# Patient Record
Sex: MALE | Race: WHITE | Employment: UNEMPLOYED | URBAN - METROPOLITAN AREA
[De-identification: names, ages, dates, MRNs, and addresses within clinical notes are randomized per-mention and may not be internally consistent; named-entity substitution may affect disease eponyms.]

---

## 2020-08-25 ENCOUNTER — APPOINTMENT (OUTPATIENT)
Dept: CT IMAGING | Age: 65
DRG: 166 | End: 2020-08-25
Attending: INTERNAL MEDICINE
Payer: COMMERCIAL

## 2020-08-25 ENCOUNTER — APPOINTMENT (OUTPATIENT)
Dept: GENERAL RADIOLOGY | Age: 65
DRG: 166 | End: 2020-08-25
Attending: STUDENT IN AN ORGANIZED HEALTH CARE EDUCATION/TRAINING PROGRAM
Payer: COMMERCIAL

## 2020-08-25 ENCOUNTER — HOSPITAL ENCOUNTER (INPATIENT)
Age: 65
LOS: 22 days | Discharge: NURSING, REHAB OR LONG-TERM CARE | DRG: 166 | End: 2020-09-16
Attending: EMERGENCY MEDICINE | Admitting: INTERNAL MEDICINE
Payer: COMMERCIAL

## 2020-08-25 ENCOUNTER — APPOINTMENT (OUTPATIENT)
Dept: INTERVENTIONAL RADIOLOGY/VASCULAR | Age: 65
DRG: 166 | End: 2020-08-25
Attending: INTERNAL MEDICINE
Payer: COMMERCIAL

## 2020-08-25 DIAGNOSIS — R06.02 SOB (SHORTNESS OF BREATH): ICD-10-CM

## 2020-08-25 DIAGNOSIS — J18.9 PNEUMONIA OF RIGHT LOWER LOBE DUE TO INFECTIOUS ORGANISM: ICD-10-CM

## 2020-08-25 DIAGNOSIS — R53.83 FATIGUE, UNSPECIFIED TYPE: ICD-10-CM

## 2020-08-25 DIAGNOSIS — A41.9 SEVERE SEPSIS (HCC): Primary | ICD-10-CM

## 2020-08-25 DIAGNOSIS — R65.20 SEVERE SEPSIS (HCC): Primary | ICD-10-CM

## 2020-08-25 DIAGNOSIS — K92.1 GASTROINTESTINAL HEMORRHAGE WITH MELENA: ICD-10-CM

## 2020-08-25 DIAGNOSIS — R63.4 WEIGHT LOSS, UNINTENTIONAL: ICD-10-CM

## 2020-08-25 DIAGNOSIS — R07.9 CHEST PAIN, UNSPECIFIED TYPE: ICD-10-CM

## 2020-08-25 PROBLEM — J90 PLEURAL EFFUSION, RIGHT: Status: ACTIVE | Noted: 2020-08-25

## 2020-08-25 PROBLEM — R19.7 DIARRHEA: Status: ACTIVE | Noted: 2020-08-25

## 2020-08-25 PROBLEM — I82.403 ACUTE DEEP VEIN THROMBOSIS (DVT) OF BOTH LOWER EXTREMITIES (HCC): Status: ACTIVE | Noted: 2020-08-25

## 2020-08-25 PROBLEM — I82.402 ACUTE DEEP VEIN THROMBOSIS (DVT) OF LEFT LOWER EXTREMITY (HCC): Status: ACTIVE | Noted: 2020-08-25

## 2020-08-25 PROBLEM — I26.99 BILATERAL PULMONARY EMBOLISM (HCC): Status: ACTIVE | Noted: 2020-08-25

## 2020-08-25 PROBLEM — R04.2 HEMOPTYSIS: Status: ACTIVE | Noted: 2020-08-25

## 2020-08-25 PROBLEM — Z78.9 ALCOHOL USE: Status: ACTIVE | Noted: 2020-08-25

## 2020-08-25 PROBLEM — R19.5 FECAL OCCULT BLOOD TEST POSITIVE: Status: ACTIVE | Noted: 2020-08-25

## 2020-08-25 PROBLEM — E87.1 HYPONATREMIA WITH EXTRACELLULAR FLUID DEPLETION: Status: ACTIVE | Noted: 2020-08-25

## 2020-08-25 PROBLEM — E80.6 HYPERBILIRUBINEMIA: Status: ACTIVE | Noted: 2020-08-25

## 2020-08-25 LAB
ALBUMIN SERPL-MCNC: 1 G/DL (ref 3.5–5)
ALBUMIN/GLOB SERPL: 0.2 {RATIO} (ref 1.1–2.2)
ALP SERPL-CCNC: 221 U/L (ref 45–117)
ALT SERPL-CCNC: 67 U/L (ref 12–78)
ANION GAP SERPL CALC-SCNC: 5 MMOL/L (ref 5–15)
AST SERPL-CCNC: 95 U/L (ref 15–37)
BASOPHILS # BLD: 0 K/UL (ref 0–0.1)
BASOPHILS NFR BLD: 0 % (ref 0–1)
BILIRUB SERPL-MCNC: 2.5 MG/DL (ref 0.2–1)
BUN SERPL-MCNC: 13 MG/DL (ref 6–20)
BUN/CREAT SERPL: 17 (ref 12–20)
CALCIUM SERPL-MCNC: 7.5 MG/DL (ref 8.5–10.1)
CHLORIDE SERPL-SCNC: 96 MMOL/L (ref 97–108)
CO2 SERPL-SCNC: 31 MMOL/L (ref 21–32)
COMMENT, HOLDF: NORMAL
COMMENT, HOLDF: NORMAL
CREAT SERPL-MCNC: 0.78 MG/DL (ref 0.7–1.3)
DIFFERENTIAL METHOD BLD: ABNORMAL
EOSINOPHIL # BLD: 0 K/UL (ref 0–0.4)
EOSINOPHIL NFR BLD: 0 % (ref 0–7)
ERYTHROCYTE [DISTWIDTH] IN BLOOD BY AUTOMATED COUNT: 19.8 % (ref 11.5–14.5)
GLOBULIN SER CALC-MCNC: 5.8 G/DL (ref 2–4)
GLUCOSE SERPL-MCNC: 66 MG/DL (ref 65–100)
HCT VFR BLD AUTO: 35.1 % (ref 36.6–50.3)
HEMOCCULT STL QL: POSITIVE
HGB BLD-MCNC: 12.2 G/DL (ref 12.1–17)
IMM GRANULOCYTES # BLD AUTO: 0.9 K/UL (ref 0–0.04)
IMM GRANULOCYTES NFR BLD AUTO: 3 % (ref 0–0.5)
LACTATE SERPL-SCNC: 1.1 MMOL/L (ref 0.4–2)
LACTATE SERPL-SCNC: 2.2 MMOL/L (ref 0.4–2)
LYMPHOCYTES # BLD: 2.5 K/UL (ref 0.8–3.5)
LYMPHOCYTES NFR BLD: 8 % (ref 12–49)
M PNEUMO IGM SER IA-ACNC: NONREACTIVE
MCH RBC QN AUTO: 34.1 PG (ref 26–34)
MCHC RBC AUTO-ENTMCNC: 34.8 G/DL (ref 30–36.5)
MCV RBC AUTO: 98 FL (ref 80–99)
MONOCYTES # BLD: 1.2 K/UL (ref 0–1)
MONOCYTES NFR BLD: 4 % (ref 5–13)
NEUTS SEG # BLD: 26.5 K/UL (ref 1.8–8)
NEUTS SEG NFR BLD: 85 % (ref 32–75)
NRBC # BLD: 0 K/UL (ref 0–0.01)
NRBC BLD-RTO: 0 PER 100 WBC
PLATELET # BLD AUTO: 367 K/UL (ref 150–400)
PMV BLD AUTO: 10.6 FL (ref 8.9–12.9)
POTASSIUM SERPL-SCNC: 4.4 MMOL/L (ref 3.5–5.1)
PROT SERPL-MCNC: 6.8 G/DL (ref 6.4–8.2)
RBC # BLD AUTO: 3.58 M/UL (ref 4.1–5.7)
RBC MORPH BLD: ABNORMAL
RBC MORPH BLD: ABNORMAL
SAMPLES BEING HELD,HOLD: NORMAL
SAMPLES BEING HELD,HOLD: NORMAL
SODIUM SERPL-SCNC: 132 MMOL/L (ref 136–145)
TROPONIN I SERPL-MCNC: <0.05 NG/ML
WBC # BLD AUTO: 31.1 K/UL (ref 4.1–11.1)

## 2020-08-25 PROCEDURE — 74011000250 HC RX REV CODE- 250: Performed by: STUDENT IN AN ORGANIZED HEALTH CARE EDUCATION/TRAINING PROGRAM

## 2020-08-25 PROCEDURE — 87635 SARS-COV-2 COVID-19 AMP PRB: CPT

## 2020-08-25 PROCEDURE — 74011000636 HC RX REV CODE- 636: Performed by: RADIOLOGY

## 2020-08-25 PROCEDURE — 37191 INS ENDOVAS VENA CAVA FILTR: CPT

## 2020-08-25 PROCEDURE — 74011000250 HC RX REV CODE- 250: Performed by: INTERNAL MEDICINE

## 2020-08-25 PROCEDURE — 77030029065 HC DRSG HEMO QCLOT ZMED -B

## 2020-08-25 PROCEDURE — 77030015395 HC CATH ANGI DX SVU ANGI -C

## 2020-08-25 PROCEDURE — 87040 BLOOD CULTURE FOR BACTERIA: CPT

## 2020-08-25 PROCEDURE — 80053 COMPREHEN METABOLIC PANEL: CPT

## 2020-08-25 PROCEDURE — C9113 INJ PANTOPRAZOLE SODIUM, VIA: HCPCS | Performed by: INTERNAL MEDICINE

## 2020-08-25 PROCEDURE — 76937 US GUIDE VASCULAR ACCESS: CPT

## 2020-08-25 PROCEDURE — 74011250636 HC RX REV CODE- 250/636: Performed by: STUDENT IN AN ORGANIZED HEALTH CARE EDUCATION/TRAINING PROGRAM

## 2020-08-25 PROCEDURE — 74011000258 HC RX REV CODE- 258: Performed by: INTERNAL MEDICINE

## 2020-08-25 PROCEDURE — 71260 CT THORAX DX C+: CPT

## 2020-08-25 PROCEDURE — 06H03DZ INSERTION OF INTRALUMINAL DEVICE INTO INFERIOR VENA CAVA, PERCUTANEOUS APPROACH: ICD-10-PCS | Performed by: STUDENT IN AN ORGANIZED HEALTH CARE EDUCATION/TRAINING PROGRAM

## 2020-08-25 PROCEDURE — 85025 COMPLETE CBC W/AUTO DIFF WBC: CPT

## 2020-08-25 PROCEDURE — C1769 GUIDE WIRE: HCPCS

## 2020-08-25 PROCEDURE — 74011250636 HC RX REV CODE- 250/636: Performed by: EMERGENCY MEDICINE

## 2020-08-25 PROCEDURE — 86738 MYCOPLASMA ANTIBODY: CPT

## 2020-08-25 PROCEDURE — 82272 OCCULT BLD FECES 1-3 TESTS: CPT

## 2020-08-25 PROCEDURE — C1894 INTRO/SHEATH, NON-LASER: HCPCS

## 2020-08-25 PROCEDURE — C1880 VENA CAVA FILTER: HCPCS

## 2020-08-25 PROCEDURE — B5191ZZ FLUOROSCOPY OF INFERIOR VENA CAVA USING LOW OSMOLAR CONTRAST: ICD-10-PCS | Performed by: STUDENT IN AN ORGANIZED HEALTH CARE EDUCATION/TRAINING PROGRAM

## 2020-08-25 PROCEDURE — 99285 EMERGENCY DEPT VISIT HI MDM: CPT

## 2020-08-25 PROCEDURE — 74011000636 HC RX REV CODE- 636: Performed by: STUDENT IN AN ORGANIZED HEALTH CARE EDUCATION/TRAINING PROGRAM

## 2020-08-25 PROCEDURE — 96360 HYDRATION IV INFUSION INIT: CPT

## 2020-08-25 PROCEDURE — 83605 ASSAY OF LACTIC ACID: CPT

## 2020-08-25 PROCEDURE — 84484 ASSAY OF TROPONIN QUANT: CPT

## 2020-08-25 PROCEDURE — 74011250636 HC RX REV CODE- 250/636: Performed by: INTERNAL MEDICINE

## 2020-08-25 PROCEDURE — 36415 COLL VENOUS BLD VENIPUNCTURE: CPT

## 2020-08-25 PROCEDURE — 65660000000 HC RM CCU STEPDOWN

## 2020-08-25 PROCEDURE — 93005 ELECTROCARDIOGRAM TRACING: CPT

## 2020-08-25 PROCEDURE — 71045 X-RAY EXAM CHEST 1 VIEW: CPT

## 2020-08-25 RX ORDER — HEPARIN SODIUM 10000 [USP'U]/100ML
18-36 INJECTION, SOLUTION INTRAVENOUS
Status: DISCONTINUED | OUTPATIENT
Start: 2020-08-25 | End: 2020-08-25

## 2020-08-25 RX ORDER — FENTANYL CITRATE 50 UG/ML
25-50 INJECTION, SOLUTION INTRAMUSCULAR; INTRAVENOUS
Status: DISCONTINUED | OUTPATIENT
Start: 2020-08-25 | End: 2020-08-26 | Stop reason: HOSPADM

## 2020-08-25 RX ORDER — POLYETHYLENE GLYCOL 3350 17 G/17G
17 POWDER, FOR SOLUTION ORAL DAILY PRN
Status: DISCONTINUED | OUTPATIENT
Start: 2020-08-25 | End: 2020-09-16 | Stop reason: HOSPADM

## 2020-08-25 RX ORDER — HEPARIN SODIUM 200 [USP'U]/100ML
500 INJECTION, SOLUTION INTRAVENOUS CONTINUOUS
Status: DISCONTINUED | OUTPATIENT
Start: 2020-08-25 | End: 2020-08-25 | Stop reason: HOSPADM

## 2020-08-25 RX ORDER — ONDANSETRON 2 MG/ML
4 INJECTION INTRAMUSCULAR; INTRAVENOUS
Status: DISCONTINUED | OUTPATIENT
Start: 2020-08-25 | End: 2020-09-16 | Stop reason: HOSPADM

## 2020-08-25 RX ORDER — FENTANYL CITRATE 50 UG/ML
25-50 INJECTION, SOLUTION INTRAMUSCULAR; INTRAVENOUS
Status: DISCONTINUED | OUTPATIENT
Start: 2020-08-25 | End: 2020-08-25 | Stop reason: HOSPADM

## 2020-08-25 RX ORDER — SODIUM CHLORIDE 9 MG/ML
50 INJECTION, SOLUTION INTRAVENOUS CONTINUOUS
Status: DISCONTINUED | OUTPATIENT
Start: 2020-08-25 | End: 2020-09-05

## 2020-08-25 RX ORDER — SODIUM CHLORIDE 0.9 % (FLUSH) 0.9 %
5-10 SYRINGE (ML) INJECTION AS NEEDED
Status: DISCONTINUED | OUTPATIENT
Start: 2020-08-25 | End: 2020-09-16 | Stop reason: HOSPADM

## 2020-08-25 RX ORDER — LIDOCAINE HYDROCHLORIDE 10 MG/ML
10-30 INJECTION INFILTRATION; PERINEURAL
Status: DISCONTINUED | OUTPATIENT
Start: 2020-08-25 | End: 2020-08-25 | Stop reason: HOSPADM

## 2020-08-25 RX ORDER — PROMETHAZINE HYDROCHLORIDE 25 MG/1
12.5 TABLET ORAL
Status: DISCONTINUED | OUTPATIENT
Start: 2020-08-25 | End: 2020-09-16 | Stop reason: HOSPADM

## 2020-08-25 RX ORDER — ACETAMINOPHEN 650 MG/1
650 SUPPOSITORY RECTAL
Status: DISCONTINUED | OUTPATIENT
Start: 2020-08-25 | End: 2020-09-16 | Stop reason: HOSPADM

## 2020-08-25 RX ORDER — SODIUM CHLORIDE 0.9 % (FLUSH) 0.9 %
5-40 SYRINGE (ML) INJECTION AS NEEDED
Status: DISCONTINUED | OUTPATIENT
Start: 2020-08-25 | End: 2020-09-16 | Stop reason: HOSPADM

## 2020-08-25 RX ORDER — SODIUM CHLORIDE 0.9 % (FLUSH) 0.9 %
5-40 SYRINGE (ML) INJECTION EVERY 8 HOURS
Status: DISCONTINUED | OUTPATIENT
Start: 2020-08-25 | End: 2020-09-16 | Stop reason: HOSPADM

## 2020-08-25 RX ORDER — HEPARIN SODIUM 5000 [USP'U]/ML
80 INJECTION, SOLUTION INTRAVENOUS; SUBCUTANEOUS ONCE
Status: DISCONTINUED | OUTPATIENT
Start: 2020-08-25 | End: 2020-08-25

## 2020-08-25 RX ORDER — ACETAMINOPHEN 325 MG/1
650 TABLET ORAL
Status: DISCONTINUED | OUTPATIENT
Start: 2020-08-25 | End: 2020-09-16 | Stop reason: HOSPADM

## 2020-08-25 RX ORDER — ENOXAPARIN SODIUM 100 MG/ML
40 INJECTION SUBCUTANEOUS DAILY
Status: CANCELLED | OUTPATIENT
Start: 2020-08-26

## 2020-08-25 RX ORDER — HEPARIN SODIUM 200 [USP'U]/100ML
500 INJECTION, SOLUTION INTRAVENOUS CONTINUOUS
Status: DISCONTINUED | OUTPATIENT
Start: 2020-08-26 | End: 2020-08-26 | Stop reason: HOSPADM

## 2020-08-25 RX ADMIN — SODIUM CHLORIDE 863 ML: 900 INJECTION, SOLUTION INTRAVENOUS at 17:38

## 2020-08-25 RX ADMIN — SODIUM CHLORIDE 40 MG: 9 INJECTION INTRAMUSCULAR; INTRAVENOUS; SUBCUTANEOUS at 22:27

## 2020-08-25 RX ADMIN — IOPAMIDOL 15 ML: 755 INJECTION, SOLUTION INTRAVENOUS at 23:43

## 2020-08-25 RX ADMIN — PIPERACILLIN AND TAZOBACTAM 3.38 G: 3; .375 INJECTION, POWDER, LYOPHILIZED, FOR SOLUTION INTRAVENOUS at 22:27

## 2020-08-25 RX ADMIN — AZITHROMYCIN 500 MG: 500 INJECTION, POWDER, LYOPHILIZED, FOR SOLUTION INTRAVENOUS at 17:42

## 2020-08-25 RX ADMIN — HEPARIN SODIUM 500 UNITS/HR: 200 INJECTION, SOLUTION INTRAVENOUS at 23:39

## 2020-08-25 RX ADMIN — IOPAMIDOL 100 ML: 755 INJECTION, SOLUTION INTRAVENOUS at 18:38

## 2020-08-25 RX ADMIN — LIDOCAINE HYDROCHLORIDE 5 ML: 10 INJECTION, SOLUTION INFILTRATION; PERINEURAL at 23:34

## 2020-08-25 RX ADMIN — Medication 10 ML: at 22:47

## 2020-08-25 RX ADMIN — SODIUM CHLORIDE 75 ML/HR: 900 INJECTION, SOLUTION INTRAVENOUS at 22:27

## 2020-08-25 RX ADMIN — FENTANYL CITRATE 50 MCG: 50 INJECTION, SOLUTION INTRAMUSCULAR; INTRAVENOUS at 23:37

## 2020-08-25 RX ADMIN — SODIUM CHLORIDE 1000 ML: 900 INJECTION, SOLUTION INTRAVENOUS at 16:05

## 2020-08-25 RX ADMIN — WATER 1 G: 1 INJECTION INTRAMUSCULAR; INTRAVENOUS; SUBCUTANEOUS at 17:43

## 2020-08-25 NOTE — ED NOTES
Verbal report given to Zoe (name) on Hannah Rolon being transferred to PCC(unit) for routine progression of care    Report consisted of patient's Situation, Background, Assessment and Recommendations (SBAR)    Information from the following report(s)  SBAR, ED Summary, Procedure Summary and Intake/Output was reviewed with the receiving nurse. Opportunity for questions and clarification was provided.     Patient transported with:  Monitor  Registered Nurse    Last Filed Values:  Temp: 98.2 °F (36.8 °C) (08/25/20 1927)  Pulse (Heart Rate): 96 (08/25/20 1927)  Resp Rate: 25 (08/25/20 1926)  O2 Sat (%): 93 % (08/25/20 1926)  BP: 90/71 (08/25/20 1927)  MAP (Monitor): 79 (08/25/20 1926)  MAP (Calculated): 77 (08/25/20 1927)  Level of Consciousness: Alert (08/25/20 1927)      Lab Results   Component Value Date/Time    WBC 31.1 (H) 08/25/2020 04:07 PM    Lactic acid 1.1 08/25/2020 06:25 PM    Lactic acid 2.2 (HH) 08/25/2020 04:07 PM       Repeat LA:  Time Due completed at 1825    Blood Cultures Drawn:  yes    Fluid Resuscitation:  Total needed 1863 mL, Status completed, amount 1863    All Antibiotics Started:  yes, Dose Due completed see MAR     VS x 2 post-fluid resuscitation:   yes    Vasopressor Infusion:  no n/a    Provider Reassessment needed and notified:  no , Due completed     Additional Interventions/Comments:  N/a

## 2020-08-25 NOTE — Clinical Note
IVC consent obtained. Plan for Filter insertion. IVC site prepped. Right internal jugular accessed. IVC wire inserted. Pre-deployment venacavogram done. Renal veins visualized. IVC filter inserted. Post-deployment venacavogram done. IVC placement verified. A Other (enter in comment page) filter placed in Inferior Vena Cava.  [employer]

## 2020-08-25 NOTE — ED TRIAGE NOTES
Pt reports chest pain, diarrhea, and lethargy for a few months. Pt reportedly fell on floor 2 days ago and was unable to get up for unknown amount of time.

## 2020-08-25 NOTE — ED PROVIDER NOTES
61 y/o M who denies any PMH (Hasn't seen a doctor in over 40 years besides seeing PCP 1 month ago) presents due to fatigue, chest pain, SOB, and diarrhea. Pt reports chronic worsening fatigue, weight loss of about 50 lbs over past year in a half, and worsening SOB over the past few weeks. Does not take any medications, went to establish with PCP last month due to symptoms and says has ABD CT scheduled for 8/27/20. Says has also been having \"black\" diarrhea a few times a day for the past week. Pt with history of smoking about 2 PPD for past 40 years and drinking 12 pack of beer daily for past 40 years, has been smoking and drinking less the past few weeks due to symptoms. Pt denies taking any medications or blood thinners. Pt seen and examined with Dr. Paco Torres. Chart reviewed. No past medical history on file. No past surgical history on file. No family history on file.     Social History     Socioeconomic History    Marital status: SINGLE     Spouse name: Not on file    Number of children: Not on file    Years of education: Not on file    Highest education level: Not on file   Occupational History    Not on file   Social Needs    Financial resource strain: Not on file    Food insecurity     Worry: Not on file     Inability: Not on file    Transportation needs     Medical: Not on file     Non-medical: Not on file   Tobacco Use    Smoking status: Not on file   Substance and Sexual Activity    Alcohol use: Not on file    Drug use: Not on file    Sexual activity: Not on file   Lifestyle    Physical activity     Days per week: Not on file     Minutes per session: Not on file    Stress: Not on file   Relationships    Social connections     Talks on phone: Not on file     Gets together: Not on file     Attends Mu-ism service: Not on file     Active member of club or organization: Not on file     Attends meetings of clubs or organizations: Not on file     Relationship status: Not on file    Intimate partner violence     Fear of current or ex partner: Not on file     Emotionally abused: Not on file     Physically abused: Not on file     Forced sexual activity: Not on file   Other Topics Concern    Not on file   Social History Narrative    Not on file         ALLERGIES: Patient has no known allergies. Review of Systems   Constitutional: Positive for appetite change, fatigue and unexpected weight change. Negative for chills and fever. HENT: Negative for congestion and trouble swallowing. Eyes: Negative for pain and redness. Respiratory: Positive for cough and shortness of breath. Negative for wheezing. Cardiovascular: Positive for chest pain and leg swelling. Negative for palpitations. Gastrointestinal: Positive for blood in stool and diarrhea. Negative for abdominal pain, constipation, nausea and vomiting. Genitourinary: Negative for difficulty urinating and dysuria. Musculoskeletal: Negative for neck pain and neck stiffness. Skin: Negative for rash. Neurological: Negative for seizures. Psychiatric/Behavioral: Negative for confusion. Vitals:    08/25/20 1918 08/25/20 1926 08/25/20 1927 08/25/20 2010   BP:  90/71 90/71    Pulse:  96 96 97   Resp:  25     Temp:   98.2 °F (36.8 °C)    SpO2: 94% 93%     Weight:       Height:                Physical Exam  Vitals signs reviewed. Constitutional:       Appearance: He is ill-appearing. Comments: Cachectic    HENT:      Head: Normocephalic and atraumatic. Eyes:      Pupils: Pupils are equal, round, and reactive to light. Neck:      Musculoskeletal: Normal range of motion and neck supple. Cardiovascular:      Rate and Rhythm: Regular rhythm. Tachycardia present. Heart sounds: Heart sounds are distant. No murmur. Pulmonary:      Effort: Tachypnea present. No respiratory distress. Breath sounds: Examination of the right-lower field reveals decreased breath sounds. Decreased breath sounds present.  No wheezing. Comments: Decreased effort  Abdominal:      General: Bowel sounds are normal.      Palpations: Abdomen is soft. Tenderness: There is no abdominal tenderness. There is no guarding. Genitourinary:     Comments: FOBT +, brown stool on exam  Musculoskeletal:      Comments: 2+ pitting edema in bilateral lower extremities to the knees   Skin:     Capillary Refill: Capillary refill takes less than 2 seconds. Coloration: Skin is pale. Comments: Spider angiomas no chest   Neurological:      General: No focal deficit present. Mental Status: He is alert and oriented to person, place, and time. Psychiatric:         Behavior: Behavior normal.         MDM  Number of Diagnoses or Management Options  Chest pain, unspecified type: new and requires workup  Fatigue, unspecified type: new and requires workup  Gastrointestinal hemorrhage with melena: new and requires workup  Pneumonia of right lower lobe due to infectious organism: new and requires workup  Severe sepsis [facility]): new and requires workup  SOB (shortness of breath): new and requires workup  Weight loss, unintentional: new and requires workup  Diagnosis management comments: 61 y/o M who appears very sick on exam, cachectic, denies any PMH because has not been seeing a doctor for past 40 years until last month. EKG sinus tachy 118, no ST elevations. Troponin neg. WBC 31.1, LA 2.2, tachy, and increased respiratory rate. R PNA on CXR. Pt with severe sepsis, ordered 30cc/kg fluid bolus, cultures, and Rocephin and Azithro. FOBT +, Hgb 12.2. Elevated LFTs. Patient is very sick. Consulted hospitalist for admission.         Amount and/or Complexity of Data Reviewed  Clinical lab tests: ordered and reviewed  Tests in the radiology section of CPT®: ordered and reviewed    Risk of Complications, Morbidity, and/or Mortality  Presenting problems: high  Diagnostic procedures: moderate  Management options: moderate    Critical Care  Total time providing critical care: < 30 minutes    Patient Progress  Patient progress: stable    ED Course as of Aug 25 2013   Tue Aug 25, 2020   1626 Pt evaluated at bedside. Cachectic, thin, and frail with significant bilateral LE pitting edema, telangiectasias on chest. Tachycardic.     [SM]      ED Course User Index  [SM] Tammie Dean MD       Procedures: none.      Maida Mandujano MD  08/25/20

## 2020-08-25 NOTE — PROGRESS NOTES
8/25/2020  5:57 PM    Case management note    eval face to face /both masked   Before isolation    Reason for Admission:   Lethargy, GLF  Patient came to ED for fall which he layed on floor for quite some time. Patient is very thin and ill looking. He has a friend at [facility] [phone]  Patient does drive, uses a cane. He currently does not take any medication, but if needed RX [facility]                   RUR Score:        moderate             Plan for utilizing home health: To be evaluated by PT/OT    PCP: First and Last name:  Gaye Ortega   Name of Practice:    Are you a current patient: Yes/No: yes   Approximate date of last visit: 1 month   Can you participate in a virtual visit with your PCP: no                    Current Advanced Directive/Advance Care Plan: no, order placed for silvia to do AD in am. Patient may be interested in DNR                         Transition of Care Plan:                    1. Home   2. PCP follow up  3. Long term planning  4. CM to follow for discharge needs.     Care Management Interventions  PCP Verified by CM: Yes(dr. gaye ortega)  Mode of Transport at Discharge: Self  Current Support Network: Lives Alone  The Plan for Transition of Care is Related to the Following Treatment Goals : failure to thrive  Discharge Location  Discharge Placement: [place]

## 2020-08-25 NOTE — PROGRESS NOTES
TRANSFER - IN REPORT:    Verbal report received from Greg(name) on William Gilliam  being received from ED (unit) for routine progression of care      Report consisted of patients Situation, Background, Assessment and   Recommendations(SBAR). Information from the following report(s) SBAR, Kardex, [facility] and Recent Results was reviewed with the receiving nurse. Opportunity for questions and clarification was provided. Assessment completed upon patients arrival to unit and care assumed. 0000  Pt returned from IVC placement. Reassessed, vs stable.

## 2020-08-25 NOTE — PROGRESS NOTES
[facility]: MED RECONCILIATION    Comments/Recommendations:     Per patient's friend Mehreen Velasquez, he does not currently take any prescription or OTC medications at home  Updated preferred pharmacy to [facility] on [address] for any outpatient prescription needs    Information obtained from: Mehreen Velasquez (patient's friend), no Rx query on file    Significant PMH/Disease States: No past medical history on file. Chief Complaint for this Admission:   Chief Complaint   Patient presents with    Chest Pain    Diarrhea    Fatigue     Allergies: Patient has no known allergies. Thank you for the consult,  Ray DALE [facility]

## 2020-08-26 ENCOUNTER — APPOINTMENT (OUTPATIENT)
Dept: INTERVENTIONAL RADIOLOGY/VASCULAR | Age: 65
DRG: 166 | End: 2020-08-26
Attending: EMERGENCY MEDICINE
Payer: COMMERCIAL

## 2020-08-26 LAB
ALBUMIN SERPL-MCNC: 0.7 G/DL (ref 3.5–5)
ALBUMIN SERPL-MCNC: 0.7 G/DL (ref 3.5–5)
ALBUMIN/GLOB SERPL: 0.2 {RATIO} (ref 1.1–2.2)
ALBUMIN/GLOB SERPL: 0.2 {RATIO} (ref 1.1–2.2)
ALP SERPL-CCNC: 137 U/L (ref 45–117)
ALP SERPL-CCNC: 141 U/L (ref 45–117)
ALT SERPL-CCNC: 44 U/L (ref 12–78)
ALT SERPL-CCNC: 47 U/L (ref 12–78)
ANION GAP SERPL CALC-SCNC: 4 MMOL/L (ref 5–15)
ANION GAP SERPL CALC-SCNC: 7 MMOL/L (ref 5–15)
APPEARANCE FLD: ABNORMAL
APPEARANCE UR: CLEAR
AST SERPL-CCNC: 59 U/L (ref 15–37)
AST SERPL-CCNC: 75 U/L (ref 15–37)
ATRIAL RATE: 119 BPM
BACTERIA URNS QL MICRO: NEGATIVE /HPF
BASOPHILS # BLD: 0 K/UL (ref 0–0.1)
BASOPHILS NFR BLD: 0 % (ref 0–1)
BILIRUB SERPL-MCNC: 1.1 MG/DL (ref 0.2–1)
BILIRUB SERPL-MCNC: 1.4 MG/DL (ref 0.2–1)
BILIRUB UR QL CFM: POSITIVE
BODY FLD TYPE: NORMAL
BUN SERPL-MCNC: 11 MG/DL (ref 6–20)
BUN SERPL-MCNC: 12 MG/DL (ref 6–20)
BUN/CREAT SERPL: 16 (ref 12–20)
BUN/CREAT SERPL: 26 (ref 12–20)
CALCIUM SERPL-MCNC: 6.6 MG/DL (ref 8.5–10.1)
CALCIUM SERPL-MCNC: 6.6 MG/DL (ref 8.5–10.1)
CALCULATED P AXIS, ECG09: 29 DEGREES
CALCULATED R AXIS, ECG10: 54 DEGREES
CALCULATED T AXIS, ECG11: 111 DEGREES
CHLORIDE SERPL-SCNC: 102 MMOL/L (ref 97–108)
CHLORIDE SERPL-SCNC: 104 MMOL/L (ref 97–108)
CO2 SERPL-SCNC: 25 MMOL/L (ref 21–32)
CO2 SERPL-SCNC: 29 MMOL/L (ref 21–32)
COLOR FLD: ABNORMAL
COLOR UR: ABNORMAL
CREAT FLD-MCNC: 1.11 MG/DL
CREAT SERPL-MCNC: 0.47 MG/DL (ref 0.7–1.3)
CREAT SERPL-MCNC: 0.68 MG/DL (ref 0.7–1.3)
DIAGNOSIS, 93000: NORMAL
DIFFERENTIAL METHOD BLD: ABNORMAL
EOSINOPHIL # BLD: 0 K/UL (ref 0–0.4)
EOSINOPHIL NFR BLD: 0 % (ref 0–7)
EPITH CASTS URNS QL MICRO: ABNORMAL /LPF
ERYTHROCYTE [DISTWIDTH] IN BLOOD BY AUTOMATED COUNT: 20 % (ref 11.5–14.5)
GLOBULIN SER CALC-MCNC: 4.4 G/DL (ref 2–4)
GLOBULIN SER CALC-MCNC: 4.5 G/DL (ref 2–4)
GLUCOSE FLD-MCNC: <1 MG/DL
GLUCOSE SERPL-MCNC: 131 MG/DL (ref 65–100)
GLUCOSE SERPL-MCNC: 51 MG/DL (ref 65–100)
GLUCOSE UR STRIP.AUTO-MCNC: NEGATIVE MG/DL
HCT VFR BLD AUTO: 28 % (ref 36.6–50.3)
HEALTH STATUS, XMCV2T: NORMAL
HGB BLD-MCNC: 9.7 G/DL (ref 12.1–17)
HGB UR QL STRIP: NEGATIVE
IMM GRANULOCYTES # BLD AUTO: 0 K/UL (ref 0–0.04)
IMM GRANULOCYTES NFR BLD AUTO: 0 % (ref 0–0.5)
KETONES UR QL STRIP.AUTO: ABNORMAL MG/DL
LDH FLD L TO P-CCNC: 1620 U/L
LEUKOCYTE ESTERASE UR QL STRIP.AUTO: ABNORMAL
LYMPHOCYTES # BLD: 1.9 K/UL (ref 0.8–3.5)
LYMPHOCYTES NFR BLD: 6 % (ref 12–49)
LYMPHOCYTES NFR FLD: 3 %
MCH RBC QN AUTO: 34.3 PG (ref 26–34)
MCHC RBC AUTO-ENTMCNC: 34.6 G/DL (ref 30–36.5)
MCV RBC AUTO: 98.9 FL (ref 80–99)
MONOCYTES # BLD: 0 K/UL (ref 0–1)
MONOCYTES NFR BLD: 0 % (ref 5–13)
MONOS+MACROS NFR FLD: 6 %
MYELOCYTES NFR BLD MANUAL: 1 %
NEUTROPHILS NFR FLD: 91 %
NEUTS BAND NFR BLD MANUAL: 8 %
NEUTS SEG # BLD: 29.7 K/UL (ref 1.8–8)
NEUTS SEG NFR BLD: 85 % (ref 32–75)
NITRITE UR QL STRIP.AUTO: NEGATIVE
NRBC # BLD: 0 K/UL (ref 0–0.01)
NRBC BLD-RTO: 0 PER 100 WBC
NUC CELL # FLD: ABNORMAL /CU MM
P-R INTERVAL, ECG05: 98 MS
PH FLD: 6.8 [PH]
PH UR STRIP: 6 [PH] (ref 5–8)
PLATELET # BLD AUTO: 279 K/UL (ref 150–400)
PMV BLD AUTO: 10.6 FL (ref 8.9–12.9)
POTASSIUM SERPL-SCNC: 3.9 MMOL/L (ref 3.5–5.1)
POTASSIUM SERPL-SCNC: 4.4 MMOL/L (ref 3.5–5.1)
PROT FLD-MCNC: 2.9 G/DL
PROT SERPL-MCNC: 5.1 G/DL (ref 6.4–8.2)
PROT SERPL-MCNC: 5.2 G/DL (ref 6.4–8.2)
PROT UR STRIP-MCNC: ABNORMAL MG/DL
Q-T INTERVAL, ECG07: 310 MS
QRS DURATION, ECG06: 74 MS
QTC CALCULATION (BEZET), ECG08: 436 MS
RBC # BLD AUTO: 2.83 M/UL (ref 4.1–5.7)
RBC # FLD: >100 /CU MM
RBC #/AREA URNS HPF: ABNORMAL /HPF (ref 0–5)
RBC MORPH BLD: ABNORMAL
RBC MORPH BLD: ABNORMAL
SARS-COV-2, COV2: NOT DETECTED
SODIUM SERPL-SCNC: 135 MMOL/L (ref 136–145)
SODIUM SERPL-SCNC: 136 MMOL/L (ref 136–145)
SOURCE, COVRS: NORMAL
SP GR UR REFRACTOMETRY: 1 (ref 1–1.03)
SPECIMEN SOURCE FLD: ABNORMAL
SPECIMEN SOURCE FLD: NORMAL
SPECIMEN SOURCE, FCOV2M: NORMAL
SPECIMEN TYPE, XMCV1T: NORMAL
UR CULT HOLD, URHOLD: NORMAL
UROBILINOGEN UR QL STRIP.AUTO: 1 EU/DL (ref 0.2–1)
VENTRICULAR RATE, ECG03: 119 BPM
WBC # BLD AUTO: 31.9 K/UL (ref 4.1–11.1)
WBC URNS QL MICRO: ABNORMAL /HPF (ref 0–4)

## 2020-08-26 PROCEDURE — 80053 COMPREHEN METABOLIC PANEL: CPT

## 2020-08-26 PROCEDURE — 74011000258 HC RX REV CODE- 258: Performed by: INTERNAL MEDICINE

## 2020-08-26 PROCEDURE — 74011250637 HC RX REV CODE- 250/637: Performed by: FAMILY MEDICINE

## 2020-08-26 PROCEDURE — 77030002996 HC SUT SLK J&J -A

## 2020-08-26 PROCEDURE — 87070 CULTURE OTHR SPECIMN AEROBIC: CPT

## 2020-08-26 PROCEDURE — 84157 ASSAY OF PROTEIN OTHER: CPT

## 2020-08-26 PROCEDURE — 74011250636 HC RX REV CODE- 250/636: Performed by: NURSE PRACTITIONER

## 2020-08-26 PROCEDURE — 87116 MYCOBACTERIA CULTURE: CPT

## 2020-08-26 PROCEDURE — 0W9930Z DRAINAGE OF RIGHT PLEURAL CAVITY WITH DRAINAGE DEVICE, PERCUTANEOUS APPROACH: ICD-10-PCS | Performed by: RADIOLOGY

## 2020-08-26 PROCEDURE — 81001 URINALYSIS AUTO W/SCOPE: CPT

## 2020-08-26 PROCEDURE — 87075 CULTR BACTERIA EXCEPT BLOOD: CPT

## 2020-08-26 PROCEDURE — 87015 SPECIMEN INFECT AGNT CONCNTJ: CPT

## 2020-08-26 PROCEDURE — 89050 BODY FLUID CELL COUNT: CPT

## 2020-08-26 PROCEDURE — 83986 ASSAY PH BODY FLUID NOS: CPT

## 2020-08-26 PROCEDURE — 88112 CYTOPATH CELL ENHANCE TECH: CPT

## 2020-08-26 PROCEDURE — C9113 INJ PANTOPRAZOLE SODIUM, VIA: HCPCS | Performed by: INTERNAL MEDICINE

## 2020-08-26 PROCEDURE — 87205 SMEAR GRAM STAIN: CPT

## 2020-08-26 PROCEDURE — 83615 LACTATE (LD) (LDH) ENZYME: CPT

## 2020-08-26 PROCEDURE — 77030002986 HC SUT PROL J&J -A

## 2020-08-26 PROCEDURE — 65660000000 HC RM CCU STEPDOWN

## 2020-08-26 PROCEDURE — 87186 SC STD MICRODIL/AGAR DIL: CPT

## 2020-08-26 PROCEDURE — 86480 TB TEST CELL IMMUN MEASURE: CPT

## 2020-08-26 PROCEDURE — 77030012390 HC DRN CHST BTL GTNG -B

## 2020-08-26 PROCEDURE — 88305 TISSUE EXAM BY PATHOLOGIST: CPT

## 2020-08-26 PROCEDURE — 76942 ECHO GUIDE FOR BIOPSY: CPT

## 2020-08-26 PROCEDURE — 87102 FUNGUS ISOLATION CULTURE: CPT

## 2020-08-26 PROCEDURE — C1894 INTRO/SHEATH, NON-LASER: HCPCS

## 2020-08-26 PROCEDURE — 82945 GLUCOSE OTHER FLUID: CPT

## 2020-08-26 PROCEDURE — 85025 COMPLETE CBC W/AUTO DIFF WBC: CPT

## 2020-08-26 PROCEDURE — C1729 CATH, DRAINAGE: HCPCS

## 2020-08-26 PROCEDURE — 87899 AGENT NOS ASSAY W/OPTIC: CPT

## 2020-08-26 PROCEDURE — 74011250636 HC RX REV CODE- 250/636: Performed by: RADIOLOGY

## 2020-08-26 PROCEDURE — 77030011229 HC DIL VESL COON COOK -A

## 2020-08-26 PROCEDURE — 82570 ASSAY OF URINE CREATININE: CPT

## 2020-08-26 PROCEDURE — C1769 GUIDE WIRE: HCPCS

## 2020-08-26 PROCEDURE — 74011250636 HC RX REV CODE- 250/636: Performed by: INTERNAL MEDICINE

## 2020-08-26 PROCEDURE — 74011250637 HC RX REV CODE- 250/637: Performed by: INTERNAL MEDICINE

## 2020-08-26 PROCEDURE — 36415 COLL VENOUS BLD VENIPUNCTURE: CPT

## 2020-08-26 PROCEDURE — 84311 SPECTROPHOTOMETRY: CPT

## 2020-08-26 PROCEDURE — 87077 CULTURE AEROBIC IDENTIFY: CPT

## 2020-08-26 PROCEDURE — 87449 NOS EACH ORGANISM AG IA: CPT

## 2020-08-26 PROCEDURE — 32557 INSERT CATH PLEURA W/ IMAGE: CPT

## 2020-08-26 RX ORDER — MORPHINE SULFATE 2 MG/ML
1 INJECTION, SOLUTION INTRAMUSCULAR; INTRAVENOUS
Status: DISCONTINUED | OUTPATIENT
Start: 2020-08-26 | End: 2020-09-16 | Stop reason: HOSPADM

## 2020-08-26 RX ORDER — LANOLIN ALCOHOL/MO/W.PET/CERES
1000 CREAM (GRAM) TOPICAL DAILY
Status: DISCONTINUED | OUTPATIENT
Start: 2020-08-27 | End: 2020-09-16 | Stop reason: HOSPADM

## 2020-08-26 RX ORDER — FOLIC ACID 1 MG/1
1 TABLET ORAL DAILY
Status: DISCONTINUED | OUTPATIENT
Start: 2020-08-27 | End: 2020-09-16 | Stop reason: HOSPADM

## 2020-08-26 RX ORDER — MIDAZOLAM HYDROCHLORIDE 1 MG/ML
1-10 INJECTION, SOLUTION INTRAMUSCULAR; INTRAVENOUS
Status: CANCELLED | OUTPATIENT
Start: 2020-08-26

## 2020-08-26 RX ORDER — HYDROCODONE BITARTRATE AND ACETAMINOPHEN 5; 325 MG/1; MG/1
1 TABLET ORAL
Status: DISCONTINUED | OUTPATIENT
Start: 2020-08-26 | End: 2020-09-16 | Stop reason: HOSPADM

## 2020-08-26 RX ORDER — FENTANYL CITRATE 50 UG/ML
25-100 INJECTION, SOLUTION INTRAMUSCULAR; INTRAVENOUS
Status: DISCONTINUED | OUTPATIENT
Start: 2020-08-26 | End: 2020-08-29

## 2020-08-26 RX ORDER — LIDOCAINE HYDROCHLORIDE 10 MG/ML
10 INJECTION, SOLUTION EPIDURAL; INFILTRATION; INTRACAUDAL; PERINEURAL
Status: DISCONTINUED | OUTPATIENT
Start: 2020-08-26 | End: 2020-08-29

## 2020-08-26 RX ORDER — VANCOMYCIN/0.9 % SOD CHLORIDE 1.5G/250ML
1500 PLASTIC BAG, INJECTION (ML) INTRAVENOUS
Status: COMPLETED | OUTPATIENT
Start: 2020-08-26 | End: 2020-08-26

## 2020-08-26 RX ADMIN — PIPERACILLIN AND TAZOBACTAM 3.38 G: 3; .375 INJECTION, POWDER, LYOPHILIZED, FOR SOLUTION INTRAVENOUS at 17:39

## 2020-08-26 RX ADMIN — SODIUM CHLORIDE 75 ML/HR: 900 INJECTION, SOLUTION INTRAVENOUS at 12:47

## 2020-08-26 RX ADMIN — Medication 10 ML: at 04:54

## 2020-08-26 RX ADMIN — PIPERACILLIN AND TAZOBACTAM 3.38 G: 3; .375 INJECTION, POWDER, LYOPHILIZED, FOR SOLUTION INTRAVENOUS at 04:54

## 2020-08-26 RX ADMIN — Medication 10 ML: at 23:55

## 2020-08-26 RX ADMIN — VANCOMYCIN HYDROCHLORIDE 750 MG: 750 INJECTION, POWDER, LYOPHILIZED, FOR SOLUTION INTRAVENOUS at 23:52

## 2020-08-26 RX ADMIN — FENTANYL CITRATE 50 MCG: 50 INJECTION, SOLUTION INTRAMUSCULAR; INTRAVENOUS at 16:35

## 2020-08-26 RX ADMIN — DOXYCYCLINE 100 MG: 100 INJECTION, POWDER, LYOPHILIZED, FOR SOLUTION INTRAVENOUS at 17:40

## 2020-08-26 RX ADMIN — HYDROCODONE BITARTRATE AND ACETAMINOPHEN 1 TABLET: 5; 325 TABLET ORAL at 19:26

## 2020-08-26 RX ADMIN — SODIUM CHLORIDE 40 MG: 9 INJECTION INTRAMUSCULAR; INTRAVENOUS; SUBCUTANEOUS at 20:22

## 2020-08-26 RX ADMIN — VANCOMYCIN HYDROCHLORIDE 1500 MG: 10 INJECTION, POWDER, LYOPHILIZED, FOR SOLUTION INTRAVENOUS at 12:47

## 2020-08-26 RX ADMIN — PIPERACILLIN AND TAZOBACTAM 3.38 G: 3; .375 INJECTION, POWDER, LYOPHILIZED, FOR SOLUTION INTRAVENOUS at 09:22

## 2020-08-26 RX ADMIN — SODIUM CHLORIDE 40 MG: 9 INJECTION INTRAMUSCULAR; INTRAVENOUS; SUBCUTANEOUS at 09:19

## 2020-08-26 RX ADMIN — PANCRELIPASE 1 CAPSULE: 60000; 12000; 38000 CAPSULE, DELAYED RELEASE PELLETS ORAL at 19:26

## 2020-08-26 NOTE — PROCEDURES
Interventional Radiology  Procedure Note        8/25/2020 11:50 PM    Patient: Lela Martinez     Informed consent obtained    Diagnosis: IVC thrombus, PE, hemoptysis    Procedure(s): IVC filter    Specimens removed:  none    Complications: None    Primary Physician: Eric Song MD    Recomendations: N/A    Discharge Disposition: Stable; return to floor    Full dictated report to follow    Eric Song MD  Interventional Radiology  [facility], [facility].  11:50 PM, 8/25/2020

## 2020-08-26 NOTE — PROGRESS NOTES
Physical Therapy  Orders received and chart reviewed. Patient with new extensive LE DVT, bilateral PE's, COVID and TB rule outs, s/p IVC filter placement yesterday, new start of Heparin and awaiting a thoracic surgery consult. He remains hypotensive. Due to medical complexity, we will continue to follow and re-attempt tomorrow. Thank you.   Kiya Ortiz PT,DPT,NCS

## 2020-08-26 NOTE — PROGRESS NOTES
0700- Bedside and Verbal shift change report given to OWEN Tena RN (oncoming nurse) by Luis M Steven RN (offgoing nurse). Report included the following information SBAR, Kardex and ED Summary. 1554- Covid not detected, updated Dr Rossana Keating. Per MD, not retesting COVID. Placing patient on airbourne precautions d/t pending TB test, DC droplet +.     1625- Patient asked to get out of bed stating \"I can walk\" but refused to stand and pivot to stretcher. PT to assess patient. 1630- Pt off floor for chest tube placement. 1740- Pt returned to floor    1900- Pt c/o pain at chest tube site. Spoke with Dr Jermain Sage, orders received for norco 5-325 q6prn and IV morphine 1mg q4prn. Also received orders to check CMP in 1 hour d/t chest tube drainage. 1900- Bedside and Verbal shift change report given to Neo Multani (oncoming nurse) by Ti Canas RN (offgoing nurse). Report included the following information SBAR, Kardex, ED Summary, Procedure Summary, Intake/Output and MAR.

## 2020-08-26 NOTE — PROGRESS NOTES
Occupational Therapy Note:  Chart reviewed and spoke with nursing. Patient with new extensive LE DVT, bilateral PE's, COVID and TB rule outs, s/p IVC filter placement yesterday, new start of Heparin and awaiting a thoracic surgery consult. He remains hypotensive. Due to medical complexity, we will continue to follow and re-attempt tomorrow as appropriate. Thank you.   Janell Garcia

## 2020-08-26 NOTE — PROGRESS NOTES
[facility] Dosing Services: Antimicrobial Stewardship Progress Note    Consult for antibiotic dosing of vancomycin by Rosalio Yates NP  Pharmacist reviewed antibiotic appropriateness for 60year old , male  for indication of CAP  Day of Therapy 1/7    Plan:  Vancomycin therapy:  Start Vancomycin therapy, with loading dose of 1500mg. Follow with maintenance dose of 750mg every 12 hours. Dose calculated to approximate a therapeutic trough of 15-20 mcg/mL. Last trough level / Plan for level: Trough prior to 4th dose (not yet ordered). Pharmacy to follow daily and will make changes to dose and/or frequency based on clinical status. Date Dose & Interval Measured (mcg/mL) Extrapolated (mcg/mL)   ? ? ? ?   ? ? ? ?   ? ? ? ? Other Antimicrobial  (not dosed by pharmacist)   Zosyn 3.375g IV every 8 hours  Doxycyline 100mg IV every 12 hours   Cultures     8/25 Blood x2: NGTD x 16h (prelim)  8/25 Sputum: in process  Urine on hold   Serum Creatinine     Lab Results   Component Value Date/Time    Creatinine 0.47 (L) 08/26/2020 03:12 AM       Creatinine Clearance Estimated Creatinine Clearance: 87.8 mL/min (A) (by C-G formula based on SCr of 0.47 mg/dL (L)).      Procalcitonin  No results found for: PCT     Temp   98.6 °F (37 °C)    WBC   Lab Results   Component Value Date/Time    WBC 31.9 (H) 08/26/2020 03:12 AM       H/H   Lab Results   Component Value Date/Time    HGB 9.7 (L) 08/26/2020 03:12 AM      Platelets Lab Results   Component Value Date/Time    PLATELET 104 85/85/2348 03:12 AM            Pharmacist: Signed Berta Watts Contact information: [phone]

## 2020-08-26 NOTE — PROGRESS NOTES
Spiritual Care Assessment/Progress Note  [address]      NAME: Kenneth Cerrato      MRN: [id number]  AGE: 60 y.o. SEX: male  Nondenominational Affiliation: No preference   Language: English     8/26/2020     Total Time (in minutes): 20     Spiritual Assessment begun in [facility] 3 PRO CARE TELE 2 through conversation with:         [x]Patient        [] Family    [] Friend(s)        Reason for Consult: Advance medical directive consult     Spiritual beliefs: (Please include comment if needed)     [] Identifies with a jaret tradition:         [] Supported by a jaret community:            [x] Claims no spiritual orientation:           [] Seeking spiritual identity:                [] Adheres to an individual form of spirituality:           [] Not able to assess:                           Identified resources for coping:      [] Prayer                               [] Music                  [] Guided Imagery     [x] Family/friends                 [] Pet visits     [] Devotional reading                         [] Unknown     [] Other:                                       Interventions offered during this visit: (See comments for more details)    Patient Interventions: Advance medical directive consult, Affirmation of emotions/emotional suffering           Plan of Care:     [] Support spiritual and/or cultural needs    [] Support AMD and/or advance care planning process      [] Support grieving process   [] Coordinate Rites and/or Rituals    [] Coordination with community clergy   [] No spiritual needs identified at this time   [] Detailed Plan of Care below (See Comments)  [] Make referral to Music Therapy  [] Make referral to Pet Therapy     [] Make referral to Addiction services  [] Make referral to [facility]  [] Make referral to Spiritual Care Partner  [] No future visits requested        [x] Follow up visits as needed     Comments: Responded to In Basket request for and Advance Medical Directive (AMD).    Arley Francisco is under contact restrictions. After consulting with his nurse called him on the phone. He indicated he was not feeling well. He said he knows what an AMD is and needs to get a friend here to be able to go over the information with them. Gave the AMD form and  Your Right to Decide booklet to his nurse to take into the room the next time she goes in. Advised of  Availability should he have further question or assistance be needed. At this time Mr. Arley Francisco is feeling bad, he indicated that he has nothing that brings him strength spiritually right now. Provided supportive presence.     Visited by: River Osborne., MS., [address] [facility] ([id number])

## 2020-08-26 NOTE — PROGRESS NOTES
Problem: Falls - Risk of  Goal: *Absence of Falls  Description: Document Lubna Eubanks Fall Risk and appropriate interventions in the flowsheet.   Outcome: Progressing Towards Goal  Note: Fall Risk Interventions:  Mobility Interventions: Bed/chair exit alarm, OT consult for ADLs, Patient to call before getting OOB, PT Consult for mobility concerns         Medication Interventions: Bed/chair exit alarm, Patient to call before getting OOB, Teach patient to arise slowly    Elimination Interventions: Bed/chair exit alarm, Call light in reach, Urinal in reach    History of Falls Interventions: Bed/chair exit alarm, Investigate reason for fall, Vital signs minimum Q4HRs X 24 hrs (comment for end date), Room close to nurse's station

## 2020-08-26 NOTE — PROGRESS NOTES
Daily Progress Note: 8/26/2020  Sabi Perkins MD    Assessment/Plan:   Pneumonia involving right lung (8/25/2020) /  Hemoptysis (8/25/2020) POA: extensive with internal cavitation and air fluid levels. Malignancy remains a DDx. Blood cultures, sputum cultures and serologies. empiric IV Zosyn and Doxycycline. Consulted pulmonology and Thoracic Surg     Bilateral pulmonary embolism, acute ([address] (8/25/2020) / Acute deep vein thrombosis (DVT) of both lower extremities ([facility] [address] (8/25/2020): due to associated hemoptysis and positive FOBT, I have discussed with IR and will have an IVC filter placed tonight pending further testing prior to full anticoagulation; recheck Hb.     Weight loss, unintentional (8/25/2020) POA: still concerning for malignancy but severe protein calorie malnutrition remains a DDx. Work up as above. Diet as tolerated     Hyperbilirubinemia (8/25/2020) POA: unclear etiology. CT abdomen unremarkable. Follow LFTs in AM     Pleural effusion, right (8/25/2020) POA: empyema, pneumonia vs malignant effusion. Consult pulmonology     Alcohol use (8/25/2020) POA: he says he has cut down recently. Monitor for withdrawal     Diarrhea (8/25/2020) / Fecal occult blood test positive (8/25/2020) POA: check stool studies. Monitor Hgb. May need a Gi evaluation once more stable.  Consulted GI     Hyponatremia with extracellular fluid depletion (8/25/2020) POA: gently hydration         Problem List:  Problem List as of 8/26/2020 Date Reviewed: 8/25/2020          Codes Class Noted - Resolved    * (Principal) Pneumonia involving right lung ICD-10-CM: J18.9  ICD-9-CM: 486  8/25/2020 - Present        Hemoptysis ICD-10-CM: R04.2  ICD-9-CM: 786.30  8/25/2020 - Present        Hyperbilirubinemia ICD-10-CM: E80.6  ICD-9-CM: 782.4  8/25/2020 - Present        Weight loss, unintentional ICD-10-CM: R63.4  ICD-9-CM: 783.21  8/25/2020 - Present        Pleural effusion, right ICD-10-CM: J90  ICD-9-CM: 511.9 8/25/2020 - Present        Bilateral pulmonary embolism ([facility] [address] ICD-10-CM: I26.99  ICD-9-CM: 415.19  8/25/2020 - Present        Acute deep vein thrombosis (DVT) of both lower extremities (HCC) ICD-10-CM: I82.403  ICD-9-CM: 453.40  8/25/2020 - Present        Alcohol use ICD-10-CM: Z72.89  ICD-9-CM: V49.89  8/25/2020 - Present        Diarrhea ICD-10-CM: R19.7  ICD-9-CM: 787.91  8/25/2020 - Present        Fecal occult blood test positive ICD-10-CM: R19.5  ICD-9-CM: 792.1  8/25/2020 - Present        Hyponatremia with extracellular fluid depletion ICD-10-CM: E87.1  ICD-9-CM: 276.1  8/25/2020 - Present              Subjective:     60 y.o. male who is being admitted for Pneumonia involving right lung. Mr. Frankie Lopez presented to our Emergency Department today complaining of generalized weakness, diarrhea and lethargy for several months. Patient was accompanied by his friend who gave most of the history. The patient does not like seeing physicians and apparently uses vitamin supplements. He is a smoker and drinks alcohol and has been progressively weaker over several months now. He has had pleuritic right sided chest pain associated with a productive cough and hemoptysis. He feel on the floor several days ago and was unable to get up. His friends helped him up and brought him to the ED for further testing. Initial CXR done showed airspace disease in right mid lower lung zone consistent with pneumonia. This is surrounded by a moderate right-sided pleural effusion. Upon review, he was noted to be cachectic with swollen lower extremities. CT scans chest abdomen and pelvis done showed a diffuse irregular opacification throughout the right lower lobe with multiple areas of internal cavitation and air-fluid levels. Findings are more likely related to infection than malignancy. Large right pleural effusion. Trace left pleural effusion. Bilateral pulmonary emboli. Clot burden is moderate. No evidence of right heart strain.  Thrombus in the veins of the left lower extremity extending to the IVC. A small amount of thrombus is also seen in the right common iliac vein. He will be admitted for further management. (Dr Genaro Mario)    :  Feeling a little better this AM but still c/o painful, pleuritic cough and occas thick sputum. No leg/calf pains. No known exposure to ill individuals/no travel - \"just staying in the last few months. \"  No temps. WBC 31.9K. Cultures neg so far.       Review of Systems:   A comprehensive review of systems was negative except for that written in the HPI. Objective:   Physical Exam:   Visit Vitals  BP 95/57 (BP 1 Location: Right arm, BP Patient Position: At rest)   Pulse 91   Temp 97.7 °F (36.5 °C)   Resp 22   Ht 5' 10\" (1.778 m)   Wt 56.7 kg (125 lb 1.6 oz)   SpO2 95%   BMI 17.95 kg/m²      O2 Device: Room air  Temp (24hrs), Av.9 °F (36.6 °C), Min:97.5 °F (36.4 °C), Max:98.2 °F (36.8 °C)    No intake/output data recorded.  1901 -  0700  In: 2263 [P.O.:400; I.V.:1863]  Out: 400 [Urine:400]  General:  Alert, cooperative, no distress, cachetic, ill appearing. Head:  Normocephalic, without obvious abnormality, atraumatic. Eyes:  Kinder conjuntiva. PERRL, EOMs intact. Nose: Nares normal. Septum midline. Mucosa normal. No drainage or sinus tenderness. Throat: Lips, mucosa, and tongue moist..   Neck: Supple, symmetrical, trachea midline, no adenopathy, thyroid: no enlargement/tenderness/nodules, no carotid bruit and no JVD. Lungs:   Poor air movement, R less than Left, a few scattered rhonchi. Chest wall:  No tenderness or deformity. Heart:  Regular rate and rhythm, S1, S2 normal, no murmur, click, rub or gallop. Abdomen:   Soft, non-tender. Bowel sounds normal. No masses,  No organomegaly. Extremities: no cyanosis or edema. No calf tenderness; swelling of left calf >right. No cords palpated. Pulses: 2+ and symmetric all extremities.    Skin: Skin color, texture, turgor normal. No rashes   Neurologic:  Alert and oriented X 3. Fine motor of hands and fingers normal.   equal.  No cogwheeling or rigidity. Gait not tested at this time. Sensation grossly normal to touch. Gross motor of extremities normal.       Data Review:       Recent Days:  Recent Labs     08/26/20  0312 08/25/20  1607   WBC 31.9* 31.1*   HGB 9.7* 12.2   HCT 28.0* 35.1*    367     Recent Labs     08/26/20 0312 08/25/20  1607   * 132*   K 4.4 4.4    96*   CO2 29 31   GLU 51* 66   BUN 12 13   CREA 0.47* 0.78   CA 6.6* 7.5*   ALB 0.7* 1.0*   TBILI 1.4* 2.5*   ALT 47 67     No results for input(s): PH, PCO2, PO2, HCO3, FIO2 in the last 72 hours. 24 Hour Results:  Recent Results (from the past 24 hour(s))   CBC WITH AUTOMATED DIFF    Collection Time: 08/25/20  4:07 PM   Result Value Ref Range    WBC 31.1 (H) 4.1 - 11.1 K/uL    RBC 3.58 (L) 4.10 - 5.70 M/uL    HGB 12.2 12.1 - 17.0 g/dL    HCT 35.1 (L) 36.6 - 50.3 %    MCV 98.0 80.0 - 99.0 FL    MCH 34.1 (H) 26.0 - 34.0 PG    MCHC 34.8 30.0 - 36.5 g/dL    RDW 19.8 (H) 11.5 - 14.5 %    PLATELET 858 308 - 387 K/uL    MPV 10.6 8.9 - 12.9 FL    NRBC 0.0 0  WBC    ABSOLUTE NRBC 0.00 0.00 - 0.01 K/uL    NEUTROPHILS 85 (H) 32 - 75 %    LYMPHOCYTES 8 (L) 12 - 49 %    MONOCYTES 4 (L) 5 - 13 %    EOSINOPHILS 0 0 - 7 %    BASOPHILS 0 0 - 1 %    IMMATURE GRANULOCYTES 3 (H) 0.0 - 0.5 %    ABS. NEUTROPHILS 26.5 (H) 1.8 - 8.0 K/UL    ABS. LYMPHOCYTES 2.5 0.8 - 3.5 K/UL    ABS. MONOCYTES 1.2 (H) 0.0 - 1.0 K/UL    ABS. EOSINOPHILS 0.0 0.0 - 0.4 K/UL    ABS. BASOPHILS 0.0 0.0 - 0.1 K/UL    ABS. IMM.  GRANS. 0.9 (H) 0.00 - 0.04 K/UL    DF SMEAR SCANNED      RBC COMMENTS TEARDROP CELLS  1+        RBC COMMENTS ANISOCYTOSIS  1+       METABOLIC PANEL, COMPREHENSIVE    Collection Time: 08/25/20  4:07 PM   Result Value Ref Range    Sodium 132 (L) 136 - 145 mmol/L    Potassium 4.4 3.5 - 5.1 mmol/L    Chloride 96 (L) 97 - 108 mmol/L    CO2 31 21 - 32 mmol/L    Anion gap 5 5 - 15 mmol/L    Glucose 66 65 - 100 mg/dL    BUN 13 6 - 20 MG/DL    Creatinine 0.78 0.70 - 1.30 MG/DL    BUN/Creatinine ratio 17 12 - 20      GFR est AA >60 >60 ml/min/1.73m2    GFR est non-AA >60 >60 ml/min/1.73m2    Calcium 7.5 (L) 8.5 - 10.1 MG/DL    Bilirubin, total 2.5 (H) 0.2 - 1.0 MG/DL    ALT (SGPT) 67 12 - 78 U/L    AST (SGOT) 95 (H) 15 - 37 U/L    Alk. phosphatase 221 (H) 45 - 117 U/L    Protein, total 6.8 6.4 - 8.2 g/dL    Albumin 1.0 (L) 3.5 - 5.0 g/dL    Globulin 5.8 (H) 2.0 - 4.0 g/dL    A-G Ratio 0.2 (L) 1.1 - 2.2     TROPONIN I    Collection Time: 08/25/20  4:07 PM   Result Value Ref Range    Troponin-I, Qt. <0.05 <0.05 ng/mL   LACTIC ACID    Collection Time: 08/25/20  4:07 PM   Result Value Ref Range    Lactic acid 2.2 (HH) 0.4 - 2.0 MMOL/L   SAMPLES BEING HELD    Collection Time: 08/25/20  4:07 PM   Result Value Ref Range    SAMPLES BEING HELD [email or web address]     COMMENT        Add-on orders for these samples will be processed based on acceptable specimen integrity and analyte stability, which may vary by analyte.    CULTURE, BLOOD    Collection Time: 08/25/20  4:07 PM    Specimen: Blood   Result Value Ref Range    Special Requests: NO SPECIAL REQUESTS      Culture result: NO GROWTH AFTER 11 HOURS     MYCOPLASMA AB, IGM    Collection Time: 08/25/20  4:07 PM   Result Value Ref Range    Mycoplasma Ab, IgM NONREACTIVE NR     OCCULT BLOOD, STOOL    Collection Time: 08/25/20  4:34 PM   Result Value Ref Range    Occult blood, stool Positive (A) NEG     CULTURE, BLOOD    Collection Time: 08/25/20  5:09 PM    Specimen: Blood   Result Value Ref Range    Special Requests: NO SPECIAL REQUESTS      Culture result: NO GROWTH AFTER 11 HOURS     SARS-COV-2    Collection Time: 08/25/20  6:25 PM   Result Value Ref Range    Specimen source Nasopharyngeal      SARS-CoV-2 PENDING     SARS-CoV-2 PENDING     Specimen source Nasopharyngeal      COVID-19 rapid test PENDING     Specimen type NP Swab      Health status PENDING     COVID-19 PENDING    SAMPLES BEING HELD    Collection Time: 08/25/20  6:25 PM   Result Value Ref Range    SAMPLES BEING HELD NO TUBES ON HOLD     COMMENT        Add-on orders for these samples will be processed based on acceptable specimen integrity and analyte stability, which may vary by analyte. LACTIC ACID    Collection Time: 08/25/20  6:25 PM   Result Value Ref Range    Lactic acid 1.1 0.4 - 2.0 MMOL/L   URINALYSIS W/MICROSCOPIC    Collection Time: 08/26/20  3:12 AM   Result Value Ref Range    Color JENNIFFER      Appearance CLEAR CLEAR      Specific gravity 1.005 1.003 - 1.030      pH (UA) 6.0 5.0 - 8.0      Protein TRACE (A) NEG mg/dL    Glucose Negative NEG mg/dL    Ketone TRACE (A) NEG mg/dL    Blood Negative NEG      Urobilinogen 1.0 0.2 - 1.0 EU/dL    Nitrites Negative NEG      Leukocyte Esterase SMALL (A) NEG      WBC 5-10 0 - 4 /hpf    RBC 5-10 0 - 5 /hpf    Epithelial cells FEW FEW /lpf    Bacteria Negative NEG /hpf   URINE CULTURE HOLD SAMPLE    Collection Time: 08/26/20  3:12 AM    Specimen: Serum; Urine   Result Value Ref Range    Urine culture hold        Urine on hold in Microbiology dept for 2 days. If unpreserved urine is submitted, it cannot be used for addtional testing after 24 hours, recollection will be required. METABOLIC PANEL, COMPREHENSIVE    Collection Time: 08/26/20  3:12 AM   Result Value Ref Range    Sodium 135 (L) 136 - 145 mmol/L    Potassium 4.4 3.5 - 5.1 mmol/L    Chloride 102 97 - 108 mmol/L    CO2 29 21 - 32 mmol/L    Anion gap 4 (L) 5 - 15 mmol/L    Glucose 51 (L) 65 - 100 mg/dL    BUN 12 6 - 20 MG/DL    Creatinine 0.47 (L) 0.70 - 1.30 MG/DL    BUN/Creatinine ratio 26 (H) 12 - 20      GFR est AA >60 >60 ml/min/1.73m2    GFR est non-AA >60 >60 ml/min/1.73m2    Calcium 6.6 (L) 8.5 - 10.1 MG/DL    Bilirubin, total 1.4 (H) 0.2 - 1.0 MG/DL    ALT (SGPT) 47 12 - 78 U/L    AST (SGOT) 75 (H) 15 - 37 U/L    Alk.  phosphatase 137 (H) 45 - 117 U/L    Protein, total 5.1 (L) 6.4 - 8.2 g/dL    Albumin 0.7 (L) 3.5 - 5.0 g/dL    Globulin 4.4 (H) 2.0 - 4.0 g/dL    A-G Ratio 0.2 (L) 1.1 - 2.2     CBC WITH AUTOMATED DIFF    Collection Time: 08/26/20  3:12 AM   Result Value Ref Range    WBC 31.9 (H) 4.1 - 11.1 K/uL    RBC 2.83 (L) 4.10 - 5.70 M/uL    HGB 9.7 (L) 12.1 - 17.0 g/dL    HCT 28.0 (L) 36.6 - 50.3 %    MCV 98.9 80.0 - 99.0 FL    MCH 34.3 (H) 26.0 - 34.0 PG    MCHC 34.6 30.0 - 36.5 g/dL    RDW 20.0 (H) 11.5 - 14.5 %    PLATELET 607 813 - 285 K/uL    MPV 10.6 8.9 - 12.9 FL    NRBC 0.0 0  WBC    ABSOLUTE NRBC 0.00 0.00 - 0.01 K/uL    NEUTROPHILS 85 (H) 32 - 75 %    BAND NEUTROPHILS 8 %    LYMPHOCYTES 6 (L) 12 - 49 %    MONOCYTES 0 (L) 5 - 13 %    EOSINOPHILS 0 0 - 7 %    BASOPHILS 0 0 - 1 %    MYELOCYTES 1 %    IMMATURE GRANULOCYTES 0 0.0 - 0.5 %    ABS. NEUTROPHILS 29.7 (H) 1.8 - 8.0 K/UL    ABS. LYMPHOCYTES 1.9 0.8 - 3.5 K/UL    ABS. MONOCYTES 0.0 0.0 - 1.0 K/UL    ABS. EOSINOPHILS 0.0 0.0 - 0.4 K/UL    ABS. BASOPHILS 0.0 0.0 - 0.1 K/UL    ABS. IMM.  GRANS. 0.0 0.00 - 0.04 K/UL    DF MANUAL      RBC COMMENTS ANISOCYTOSIS  1+        RBC COMMENTS TARGET CELLS  1+       BILIRUBIN, CONFIRM    Collection Time: 08/26/20  3:12 AM   Result Value Ref Range    Bilirubin UA, confirm Positive (A) NEG         Medications reviewed  Current Facility-Administered Medications   Medication Dose Route Frequency    sodium chloride (NS) flush 5-10 mL  5-10 mL IntraVENous PRN    sodium chloride (NS) flush 5-40 mL  5-40 mL IntraVENous Q8H    sodium chloride (NS) flush 5-40 mL  5-40 mL IntraVENous PRN    acetaminophen (TYLENOL) tablet 650 mg  650 mg Oral Q6H PRN    Or    acetaminophen (TYLENOL) suppository 650 mg  650 mg Rectal Q6H PRN    polyethylene glycol (MIRALAX) packet 17 g  17 g Oral DAILY PRN    promethazine (PHENERGAN) tablet 12.5 mg  12.5 mg Oral Q6H PRN    Or    ondansetron (ZOFRAN) injection 4 mg  4 mg IntraVENous Q6H PRN    0.9% sodium chloride infusion  75 mL/hr IntraVENous CONTINUOUS    piperacillin-tazobactam (ZOSYN) 3.375 g in 0.9% sodium chloride (MBP/ADV) 100 mL  3.375 g IntraVENous Q8H    doxycycline (VIBRAMYCIN) 100 mg in 0.9% sodium chloride (MBP/ADV) 100 mL  100 mg IntraVENous Q12H    pantoprazole (PROTONIX) 40 mg in 0.9% sodium chloride 10 mL injection  40 mg IntraVENous Q12H       Care Plan discussed with: Patient/Nurse  Total time spent with patient: 30 minutes.   Shea Shipley MD

## 2020-08-26 NOTE — PROGRESS NOTES
Care Management follow up    Due to [address] assessment completed via EMR review and collaboration with nursing staff. No contact with patient for this assessment. Patient admitted for pneumonia, hemoptysis, right pleural effusion, bilateral DVT, r/o TB.    RUR score 10%/ low risk    Current status  Patient currently requiring medical management including IV antibiotics and further evaluation. IVC filter placed 8/25/2020. Transition of Care Plan  1. Monitor patient status and response to treatment. 2. Medical management continues. 3. PT/OT evaluations pending. 4. Unsure of discharge needs at this time. 5. CM to follow.     Dana Tubbs RN, MSN/Care manager

## 2020-08-26 NOTE — PROGRESS NOTES
TRANSFER - OUT REPORT:    Verbal report given to [facility] (unit) for routine progression of care       Report consisted of patient's Situation, Background, Assessment and   Recommendations(SBAR). Information from the following report(s) Procedure Summary was reviewed with the receiving nurse. Opportunity for questions and clarification was provided.       Patient transported with:   Registered Nurse

## 2020-08-26 NOTE — H&P
[address]  [phone]    Admission History and Physical      NAME:       Tonia Gee   :          MRN:      [id number]     PCP:      Baylee Rojo MD     Date of service:   2020     Chief  Complaint: Generalized weakness    History Of Presenting Illness:       Mr. Sherif Nava is a 60 y.o. male who is being admitted for Pneumonia involving right lung. Mr. Sherif Nava presented to our Emergency Department today complaining of generalized weakness, diarrhea and lethargy for several months. Patient was accompanied by his friend who gave most of the history. The patient does not like seeing physicians and apparently uses vitamin supplements. He is a smoker and drinks alcohol and has been progressively weaker over several months now. He has had pleuritic right sided chest pain associated with a productive cough and hemoptysis. He feel on the floor several days ago and was unable to get up. His friends helped him up and brought him to the ED for further testing. Initial CXR done showed airspace disease in right mid lower lung zone consistent with pneumonia. This is surrounded by a moderate right-sided pleural effusion. Upon review, he was noted to be cachectic with swollen lower extremities. CT scans chest abdomen and pelvis done showed a diffuse irregular opacification throughout the right lower lobe with multiple areas of internal cavitation and air-fluid levels. Findings are more likely related to infection than malignancy. Large right pleural effusion. Trace left pleural effusion. Bilateral pulmonary emboli. Clot burden is moderate. No evidence of right heart strain. Thrombus in the veins of the left lower extremity extending to the IVC. A small amount of thrombus is also seen in the right common iliac vein.  He will be admitted for further management. No Known Allergies    Prior to Admission medications    None     Past medical history: neg for DM, HTN      No past surgical history on file. Social History     Tobacco Use    Smoking status: Current Every Day Smoker   Substance Use Topics    Alcohol use: Yes        No family history on file. Review of Systems:    Constitutional ROS: no fever, chills, rigors or night sweats  Respiratory ROS: + cough, + sputum,  +hemoptysis, + dyspnea and + pleuritic pain. Cardiovascular ROS: no palpitations, orthopnea, PND or syncope  Endocrine ROS: no polydispsia, polyuria, heat or cold intolerance or major weight change. Gastrointestinal ROS: no dysphagia, abdominal pain, nausea, vomiting or diarrhea    Genito-Urinary ROS: no dysuria, frequency, hematuria, retention or flank pain  Musculoskeletal ROS: no joint pain but lower extremities swelling   Neurological ROS: no headache, confusion, focal weakness   Psychiatric ROS: no depression, anxiety, mood swings  Dermatological ROS: no rash, pruritis, or urticaria  Heme-Lymph ROS: no swollen glands, bleeding    Examination:    Constitutional:    Visit Vitals  BP 90/71 (BP 1 Location: Right arm, BP Patient Position: At rest)   Pulse 97   Temp 98.2 °F (36.8 °C)   Resp 25   Ht 5' 10\" (1.778 m)   Wt 56.7 kg (125 lb 1.6 oz)   SpO2 93%   BMI 17.95 kg/m²         General:  Weak, very cachectic and ill looking patient although not in any acute distress   Eyes: icteric conjunctivae, PERRLA with no discharge. Normal eye movements  Ear, Nose, Mouth & Throat: No ottorrhea, rhinorrhea, non tender sinuses, dry mucous membranes  Respiratory: + accessory muscle use, decreased breath sounds right > left. No wheezes  Cardiovascular:  No JVD or murmurs, regular and normal S1, S2 without thrills, bruits. ++ peripheral edema.  Capillary refil+, good distal pulses  GI & :  Soft abdomen, non-tender, non-distended, normoactive bowel sounds with no palpable organomegaly  Heme:  No cervical or axillary adenopathy. Musculoskeletal:  No cyanosis, clubbing, atrophy or deformities  Skin:  No rashes, bruising or ulcers   Neurological: Awake and alert, speech is clear, CNs 2-12 are grossly intact and otherwise non focal  Psychiatric:  Has a fair insight to his illness   ________________________________________________________________________    Data Review:    Labs:    Recent Labs     08/25/20  1607   WBC 31.1*   HGB 12.2   HCT 35.1*        Recent Labs     08/25/20  1607   *   K 4.4   CL 96*   CO2 31   GLU 66   BUN 13   CREA 0.78   CA 7.5*   ALB 1.0*   ALT 67     No components found for: GLPOC  No results for input(s): PH, PCO2, PO2, HCO3, FIO2 in the last 72 hours. No results for input(s): INR, INREXT, INREXT in the last 72 hours. Imaging Studies:      Chest X-ray, CT scans - all reviewed     I have also reviewed available old medical records. Assessment & Impression:     Mr. Chandan Campa is a 60 y.o. male being evaluated for:     Principal Problem:    Pneumonia involving right lung (8/25/2020)    Active Problems:    Hemoptysis (8/25/2020)      Hyperbilirubinemia (8/25/2020)      Weight loss, unintentional (8/25/2020)      Pleural effusion, right (8/25/2020)      Bilateral pulmonary embolism (HCC) (8/25/2020)      Acute deep vein thrombosis (DVT) of both lower extremities ([address] (8/25/2020)      Alcohol use (8/25/2020)      Diarrhea (8/25/2020)      Fecal occult blood test positive (8/25/2020)      Hyponatremia with extracellular fluid depletion (8/25/2020)         Plan of management:    Pneumonia involving right lung (8/25/2020) /  Hemoptysis (8/25/2020) POA: extensive with internal cavitation and air fluid levels. Malignancy remains a DDx. Admit to hospital. Blood cultures, sputum cultures and serologies. Start empiric IV Zosyn and Doxycycline.  Consult pulmonology     Bilateral pulmonary embolism, acute ([facility] [address] (8/25/2020) / Acute deep vein thrombosis (DVT) of both lower extremities ([address] (8/25/2020): due to associated hemoptysis and positive FOBT, I have discussed with IR and will have an IVC filter placed tonight pending further testing prior to full anticoagulation    Weight loss, unintentional (8/25/2020) POA: still concerning for malignancy but severe protein calorie malnutrition remains a DDx. Work up as above. Diet as tolerated    Hyperbilirubinemia (8/25/2020) POA: unclear etiology. CT abdomen unremarkable. Follow LFTs in AM    Pleural effusion, right (8/25/2020) POA: likely para - pneumonia vs malignant effusion. Consult pulmonology    Alcohol use (8/25/2020) POA: he says he has cut down recently. Monitor for withdrawal    Diarrhea (8/25/2020) / Fecal occult blood test positive (8/25/2020) POA: check stool studies. Monitor Hgb. May need a Gi evaluation once more stable.  Consult on call MD    Hyponatremia with extracellular fluid depletion (8/25/2020) POA: gently hydration     Code Status:  Full    Surrogate decision maker: Family    Risk of deterioration: high      Total time spent for the care of the patient: [address] [place] [address],[address] discussed with: Patient, Family, Nursing Staff, ED physician, IR and Dr Valentino Dibble whose team will assume further care    Discussed:  Code Status, Care Plan and D/C Planning    Prophylaxis:  H2B/PPI    Probable Disposition:  Home w/Family           ___________________________________________________    Attending Physician: Robbie Francis MD

## 2020-08-26 NOTE — CONSULTS
Gastroenterology Consult     Referring Physician: Hyun Reynolds    Consult Date: 8/26/2020       Cough, weakness     Chief Complaint: cough, weakness    History of Present Illness: Vernon Morgan is a 60 y.o. male who is seen in consultation for malnutrition, abnormal CT, weight loss. Personal history of tobacco, alcohol use. Admit with pneumonia, weight loss, malnutrition. Denies impaired ability oral intake, vomiting, dysphagia, regurgitation. Labs consistent with severe malnutrition; evidence for intestinal blood loss    No past medical history on file. Past Surgical History:   Procedure Laterality Date    IR [facility] IVC FILTER  8/25/2020    IR THORACENTESIS/INSERT CHEST TUBE  8/26/2020      No family history on file.   Social History     Tobacco Use    Smoking status: Current Every Day Smoker   Substance Use Topics    Alcohol use: Yes      No Known Allergies  Current Facility-Administered Medications   Medication Dose Route Frequency    vancomycin (VANCOCIN) 750 mg in 0.9% sodium chloride (MBP/ADV) 250 mL  750 mg IntraVENous Q12H    lidocaine (PF) (XYLOCAINE) 10 mg/mL (1 %) injection 10 mL  10 mL SubCUTAneous Rad Multiple    fentaNYL citrate (PF) injection  mcg   mcg IntraVENous Rad Multiple    sodium chloride (NS) flush 5-10 mL  5-10 mL IntraVENous PRN    sodium chloride (NS) flush 5-40 mL  5-40 mL IntraVENous Q8H    sodium chloride (NS) flush 5-40 mL  5-40 mL IntraVENous PRN    acetaminophen (TYLENOL) tablet 650 mg  650 mg Oral Q6H PRN    Or    acetaminophen (TYLENOL) suppository 650 mg  650 mg Rectal Q6H PRN    polyethylene glycol (MIRALAX) packet 17 g  17 g Oral DAILY PRN    promethazine (PHENERGAN) tablet 12.5 mg  12.5 mg Oral Q6H PRN    Or    ondansetron (ZOFRAN) injection 4 mg  4 mg IntraVENous Q6H PRN    0.9% sodium chloride infusion  75 mL/hr IntraVENous CONTINUOUS    piperacillin-tazobactam (ZOSYN) 3.375 g in 0.9% sodium chloride (MBP/ADV) 100 mL  3.375 g IntraVENous Q8H  doxycycline (VIBRAMYCIN) 100 mg in 0.9% sodium chloride (MBP/ADV) 100 mL  100 mg IntraVENous Q12H    pantoprazole (PROTONIX) 40 mg in 0.9% sodium chloride 10 mL injection  40 mg IntraVENous Q12H        Review of Systems:  A detailed 10 organ review of systems is obtained with pertinent positives as listed in the History of Present Illness and Past Medical History. All others are negative. Objective:     Physical Exam:  Visit Vitals  BP 96/69   Pulse 97   Temp 97.6 °F (36.4 °C)   Resp 24   Ht 5' 10\" (1.778 m)   Wt 56.7 kg (125 lb 1.6 oz)   SpO2 93%   BMI 17.95 kg/m²      Emaciated  Skin:  Extremities and face reveal no rashes. No jara erythema. No telangiectasias on the chest wall. HEENT: Sclerae anicteric. Extra-occular muscles are intact. No oral ulcers. No abnormal pigmentation of the lips. The neck is supple. Cardiovascular: Regular rate and rhythm. Respiratory:  Scattered rhonchi. GI:  Abdomen nondistended, soft, and nontender. Normal active bowel sounds. No enlargement of the liver or spleen. No masses palpable. Rectal:  Deferred  Musculoskeletal:  No pitting edema of the lower legs. Extremities have good range of motion. No costovertebral tenderness. Neurological:  Gross memory appears intact. Patient is alert and oriented. Psychiatric:  Mood appears appropriate with judgement intact. Lymphatic:  No cervical or supraclavicular adenopathy.     Lab/Data Review:  CMP:   Lab Results   Component Value Date/Time     (L) 08/26/2020 03:12 AM    K 4.4 08/26/2020 03:12 AM     08/26/2020 03:12 AM    CO2 29 08/26/2020 03:12 AM    AGAP 4 (L) 08/26/2020 03:12 AM    GLU 51 (L) 08/26/2020 03:12 AM    BUN 12 08/26/2020 03:12 AM    CREA 0.47 (L) 08/26/2020 03:12 AM    GFRAA >60 08/26/2020 03:12 AM    GFRNA >60 08/26/2020 03:12 AM    CA 6.6 (L) 08/26/2020 03:12 AM    ALB 0.7 (L) 08/26/2020 03:12 AM    TP 5.1 (L) 08/26/2020 03:12 AM    GLOB 4.4 (H) 08/26/2020 03:12 AM    AGRAT 0.2 (L) 08/26/2020 03:12 AM    ALT 47 08/26/2020 03:12 AM     CBC:   Lab Results   Component Value Date/Time    WBC 31.9 (H) 08/26/2020 03:12 AM    HGB 9.7 (L) 08/26/2020 03:12 AM    HCT 28.0 (L) 08/26/2020 03:12 AM     08/26/2020 03:12 AM         Assessment/Plan:     Principal Problem:    Pneumonia involving right lung (8/25/2020)    Active Problems:    Hemoptysis (8/25/2020)      Hyperbilirubinemia (8/25/2020)      Weight loss, unintentional (8/25/2020)      Pleural effusion, right (8/25/2020)      Bilateral pulmonary embolism (HCC) (8/25/2020)      Acute deep vein thrombosis (DVT) of both lower extremities ([address] (8/25/2020)      Alcohol use (8/25/2020)      Diarrhea (8/25/2020)      Fecal occult blood test positive (8/25/2020)      Hyponatremia with extracellular fluid depletion (8/25/2020)         Recommend:    Creon with meals  Oral B12, folate  GI blood loss evaluation once pulmonary status improved  Thanks very much

## 2020-08-26 NOTE — PROGRESS NOTES
TRANSFER - IN REPORT:    Verbal report received on Horace Shove  being received from 3rd Floor(unit) for ordered procedure      Report consisted of patients Situation, Background, Assessment and   Recommendations(SBAR). Information from the following report(s) SBAR was reviewed with the receiving nurse. Opportunity for questions and clarification was provided. Assessment completed upon patients arrival to unit and care assumed.

## 2020-08-26 NOTE — CONSULTS
Name: [facility]: [facility]   : 1955 Admit Date: 2020   Phone: [phone]  Room: 324/01   PCP: Mariano Pimentel MD  MRN: [id number]   Date: 2020  Code: Full Code        HPI:      Chart and notes reviewed. Data reviewed. I review the patient's current medications in the medical record at each encounter.  I have evaluated and examined the patient. 9:18 AM       History was obtained from patient. I was asked by Gregoria Bragg MD to see Tom Good in consultation for a chief complaint of  pneumonia, hemoptysis concerning for malignancy. History of Present Illness:   is a pleasant, 59 yo gentleman that presented to [facility] with worsening shortness of breath and weakness. He tells me that he started feeling poorly in 2019 with a cough, however that resolved. His symptoms returned in 2019 and tells me that he has been struggling with cough, weight loss of 40+ lbs, and intermittent hemoptysis (phlegm mixed with blood), and diarrhea. He is a smoker of 2ppd for 20+ years. Also prior to the last two weeks, was \"drinking like a fish,\" at least 1 pint per day. He does not routinely prefer to seek traditional medical care and thus has delayed coming to the ED due to his symptoms. Reports worsening right sided chest pain and poor appetite. Images:  Personally reviewed. Chest/Abdominal CT:  Diffuse irregular opacification throughout the right lower lobe with multiple  areas of internal cavitation and air-fluid levels. Findings are more likely  related to infection than malignancy. Large right pleural effusion. Trace left pleural effusion. Bilateral pulmonary emboli. Clot burden is moderate. No evidence of right heart strain. Thrombus in the veins of the left lower extremity extending to the IVC. A  small amount of thrombus is also seen in the right common iliac vein.     WBC 31.69  Hgb 9.7  ALT 47  AST 75  Na 135  Creat .47  Albumin . 7  Lactic acid on admit 2.2; now 1.1  Hemoccult +   Blood cultures negative x 11 hours    No past medical history on file. Past Surgical History:   Procedure Laterality Date    IR [facility] IVC FILTER  8/25/2020       No family history on file. Social History     Tobacco Use    Smoking status: Current Every Day Smoker   Substance Use Topics    Alcohol use: Yes       No Known Allergies    Current Facility-Administered Medications   Medication Dose Route Frequency    sodium chloride (NS) flush 5-10 mL  5-10 mL IntraVENous PRN    sodium chloride (NS) flush 5-40 mL  5-40 mL IntraVENous Q8H    sodium chloride (NS) flush 5-40 mL  5-40 mL IntraVENous PRN    acetaminophen (TYLENOL) tablet 650 mg  650 mg Oral Q6H PRN    Or    acetaminophen (TYLENOL) suppository 650 mg  650 mg Rectal Q6H PRN    polyethylene glycol (MIRALAX) packet 17 g  17 g Oral DAILY PRN    promethazine (PHENERGAN) tablet 12.5 mg  12.5 mg Oral Q6H PRN    Or    ondansetron (ZOFRAN) injection 4 mg  4 mg IntraVENous Q6H PRN    0.9% sodium chloride infusion  75 mL/hr IntraVENous CONTINUOUS    piperacillin-tazobactam (ZOSYN) 3.375 g in 0.9% sodium chloride (MBP/ADV) 100 mL  3.375 g IntraVENous Q8H    doxycycline (VIBRAMYCIN) 100 mg in 0.9% sodium chloride (MBP/ADV) 100 mL  100 mg IntraVENous Q12H    pantoprazole (PROTONIX) 40 mg in 0.9% sodium chloride 10 mL injection  40 mg IntraVENous Q12H         REVIEW OF SYSTEMS   Negative except as stated in the HPI. Physical Exam:   Visit Vitals  BP 95/57 (BP 1 Location: Right arm, BP Patient Position: At rest)   Pulse 97   Temp 97.7 °F (36.5 °C)   Resp 22   Ht 5' 10\" (1.778 m)   Wt 56.7 kg (125 lb 1.6 oz)   SpO2 95%   BMI 17.95 kg/m²       General:  Alert, cooperative, no distress, appears older stated age. Head:  Normocephalic, without obvious abnormality, atraumatic. Eyes:  Conjunctivae/corneas clear. .   Nose: Nares normal. Septum midline.  Mucosa normal.    Throat: Lips, mucosa, and tongue normal. Poor dentition. Lungs:   Diminished throughout. Chest wall:  No tenderness or deformity. Heart:  Regular rate and rhythm, S1, S2 normal, no murmur, click, rub or gallop. Abdomen:   Soft, non-tender. Bowel sounds normal.    Extremities: Muscle wasting. Anasarca. Pulses: 2+ and symmetric all extremities. Skin: Skin color, texture, turgor normal.   Lymph nodes: Cervical nodes normal.   Neurologic: Grossly nonfocal       Lab Results   Component Value Date/Time    Sodium 135 (L) 08/26/2020 03:12 AM    Potassium 4.4 08/26/2020 03:12 AM    Chloride 102 08/26/2020 03:12 AM    CO2 29 08/26/2020 03:12 AM    BUN 12 08/26/2020 03:12 AM    Creatinine 0.47 (L) 08/26/2020 03:12 AM    Glucose 51 (L) 08/26/2020 03:12 AM    Calcium 6.6 (L) 08/26/2020 03:12 AM    Lactic acid 1.1 08/25/2020 06:25 PM       Lab Results   Component Value Date/Time    WBC 31.9 (H) 08/26/2020 03:12 AM    HGB 9.7 (L) 08/26/2020 03:12 AM    PLATELET 414 25/92/1139 03:12 AM    MCV 98.9 08/26/2020 03:12 AM       Lab Results   Component Value Date/Time    Alk.  phosphatase 137 (H) 08/26/2020 03:12 AM    Protein, total 5.1 (L) 08/26/2020 03:12 AM    Albumin 0.7 (L) 08/26/2020 03:12 AM    Globulin 4.4 (H) 08/26/2020 03:12 AM       No results found for: IRON, FE, TIBC, IBCT, PSAT, FERR    No results found for: SR, CRP, ANALIA, ANAIGG, RA, RPR, RPRT, VDRLT, VDRLS, TSH, TSHEXT     No results found for: PH, PHI, PCO2, PCO2I, PO2, PO2I, HCO3, HCO3I, FIO2, FIO2I    Lab Results   Component Value Date/Time    Troponin-I, Qt. <0.05 08/25/2020 04:07 PM        Lab Results   Component Value Date/Time    Culture result: NO GROWTH AFTER 11 HOURS 08/25/2020 05:09 PM    Culture result: NO GROWTH AFTER 11 HOURS 08/25/2020 04:07 PM       No results found for: TOXA1, RPR, HBCM, HBSAG, HAAB, HCAB1, HAAT, G6PD, CRYAC, HIVGT, HIVR, HIV1, HIV12, HIVPC, HIVRPI    No results found for: VANCT, CPK    Lab Results   Component Value Date/Time    Color JENNIFFER 08/26/2020 03:12 AM    Appearance CLEAR 08/26/2020 03:12 AM    Specific gravity 1.005 08/26/2020 03:12 AM    pH (UA) 6.0 08/26/2020 03:12 AM    Protein TRACE (A) 08/26/2020 03:12 AM    Glucose Negative 08/26/2020 03:12 AM    Ketone TRACE (A) 08/26/2020 03:12 AM    Blood Negative 08/26/2020 03:12 AM    Urobilinogen 1.0 08/26/2020 03:12 AM    Nitrites Negative 08/26/2020 03:12 AM    Leukocyte Esterase SMALL (A) 08/26/2020 03:12 AM    WBC 5-10 08/26/2020 03:12 AM    RBC 5-10 08/26/2020 03:12 AM    Bacteria Negative 08/26/2020 03:12 AM       IMPRESSION  · Abnormal Chest CT: extensive cavitation throughout   · Bilateral PEs s/p IVC filter  · Weight Loss  · Tobacco Use    PLAN  · Supplemental O2 to keep sats >88%  · Thoracic surgery consult  · Continue Zosyn, Doxy. Add Vancomycin. · Follow cultures and check sputum culture  · Check Quantiferon gold and PPD  · Check MRSA swab  · F/U Covid. · IVF    Thank you very much for the consult.         Maty Akers NP

## 2020-08-26 NOTE — ACP (ADVANCE CARE PLANNING)
Responded to In [employer] request for and Advance Medical Directive (AMD). Mr. Felipe Rubio is under contact restrictions. After consulting with his nurse called him on the phone. He indicated he was not feeling well. He said he knows what an AMD is and needs to get a friend here to be able to go over the information with them. Gave the AMD form and  Your Right to Decide booklet to his nurse to take into the room the next time she goes in. Advised of  Availability should he have further question or assistance be needed.   Visited by: Marcelina Wilson., MS., [address] [facility] [address] ([id number])

## 2020-08-26 NOTE — CONSULTS
Comprehensive Nutrition Assessment    Type and Reason for Visit: Consult, Initial    Nutrition Recommendations/Plan:   1. Continue with Regular diet order - will change consistency to Chopped with gravy to help with difficulty chewing. 2. Will order Ensure Pudding, Ensure Clear, and orange Magic Cup for increased kcal/protein intake. 3. Recommend ordering Probiotic to help with diarrhea. Nutrition Assessment:      8/26: 61 yo male admitted for pneumonia. PMhx: ETOH use daily. Underweight per BMI of 17.9. No weight hx in EMR. Pt being tested for COVID-19. Spoke with pt over phone, states he has been steadily losing weight and reports 50lb loss over the past 1.5 years. This is 29% loss which is significant for time frame. Pt states he has had no PO intakes for the last 1-2 weeks except for a few protein bars. Prior to that his intakes were still poor, only eating 1x/day which was either a frozen meal or sandwich. Regular diet ordered here. States he ate 50% of grits and pancakes this morning. Has no teeth and states he was not able to chew the sausage or fruit. Requesting softer foods, willing to try chopped meats for now but may need even softer foods than that. Discussed ONS options, states Ensure Enlive causes him to have diarrhea. Willing to try Ensure Clear, Ensure pudding, and Magic Cup. C/o chronic diarrhea, states he takes a probiotic most days at home which helps with the diarrhea, requesting that here. Labs and meds reviewed. NaCl ordered at 75ml/hr.       Malnutrition Assessment:  Malnutrition Status:     Severe Malnutrition  Context:      Chronic Illness  Findings of the 6 clinical characteristics of malnutrition:   Energy Intake:  7 - 75% or less est energy requirements for 1 month or longer  Weight Loss:  7.000 - Greater than 20% over 1 year     Body Fat Loss:  Unable to assess,     Muscle Mass Loss:  Unable to assess,    Fluid Accumulation:  No significant fluid accumulation,  Strength:  Not performed         Estimated Daily Nutrient Needs:  Energy (kcal):  2034 kcals(REE 1373 x AF 1.3 + 250)  Protein (g):  68-80gm(1.2-1.4gm/kg/d)       Fluid (ml/day):  2034 ml(1ml/kg)    Nutrition Related Findings:         Wounds:    None       Current Nutrition Therapies:   DIET REGULAR    Anthropometric Measures:  · Height:  5' 10\" (177.8 cm)  · Current Body Wt:  56.7 kg (125 lb)   · Admission Body Wt:       · Usual Body Wt:        · Ideal Body Wt:   :      · Adjusted Body Weight:   ; Weight Adjustment for: No adjustment   · Adjusted BMI:       · BMI Category:  Underweight (BMI less than 18.5)       Nutrition Diagnosis:   · Underweight related to inadequate protein-energy intake as evidenced by BMI(17.9)    · Biting/chewing (masticatory) difficulty related to biting/chewing (masticatory) difficulty as evidenced by other (specify)(edentulism)      Nutrition Interventions:   Food and/or Nutrient Delivery: Continue current diet, Start oral nutrition supplement  Nutrition Education and Counseling: No recommendations at this time  Coordination of Nutrition Care: Continued inpatient monitoring    Goals:  Consueme > 75% meals + ONS to promote weight gain of 0.5-1lb within next 2-3 days       Nutrition Monitoring and Evaluation:   Behavioral-Environmental Outcomes:    Food/Nutrient Intake Outcomes: Food and nutrient intake, Supplement intake  Physical Signs/Symptoms Outcomes: Chewing or swallowing, GI status, Weight    Discharge Planning:    Continue current diet, Continue oral nutrition supplement     Electronically signed by Geneva Nissen, RD on 8/26/2020 at 11:21 AM    Contact: [phone]

## 2020-08-27 ENCOUNTER — APPOINTMENT (OUTPATIENT)
Dept: GENERAL RADIOLOGY | Age: 65
DRG: 166 | End: 2020-08-27
Attending: NURSE PRACTITIONER
Payer: COMMERCIAL

## 2020-08-27 ENCOUNTER — APPOINTMENT (OUTPATIENT)
Dept: NON INVASIVE DIAGNOSTICS | Age: 65
DRG: 166 | End: 2020-08-27
Attending: INTERNAL MEDICINE
Payer: COMMERCIAL

## 2020-08-27 ENCOUNTER — APPOINTMENT (OUTPATIENT)
Dept: VASCULAR SURGERY | Age: 65
DRG: 166 | End: 2020-08-27
Attending: INTERNAL MEDICINE
Payer: COMMERCIAL

## 2020-08-27 LAB
ALBUMIN SERPL-MCNC: 0.6 G/DL (ref 3.5–5)
ALBUMIN/GLOB SERPL: 0.2 {RATIO} (ref 1.1–2.2)
ALP SERPL-CCNC: 115 U/L (ref 45–117)
ALT SERPL-CCNC: 38 U/L (ref 12–78)
ANION GAP SERPL CALC-SCNC: 7 MMOL/L (ref 5–15)
AST SERPL-CCNC: 45 U/L (ref 15–37)
BACTERIA SPEC CULT: NORMAL
BACTERIA SPEC CULT: NORMAL
BASOPHILS # BLD: 0 K/UL (ref 0–0.1)
BASOPHILS NFR BLD: 0 % (ref 0–1)
BILIRUB SERPL-MCNC: 1 MG/DL (ref 0.2–1)
BUN SERPL-MCNC: 10 MG/DL (ref 6–20)
BUN/CREAT SERPL: 18 (ref 12–20)
CALCIUM SERPL-MCNC: 6.5 MG/DL (ref 8.5–10.1)
CHLORIDE SERPL-SCNC: 106 MMOL/L (ref 97–108)
CO2 SERPL-SCNC: 25 MMOL/L (ref 21–32)
CREAT SERPL-MCNC: 0.55 MG/DL (ref 0.7–1.3)
DIFFERENTIAL METHOD BLD: ABNORMAL
ECHO AO ROOT DIAM: 3.4 CM
ECHO AV AREA PEAK VELOCITY: 2.05 CM2
ECHO AV AREA/BSA PEAK VELOCITY: 1.2 CM2/M2
ECHO AV PEAK GRADIENT: 4.61 MMHG
ECHO AV PEAK VELOCITY: 107.38 CM/S
ECHO LA AREA 4C: 14.04 CM2
ECHO LA MAJOR AXIS: 2.44 CM
ECHO LA MINOR AXIS: 1.39 CM
ECHO LA VOL 2C: 27.18 ML (ref 18–58)
ECHO LA VOL 4C: 34.28 ML (ref 18–58)
ECHO LA VOL BP: 36.57 ML (ref 18–58)
ECHO LA VOL/BSA BIPLANE: 20.77 ML/M2 (ref 16–28)
ECHO LA VOLUME INDEX A2C: 15.44 ML/M2 (ref 16–28)
ECHO LA VOLUME INDEX A4C: 19.47 ML/M2 (ref 16–28)
ECHO LV E' LATERAL VELOCITY: 10.83 CENTIMETER/SECOND
ECHO LV E' SEPTAL VELOCITY: 13.6 CENTIMETER/SECOND
ECHO LV INTERNAL DIMENSION DIASTOLIC: 4.25 CM (ref 4.2–5.9)
ECHO LV INTERNAL DIMENSION SYSTOLIC: 2.94 CM
ECHO LV IVSD: 1.03 CM (ref 0.6–1)
ECHO LV MASS 2D: 108.5 G (ref 88–224)
ECHO LV MASS INDEX 2D: 61.6 G/M2 (ref 49–115)
ECHO LV POSTERIOR WALL DIASTOLIC: 0.63 CM (ref 0.6–1)
ECHO LVOT DIAM: 1.8 CM
ECHO LVOT PEAK GRADIENT: 3.01 MMHG
ECHO LVOT PEAK VELOCITY: 86.68 CM/S
ECHO MV A VELOCITY: 63.76 CENTIMETER/SECOND
ECHO MV E DECELERATION TIME (DT): 0.21 S
ECHO MV E VELOCITY: 69.55 CENTIMETER/SECOND
ECHO PV PEAK INSTANTANEOUS GRADIENT SYSTOLIC: 1.08 MMHG
ECHO PV REGURGITANT MAX VELOCITY: 51.87 CM/S
ECHO RV INTERNAL DIMENSION: 3.14 CM
ECHO RV TAPSE: 2.1 CM (ref 1.5–2)
ECHO TV REGURGITANT MAX VELOCITY: 210.36 CM/S
ECHO TV REGURGITANT PEAK GRADIENT: 17.7 MMHG
EOSINOPHIL # BLD: 0.3 K/UL (ref 0–0.4)
EOSINOPHIL NFR BLD: 1 % (ref 0–7)
ERYTHROCYTE [DISTWIDTH] IN BLOOD BY AUTOMATED COUNT: 20.5 % (ref 11.5–14.5)
FLUID CULTURE, SPNG2: NORMAL
GLOBULIN SER CALC-MCNC: 3.9 G/DL (ref 2–4)
GLUCOSE SERPL-MCNC: 100 MG/DL (ref 65–100)
HCT VFR BLD AUTO: 25.9 % (ref 36.6–50.3)
HGB BLD-MCNC: 8.8 G/DL (ref 12.1–17)
HIV 1+2 AB+HIV1 P24 AG SERPL QL IA: NONREACTIVE
HIV12 RESULT COMMENT, HHIVC: NORMAL
IMM GRANULOCYTES # BLD AUTO: 0 K/UL
IMM GRANULOCYTES NFR BLD AUTO: 0 %
L PNEUMO1 AG UR QL IA: NEGATIVE
LA VOL DISK BP: 33.11 ML (ref 18–58)
LDH SERPL L TO P-CCNC: 353 U/L (ref 85–241)
LYMPHOCYTES # BLD: 2 K/UL (ref 0.8–3.5)
LYMPHOCYTES NFR BLD: 7 % (ref 12–49)
MCH RBC QN AUTO: 33.7 PG (ref 26–34)
MCHC RBC AUTO-ENTMCNC: 34 G/DL (ref 30–36.5)
MCV RBC AUTO: 99.2 FL (ref 80–99)
METAMYELOCYTES NFR BLD MANUAL: 1 %
MONOCYTES # BLD: 0.9 K/UL (ref 0–1)
MONOCYTES NFR BLD: 3 % (ref 5–13)
NEUTS BAND NFR BLD MANUAL: 3 % (ref 0–6)
NEUTS SEG # BLD: 25.3 K/UL (ref 1.8–8)
NEUTS SEG NFR BLD: 85 % (ref 32–75)
NRBC # BLD: 0 K/UL (ref 0–0.01)
NRBC BLD-RTO: 0 PER 100 WBC
ORGANISM ID, SPNG3: NORMAL
PLATELET # BLD AUTO: 227 K/UL (ref 150–400)
PLEASE NOTE, SPNG4: NORMAL
PMV BLD AUTO: 10.1 FL (ref 8.9–12.9)
POTASSIUM SERPL-SCNC: 3.6 MMOL/L (ref 3.5–5.1)
PROT SERPL-MCNC: 4.5 G/DL (ref 6.4–8.2)
RBC # BLD AUTO: 2.61 M/UL (ref 4.1–5.7)
RBC MORPH BLD: ABNORMAL
RBC MORPH BLD: ABNORMAL
S PNEUM AG SPEC QL LA: NEGATIVE
SERVICE CMNT-IMP: NORMAL
SODIUM SERPL-SCNC: 138 MMOL/L (ref 136–145)
SPECIMEN SOURCE: NORMAL
SPECIMEN SOURCE: NORMAL
SPECIMEN, SPNG1: NORMAL
WBC # BLD AUTO: 28.8 K/UL (ref 4.1–11.1)

## 2020-08-27 PROCEDURE — 74011250636 HC RX REV CODE- 250/636: Performed by: FAMILY MEDICINE

## 2020-08-27 PROCEDURE — 87116 MYCOBACTERIA CULTURE: CPT

## 2020-08-27 PROCEDURE — 97162 PT EVAL MOD COMPLEX 30 MIN: CPT

## 2020-08-27 PROCEDURE — 97165 OT EVAL LOW COMPLEX 30 MIN: CPT

## 2020-08-27 PROCEDURE — 71045 X-RAY EXAM CHEST 1 VIEW: CPT

## 2020-08-27 PROCEDURE — 74011000258 HC RX REV CODE- 258: Performed by: INTERNAL MEDICINE

## 2020-08-27 PROCEDURE — 77030027138 HC INCENT SPIROMETER -A

## 2020-08-27 PROCEDURE — 83615 LACTATE (LD) (LDH) ENZYME: CPT

## 2020-08-27 PROCEDURE — 74011250637 HC RX REV CODE- 250/637: Performed by: INTERNAL MEDICINE

## 2020-08-27 PROCEDURE — 51798 US URINE CAPACITY MEASURE: CPT

## 2020-08-27 PROCEDURE — 74011000250 HC RX REV CODE- 250: Performed by: INTERNAL MEDICINE

## 2020-08-27 PROCEDURE — 93970 EXTREMITY STUDY: CPT

## 2020-08-27 PROCEDURE — 74011250637 HC RX REV CODE- 250/637: Performed by: FAMILY MEDICINE

## 2020-08-27 PROCEDURE — 97530 THERAPEUTIC ACTIVITIES: CPT

## 2020-08-27 PROCEDURE — 93306 TTE W/DOPPLER COMPLETE: CPT

## 2020-08-27 PROCEDURE — 74011250636 HC RX REV CODE- 250/636: Performed by: NURSE PRACTITIONER

## 2020-08-27 PROCEDURE — P9047 ALBUMIN (HUMAN), 25%, 50ML: HCPCS | Performed by: NURSE PRACTITIONER

## 2020-08-27 PROCEDURE — 80053 COMPREHEN METABOLIC PANEL: CPT

## 2020-08-27 PROCEDURE — 94640 AIRWAY INHALATION TREATMENT: CPT

## 2020-08-27 PROCEDURE — 74011250636 HC RX REV CODE- 250/636: Performed by: INTERNAL MEDICINE

## 2020-08-27 PROCEDURE — 85025 COMPLETE CBC W/AUTO DIFF WBC: CPT

## 2020-08-27 PROCEDURE — 65660000000 HC RM CCU STEPDOWN

## 2020-08-27 PROCEDURE — 36415 COLL VENOUS BLD VENIPUNCTURE: CPT

## 2020-08-27 PROCEDURE — 97535 SELF CARE MNGMENT TRAINING: CPT

## 2020-08-27 PROCEDURE — 87389 HIV-1 AG W/HIV-1&-2 AB AG IA: CPT

## 2020-08-27 PROCEDURE — C9113 INJ PANTOPRAZOLE SODIUM, VIA: HCPCS | Performed by: INTERNAL MEDICINE

## 2020-08-27 RX ORDER — ALBUMIN HUMAN 250 G/1000ML
25 SOLUTION INTRAVENOUS ONCE
Status: COMPLETED | OUTPATIENT
Start: 2020-08-28 | End: 2020-08-28

## 2020-08-27 RX ORDER — ALBUMIN HUMAN 250 G/1000ML
25 SOLUTION INTRAVENOUS EVERY 6 HOURS
Status: COMPLETED | OUTPATIENT
Start: 2020-08-27 | End: 2020-08-28

## 2020-08-27 RX ORDER — SODIUM CHLORIDE FOR INHALATION 10 %
5 VIAL, NEBULIZER (ML) INHALATION ONCE
Status: DISCONTINUED | OUTPATIENT
Start: 2020-08-27 | End: 2020-08-27

## 2020-08-27 RX ORDER — SODIUM CHLORIDE 9 MG/ML
500 INJECTION, SOLUTION INTRAVENOUS ONCE
Status: COMPLETED | OUTPATIENT
Start: 2020-08-27 | End: 2020-08-27

## 2020-08-27 RX ORDER — ALBUMIN HUMAN 250 G/1000ML
25 SOLUTION INTRAVENOUS EVERY 6 HOURS
Status: DISCONTINUED | OUTPATIENT
Start: 2020-08-27 | End: 2020-08-27

## 2020-08-27 RX ORDER — SODIUM CHLORIDE FOR INHALATION 10 %
5 VIAL, NEBULIZER (ML) INHALATION
Status: DISPENSED | OUTPATIENT
Start: 2020-08-27 | End: 2020-08-30

## 2020-08-27 RX ADMIN — SODIUM CHLORIDE 100 ML/HR: 900 INJECTION, SOLUTION INTRAVENOUS at 19:53

## 2020-08-27 RX ADMIN — ALBUMIN (HUMAN) 25 G: 0.25 INJECTION, SOLUTION INTRAVENOUS at 11:23

## 2020-08-27 RX ADMIN — PANCRELIPASE 1 CAPSULE: 60000; 12000; 38000 CAPSULE, DELAYED RELEASE PELLETS ORAL at 17:36

## 2020-08-27 RX ADMIN — Medication 10 ML: at 22:32

## 2020-08-27 RX ADMIN — PIPERACILLIN AND TAZOBACTAM 3.38 G: 3; .375 INJECTION, POWDER, LYOPHILIZED, FOR SOLUTION INTRAVENOUS at 10:19

## 2020-08-27 RX ADMIN — VANCOMYCIN HYDROCHLORIDE 750 MG: 750 INJECTION, POWDER, LYOPHILIZED, FOR SOLUTION INTRAVENOUS at 22:31

## 2020-08-27 RX ADMIN — Medication 10 ML: at 06:45

## 2020-08-27 RX ADMIN — Medication 5 ML: at 09:43

## 2020-08-27 RX ADMIN — DOXYCYCLINE 100 MG: 100 INJECTION, POWDER, LYOPHILIZED, FOR SOLUTION INTRAVENOUS at 17:35

## 2020-08-27 RX ADMIN — SODIUM CHLORIDE 1000 ML: 900 INJECTION, SOLUTION INTRAVENOUS at 04:00

## 2020-08-27 RX ADMIN — Medication 10 ML: at 13:25

## 2020-08-27 RX ADMIN — PANCRELIPASE 1 CAPSULE: 60000; 12000; 38000 CAPSULE, DELAYED RELEASE PELLETS ORAL at 10:18

## 2020-08-27 RX ADMIN — SODIUM CHLORIDE 1000 ML: 900 INJECTION, SOLUTION INTRAVENOUS at 20:28

## 2020-08-27 RX ADMIN — HYDROCODONE BITARTRATE AND ACETAMINOPHEN 1 TABLET: 5; 325 TABLET ORAL at 10:24

## 2020-08-27 RX ADMIN — PANCRELIPASE 1 CAPSULE: 60000; 12000; 38000 CAPSULE, DELAYED RELEASE PELLETS ORAL at 11:27

## 2020-08-27 RX ADMIN — SODIUM CHLORIDE 500 ML: 900 INJECTION, SOLUTION INTRAVENOUS at 12:47

## 2020-08-27 RX ADMIN — SODIUM CHLORIDE 40 MG: 9 INJECTION INTRAMUSCULAR; INTRAVENOUS; SUBCUTANEOUS at 19:53

## 2020-08-27 RX ADMIN — ALBUMIN (HUMAN) 25 G: 0.25 INJECTION, SOLUTION INTRAVENOUS at 17:35

## 2020-08-27 RX ADMIN — SODIUM CHLORIDE 100 ML/HR: 900 INJECTION, SOLUTION INTRAVENOUS at 14:49

## 2020-08-27 RX ADMIN — SODIUM CHLORIDE 40 MG: 9 INJECTION INTRAMUSCULAR; INTRAVENOUS; SUBCUTANEOUS at 10:18

## 2020-08-27 RX ADMIN — SODIUM CHLORIDE 100 ML/HR: 900 INJECTION, SOLUTION INTRAVENOUS at 22:31

## 2020-08-27 RX ADMIN — VANCOMYCIN HYDROCHLORIDE 750 MG: 750 INJECTION, POWDER, LYOPHILIZED, FOR SOLUTION INTRAVENOUS at 10:19

## 2020-08-27 RX ADMIN — PIPERACILLIN AND TAZOBACTAM 3.38 G: 3; .375 INJECTION, POWDER, LYOPHILIZED, FOR SOLUTION INTRAVENOUS at 17:34

## 2020-08-27 RX ADMIN — CYANOCOBALAMIN TAB 500 MCG 1000 MCG: 500 TAB at 10:18

## 2020-08-27 RX ADMIN — HYDROCODONE BITARTRATE AND ACETAMINOPHEN 1 TABLET: 5; 325 TABLET ORAL at 17:42

## 2020-08-27 RX ADMIN — DOXYCYCLINE 100 MG: 100 INJECTION, POWDER, LYOPHILIZED, FOR SOLUTION INTRAVENOUS at 05:30

## 2020-08-27 RX ADMIN — FOLIC ACID 1 MG: 1 TABLET ORAL at 10:18

## 2020-08-27 RX ADMIN — PIPERACILLIN AND TAZOBACTAM 3.38 G: 3; .375 INJECTION, POWDER, LYOPHILIZED, FOR SOLUTION INTRAVENOUS at 03:00

## 2020-08-27 NOTE — PROGRESS NOTES
Received call from [facility] for Pleural fluid culture and it is positive for OCCASIONAL [address]. Notified primary nurse Antonino Ruiz.

## 2020-08-27 NOTE — PROGRESS NOTES
Problem: Self Care Deficits Care Plan (Adult)  Goal: *Acute Goals and Plan of Care (Insert Text)  Description:   FUNCTIONAL STATUS PRIOR TO ADMISSION: Patient was independent and active without use of DME. Patient was independent for basic and instrumental ADLs. HOME SUPPORT: The patient lived alone with no local support. Occupational Therapy Goals  Initiated 8/27/2020  1. Patient will perform grooming, standing at sink for at least 5 minutes, with supervision/set-up within 7 day(s). 2.  Patient will perform upper body dressing with modified independence within 7 day(s). 3.  Patient will perform lower body dressing with supervision/set-up within 7 day(s). 4.  Patient will perform toilet transfers with modified independence within 7 day(s). 5.  Patient will perform all aspects of toileting with modified independence within 7 day(s). 6.  Patient will participate in upper extremity therapeutic exercise/activities with independence for 5 minutes within 7 day(s). 7.  Patient will utilize energy conservation techniques during functional activities with verbal cues within 7 day(s). Outcome: Progressing Towards Goal     OCCUPATIONAL THERAPY EVALUATION  Patient: Yasemin Avery (89 y.o. male)  Date: 8/27/2020  Primary Diagnosis: Pneumonia [J18.9]        Precautions: Airborne; Fall    ASSESSMENT  Based on the objective data described below, the patient presents with decreased activity tolerance, hypotension, decreased strength, and decreased ADL performance and mobility following admission for generalized weakness, lethargy, and diarrhea x several months and also with GLF at home. Pt found to have bilateral PEs, large R pleural effusion, DVT in LEs, and PNA. He has ruled negative for COVID and being worked up for TB, seen on Airborne precautions. He is now s/p IVC filter and chest tube placement. Pt is received in bed and alert.  He is demanding and particular, not very friendly to therapists but overall agreeable to participate. He is able to stand at EOB with rolling walker to manage brief change and able to remove hands to assist with closures, but refuses to perform his own hygiene. Noted BRB during bowel hygiene; RN notified. He is extensively educated on importance of chest opening exercises, breathing exercises, and sitting OOB in chair, however pt refuses almost all of this, is not receptive, and returns to supine at end of session. At this time, pt is functioning below his baseline, but anticipate he will improve once able to mobilize without additional line/tube/isolation precautions. Will continue to follow to progress activity and maximize performance with ADLs. Pulse BP SpO2   08/27/20 1505 85 96/73 Sitting; Post activity   08/27/20 1500 87 90/66 Standing; during activity   08/27/20 1457  (!) 82/63 Standing    08/27/20 1454  93/65 Sitting EOB   08/27/20 1452 82 96/68 At rest       Current Level of Function Impacting Discharge (ADLs/self-care): set up to mod A ADLs    Functional Outcome Measure: The patient scored Total: 30/100 on the Barthel Index outcome measure which is indicative of 70% impaired ability to care for basic self needs/dependency on others. Other factors to consider for discharge: poor motivation to participate with therapy; lives alone; h/o Etoh; airborne precautions     Patient will benefit from skilled therapy intervention to address the above noted impairments. PLAN :  Recommendations and Planned Interventions: self care training, functional mobility training, therapeutic exercise, balance training, therapeutic activities, endurance activities, patient education, home safety training and family training/education    Frequency/Duration: Patient will be followed by occupational therapy 5 times a week to address goals.     Recommendation for discharge: (in order for the patient to meet his/her long term goals)  To be determined: home with [facility] and additional assistance vs. rehab pending progress in acute setting    This discharge recommendation:  Has not yet been discussed the attending provider and/or case management    IF patient discharges home will need the following DME: walker: rolling       SUBJECTIVE:   Patient stated Go ahead and believe what you want to believe.  -when educated regarding importance of sitting OOB for pulmonary hygiene    OBJECTIVE DATA SUMMARY:   HISTORY:   No past medical history on file. Past Surgical History:   Procedure Laterality Date    IR [facility] BROWN DEER IVC FILTER  8/25/2020    IR THORACENTESIS/INSERT CHEST TUBE  8/26/2020       Expanded or extensive additional review of patient history:     Home Situation  Home Environment: Private residence  # Steps to Enter: 4  Rails to Enter: Yes  One/Two Story Residence: One story  Living Alone: Yes  Support Systems: Friends \ neighbors  Patient Expects to be Discharged to[de-identified] Private residence  Current DME Used/Available at Home: None    Hand dominance: Right    EXAMINATION OF PERFORMANCE DEFICITS:  Cognitive/Behavioral Status:  Neurologic State: Alert  Orientation Level: Oriented X4  Cognition: Follows commands; Appropriate for age attention/concentration  Perception: Appears intact  Perseveration: No perseveration noted  Safety/Judgement: Awareness of environment;Decreased awareness of need for safety; Fall prevention(decreased insight into illness/condition)     Edema: noted swelling in bilateral LEs    Hearing: Auditory  Auditory Impairment: None    Range of Motion:  AROM: Generally decreased, functional    Strength:  Strength: Generally decreased, functional    Coordination:  Coordination: Within functional limits  Fine Motor Skills-Upper: Left Intact; Right Intact    Gross Motor Skills-Upper: Left Intact; Right Intact    Tone:  Tone: Normal    Balance:  Sitting: Intact  Standing: With support  Standing - Static: Good  Standing - Dynamic : Good    Functional Mobility and Transfers for ADLs:  Bed Mobility:  Supine to Sit: Additional time; Moderate assistance; Adaptive equipment;Bed Modified  Scooting: Additional time;Contact guard assistance    Transfers:  Sit to Stand: Contact guard assistance; Additional time  Stand to Sit: Contact guard assistance; Additional time  Toilet Transfer : Contact guard assistance(infer based on observations)    ADL Assessment:  Feeding: Setup; Independent    Oral Facial Hygiene/Grooming: Setup;Supervision(seated; infer up to CGA standing)    Bathing: Contact guard assistance; Moderate assistance(infer based on observations)    Upper Body Dressing: Minimum assistance(infer based on observations due to lines/leads)    Lower Body Dressing: Minimum assistance; Moderate assistance(infer based on observations)    Toileting: Contact guard assistance; Moderate assistance    ADL Intervention and task modifications:    Lower Body Dressing Assistance  Protective Undergarmet: Moderate assistance;Maximum assistance(to manage brief; pt able to assist with donning tabs standin)  Position Performed: Standing(with rolling walker at EOB)  Cues: Don;Doff;Physical assistance  Adaptive Equipment Used: Walker    Toileting  Bowel Hygiene: Maximum assistance(due to pt refusing to perform himself; likely able to w/. CGA)  Clothing Management: Moderate assistance  Cues: Physical assistance for pants down;Physical assistance for pants up; Tactile cues provided;Verbal cues provided;Visual cues provided(physical assist for hygiene; noted BRB, RN notified)  Adaptive Equipment: Walker    Cognitive Retraining  Safety/Judgement: Awareness of environment;Decreased awareness of need for safety; Fall prevention(decreased insight into illness/condition)    Instructed patient to increase time sitting OOB in chair for pulmonary hygiene, skin integrity, and to increase endurance in preparation for ADLs. Instructed patient to sit OOB for all meals.  Educated pt on UE exercises (scapular elevation, shoulder rows) for chest opening to increase pulmonary hygiene and improve posture in preparation for ADL transfers and mobility, however pt refusing to perform. Pt not receptive to any education and refuses to sit OOB at end of session. Functional Measure:  Barthel Index:    Bathin  Bladder: 5  Bowels: 5  Groomin  Dressin  Feedin  Mobility: 0  Stairs: 0  Toilet Use: 5  Transfer (Bed to Chair and Back): 10  Total: 30/100        The Barthel ADL Index: Guidelines  1. The index should be used as a record of what a patient does, not as a record of what a patient could do. 2. The main aim is to establish degree of independence from any help, physical or verbal, however minor and for whatever reason. 3. The need for supervision renders the patient not independent. 4. A patient's performance should be established using the best available evidence. Asking the patient, friends/relatives and nurses are the usual sources, but direct observation and common sense are also important. However direct testing is not needed. 5. Usually the patient's performance over the preceding 24-48 hours is important, but occasionally longer periods will be relevant. 6. Middle categories imply that the patient supplies over 50 per cent of the effort. 7. Use of aids to be independent is allowed. Aurea Blunt., Barthel, DFAREED. (9487). Functional evaluation: the Barthel Index. [address] [facility] (14)2. Silo [place] leonor [place], J.J.M.F, Artur Peres., Blanca Lee., [facility], [address] [place] [address] (). Measuring the change indisability after inpatient rehabilitation; comparison of the responsiveness of the Barthel Index and Functional [place] Measure. Journal of Neurology, Neurosurgery, and Psychiatry, 66(4), 984-039. Semaj Askew, N.J.A, NOEL Vasquez, & Jonathan Calderon MJoseA. (2004.) Assessment of post-stroke quality of life in cost-effectiveness studies: The usefulness of the Barthel Index and the EuroQoL-5D.  Quality of Life Research, 13, 171-16     Occupational Therapy Evaluation Charge Determination   History Examination Decision-Making   LOW Complexity : Brief history review  HIGH Complexity : 5 or more performance deficits relating to physical, cognitive , or psychosocial skils that result in activity limitations and / or participation restrictions HIGH Complexity : Patient presents with comorbidities that affect occupational performance. Signifigant modification of tasks or assistance (eg, physical or verbal) with assessment (s) is necessary to enable patient to complete evaluation       Based on the above components, the patient evaluation is determined to be of the following complexity level: LOW   Pain Rating:  Pt reporting no pain. Activity Tolerance:   Fair and hypotensive (see above)  Please refer to the flowsheet for vital signs taken during this treatment. After treatment patient left in no apparent distress:    Supine in bed, Call bell within reach, Bed / chair alarm activated, Side rails x 3 and RN notified    COMMUNICATION/EDUCATION:   The patients plan of care was discussed with: Physical therapist and Registered nurse. Patient understands intent and goals of therapy, but is neutral about his/her participation. This patients plan of care is appropriate for delegation to [facility].     Thank you for this referral.  Yoni Harman, OT  Time Calculation: 40 mins

## 2020-08-27 NOTE — PROGRESS NOTES
1237: Dr. Beth Garcia notified of patients recent blood pressure of 74/55. 500 cc Bolus ordered. 1452: Patients blood pressure is now 96/68.

## 2020-08-27 NOTE — PROGRESS NOTES
Gastrointestinal Progress Note    8/27/2020    Admit Date: 8/25/2020    Subjective:     No new complaints, problems. States difficulty breathing and all scheduled testing makes difficult complete meals. Denies pain, vomiting; no visible blood loss    Current Facility-Administered Medications   Medication Dose Route Frequency    sodium chloride 10% 15 ml hypertonic neb solution  5 mL Nebulization QAM RT    [START ON 8/28/2020] Vancomycin Trough: please obtain at 0930 on 8/28 (obtain 30 min prior to 1000 dose on 8/28/2020) Thank you!    Other ONCE    vancomycin (VANCOCIN) 750 mg in 0.9% sodium chloride (MBP/ADV) 250 mL  750 mg IntraVENous Q12H    lidocaine (PF) (XYLOCAINE) 10 mg/mL (1 %) injection 10 mL  10 mL SubCUTAneous Rad Multiple    fentaNYL citrate (PF) injection  mcg   mcg IntraVENous Rad Multiple    cyanocobalamin (VITAMIN B12) tablet 1,000 mcg  1,000 mcg Oral DAILY    folic acid (FOLVITE) tablet 1 mg  1 mg Oral DAILY    lipase-protease-amylase (CREON 12,000) capsule 1 Cap  1 Cap Oral TID WITH MEALS    HYDROcodone-acetaminophen (NORCO) 5-325 mg per tablet 1 Tab  1 Tab Oral Q6H PRN    morphine injection 1 mg  1 mg IntraVENous Q4H PRN    sodium chloride (NS) flush 5-10 mL  5-10 mL IntraVENous PRN    sodium chloride (NS) flush 5-40 mL  5-40 mL IntraVENous Q8H    sodium chloride (NS) flush 5-40 mL  5-40 mL IntraVENous PRN    acetaminophen (TYLENOL) tablet 650 mg  650 mg Oral Q6H PRN    Or    acetaminophen (TYLENOL) suppository 650 mg  650 mg Rectal Q6H PRN    polyethylene glycol (MIRALAX) packet 17 g  17 g Oral DAILY PRN    promethazine (PHENERGAN) tablet 12.5 mg  12.5 mg Oral Q6H PRN    Or    ondansetron (ZOFRAN) injection 4 mg  4 mg IntraVENous Q6H PRN    0.9% sodium chloride infusion  100 mL/hr IntraVENous CONTINUOUS    piperacillin-tazobactam (ZOSYN) 3.375 g in 0.9% sodium chloride (MBP/ADV) 100 mL  3.375 g IntraVENous Q8H    doxycycline (VIBRAMYCIN) 100 mg in 0.9% sodium chloride (MBP/ADV) 100 mL  100 mg IntraVENous Q12H    pantoprazole (PROTONIX) 40 mg in 0.9% sodium chloride 10 mL injection  40 mg IntraVENous Q12H        Objective:     Blood pressure 96/73, pulse 85, temperature 97.5 °F (36.4 °C), resp. rate 18, height 5' 10\" (1.778 m), weight 60.8 kg (134 lb), SpO2 98 %.    08/27 0701 - 08/27 1900  In: 320 [P.O.:320]  Out: 511 [Urine:490]    08/25 1901 - 08/27 0700  In: 4652.5 [P.O.:1160; I.V.:3492.5]  Out: 2970 [Urine:800]    EXAM:  GENERAL: alert, no distress, HEART: regular rate and rhythm, LUNGS: chest clear, no wheezing, rales, normal symmetric air entry, ABDOMEN:  Bowel sounds are normal, liver is not enlarged, spleen is not enlarged and EXTREMITY: no edema      Data Review    Recent Results (from the past 24 hour(s))   METABOLIC PANEL, COMPREHENSIVE    Collection Time: 08/26/20  8:22 PM   Result Value Ref Range    Sodium 136 136 - 145 mmol/L    Potassium 3.9 3.5 - 5.1 mmol/L    Chloride 104 97 - 108 mmol/L    CO2 25 21 - 32 mmol/L    Anion gap 7 5 - 15 mmol/L    Glucose 131 (H) 65 - 100 mg/dL    BUN 11 6 - 20 MG/DL    Creatinine 0.68 (L) 0.70 - 1.30 MG/DL    BUN/Creatinine ratio 16 12 - 20      GFR est AA >60 >60 ml/min/1.73m2    GFR est non-AA >60 >60 ml/min/1.73m2    Calcium 6.6 (L) 8.5 - 10.1 MG/DL    Bilirubin, total 1.1 (H) 0.2 - 1.0 MG/DL    ALT (SGPT) 44 12 - 78 U/L    AST (SGOT) 59 (H) 15 - 37 U/L    Alk.  phosphatase 141 (H) 45 - 117 U/L    Protein, total 5.2 (L) 6.4 - 8.2 g/dL    Albumin 0.7 (L) 3.5 - 5.0 g/dL    Globulin 4.5 (H) 2.0 - 4.0 g/dL    A-G Ratio 0.2 (L) 1.1 - 2.2     CBC WITH AUTOMATED DIFF    Collection Time: 08/27/20  3:54 AM   Result Value Ref Range    WBC 28.8 (H) 4.1 - 11.1 K/uL    RBC 2.61 (L) 4.10 - 5.70 M/uL    HGB 8.8 (L) 12.1 - 17.0 g/dL    HCT 25.9 (L) 36.6 - 50.3 %    MCV 99.2 (H) 80.0 - 99.0 FL    MCH 33.7 26.0 - 34.0 PG    MCHC 34.0 30.0 - 36.5 g/dL    RDW 20.5 (H) 11.5 - 14.5 %    PLATELET 463 876 - 001 K/uL    MPV 10.1 8.9 - 12.9 FL NRBC 0.0 0  WBC    ABSOLUTE NRBC 0.00 0.00 - 0.01 K/uL    NEUTROPHILS 85 (H) 32 - 75 %    BAND NEUTROPHILS 3 0 - 6 %    LYMPHOCYTES 7 (L) 12 - 49 %    MONOCYTES 3 (L) 5 - 13 %    EOSINOPHILS 1 0 - 7 %    BASOPHILS 0 0 - 1 %    METAMYELOCYTES 1 (H) 0 %    IMMATURE GRANULOCYTES 0 %    ABS. NEUTROPHILS 25.3 (H) 1.8 - 8.0 K/UL    ABS. LYMPHOCYTES 2.0 0.8 - 3.5 K/UL    ABS. MONOCYTES 0.9 0.0 - 1.0 K/UL    ABS. EOSINOPHILS 0.3 0.0 - 0.4 K/UL    ABS. BASOPHILS 0.0 0.0 - 0.1 K/UL    ABS. IMM. GRANS. 0.0 K/UL    DF MANUAL      RBC COMMENTS ANISOCYTOSIS  1+        RBC COMMENTS TARGET CELLS     METABOLIC PANEL, COMPREHENSIVE    Collection Time: 08/27/20  3:54 AM   Result Value Ref Range    Sodium 138 136 - 145 mmol/L    Potassium 3.6 3.5 - 5.1 mmol/L    Chloride 106 97 - 108 mmol/L    CO2 25 21 - 32 mmol/L    Anion gap 7 5 - 15 mmol/L    Glucose 100 65 - 100 mg/dL    BUN 10 6 - 20 MG/DL    Creatinine 0.55 (L) 0.70 - 1.30 MG/DL    BUN/Creatinine ratio 18 12 - 20      GFR est AA >60 >60 ml/min/1.73m2    GFR est non-AA >60 >60 ml/min/1.73m2    Calcium 6.5 (L) 8.5 - 10.1 MG/DL    Bilirubin, total 1.0 0.2 - 1.0 MG/DL    ALT (SGPT) 38 12 - 78 U/L    AST (SGOT) 45 (H) 15 - 37 U/L    Alk.  phosphatase 115 45 - 117 U/L    Protein, total 4.5 (L) 6.4 - 8.2 g/dL    Albumin 0.6 (L) 3.5 - 5.0 g/dL    Globulin 3.9 2.0 - 4.0 g/dL    A-G Ratio 0.2 (L) 1.1 - 2.2     LD    Collection Time: 08/27/20  3:54 AM   Result Value Ref Range     (H) 85 - 241 U/L   HIV 1/2 AG/AB, 4TH GENERATION,W RFLX CONFIRM    Collection Time: 08/27/20 11:42 AM   Result Value Ref Range    HIV 1/2 Interpretation NONREACTIVE NR      HIV 1/2 result comment SEE NOTE     ECHO ADULT COMPLETE    Collection Time: 08/27/20  3:53 PM   Result Value Ref Range    IVSd 1.03 (A) 0.6 - 1.0 cm    LVIDd 4.25 4.2 - 5.9 cm    LVIDs 2.94 cm    LVOT d 1.80 cm    LVPWd 0.63 0.6 - 1.0 cm    LVOT Peak Gradient 3.01 mmHg    LVOT Peak Velocity 86.68 cm/s    RVIDd 3.14 cm    Left Atrium Major Axis 2.44 cm    LA Volume 36.57 18 - 58 mL    LA Area 4C 14.04 cm2    LA Vol 2C 27.18 18 - 58 mL    LA Vol 4C 34.28 18 - 58 mL    LA Volume DISK BP 33.11 18 - 58 mL    Aortic Valve Area by Continuity of Peak Velocity 2.05 cm2    AoV PG 4.61 mmHg    Aortic Valve Systolic Peak Velocity 123.80 cm/s    MV A Thiago 63.76 centimeter/second    Mitral Valve E Wave Deceleration Time 0.21 s    MV E Thiago 69.55 centimeter/second    LV E' Lateral Velocity 10.83 centimeter/second    LV E' Septal Velocity 13.60 centimeter/second    Pulmonic Valve Systolic Peak Instantaneous Gradient 1.08 mmHg    Pulmonic Regurgitant End Max Velocity 51.87 cm/s    Tapse 2.10 (A) 1.5 - 2.0 cm    Triscuspid Valve Regurgitation Peak Gradient 17.70 mmHg    TR Max Velocity 210.36 cm/s    Ao Root D 3.40 cm    LV Mass .5 88 - 224 g    LV Mass AL Index 61.6 49 - 115 g/m2    Left Atrium Minor Axis 1.39 cm    LA Vol Index 20.77 16 - 28 ml/m2    LA Vol Index 15.44 16 - 28 ml/m2    LA Vol Index 19.47 16 - 28 ml/m2    ANAYA/BSA Pk Thiago 1.2 cm2/m2       Assessment:     Principal Problem:    Pneumonia involving right lung (8/25/2020)    Active Problems:    Hemoptysis (8/25/2020)      Hyperbilirubinemia (8/25/2020)      Weight loss, unintentional (8/25/2020)      Pleural effusion, right (8/25/2020)      Bilateral pulmonary embolism (HCC) (8/25/2020)      Acute deep vein thrombosis (DVT) of both lower extremities (HCC) (8/25/2020)      Alcohol use (8/25/2020)      Diarrhea (8/25/2020)      Fecal occult blood test positive (8/25/2020)      Hyponatremia with extracellular fluid depletion (8/25/2020)        Plan:     1. No immediate plans additional GI evaluation.   Will check in time to time

## 2020-08-27 NOTE — PROGRESS NOTES
Nutrition Assessment     Type and Reason for Visit: Reassess    Nutrition Recommendations/Plan:   · Continue diet as ordered pending SLP eval and recs. · Continue multiple ONS to assist with meeting nutritional needs, hx of weight loss, malnutrition. Nutrition Assessment:      8/27;  Chart reviewed; med noted for pneumonia, TB. Pt on airborne precautions. RD completed comprehensive assessment with pt yesterday 8/26. Multiple ONS initiated. Will continue for now. Per Pulmonology note today, SLP to be consulted. Currently receiving a regular consistency diet with fair/good documented po intake. 8/26: 61 yo male admitted for pneumonia. PMhx: ETOH use daily. Underweight per BMI of 17.9. No weight hx in EMR. Pt being tested for COVID-19. Spoke with pt over phone, states he has been steadily losing weight and reports 50lb loss over the past 1.5 years. This is 29% loss which is significant for time frame. Pt states he has had no PO intakes for the last 1-2 weeks except for a few protein bars. Prior to that his intakes were still poor, only eating 1x/day which was either a frozen meal or sandwich. Regular diet ordered here. States he ate 50% of grits and pancakes this morning. Has no teeth and states he was not able to chew the sausage or fruit. Requesting softer foods, willing to try chopped meats for now but may need even softer foods than that. Discussed ONS options, states Ensure Enlive causes him to have diarrhea. Willing to try Ensure Clear, Ensure pudding, and Magic Cup. C/o chronic diarrhea, states he takes a probiotic most days at home which helps with the diarrhea, requesting that here. Labs and meds reviewed. NaCl ordered at 75ml/hr.     Patient Vitals for the past 72 hrs:   % Diet Eaten   08/26/20 1926 80 %   08/26/20 0917 50 %     Last Weight Metric  Weight Loss Metrics 8/25/2020   Today's Wt 125 lb 1.6 oz   BMI 17.95 kg/m2       Malnutrition Assessment:  Malnutrition Status: Severe malnutrition     Estimated Daily Nutrient Needs:  Energy (kcal):  2034 kcals(REE 1373 x AF 1.3 + 250)  Protein (g):  68-80gm(1.2-1.4gm/kg/d)       Fluid (ml/day):  2034 ml(1ml/kg)    Nutrition Related Findings:  Meds: folvite, B12, H/H 8.8/25.9      Current Nutrition Therapies:  DIET REGULAR  DIET NUTRITIONAL SUPPLEMENTS Breakfast, Lunch;  Ensure Clear  DIET NUTRITIONAL SUPPLEMENTS Lunch; Magic Cups  DIET NUTRITIONAL SUPPLEMENTS Lunch, Dinner; Pudding, Fortified    Anthropometric Measures:  · Height:  5' 10\" (177.8 cm)  · Current Body Wt:  56.7 kg (125 lb)  · BMI: 17.9    Nutrition Diagnosis:   · Underweight related to inadequate protein-energy intake as evidenced by BMI(17.9)    Nutrition Intervention:  Food and/or Nutrient Delivery: Continue current diet, Start oral nutrition supplement  Nutrition Education and Counseling: No recommendations at this time  Coordination of Nutrition Care: Continued inpatient monitoring    Goals:  Consueme > 75% meals + ONS to promote weight gain of 0.5-1lb within next 2-3 days       Nutrition Monitoring and Evaluation:   Food/Nutrient Intake Outcomes: Food and nutrient intake, Supplement intake  Physical Signs/Symptoms Outcomes: Chewing or swallowing, GI status, Weight    Discharge Planning:    Continue current diet, Continue oral nutrition supplement     Electronically signed by Stephen Goddard RD on 8/27/2020 at 11:29 AM

## 2020-08-27 NOTE — PROGRESS NOTES
Shift Change:     Bedside and Verbal shift change report given to Zo Harrell (oncoming nurse) by [facility] (offgoing nurse). Report included the following information SBAR, Kardex and Recent Results. Shift Summary:    1920: Chest tube drainage system filled with output. Contacted ICU RN to assist in change of drainage system. 2000: ICU RNs Steven Scott and Fredi Pisano at bedside to assist in change of drainage system. 2120 mL recorded output. Drainage system sitting upright. Extra clamps at bedside for emergency. Pnt stated \"Give me my medications for tonight and don't come back until 9am.\" Explained that I need to come in to check on him at least every hour, but I will not wake him up unless I need to. Explained that I will cluster medications and Vitals checks in order to give him the most amount of sleep I can, but I cannot leave him alone. Pnt verbalized understanding. 2030: Educated pnt on Protonix. Explained that the increased stress his body has endured causes increased acid production and protonix will protect the lining of his stomach. Pnt verbalized understanding. 0345: Pnt's BP has remained low overnight. Current BP is 79/54. Dr. Saul Parent alerted of findings. 1L Bolus ordered and increased maintenance fluid to 125mL/hr. Order added later d/t system downtime. 0400: Attempted to take patient's weight. Bed was not zeroed prior to pnt being in bed and weight is grossly inaccurate. Pnt unable to stand or leave the bed at this time. Called supervisor to see if turn team is able to go into room with juan f lift to zero bed and team is not fitted with N95. Unable to take daily weight. Shift Change:     Bedside and Verbal shift change report given to [facility] (oncoming nurse) by Zo Harrell (offgoing nurse). Report included the following information SBAR, Kardex and Recent Results.

## 2020-08-27 NOTE — PROGRESS NOTES
Daily Progress Note: 8/27/2020  Jasmyne Grier MD    Assessment/Plan:   Pneumonia involving right lung (8/25/2020) /  Hemoptysis (8/25/2020) POA: extensive with internal cavitation and air fluid levels. --.Blood cultures, sputum cultures and serologies. -- Consulted pulmonology and Thoracic Surg     Bilateral pulmonary embolism, acute ([address] (8/25/2020) / Acute deep vein thrombosis (DVT) of both lower extremities ([address] (8/25/2020): due to associated hemoptysis and positive FOBT  - IVC filter placed   - holding anticoags due to dropping Hb     Weight loss, unintentional (8/25/2020) POA: still concerning for malignancy but severe protein calorie malnutrition remains a DDx. Work up as above. Diet as tolerated     Hyperbilirubinemia (8/25/2020) POA: unclear etiology. CT abdomen unremarkable  - resolved     Pleural effusion, right (8/25/2020) POA: empyema, pneumonia vs malignant effusion. Consulted pulmonology  - Chest tube placed 8/26/20     Alcohol use (8/25/2020) POA: he says he has cut down recently. Monitor for withdrawal     Diarrhea (8/25/2020) / Fecal occult blood test positive (8/25/2020) POA: check stool studies.  Monitor Hgb.   - Consulted GI     Hyponatremia with extracellular fluid depletion (8/25/2020) POA: gently hydration         Problem List:  Problem List as of 8/27/2020 Date Reviewed: 8/25/2020          Codes Class Noted - Resolved    * (Principal) Pneumonia involving right lung ICD-10-CM: J18.9  ICD-9-CM: 486  8/25/2020 - Present        Hemoptysis ICD-10-CM: R04.2  ICD-9-CM: 786.30  8/25/2020 - Present        Hyperbilirubinemia ICD-10-CM: E80.6  ICD-9-CM: 782.4  8/25/2020 - Present        Weight loss, unintentional ICD-10-CM: R63.4  ICD-9-CM: 783.21  8/25/2020 - Present        Pleural effusion, right ICD-10-CM: J90  ICD-9-CM: 511.9  8/25/2020 - Present        Bilateral pulmonary embolism ([facility] [address] ICD-10-CM: I26.99  ICD-9-CM: 415.19  8/25/2020 - Present        Acute deep vein thrombosis (DVT) of both lower extremities ([facility] [address] ICD-10-CM: I82.403  ICD-9-CM: 453.40  8/25/2020 - Present        Alcohol use ICD-10-CM: Z72.89  ICD-9-CM: V49.89  8/25/2020 - Present        Diarrhea ICD-10-CM: R19.7  ICD-9-CM: 787.91  8/25/2020 - Present        Fecal occult blood test positive ICD-10-CM: R19.5  ICD-9-CM: 792.1  8/25/2020 - Present        Hyponatremia with extracellular fluid depletion ICD-10-CM: E87.1  ICD-9-CM: 276.1  8/25/2020 - Present            Subjective:     60 y.o. male who is being admitted for Pneumonia involving right lung. Mr. Dexter Aaron presented to our Emergency Department today complaining of generalized weakness, diarrhea and lethargy for several months. Patient was accompanied by his friend who gave most of the history. The patient does not like seeing physicians and apparently uses vitamin supplements. He is a smoker and drinks alcohol and has been progressively weaker over several months now. He has had pleuritic right sided chest pain associated with a productive cough and hemoptysis. He feel on the floor several days ago and was unable to get up. His friends helped him up and brought him to the ED for further testing. Initial CXR done showed airspace disease in right mid lower lung zone consistent with pneumonia. This is surrounded by a moderate right-sided pleural effusion. Upon review, he was noted to be cachectic with swollen lower extremities. CT scans chest abdomen and pelvis done showed a diffuse irregular opacification throughout the right lower lobe with multiple areas of internal cavitation and air-fluid levels. Findings are more likely related to infection than malignancy. Large right pleural effusion. Trace left pleural effusion. Bilateral pulmonary emboli. Clot burden is moderate. No evidence of right heart strain. Thrombus in the veins of the left lower extremity extending to the IVC. A small amount of thrombus is also seen in the right common iliac vein.  He will be admitted for further management. (Dr Eli Agrawal)    :  Feeling a little better this AM but still c/o painful, pleuritic cough and occas thick sputum. No leg/calf pains. No known exposure to ill individuals/no travel - \"just staying in the last few months. \"  No temps. WBC 31.9K. Cultures neg so far.      :  Reports less cough, chest pain and feeling better overall. Appetite has returned. Chest tube placed yesterday with discharge obtained - pending cultures. Other cultures remain neg. Hb lower so cont to hold anticoag for now. GI has seen and work up when more stable. Remains on doxy, zosyn, vanc. WBC down to 28K. Bili back to normal.       Review of Systems:   A comprehensive review of systems was negative except for that written in the HPI. Objective:   Physical Exam:   Visit Vitals  BP (!) 84/59 (BP 1 Location: Left arm, BP Patient Position: At rest) Comment: post bolus infusion   Pulse 81   Temp 98.2 °F (36.8 °C)   Resp 18   Ht 5' 10\" (1.778 m)   Wt 56.7 kg (125 lb 1.6 oz)   SpO2 94%   BMI 17.95 kg/m²      O2 Device: Room air  Temp (24hrs), Av °F (36.7 °C), Min:97.3 °F (36.3 °C), Max:98.7 °F (37.1 °C)    1901 -  0700  In: 1796.3 [P.O.:520; I.V.:1276.3]  Out: 2170    701 - 1900  In: 4719.3 [P.O.:640; I.V.:4079.3]  Out: 800 [Urine:800]  General:  Alert, cooperative, no distress, cachetic, ill appearing. Head:  Normocephalic, without obvious abnormality, atraumatic. Eyes:  [place] conjuntiva. PERRL, EOMs intact. Throat: Lips, mucosa, and tongue moist..   Neck: Supple, symmetrical, trachea midline, no adenopathy, thyroid: no enlargement/tenderness/nodules, no carotid bruit and no JVD. Lungs:   Better air exchange right,  Fine crackles anteriorly right,  a few scattered rhonchi. Chest wall:  Chest tube site right looks ok   Heart:  Regular rate and rhythm, S1, S2 normal, no murmur, click, rub or gallop. Abdomen:   Soft, non-tender.  Bowel sounds normal. No masses,  No organomegaly. Extremities: no cyanosis or edema. No calf tenderness; swelling of left calf >right. No cords palpated. Pulses: 2+ and symmetric all extremities. Skin: Skin color, texture, turgor normal. No rashes   Neurologic:  Alert and oriented X 3. Fine motor of hands and fingers normal.   equal.  No cogwheeling or rigidity. Gait not tested at this time. Sensation grossly normal to touch. Gross motor of extremities normal.       Data Review:   EXAM: CT CHEST ABD PELV W CONT 8/25/20  INDICATION: weight loss  COMPARISON: Radiographs 8/25/2020  CONTRAST: 100 mL of Isovue-370. FINDINGS:   CHEST WALL: No mass or axillary lymphadenopathy. THYROID: No nodule. MEDIASTINUM: No mass or lymphadenopathy. BRYAN: No mass or lymphadenopathy. THORACIC AORTA: No dissection or aneurysm. MAIN PULMONARY ARTERY: Normal in caliber. Thrombus is seen throughout the right  lower lobe pulmonary arteries. Thrombus is seen in the left lower lobe pulmonary  artery extending into the anterior and lateral basilar segments. TRACHEA/BRONCHI: Patent. ESOPHAGUS: No wall thickening or dilatation. HEART: Normal in size. PLEURA: There is a large right pleural effusion with enhancement of the pleural  surfaces. Trace left pleural effusion. LUNGS: There is irregular opacification throughout the right lower lobe. There  are multiple areas of internal cavitation with air-fluid levels. Small areas of  atelectasis or scarring are seen in the lingula and inferior left lower lobe. LIVER: No mass. BILIARY TREE: Gallbladder is within normal limits. CBD is not dilated. SPLEEN: within normal limits. PANCREAS: No mass or ductal dilatation. ADRENALS: Unremarkable. KIDNEYS: Small nonobstructive stones are seen in the bilateral kidneys. No  evidence of hydronephrosis. No mass. STOMACH: Unremarkable. SMALL BOWEL: No dilatation or wall thickening. COLON: No dilatation or wall thickening.   APPENDIX: Unremarkable  PERITONEUM: No ascites or pneumoperitoneum. RETROPERITONEUM: No lymphadenopathy or aortic aneurysm. REPRODUCTIVE ORGANS: Unremarkable  URINARY BLADDER: No mass or calculus. BONES: No destructive bone lesion. ABDOMINAL WALL: No mass or hernia. ADDITIONAL COMMENTS: There is thrombus in the left femoral veins extending into  the left iliac veins into the IVC. A small amount of thrombus is also present in  the right common iliac vein. IMPRESSION:  1. Diffuse irregular opacification throughout the right lower lobe with multiple  areas of internal cavitation and air-fluid levels. Findings are more likely  related to infection than malignancy. 2. Large right pleural effusion. Trace left pleural effusion. 3. Bilateral pulmonary emboli. Clot burden is moderate. No evidence of right  heart strain. 4. Thrombus in the veins of the left lower extremity extending to the IVC. A  small amount of thrombus is also seen in the right common iliac vein. EXAM:  IR THORACENTESIS/INSERT CHEST TUBE 8/26/20  INDICATION:  Request placement of large size pigtail catheter. Possible empyema. PROCEDURE:  Available history and imaging was reviewed which demonstrates a large partially  loculated right pleural effusion. Possibility of empyema, tuberculosis and other  etiologies are being considered clinically. Specific request for larger pigtail  catheter placement. Following informed consent the patient was evaluated with ultrasound in the  angina/catheter lab department. This demonstrates a large right pleural  effusion. The location of the pleural effusion was marked and then the area was prepped  and draped. 1% lidocaine was used for local anesthesia. A small incision was made with an 11  blade. A micropuncture needle was used under direct fluoroscopic guidance to access the  pleural fluid. When this was achieved the microguidewire was placed followed by  a 5 [place] dilator.  Through this a short [address] Amplatz superstiff guidewire  was placed. The track was then dilated to 12 [place]. This was then followed by  placement of a 12 [place] Abscession pigtail catheter. The string for the pigtail  was removed prior to placement. The catheter was positioned under fluoroscopic  guidance and then secured in place at the skin with a 2-0 Prolene suture. The  site was then dressed with a Tegaderm Bioclusive dressing and will be connected  to a Pleur-evac for subsequent drainage. Prior to this approximately 100 mL of fluid was obtained and sent for cytology  as well as multiple cultures including aerobic, anaerobic, AFB and fungal.   The fluid was withdrawn was mildly cloudy serosanguineous in color. I did not  notice any foul smell. The procedure was adequately tolerated without significant blood loss or  evidence of complication. Final digital image of the chest was saved documenting catheter position. IMPRESSION:   Successful ultrasound and fluoroscopic guided placement of 12 [place] right-sided  chest tube. Patient will have ongoing drainage from Pleur-evac in his hospital  bed/room. Care and monitoring as per medical service and nursing service. Recent Days:  Recent Labs     08/27/20  0354 08/26/20 0312 08/25/20  1607   WBC 28.8* 31.9* 31.1*   HGB 8.8* 9.7* 12.2   HCT 25.9* 28.0* 35.1*    279 367     Recent Labs     08/27/20  0354 08/26/20 2022 08/26/20 0312    136 135*   K 3.6 3.9 4.4    104 102   CO2 25 25 29    131* 51*   BUN 10 11 12   CREA 0.55* 0.68* 0.47*   CA 6.5* 6.6* 6.6*   ALB 0.6* 0.7* 0.7*   TBILI 1.0 1.1* 1.4*   ALT 38 44 47     No results for input(s): PH, PCO2, PO2, HCO3, FIO2 in the last 72 hours.     24 Hour Results:  Recent Results (from the past 24 hour(s))   CULTURE, RESPIRATORY/SPUTUM/BRONCH W GRAM STAIN    Collection Time: 08/26/20  9:24 AM    Specimen: Sputum   Result Value Ref Range    Special Requests: NO SPECIAL REQUESTS      GRAM STAIN OCCASIONAL EPITHELIAL CELLS SEEN      GRAM STAIN 2+ WBCS SEEN      GRAM STAIN FEW BUDDING YEAST WITH PSEUDOHYPHAE      GRAM STAIN OCCASIONAL GRAM POSITIVE COCCI      GRAM STAIN OCCASIONAL GRAM POSITIVE RODS      Culture result: PENDING    CREATININE, FLUID    Collection Time: 08/26/20 11:48 AM   Result Value Ref Range    Fluid Type: PLEURAL FLUID      Creatinine, fluid 1.11 MG/DL   GLUCOSE, FLUID    Collection Time: 08/26/20 11:48 AM   Result Value Ref Range    Fluid Type: PLEURAL FLUID      Glucose, body fld. <1 MG/DL   CULTURE, BODY FLUID Thereasa Gravely STAIN    Collection Time: 08/26/20  4:48 PM    Specimen: Pleural Fluid; Body Fluid   Result Value Ref Range    Special Requests: NO SPECIAL REQUESTS      GRAM STAIN 4+ WBCS SEEN      GRAM STAIN OCCASIONAL GRAM POSITIVE COCCI IN CLUSTERS      GRAM STAIN        CALLED TO AND READ BACK BY MS TIGRE RN ON 8/26/20 AT 2145 ([facility] 324). MS    Culture result: PENDING    PH, FLUID    Collection Time: 08/26/20  4:48 PM   Result Value Ref Range    FLUID TYPE(15) PLEURAL FLUID      FLUID PH 6.8     PROTEIN TOTAL, FLUID    Collection Time: 08/26/20  4:48 PM   Result Value Ref Range    Fluid Type: PLEURAL FLUID      Protein total, body fld.  2.9 g/dL   LDH, BODY FLUID    Collection Time: 08/26/20  4:48 PM   Result Value Ref Range    Fluid Type: PLEURAL FLUID      LD, body fld. 1,620 U/L   CELL COUNT, BODY FLUID    Collection Time: 08/26/20  4:48 PM   Result Value Ref Range    BODY FLUID TYPE PLEURAL FLUID      FLUID COLOR JENNIFFER      FLUID APPEARANCE TURBID      FLUID RBC CT. >100 (H) 0 /cu mm    FLUID NUCLEATED CELLS 32,890 /cu mm    FLD NEUTROPHILS 91 (A) NRRE %    FLD LYMPHS 3 (A) NRRE %    FLD MONO/MACROPHAGES 6 (A) NRRE %   METABOLIC PANEL, COMPREHENSIVE    Collection Time: 08/26/20  8:22 PM   Result Value Ref Range    Sodium 136 136 - 145 mmol/L    Potassium 3.9 3.5 - 5.1 mmol/L    Chloride 104 97 - 108 mmol/L    CO2 25 21 - 32 mmol/L    Anion gap 7 5 - 15 mmol/L    Glucose 131 (H) 65 - 100 mg/dL    BUN 11 6 - 20 MG/DL    Creatinine 0.68 (L) 0.70 - 1.30 MG/DL    BUN/Creatinine ratio 16 12 - 20      GFR est AA >60 >60 ml/min/1.73m2    GFR est non-AA >60 >60 ml/min/1.73m2    Calcium 6.6 (L) 8.5 - 10.1 MG/DL    Bilirubin, total 1.1 (H) 0.2 - 1.0 MG/DL    ALT (SGPT) 44 12 - 78 U/L    AST (SGOT) 59 (H) 15 - 37 U/L    Alk. phosphatase 141 (H) 45 - 117 U/L    Protein, total 5.2 (L) 6.4 - 8.2 g/dL    Albumin 0.7 (L) 3.5 - 5.0 g/dL    Globulin 4.5 (H) 2.0 - 4.0 g/dL    A-G Ratio 0.2 (L) 1.1 - 2.2     CBC WITH AUTOMATED DIFF    Collection Time: 08/27/20  3:54 AM   Result Value Ref Range    WBC 28.8 (H) 4.1 - 11.1 K/uL    RBC 2.61 (L) 4.10 - 5.70 M/uL    HGB 8.8 (L) 12.1 - 17.0 g/dL    HCT 25.9 (L) 36.6 - 50.3 %    MCV 99.2 (H) 80.0 - 99.0 FL    MCH 33.7 26.0 - 34.0 PG    MCHC 34.0 30.0 - 36.5 g/dL    RDW 20.5 (H) 11.5 - 14.5 %    PLATELET 031 087 - 547 K/uL    MPV 10.1 8.9 - 12.9 FL    NRBC 0.0 0  WBC    ABSOLUTE NRBC 0.00 0.00 - 0.01 K/uL    NEUTROPHILS 85 (H) 32 - 75 %    BAND NEUTROPHILS 3 0 - 6 %    LYMPHOCYTES 7 (L) 12 - 49 %    MONOCYTES 3 (L) 5 - 13 %    EOSINOPHILS 1 0 - 7 %    BASOPHILS 0 0 - 1 %    METAMYELOCYTES 1 (H) 0 %    IMMATURE GRANULOCYTES 0 %    ABS. NEUTROPHILS 25.3 (H) 1.8 - 8.0 K/UL    ABS. LYMPHOCYTES 2.0 0.8 - 3.5 K/UL    ABS. MONOCYTES 0.9 0.0 - 1.0 K/UL    ABS. EOSINOPHILS 0.3 0.0 - 0.4 K/UL    ABS. BASOPHILS 0.0 0.0 - 0.1 K/UL    ABS. IMM.  GRANS. 0.0 K/UL    DF MANUAL      RBC COMMENTS ANISOCYTOSIS  1+        RBC COMMENTS TARGET CELLS     METABOLIC PANEL, COMPREHENSIVE    Collection Time: 08/27/20  3:54 AM   Result Value Ref Range    Sodium 138 136 - 145 mmol/L    Potassium 3.6 3.5 - 5.1 mmol/L    Chloride 106 97 - 108 mmol/L    CO2 25 21 - 32 mmol/L    Anion gap 7 5 - 15 mmol/L    Glucose 100 65 - 100 mg/dL    BUN 10 6 - 20 MG/DL    Creatinine 0.55 (L) 0.70 - 1.30 MG/DL    BUN/Creatinine ratio 18 12 - 20      GFR est AA >60 >60 ml/min/1.73m2    GFR est non-AA >60 >60 ml/min/1.73m2    Calcium 6.5 (L) 8.5 - 10.1 MG/DL    Bilirubin, total 1.0 0.2 - 1.0 MG/DL    ALT (SGPT) 38 12 - 78 U/L    AST (SGOT) 45 (H) 15 - 37 U/L    Alk.  phosphatase 115 45 - 117 U/L    Protein, total 4.5 (L) 6.4 - 8.2 g/dL    Albumin 0.6 (L) 3.5 - 5.0 g/dL    Globulin 3.9 2.0 - 4.0 g/dL    A-G Ratio 0.2 (L) 1.1 - 2.2         Medications reviewed  Current Facility-Administered Medications   Medication Dose Route Frequency    vancomycin (VANCOCIN) 750 mg in 0.9% sodium chloride (MBP/ADV) 250 mL  750 mg IntraVENous Q12H    lidocaine (PF) (XYLOCAINE) 10 mg/mL (1 %) injection 10 mL  10 mL SubCUTAneous Rad Multiple    fentaNYL citrate (PF) injection  mcg   mcg IntraVENous Rad Multiple    cyanocobalamin (VITAMIN B12) tablet 1,000 mcg  1,000 mcg Oral DAILY    folic acid (FOLVITE) tablet 1 mg  1 mg Oral DAILY    lipase-protease-amylase (CREON 12,000) capsule 1 Cap  1 Cap Oral TID WITH MEALS    HYDROcodone-acetaminophen (NORCO) 5-325 mg per tablet 1 Tab  1 Tab Oral Q6H PRN    morphine injection 1 mg  1 mg IntraVENous Q4H PRN    sodium chloride (NS) flush 5-10 mL  5-10 mL IntraVENous PRN    sodium chloride (NS) flush 5-40 mL  5-40 mL IntraVENous Q8H    sodium chloride (NS) flush 5-40 mL  5-40 mL IntraVENous PRN    acetaminophen (TYLENOL) tablet 650 mg  650 mg Oral Q6H PRN    Or    acetaminophen (TYLENOL) suppository 650 mg  650 mg Rectal Q6H PRN    polyethylene glycol (MIRALAX) packet 17 g  17 g Oral DAILY PRN    promethazine (PHENERGAN) tablet 12.5 mg  12.5 mg Oral Q6H PRN    Or    ondansetron (ZOFRAN) injection 4 mg  4 mg IntraVENous Q6H PRN    0.9% sodium chloride infusion  125 mL/hr IntraVENous CONTINUOUS    piperacillin-tazobactam (ZOSYN) 3.375 g in 0.9% sodium chloride (MBP/ADV) 100 mL  3.375 g IntraVENous Q8H    doxycycline (VIBRAMYCIN) 100 mg in 0.9% sodium chloride (MBP/ADV) 100 mL  100 mg IntraVENous Q12H    pantoprazole (PROTONIX) 40 mg in 0.9% sodium chloride 10 mL injection  40 mg IntraVENous Q12H Care Plan discussed with: Patient/Nurse  Total time spent with patient: 30 minutes.   Anne Frias MD

## 2020-08-27 NOTE — PROGRESS NOTES
0700-Bedside and Verbal shift change report given to [address] [place] [address] (oncoming nurse) by Nemo Young (offgoing nurse). Report included the following information SBAR, Kardex, Intake/Output, MAR, Accordion, Recent Results and Med Rec Status. 0800- Chest tube to gravity at 70mL output. 1345- Pts BP remains low after bolus. Pt asymptomatic. Notified Dr. Samantha Mcdaniels MD OK with current pressures, will notify of changes.

## 2020-08-27 NOTE — PROGRESS NOTES
Problem: Mobility Impaired (Adult and Pediatric)  Goal: *Acute Goals and Plan of Care (Insert Text)  Description: FUNCTIONAL STATUS PRIOR TO ADMISSION: Pt reports independent baseline mobility without device    HOME SUPPORT PRIOR TO ADMISSION: The patient lived alone with friends to provide assistance intermittently. Physical Therapy Goals  Initiated 8/27/2020  1. Patient will move from supine to sit and sit to supine in bed with supervision/set-up within 7 day(s). 2.  Patient will transfer from bed to chair and chair to bed with supervision/set-up using the least restrictive device within 7 day(s). 3.  Patient will perform sit to stand with supervision/set-up within 7 day(s). 4.  Patient will ambulate with supervision/set-up for 100 feet with the least restrictive device within 7 day(s). 5.  Patient will ascend/descend 4 stairs with one handrail(s) with minimal assistance/contact guard assist within 7 day(s). Outcome: Progressing Towards Goal   PHYSICAL THERAPY EVALUATION  Patient: Iliana Martínez (73 y.o. male)  Date: 8/27/2020  Primary Diagnosis: Pneumonia [J18.9]        Precautions: Airborne (rule out TB)  Fall    ASSESSMENT  Based on the objective data described below, the patient presents with generally decreased strength, decreased endurance, impaired dynamic standing balance, decreased receptivity to education and cues, and limited functional mobility on day 2 of admission with diarrhea, weakness, and lethargy for several months. Work-up has ruled out COVID-19 and found pt with B PEs, DVTs, and pleural effusion. He is s/p IVC filter and R chest tube placement. Pt is irritable, demanding, and particular, demonstrates dependent behaviors, and is willing to participate in limited activities today. He performs bed mobility, dangles edge of bed, performs static and dynamic standing activities, and eventually returns to bed having declined transfer out of bed to chair.      Current Level of Function Impacting Discharge (mobility/balance): up to moderate assistance for bed mobility    Functional Outcome Measure: The patient scored 30 on the Barthel outcome measure which is indicative of 70% functional impairment. Other factors to consider for discharge: none additional     Patient will benefit from skilled therapy intervention to address the above noted impairments. PLAN :  Recommendations and Planned Interventions: bed mobility training, transfer training, gait training, therapeutic exercises, neuromuscular re-education, edema management/control, patient and family training/education, and therapeutic activities      Frequency/Duration: Patient will be followed by physical therapy:  5 times a week to address goals. Recommendation for discharge: (in order for the patient to meet his/her long term goals)  Therapy up to 5 days/week in SNF setting if discharged at current functional level    This discharge recommendation:  Has not yet been discussed the attending provider and/or case management    IF patient discharges home will need the following DME: TBD         SUBJECTIVE:   Patient stated Alan Abide me that cord so I can look at the end of it. I want to see it.  Pt asking to examine hospital bed plugs; requires education and redirection to return to therapy goals. Pt received supine, agreeable to PT and cleared by RN. OBJECTIVE DATA SUMMARY:   HISTORY:    No past medical history on file.   Past Surgical History:   Procedure Laterality Date    IR [facility] IVC FILTER  8/25/2020    IR THORACENTESIS/INSERT CHEST TUBE  8/26/2020       Personal factors and/or comorbidities impacting plan of care: as above    Home Situation  Home Environment: Private residence  # Steps to Enter: 4  Rails to Enter: Yes  One/Two Story Residence: One story  Living Alone: Yes  Support Systems: Friends \ neighbors  Patient Expects to be Discharged to[de-identified] Private residence  Current DME Used/Available at Home: None    EXAMINATION/PRESENTATION/DECISION MAKING:   Critical Behavior:  Neurologic State: Alert  Orientation Level: Oriented X4  Cognition: Follows commands, Appropriate for age attention/concentration  Safety/Judgement: Awareness of environment, Decreased awareness of need for safety, Fall prevention(decreased insight into illness/condition)  Hearing: Auditory  Auditory Impairment: None  Skin:  LE exposed skin intact  Edema: BLE edema as documented elsewhere. Range Of Motion:  AROM: Generally decreased, functional                       Strength:    Strength: Generally decreased, functional                    Tone & Sensation:   Tone: Normal                              Coordination:  Coordination: Within functional limits  Vision:      Functional Mobility:  Bed Mobility:     Supine to Sit: Additional time; Moderate assistance; Adaptive equipment;Bed Modified     Scooting: Additional time;Contact guard assistance  Transfers:  Sit to Stand: Contact guard assistance; Additional time  Stand to Sit: Contact guard assistance; Additional time                       Balance:   Sitting: Intact  Standing: With support  Standing - Static: Good  Standing - Dynamic : Good  Ambulation/Gait Training:  Distance (ft): 3 Feet (ft)  Assistive Device: Gait belt;Walker, rolling  Ambulation - Level of Assistance: Contact guard assistance        Gait Abnormalities: Decreased step clearance        Base of Support: Widened     Speed/Taty: Pace decreased (<100 feet/min)                              Functional Measure:  Barthel Index:    Bathin  Bladder: 5  Bowels: 5  Groomin  Dressin  Feedin  Mobility: 0  Stairs: 0  Toilet Use: 5  Transfer (Bed to Chair and Back): 10  Total: 30/100       The Barthel ADL Index: Guidelines  1. The index should be used as a record of what a patient does, not as a record of what a patient could do.   2. The main aim is to establish degree of independence from any help, physical or verbal, however minor and for whatever reason. 3. The need for supervision renders the patient not independent. 4. A patient's performance should be established using the best available evidence. Asking the patient, friends/relatives and nurses are the usual sources, but direct observation and common sense are also important. However direct testing is not needed. 5. Usually the patient's performance over the preceding 24-48 hours is important, but occasionally longer periods will be relevant. 6. Middle categories imply that the patient supplies over 50 per cent of the effort. 7. Use of aids to be independent is allowed. Sandoval Holland., Barthel, DJoseW. (1230). Functional evaluation: the Barthel Index. [address] [facility] (14)2. AVIVA Choi, Mala Thomason., Sebastien Torres., Kelsi, [address] (). Measuring the change indisability after inpatient rehabilitation; comparison of the responsiveness of the Barthel Index and Functional [place] Measure. Journal of Neurology, Neurosurgery, and Psychiatry, 66(4), 029-506. Jose Waters, NJoseJHEBERT, NOEL Vasquez, & Jackie Gardner M.A. (2004.) Assessment of post-stroke quality of life in cost-effectiveness studies: The usefulness of the Barthel Index and the EuroQoL-5D.  Quality of Life Research, 15, 386-46            Physical Therapy Evaluation Charge Determination   History Examination Presentation Decision-Making   HIGH Complexity :3+ comorbidities / personal factors will impact the outcome/ POC  HIGH Complexity : 4+ Standardized tests and measures addressing body structure, function, activity limitation and / or participation in recreation  MEDIUM Complexity : Evolving with changing characteristics  Other outcome measures barthel  MEDIUM      Based on the above components, the patient evaluation is determined to be of the following complexity level: MEDIUM    Pain Ratin    Activity Tolerance:   Fair  Please refer to the flowsheet for vital signs taken during this treatment. After treatment patient left in no apparent distress:   Supine in bed, Call bell within reach, Bed / chair alarm activated, and pt explicitely demanding 4 side rails up in very particular positions, becoming agitated and increasingly particular with room set-up. RN notified. COMMUNICATION/EDUCATION:   The patients plan of care was discussed with: Occupational therapist and Registered nurse. Patient understands intent and goals of therapy, but is neutral about his/her participation.     Thank you for this referral.  Bong Williamson, PT, DPT   Time Calculation: 45 mins

## 2020-08-27 NOTE — PROGRESS NOTES
Name: [facility]: [facility]   : 1955 Admit Date: 2020   Phone: [phone]  Room: 324/01   PCP: Maria Guadalupe Rose MD  MRN: [id number]   Date: 2020  Code: Full Code        HPI:      Chart and notes reviewed. Data reviewed. I review the patient's current medications in the medical record at each encounter.  I have evaluated and examined the patient. 9:18 AM       History was obtained from patient. I was asked by Isaac Anderson MD to see Gillian Leblanc in consultation for a chief complaint of  pneumonia, hemoptysis concerning for malignancy. History of Present Illness:   is a pleasant, 59 yo gentleman that presented to [facility] with worsening shortness of breath and weakness. He tells me that he started feeling poorly in 2019 with a cough, however that resolved. His symptoms returned in 2019 and tells me that he has been struggling with cough, weight loss of 40+ lbs, and intermittent hemoptysis (phlegm mixed with blood), and diarrhea. He is a smoker of 2ppd for 20+ years. Also prior to the last two weeks, was \"drinking like a fish,\" at least 1 pint per day. He does not routinely prefer to seek traditional medical care and thus has delayed coming to the ED due to his symptoms. Reports worsening right sided chest pain and poor appetite. Images:  Personally reviewed. Chest/Abdominal CT:  Diffuse irregular opacification throughout the right lower lobe with multiple  areas of internal cavitation and air-fluid levels. Findings are more likely  related to infection than malignancy. Large right pleural effusion. Trace left pleural effusion. Bilateral pulmonary emboli. Clot burden is moderate. No evidence of right heart strain. Thrombus in the veins of the left lower extremity extending to the IVC. A  small amount of thrombus is also seen in the right common iliac vein.     WBC 31.69  Hgb 9.7  ALT 47  AST 75  Na 135  Creat .47  Albumin . 7  Lactic acid on admit 2.2; now 1.1  Hemoccult +   Blood cultures negative x 11 hours      Interval History:    Afebrile overnight  BP 14K systolic  Sats 67% on RA  WBC 28.8; better  Hgb 8.8; decreased  LFTs trending down  Pleural fluid culture + for occasional GPCs in clusters  Pleural fluid pH 6.8  Pleural fluid glucose <1  Pleural fluid consistent with exudate  2170mL output noted in chest tube site this morning    ROS    Feels much better today than yesterday. Breathing much improved. Denies cough or sputum production. Wants to eat. No past medical history on file. Past Surgical History:   Procedure Laterality Date    IR [facility] IVC FILTER  8/25/2020    IR THORACENTESIS/INSERT CHEST TUBE  8/26/2020       No family history on file.     Social History     Tobacco Use    Smoking status: Current Every Day Smoker   Substance Use Topics    Alcohol use: Yes       No Known Allergies    Current Facility-Administered Medications   Medication Dose Route Frequency    vancomycin (VANCOCIN) 750 mg in 0.9% sodium chloride (MBP/ADV) 250 mL  750 mg IntraVENous Q12H    lidocaine (PF) (XYLOCAINE) 10 mg/mL (1 %) injection 10 mL  10 mL SubCUTAneous Rad Multiple    fentaNYL citrate (PF) injection  mcg   mcg IntraVENous Rad Multiple    cyanocobalamin (VITAMIN B12) tablet 1,000 mcg  1,000 mcg Oral DAILY    folic acid (FOLVITE) tablet 1 mg  1 mg Oral DAILY    lipase-protease-amylase (CREON 12,000) capsule 1 Cap  1 Cap Oral TID WITH MEALS    HYDROcodone-acetaminophen (NORCO) 5-325 mg per tablet 1 Tab  1 Tab Oral Q6H PRN    morphine injection 1 mg  1 mg IntraVENous Q4H PRN    sodium chloride (NS) flush 5-10 mL  5-10 mL IntraVENous PRN    sodium chloride (NS) flush 5-40 mL  5-40 mL IntraVENous Q8H    sodium chloride (NS) flush 5-40 mL  5-40 mL IntraVENous PRN    acetaminophen (TYLENOL) tablet 650 mg  650 mg Oral Q6H PRN    Or    acetaminophen (TYLENOL) suppository 650 mg  650 mg Rectal Q6H PRN    polyethylene glycol (MIRALAX) packet 17 g  17 g Oral DAILY PRN    promethazine (PHENERGAN) tablet 12.5 mg  12.5 mg Oral Q6H PRN    Or    ondansetron (ZOFRAN) injection 4 mg  4 mg IntraVENous Q6H PRN    0.9% sodium chloride infusion  100 mL/hr IntraVENous CONTINUOUS    piperacillin-tazobactam (ZOSYN) 3.375 g in 0.9% sodium chloride (MBP/ADV) 100 mL  3.375 g IntraVENous Q8H    doxycycline (VIBRAMYCIN) 100 mg in 0.9% sodium chloride (MBP/ADV) 100 mL  100 mg IntraVENous Q12H    pantoprazole (PROTONIX) 40 mg in 0.9% sodium chloride 10 mL injection  40 mg IntraVENous Q12H         REVIEW OF SYSTEMS   Negative except as stated in the HPI. Physical Exam:   Visit Vitals  BP (!) 83/55 (BP 1 Location: Right arm, BP Patient Position: At rest)   Pulse 86   Temp 97.7 °F (36.5 °C)   Resp 24   Ht 5' 10\" (1.778 m)   Wt 56.7 kg (125 lb 1.6 oz)   SpO2 99%   BMI 17.95 kg/m²       General:  Alert, cooperative, no distress, appears older stated age. Head:  Normocephalic, without obvious abnormality, atraumatic. Eyes:  Conjunctivae/corneas clear. .   Nose: Nares normal. Septum midline. Mucosa normal.    Throat: Lips, mucosa, and tongue normal. Poor dentition. Lungs:   Chest tube with serosanguinous fluid, no air leak. Diminished right base. Chest wall:  No tenderness or deformity. Heart:  Regular rate and rhythm, S1, S2 normal, no murmur, click, rub or gallop. Abdomen:   Soft, non-tender. Bowel sounds normal.    Extremities: Muscle wasting. Anasarca. Pulses: 2+ and symmetric all extremities.    Skin: Skin color, texture, turgor normal.   Lymph nodes: Cervical nodes normal.   Neurologic: Grossly nonfocal       Lab Results   Component Value Date/Time    Sodium 138 08/27/2020 03:54 AM    Potassium 3.6 08/27/2020 03:54 AM    Chloride 106 08/27/2020 03:54 AM    CO2 25 08/27/2020 03:54 AM    BUN 10 08/27/2020 03:54 AM    Creatinine 0.55 (L) 08/27/2020 03:54 AM    Glucose 100 08/27/2020 03:54 AM    Calcium 6.5 (L) 08/27/2020 03:54 AM    Lactic acid 1.1 08/25/2020 06:25 PM       Lab Results   Component Value Date/Time    WBC 28.8 (H) 08/27/2020 03:54 AM    HGB 8.8 (L) 08/27/2020 03:54 AM    PLATELET 983 79/57/9908 03:54 AM    MCV 99.2 (H) 08/27/2020 03:54 AM       Lab Results   Component Value Date/Time    Alk.  phosphatase 115 08/27/2020 03:54 AM    Protein, total 4.5 (L) 08/27/2020 03:54 AM    Albumin 0.6 (L) 08/27/2020 03:54 AM    Globulin 3.9 08/27/2020 03:54 AM       No results found for: IRON, FE, TIBC, IBCT, PSAT, FERR    No results found for: SR, CRP, ANALIA, ANAIGG, RA, RPR, RPRT, VDRLT, VDRLS, TSH, TSHEXT, TSHEXT     No results found for: PH, PHI, PCO2, PCO2I, PO2, PO2I, HCO3, HCO3I, FIO2, FIO2I    Lab Results   Component Value Date/Time    Troponin-I, Qt. <0.05 08/25/2020 04:07 PM        Lab Results   Component Value Date/Time    Culture result: PENDING 08/26/2020 04:48 PM    Culture result: PENDING 08/26/2020 09:24 AM    Culture result: NO GROWTH 2 DAYS 08/25/2020 05:09 PM       No results found for: TOXA1, RPR, HBCM, HBSAG, HAAB, HCAB1, HAAT, G6PD, CRYAC, HIVGT, HIVR, HIV1, HIV12, HIVPC, HIVRPI    No results found for: VANCT, CPK    Lab Results   Component Value Date/Time    Color JENNIFFER 08/26/2020 03:12 AM    Appearance CLEAR 08/26/2020 03:12 AM    Specific gravity 1.005 08/26/2020 03:12 AM    pH (UA) 6.0 08/26/2020 03:12 AM    Protein TRACE (A) 08/26/2020 03:12 AM    Glucose Negative 08/26/2020 03:12 AM    Ketone TRACE (A) 08/26/2020 03:12 AM    Blood Negative 08/26/2020 03:12 AM    Urobilinogen 1.0 08/26/2020 03:12 AM    Nitrites Negative 08/26/2020 03:12 AM    Leukocyte Esterase SMALL (A) 08/26/2020 03:12 AM    WBC 5-10 08/26/2020 03:12 AM    RBC 5-10 08/26/2020 03:12 AM    Bacteria Negative 08/26/2020 03:12 AM       IMPRESSION  · Abnormal Chest CT: extensive cavitation throughout   · Bilateral PEs s/p IVC filter due to GIB  · GIB  · Anemia  · Weight Loss  · Tobacco Use    PLAN  · Supplemental O2 to keep sats >88%  · Continue chest tube  · Continue Zosyn,Doxy, Vanc  · Follow-up pleural fluid studies, cultures, cytology  · Follow cultures and check sputum culture  · F/U Quantiferon gold and AFB smears  · F/U MRSA swab  · F/U HIV  · Check ECHO  · BLE dopplers  · GI following, will eventually need work-up  · Speech consult  · IVF          Heriberto Collazo, RED

## 2020-08-27 NOTE — PROGRESS NOTES
Problem: Falls - Risk of  Goal: *Absence of Falls  Description: Document Sangeetha Long Fall Risk and appropriate interventions in the flowsheet. Outcome: Progressing Towards Goal  Note: Fall Risk Interventions:  Mobility Interventions: Assess mobility with egress test, Bed/chair exit alarm, Communicate number of staff needed for ambulation/transfer, OT consult for ADLs, Patient to call before getting OOB, PT Consult for mobility concerns, PT Consult for assist device competence, Strengthening exercises (ROM-active/passive), Utilize walker, cane, or other assistive device, Utilize gait belt for transfers/ambulation         Medication Interventions: Bed/chair exit alarm, Patient to call before getting OOB, Teach patient to arise slowly    Elimination Interventions: Bed/chair exit alarm, Call light in reach, Elevated toilet seat, Patient to call for help with toileting needs, Toilet paper/wipes in reach, Toileting schedule/hourly rounds    History of Falls Interventions: Bed/chair exit alarm, Door open when patient unattended, Investigate reason for fall, Room close to nurse's station, Utilize gait belt for transfer/ambulation, Vital signs minimum Q4HRs X 24 hrs (comment for end date), Assess for delayed presentation/identification of injury for 48 hrs (comment for end date)         Problem: Patient Education: Go to Patient Education Activity  Goal: Patient/Family Education  Outcome: Progressing Towards Goal     Problem: Pressure Injury - Risk of  Goal: *Prevention of pressure injury  Description: Document Aj Scale and appropriate interventions in the flowsheet.   Outcome: Progressing Towards Goal  Note: Pressure Injury Interventions:       Moisture Interventions: Absorbent underpads, Apply protective barrier, creams and emollients, Check for incontinence Q2 hours and as needed, Internal/External urinary devices, Limit adult briefs, Maintain skin hydration (lotion/cream), Minimize layers, Moisture barrier, Offer toileting Q_hr    Activity Interventions: Assess need for specialty bed, Chair cushion, Increase time out of bed, Pressure redistribution bed/mattress(bed type), PT/OT evaluation    Mobility Interventions: Assess need for specialty bed, Chair cushion, Float heels, Pressure redistribution bed/mattress (bed type), PT/OT evaluation, Turn and reposition approx.  every two hours(pillow and wedges)    Nutrition Interventions: Document food/fluid/supplement intake, Offer support with meals,snacks and hydration, Discuss nutritional consult with provider    Friction and Shear Interventions: Apply protective barrier, creams and emollients, Foam dressings/transparent film/skin sealants, Lift sheet, Lift team/patient mobility team, Transferring/repositioning devices, Minimize layers                Problem: Patient Education: Go to Patient Education Activity  Goal: Patient/Family Education  Outcome: Progressing Towards Goal     Problem: Patient Education: Go to Patient Education Activity  Goal: Patient/Family Education  Outcome: Progressing Towards Goal     Problem: Pneumonia: Day 1  Goal: Off Pathway (Use only if patient is Off Pathway)  Outcome: Resolved/Not Met  Goal: Activity/Safety  Outcome: Resolved/Met  Goal: Consults, if ordered  Outcome: Resolved/Met  Goal: Diagnostic Test/Procedures  Outcome: Resolved/Met  Goal: Nutrition/Diet  Outcome: Resolved/Met  Goal: Medications  Outcome: Resolved/Met  Goal: Respiratory  Outcome: Resolved/Met  Goal: Treatments/Interventions/Procedures  Outcome: Resolved/Met  Goal: Psychosocial  Outcome: Resolved/Met  Goal: *Oxygen saturation within defined limits  Outcome: Resolved/Met  Goal: *Influenza vaccine administered (October-March)  Outcome: Resolved/Met  Goal: *Pneumoccocal vaccine administered  Outcome: Resolved/Met  Goal: *Hemodynamically stable  Outcome: Resolved/Met  Goal: *Demonstrates progressive activity  Outcome: Resolved/Met  Goal: *Tolerating diet  Outcome: Resolved/Met Problem: Pneumonia: Day 2  Goal: Off Pathway (Use only if patient is Off Pathway)  Outcome: Resolved/Not Met  Goal: Activity/Safety  Outcome: Progressing Towards Goal  Goal: Consults, if ordered  Outcome: Progressing Towards Goal  Goal: Diagnostic Test/Procedures  Outcome: Progressing Towards Goal  Goal: Nutrition/Diet  Outcome: Progressing Towards Goal  Goal: Discharge Planning  Outcome: Progressing Towards Goal  Goal: Medications  Outcome: Progressing Towards Goal  Goal: Respiratory  Outcome: Progressing Towards Goal  Goal: Treatments/Interventions/Procedures  Outcome: Progressing Towards Goal  Goal: Psychosocial  Outcome: Progressing Towards Goal  Goal: *Oxygen saturation within defined limits  Outcome: Progressing Towards Goal  Goal: *Hemodynamically stable  Outcome: Progressing Towards Goal  Goal: *Demonstrates progressive activity  Outcome: Progressing Towards Goal  Goal: *Tolerating diet  Outcome: Progressing Towards Goal  Goal: *Optimal pain control at patient's stated goal  Outcome: Progressing Towards Goal     Problem: Patient Education: Go to Patient Education Activity  Goal: Patient/Family Education  Outcome: Progressing Towards Goal     Problem: Upper and Lower GI Bleed: Day 1  Goal: Off Pathway (Use only if patient is Off Pathway)  Outcome: Resolved/Not Met  Goal: Activity/Safety  Outcome: Resolved/Met  Goal: Consults, if ordered  Outcome: Resolved/Met  Goal: Diagnostic Test/Procedures  Outcome: Resolved/Met  Goal: Nutrition/Diet  Outcome: Resolved/Met  Goal: Discharge Planning  Outcome: Resolved/Met  Goal: Medications  Outcome: Resolved/Met  Goal: Respiratory  Outcome: Resolved/Met  Goal: Treatments/Interventions/Procedures  Outcome: Resolved/Met  Goal: Psychosocial  Outcome: Resolved/Met  Goal: *Optimal pain control at patient's stated goal  Outcome: Resolved/Met  Goal: *Hemodynamically stable  Outcome: Resolved/Met  Goal: *Demonstrates progressive activity  Outcome: Resolved/Met     Problem: Upper and Lower GI Bleed: Day 2  Goal: Off Pathway (Use only if patient is Off Pathway)  Outcome: Resolved/Not Met  Goal: Activity/Safety  Outcome: Progressing Towards Goal  Goal: Consults, if ordered  Outcome: Progressing Towards Goal  Goal: Diagnostic Test/Procedures  Outcome: Progressing Towards Goal  Goal: Nutrition/Diet  Outcome: Progressing Towards Goal  Goal: Discharge Planning  Outcome: Progressing Towards Goal  Goal: Medications  Outcome: Progressing Towards Goal  Goal: Respiratory  Outcome: Progressing Towards Goal  Goal: Treatments/Interventions/Procedures  Outcome: Progressing Towards Goal  Goal: Psychosocial  Outcome: Progressing Towards Goal  Goal: *Optimal pain control at patient's stated goal  Outcome: Progressing Towards Goal  Goal: *Hemodynamically stable  Outcome: Progressing Towards Goal  Goal: *Tolerating diet  Outcome: Progressing Towards Goal  Goal: *Demonstrates progressive activity  Outcome: Progressing Towards Goal     Problem: Risk for Spread of Infection  Goal: Prevent transmission of infectious organism to others  Description: Prevent the transmission of infectious organisms to other patients, staff members, and visitors.   Outcome: Progressing Towards Goal     Problem: Patient Education:  Go to Education Activity  Goal: Patient/Family Education  Outcome: Progressing Towards Goal

## 2020-08-28 ENCOUNTER — APPOINTMENT (OUTPATIENT)
Dept: GENERAL RADIOLOGY | Age: 65
DRG: 166 | End: 2020-08-28
Attending: INTERNAL MEDICINE
Payer: COMMERCIAL

## 2020-08-28 LAB
ALBUMIN SERPL-MCNC: 1.5 G/DL (ref 3.5–5)
ALBUMIN/GLOB SERPL: 0.5 {RATIO} (ref 1.1–2.2)
ALP SERPL-CCNC: 170 U/L (ref 45–117)
ALT SERPL-CCNC: 46 U/L (ref 12–78)
ANION GAP SERPL CALC-SCNC: 6 MMOL/L (ref 5–15)
AST SERPL-CCNC: 84 U/L (ref 15–37)
BACTERIA SPEC CULT: ABNORMAL
BACTERIA SPEC CULT: NORMAL
BASOPHILS # BLD: 0 K/UL (ref 0–0.1)
BASOPHILS NFR BLD: 0 % (ref 0–1)
BILIRUB SERPL-MCNC: 1.8 MG/DL (ref 0.2–1)
BUN SERPL-MCNC: 6 MG/DL (ref 6–20)
BUN/CREAT SERPL: 15 (ref 12–20)
CALCIUM SERPL-MCNC: 6.4 MG/DL (ref 8.5–10.1)
CHLORIDE SERPL-SCNC: 111 MMOL/L (ref 97–108)
CO2 SERPL-SCNC: 24 MMOL/L (ref 21–32)
CREAT SERPL-MCNC: 0.39 MG/DL (ref 0.7–1.3)
DATE LAST DOSE: ABNORMAL
DIFFERENTIAL METHOD BLD: ABNORMAL
EOSINOPHIL # BLD: 0 K/UL (ref 0–0.4)
EOSINOPHIL NFR BLD: 0 % (ref 0–7)
ERYTHROCYTE [DISTWIDTH] IN BLOOD BY AUTOMATED COUNT: 20.8 % (ref 11.5–14.5)
GLOBULIN SER CALC-MCNC: 3.2 G/DL (ref 2–4)
GLUCOSE SERPL-MCNC: 62 MG/DL (ref 65–100)
GRAM STN SPEC: ABNORMAL
HCT VFR BLD AUTO: 23.5 % (ref 36.6–50.3)
HGB BLD-MCNC: 8 G/DL (ref 12.1–17)
IMM GRANULOCYTES # BLD AUTO: 0.6 K/UL (ref 0–0.04)
IMM GRANULOCYTES NFR BLD AUTO: 3 % (ref 0–0.5)
LYMPHOCYTES # BLD: 2.4 K/UL (ref 0.8–3.5)
LYMPHOCYTES NFR BLD: 11 % (ref 12–49)
MCH RBC QN AUTO: 34.2 PG (ref 26–34)
MCHC RBC AUTO-ENTMCNC: 34 G/DL (ref 30–36.5)
MCV RBC AUTO: 100.4 FL (ref 80–99)
MONOCYTES # BLD: 0.6 K/UL (ref 0–1)
MONOCYTES NFR BLD: 3 % (ref 5–13)
NEUTS SEG # BLD: 17.8 K/UL (ref 1.8–8)
NEUTS SEG NFR BLD: 83 % (ref 32–75)
NRBC # BLD: 0 K/UL (ref 0–0.01)
NRBC BLD-RTO: 0 PER 100 WBC
PLATELET # BLD AUTO: 194 K/UL (ref 150–400)
PMV BLD AUTO: 10.7 FL (ref 8.9–12.9)
POTASSIUM SERPL-SCNC: 2.9 MMOL/L (ref 3.5–5.1)
PROT SERPL-MCNC: 4.7 G/DL (ref 6.4–8.2)
RBC # BLD AUTO: 2.34 M/UL (ref 4.1–5.7)
RBC MORPH BLD: ABNORMAL
REPORTED DOSE,DOSE: ABNORMAL UNITS
REPORTED DOSE/TIME,TMG: ABNORMAL
SERVICE CMNT-IMP: ABNORMAL
SERVICE CMNT-IMP: ABNORMAL
SERVICE CMNT-IMP: NORMAL
SODIUM SERPL-SCNC: 141 MMOL/L (ref 136–145)
VANCOMYCIN TROUGH SERPL-MCNC: 13 UG/ML (ref 5–10)
WBC # BLD AUTO: 21.4 K/UL (ref 4.1–11.1)

## 2020-08-28 PROCEDURE — 74011250636 HC RX REV CODE- 250/636: Performed by: NURSE PRACTITIONER

## 2020-08-28 PROCEDURE — P9047 ALBUMIN (HUMAN), 25%, 50ML: HCPCS | Performed by: FAMILY MEDICINE

## 2020-08-28 PROCEDURE — 74011250636 HC RX REV CODE- 250/636: Performed by: INTERNAL MEDICINE

## 2020-08-28 PROCEDURE — 74011250637 HC RX REV CODE- 250/637: Performed by: INTERNAL MEDICINE

## 2020-08-28 PROCEDURE — 85025 COMPLETE CBC W/AUTO DIFF WBC: CPT

## 2020-08-28 PROCEDURE — 74011250637 HC RX REV CODE- 250/637: Performed by: FAMILY MEDICINE

## 2020-08-28 PROCEDURE — 80053 COMPREHEN METABOLIC PANEL: CPT

## 2020-08-28 PROCEDURE — 71045 X-RAY EXAM CHEST 1 VIEW: CPT

## 2020-08-28 PROCEDURE — 74011000258 HC RX REV CODE- 258: Performed by: INTERNAL MEDICINE

## 2020-08-28 PROCEDURE — 74011250636 HC RX REV CODE- 250/636: Performed by: FAMILY MEDICINE

## 2020-08-28 PROCEDURE — 80202 ASSAY OF VANCOMYCIN: CPT

## 2020-08-28 PROCEDURE — 65660000000 HC RM CCU STEPDOWN

## 2020-08-28 PROCEDURE — 92610 EVALUATE SWALLOWING FUNCTION: CPT

## 2020-08-28 PROCEDURE — C9113 INJ PANTOPRAZOLE SODIUM, VIA: HCPCS | Performed by: INTERNAL MEDICINE

## 2020-08-28 PROCEDURE — 87116 MYCOBACTERIA CULTURE: CPT

## 2020-08-28 PROCEDURE — 36415 COLL VENOUS BLD VENIPUNCTURE: CPT

## 2020-08-28 RX ORDER — LOPERAMIDE HYDROCHLORIDE 2 MG/1
2 CAPSULE ORAL
Status: DISCONTINUED | OUTPATIENT
Start: 2020-08-28 | End: 2020-09-16 | Stop reason: HOSPADM

## 2020-08-28 RX ORDER — BISMUTH SUBSALICYLATE 262 MG/15ML
30 LIQUID ORAL
Status: DISCONTINUED | OUTPATIENT
Start: 2020-08-28 | End: 2020-09-16 | Stop reason: HOSPADM

## 2020-08-28 RX ADMIN — Medication 10 ML: at 15:36

## 2020-08-28 RX ADMIN — PANCRELIPASE 1 CAPSULE: 60000; 12000; 38000 CAPSULE, DELAYED RELEASE PELLETS ORAL at 09:39

## 2020-08-28 RX ADMIN — POTASSIUM BICARBONATE 20 MEQ: 782 TABLET, EFFERVESCENT ORAL at 09:39

## 2020-08-28 RX ADMIN — CYANOCOBALAMIN TAB 500 MCG 1000 MCG: 500 TAB at 09:39

## 2020-08-28 RX ADMIN — HYDROCODONE BITARTRATE AND ACETAMINOPHEN 1 TABLET: 5; 325 TABLET ORAL at 17:35

## 2020-08-28 RX ADMIN — FOLIC ACID 1 MG: 1 TABLET ORAL at 09:39

## 2020-08-28 RX ADMIN — SODIUM CHLORIDE 100 ML/HR: 900 INJECTION, SOLUTION INTRAVENOUS at 17:40

## 2020-08-28 RX ADMIN — SODIUM CHLORIDE 40 MG: 9 INJECTION INTRAMUSCULAR; INTRAVENOUS; SUBCUTANEOUS at 09:40

## 2020-08-28 RX ADMIN — POTASSIUM BICARBONATE 20 MEQ: 782 TABLET, EFFERVESCENT ORAL at 17:35

## 2020-08-28 RX ADMIN — DOXYCYCLINE 100 MG: 100 INJECTION, POWDER, LYOPHILIZED, FOR SOLUTION INTRAVENOUS at 05:03

## 2020-08-28 RX ADMIN — PIPERACILLIN AND TAZOBACTAM 3.38 G: 3; .375 INJECTION, POWDER, LYOPHILIZED, FOR SOLUTION INTRAVENOUS at 17:39

## 2020-08-28 RX ADMIN — PANCRELIPASE 1 CAPSULE: 60000; 12000; 38000 CAPSULE, DELAYED RELEASE PELLETS ORAL at 17:35

## 2020-08-28 RX ADMIN — VANCOMYCIN HYDROCHLORIDE 750 MG: 750 INJECTION, POWDER, LYOPHILIZED, FOR SOLUTION INTRAVENOUS at 09:48

## 2020-08-28 RX ADMIN — PANCRELIPASE 1 CAPSULE: 60000; 12000; 38000 CAPSULE, DELAYED RELEASE PELLETS ORAL at 12:00

## 2020-08-28 RX ADMIN — LOPERAMIDE HYDROCHLORIDE 2 MG: 2 CAPSULE ORAL at 19:01

## 2020-08-28 RX ADMIN — PIPERACILLIN AND TAZOBACTAM 3.38 G: 3; .375 INJECTION, POWDER, LYOPHILIZED, FOR SOLUTION INTRAVENOUS at 09:40

## 2020-08-28 RX ADMIN — SODIUM CHLORIDE 100 ML/HR: 900 INJECTION, SOLUTION INTRAVENOUS at 05:03

## 2020-08-28 RX ADMIN — SODIUM CHLORIDE 40 MG: 9 INJECTION INTRAMUSCULAR; INTRAVENOUS; SUBCUTANEOUS at 20:28

## 2020-08-28 RX ADMIN — VANCOMYCIN HYDROCHLORIDE 1000 MG: 1 INJECTION, POWDER, LYOPHILIZED, FOR SOLUTION INTRAVENOUS at 19:34

## 2020-08-28 RX ADMIN — PIPERACILLIN AND TAZOBACTAM 3.38 G: 3; .375 INJECTION, POWDER, LYOPHILIZED, FOR SOLUTION INTRAVENOUS at 02:10

## 2020-08-28 RX ADMIN — DOXYCYCLINE 100 MG: 100 INJECTION, POWDER, LYOPHILIZED, FOR SOLUTION INTRAVENOUS at 17:40

## 2020-08-28 RX ADMIN — Medication 10 ML: at 05:02

## 2020-08-28 RX ADMIN — ALBUMIN (HUMAN) 25 G: 0.25 INJECTION, SOLUTION INTRAVENOUS at 00:00

## 2020-08-28 RX ADMIN — HYDROCODONE BITARTRATE AND ACETAMINOPHEN 1 TABLET: 5; 325 TABLET ORAL at 00:16

## 2020-08-28 NOTE — PROGRESS NOTES
Daily Progress Note: 8/28/2020  Aniya Malloy MD    Assessment/Plan:   Pneumonia involving right lung (8/25/2020) /  Hemoptysis (8/25/2020) POA: extensive with internal cavitation and air fluid levels. --.Blood cultures, sputum cultures and serologies. -- Consulted pulmonology and Thoracic Surg     Bilateral pulmonary embolism, acute ([address] (8/25/2020) / Acute deep vein thrombosis (DVT) of both lower extremities ([address] (8/25/2020): due to associated hemoptysis and positive FOBT  - IVC filter placed   - holding anticoags due to dropping Hb     Weight loss, unintentional (8/25/2020) POA: still concerning for malignancy but severe protein calorie malnutrition remains a DDx. Work up as above. Diet as tolerated     Hyperbilirubinemia (8/25/2020) POA: unclear etiology. CT abdomen unremarkable  - resolved     Pleural effusion, right (8/25/2020) POA: empyema, pneumonia vs malignant effusion. Consulted pulmonology  - Chest tube placed 8/26/20     Alcohol use (8/25/2020) POA: he says he has cut down recently. Monitor for withdrawal     Diarrhea (8/25/2020) / Fecal occult blood test positive (8/25/2020) POA: check stool studies.  Monitor Hgb.   - Consulted GI     Hyponatremia with extracellular fluid depletion (8/25/2020) POA: gently hydration         Problem List:  Problem List as of 8/28/2020 Date Reviewed: 8/25/2020          Codes Class Noted - Resolved    * (Principal) Pneumonia involving right lung ICD-10-CM: J18.9  ICD-9-CM: 486  8/25/2020 - Present        Hemoptysis ICD-10-CM: R04.2  ICD-9-CM: 786.30  8/25/2020 - Present        Hyperbilirubinemia ICD-10-CM: E80.6  ICD-9-CM: 782.4  8/25/2020 - Present        Weight loss, unintentional ICD-10-CM: R63.4  ICD-9-CM: 783.21  8/25/2020 - Present        Pleural effusion, right ICD-10-CM: J90  ICD-9-CM: 511.9  8/25/2020 - Present        Bilateral pulmonary embolism ([address] ICD-10-CM: I26.99  ICD-9-CM: 415.19  8/25/2020 - Present        Acute deep vein thrombosis (DVT) of both lower extremities ([facility] ICD-10-CM: I82.403  ICD-9-CM: 453.40  8/25/2020 - Present        Alcohol use ICD-10-CM: Z72.89  ICD-9-CM: V49.89  8/25/2020 - Present        Diarrhea ICD-10-CM: R19.7  ICD-9-CM: 787.91  8/25/2020 - Present        Fecal occult blood test positive ICD-10-CM: R19.5  ICD-9-CM: 792.1  8/25/2020 - Present        Hyponatremia with extracellular fluid depletion ICD-10-CM: E87.1  ICD-9-CM: 276.1  8/25/2020 - Present            Subjective:     60 y.o. male who is being admitted for Pneumonia involving right lung. Mr. Sonia Nieves presented to our Emergency Department today complaining of generalized weakness, diarrhea and lethargy for several months. Patient was accompanied by his friend who gave most of the history. The patient does not like seeing physicians and apparently uses vitamin supplements. He is a smoker and drinks alcohol and has been progressively weaker over several months now. He has had pleuritic right sided chest pain associated with a productive cough and hemoptysis. He feel on the floor several days ago and was unable to get up. His friends helped him up and brought him to the ED for further testing. Initial CXR done showed airspace disease in right mid lower lung zone consistent with pneumonia. This is surrounded by a moderate right-sided pleural effusion. Upon review, he was noted to be cachectic with swollen lower extremities. CT scans chest abdomen and pelvis done showed a diffuse irregular opacification throughout the right lower lobe with multiple areas of internal cavitation and air-fluid levels. Findings are more likely related to infection than malignancy. Large right pleural effusion. Trace left pleural effusion. Bilateral pulmonary emboli. Clot burden is moderate. No evidence of right heart strain. Thrombus in the veins of the left lower extremity extending to the IVC. A small amount of thrombus is also seen in the right common iliac vein.  He will be admitted for further management. (Dr Vanesa Holm)    :  Feeling a little better this AM but still c/o painful, pleuritic cough and occas thick sputum. No leg/calf pains. No known exposure to ill individuals/no travel - \"just staying in the last few months. \"  No temps. WBC 31.9K. Cultures neg so far.      :  Reports less cough, chest pain and feeling better overall. Appetite has returned. Chest tube placed yesterday with discharge obtained - pending cultures. Other cultures remain neg. Hb lower so cont to hold anticoag for now. GI has seen and work up when more stable. Remains on doxy, zosyn, vanc. WBC down to 28K. Bili back to normal.     :  Continues to grad improve. Still with some cough. No tremor or hallucinations so far. K+ lower - replacing. WBC coming down. Afeb. CXR this AM pending. Cultures remain neg so far. Quantiferon gold test pending as of this AM.  He reports hx of chronic LE edema for years.      Review of Systems:   A comprehensive review of systems was negative except for that written in the HPI. Objective:   Physical Exam:   Visit Vitals  BP 90/57   Pulse 77   Temp 97.3 °F (36.3 °C)   Resp 16   Ht 5' 10\" (1.778 m)   Wt 65.4 kg (144 lb 1.6 oz)   SpO2 95%   BMI 20.68 kg/m²      O2 Device: Room air  Temp (24hrs), Av.8 °F (36.6 °C), Min:97.3 °F (36.3 °C), Max:98.4 °F (36.9 °C)    1901 -  0700  In: 836.7 [P.O.:220; I.V.:616.7]  Out: 545 [Urine:400]   701 - 1900  In: 4572.5 [P.O.:1080; I.V.:3492.5]  Out: 3212 [Urine:890]  General:  Alert, cooperative, no distress, cachetic, ill appearing. Head:  Normocephalic, without obvious abnormality, atraumatic. Eyes:  [place] conjuntiva. PERRL, EOMs intact. Throat: Lips, mucosa, and tongue moist..   Neck: Supple, symmetrical, trachea midline, no adenopathy, thyroid: no enlargement/tenderness/nodules, no carotid bruit and no JVD.    Lungs:   Better air exchange right,  Fine crackles anteriorly right,  a few scattered rhonchi. Chest wall:  Chest tube site right looks ok   Heart:  Regular rate and rhythm, S1, S2 normal, no murmur, click, rub or gallop. Abdomen:   Soft, non-tender. Bowel sounds normal. No masses,  No organomegaly. Extremities: no cyanosis. No calf tenderness; swelling of left LE >right - 2+ left and sl less on right. No cords palpated. Pulses: 2+ and symmetric all extremities. Skin: Skin color, texture, turgor normal. No rashes   Neurologic:  Alert and oriented X 3. Fine motor of hands and fingers normal.   equal.  No tremor. No cogwheeling or rigidity. Gait not tested at this time. Sensation grossly normal to touch. Gross motor of extremities normal.       Data Review:   EXAM: CT CHEST ABD PELV W CONT 8/25/20  INDICATION: weight loss  COMPARISON: Radiographs 8/25/2020  CONTRAST: 100 mL of Isovue-370. FINDINGS:   CHEST WALL: No mass or axillary lymphadenopathy. THYROID: No nodule. MEDIASTINUM: No mass or lymphadenopathy. BRYAN: No mass or lymphadenopathy. THORACIC AORTA: No dissection or aneurysm. MAIN PULMONARY ARTERY: Normal in caliber. Thrombus is seen throughout the right  lower lobe pulmonary arteries. Thrombus is seen in the left lower lobe pulmonary  artery extending into the anterior and lateral basilar segments. TRACHEA/BRONCHI: Patent. ESOPHAGUS: No wall thickening or dilatation. HEART: Normal in size. PLEURA: There is a large right pleural effusion with enhancement of the pleural  surfaces. Trace left pleural effusion. LUNGS: There is irregular opacification throughout the right lower lobe. There  are multiple areas of internal cavitation with air-fluid levels. Small areas of  atelectasis or scarring are seen in the lingula and inferior left lower lobe. LIVER: No mass. BILIARY TREE: Gallbladder is within normal limits. CBD is not dilated. SPLEEN: within normal limits. PANCREAS: No mass or ductal dilatation. ADRENALS: Unremarkable.   KIDNEYS: Small nonobstructive stones are seen in the bilateral kidneys. No  evidence of hydronephrosis. No mass. STOMACH: Unremarkable. SMALL BOWEL: No dilatation or wall thickening. COLON: No dilatation or wall thickening. APPENDIX: Unremarkable  PERITONEUM: No ascites or pneumoperitoneum. RETROPERITONEUM: No lymphadenopathy or aortic aneurysm. REPRODUCTIVE ORGANS: Unremarkable  URINARY BLADDER: No mass or calculus. BONES: No destructive bone lesion. ABDOMINAL WALL: No mass or hernia. ADDITIONAL COMMENTS: There is thrombus in the left femoral veins extending into  the left iliac veins into the IVC. A small amount of thrombus is also present in  the right common iliac vein. IMPRESSION:  1. Diffuse irregular opacification throughout the right lower lobe with multiple  areas of internal cavitation and air-fluid levels. Findings are more likely  related to infection than malignancy. 2. Large right pleural effusion. Trace left pleural effusion. 3. Bilateral pulmonary emboli. Clot burden is moderate. No evidence of right  heart strain. 4. Thrombus in the veins of the left lower extremity extending to the IVC. A  small amount of thrombus is also seen in the right common iliac vein. EXAM:  IR THORACENTESIS/INSERT CHEST TUBE 8/26/20  INDICATION:  Request placement of large size pigtail catheter. Possible empyema. PROCEDURE:  Available history and imaging was reviewed which demonstrates a large partially  loculated right pleural effusion. Possibility of empyema, tuberculosis and other  etiologies are being considered clinically. Specific request for larger pigtail  catheter placement. Following informed consent the patient was evaluated with ultrasound in the  angina/catheter lab department. This demonstrates a large right pleural  effusion. The location of the pleural effusion was marked and then the area was prepped  and draped. 1% lidocaine was used for local anesthesia.  A small incision was made with an 11  blade. A micropuncture needle was used under direct fluoroscopic guidance to access the  pleural fluid. When this was achieved the microguidewire was placed followed by  a 5 [place] dilator. Through this a short [address] guidewire  was placed. The track was then dilated to 12 [place]. This was then followed by  placement of a 12 Dutch Abscession pigtail catheter. The string for the pigtail  was removed prior to placement. The catheter was positioned under fluoroscopic  guidance and then secured in place at the skin with a 2-0 Prolene suture. The  site was then dressed with a Tegaderm Bioclusive dressing and will be connected  to a Pleur-evac for subsequent drainage. Prior to this approximately 100 mL of fluid was obtained and sent for cytology  as well as multiple cultures including aerobic, anaerobic, AFB and fungal.   The fluid was withdrawn was mildly cloudy serosanguineous in color. I did not  notice any foul smell. The procedure was adequately tolerated without significant blood loss or  evidence of complication. Final digital image of the chest was saved documenting catheter position. IMPRESSION:   Successful ultrasound and fluoroscopic guided placement of 12 Dutch right-sided  chest tube. Patient will have ongoing drainage from Pleur-evac in his hospital  bed/room. Care and monitoring as per medical service and nursing service. ECHO 8/27/20  Interpretation Summary   Result status: Final result    · Image quality for this study was technically difficult. · Echo study was technically difficulty and limited due to patient's tolerance. · LV: Estimated LVEF is 55 - 60%. Normal cavity size, wall thickness and systolic function (ejection fraction normal). Wall motion: normal. Mild (grade 1) left ventricular diastolic dysfunction. · Pericardium: Small pericardial effusion adjacent to right ventricle.          Recent Days:  Recent Labs     08/28/20  0505 08/27/20  8801 08/26/20  0312   WBC 21.4* 28.8* 31.9*   HGB 8.0* 8.8* 9.7*   HCT 23.5* 25.9* 28.0*    227 279     Recent Labs     08/28/20  0505 08/27/20  0354 08/26/20 2022    138 136   K 2.9* 3.6 3.9   * 106 104   CO2 24 25 25   GLU 62* 100 131*   BUN 6 10 11   CREA 0.39* 0.55* 0.68*   CA 6.4* 6.5* 6.6*   ALB 1.5* 0.6* 0.7*   TBILI 1.8* 1.0 1.1*   ALT 46 38 44     No results for input(s): PH, PCO2, PO2, HCO3, FIO2 in the last 72 hours.     24 Hour Results:  Recent Results (from the past 24 hour(s))   HIV 1/2 AG/AB, 4TH GENERATION,W RFLX CONFIRM    Collection Time: 08/27/20 11:42 AM   Result Value Ref Range    HIV 1/2 Interpretation NONREACTIVE NR      HIV 1/2 result comment SEE NOTE     ECHO ADULT COMPLETE    Collection Time: 08/27/20  3:53 PM   Result Value Ref Range    IVSd 1.03 (A) 0.6 - 1.0 cm    LVIDd 4.25 4.2 - 5.9 cm    LVIDs 2.94 cm    LVOT d 1.80 cm    LVPWd 0.63 0.6 - 1.0 cm    LVOT Peak Gradient 3.01 mmHg    LVOT Peak Velocity 86.68 cm/s    RVIDd 3.14 cm    Left Atrium Major Axis 2.44 cm    LA Volume 36.57 18 - 58 mL    LA Area 4C 14.04 cm2    LA Vol 2C 27.18 18 - 58 mL    LA Vol 4C 34.28 18 - 58 mL    LA Volume DISK BP 33.11 18 - 58 mL    Aortic Valve Area by Continuity of Peak Velocity 2.05 cm2    AoV PG 4.61 mmHg    Aortic Valve Systolic Peak Velocity 200.78 cm/s    MV A Thiago 63.76 centimeter/second    Mitral Valve E Wave Deceleration Time 0.21 s    MV E Thiago 69.55 centimeter/second    LV E' Lateral Velocity 10.83 centimeter/second    LV E' Septal Velocity 13.60 centimeter/second    Pulmonic Valve Systolic Peak Instantaneous Gradient 1.08 mmHg    Pulmonic Regurgitant End Max Velocity 51.87 cm/s    Tapse 2.10 (A) 1.5 - 2.0 cm    Triscuspid Valve Regurgitation Peak Gradient 17.70 mmHg    TR Max Velocity 210.36 cm/s    Ao Root D 3.40 cm    LV Mass .5 88 - 224 g    LV Mass AL Index 61.6 49 - 115 g/m2    Left Atrium Minor Axis 1.39 cm    LA Vol Index 20.77 16 - 28 ml/m2    LA Vol Index 15.44 16 - 28 ml/m2    LA Vol Index 19.47 16 - 28 ml/m2    ANAYA/BSA Pk Thiago 1.2 cm2/m2   CBC WITH AUTOMATED DIFF    Collection Time: 08/28/20  5:05 AM   Result Value Ref Range    WBC 21.4 (H) 4.1 - 11.1 K/uL    RBC 2.34 (L) 4.10 - 5.70 M/uL    HGB 8.0 (L) 12.1 - 17.0 g/dL    HCT 23.5 (L) 36.6 - 50.3 %    .4 (H) 80.0 - 99.0 FL    MCH 34.2 (H) 26.0 - 34.0 PG    MCHC 34.0 30.0 - 36.5 g/dL    RDW 20.8 (H) 11.5 - 14.5 %    PLATELET 788 618 - 379 K/uL    MPV 10.7 8.9 - 12.9 FL    NRBC 0.0 0  WBC    ABSOLUTE NRBC 0.00 0.00 - 0.01 K/uL    NEUTROPHILS 83 (H) 32 - 75 %    LYMPHOCYTES 11 (L) 12 - 49 %    MONOCYTES 3 (L) 5 - 13 %    EOSINOPHILS 0 0 - 7 %    BASOPHILS 0 0 - 1 %    IMMATURE GRANULOCYTES 3 (H) 0.0 - 0.5 %    ABS. NEUTROPHILS 17.8 (H) 1.8 - 8.0 K/UL    ABS. LYMPHOCYTES 2.4 0.8 - 3.5 K/UL    ABS. MONOCYTES 0.6 0.0 - 1.0 K/UL    ABS. EOSINOPHILS 0.0 0.0 - 0.4 K/UL    ABS. BASOPHILS 0.0 0.0 - 0.1 K/UL    ABS. IMM. GRANS. 0.6 (H) 0.00 - 0.04 K/UL    DF SMEAR SCANNED      RBC COMMENTS TARGET CELLS  PRESENT       METABOLIC PANEL, COMPREHENSIVE    Collection Time: 08/28/20  5:05 AM   Result Value Ref Range    Sodium 141 136 - 145 mmol/L    Potassium 2.9 (L) 3.5 - 5.1 mmol/L    Chloride 111 (H) 97 - 108 mmol/L    CO2 24 21 - 32 mmol/L    Anion gap 6 5 - 15 mmol/L    Glucose 62 (L) 65 - 100 mg/dL    BUN 6 6 - 20 MG/DL    Creatinine 0.39 (L) 0.70 - 1.30 MG/DL    BUN/Creatinine ratio 15 12 - 20      GFR est AA >60 >60 ml/min/1.73m2    GFR est non-AA >60 >60 ml/min/1.73m2    Calcium 6.4 (LL) 8.5 - 10.1 MG/DL    Bilirubin, total 1.8 (H) 0.2 - 1.0 MG/DL    ALT (SGPT) 46 12 - 78 U/L    AST (SGOT) 84 (H) 15 - 37 U/L    Alk.  phosphatase 170 (H) 45 - 117 U/L    Protein, total 4.7 (L) 6.4 - 8.2 g/dL    Albumin 1.5 (L) 3.5 - 5.0 g/dL    Globulin 3.2 2.0 - 4.0 g/dL    A-G Ratio 0.5 (L) 1.1 - 2.2         Medications reviewed  Current Facility-Administered Medications   Medication Dose Route Frequency    sodium chloride 10% 15 ml hypertonic neb solution  5 mL Nebulization QAM RT    Vancomycin Trough: please obtain at 0930 on 8/28 (obtain 30 min prior to 1000 dose on 8/28/2020) Thank you! Other ONCE    vancomycin (VANCOCIN) 750 mg in 0.9% sodium chloride (MBP/ADV) 250 mL  750 mg IntraVENous Q12H    lidocaine (PF) (XYLOCAINE) 10 mg/mL (1 %) injection 10 mL  10 mL SubCUTAneous Rad Multiple    fentaNYL citrate (PF) injection  mcg   mcg IntraVENous Rad Multiple    cyanocobalamin (VITAMIN B12) tablet 1,000 mcg  1,000 mcg Oral DAILY    folic acid (FOLVITE) tablet 1 mg  1 mg Oral DAILY    lipase-protease-amylase (CREON 12,000) capsule 1 Cap  1 Cap Oral TID WITH MEALS    HYDROcodone-acetaminophen (NORCO) 5-325 mg per tablet 1 Tab  1 Tab Oral Q6H PRN    morphine injection 1 mg  1 mg IntraVENous Q4H PRN    sodium chloride (NS) flush 5-10 mL  5-10 mL IntraVENous PRN    sodium chloride (NS) flush 5-40 mL  5-40 mL IntraVENous Q8H    sodium chloride (NS) flush 5-40 mL  5-40 mL IntraVENous PRN    acetaminophen (TYLENOL) tablet 650 mg  650 mg Oral Q6H PRN    Or    acetaminophen (TYLENOL) suppository 650 mg  650 mg Rectal Q6H PRN    polyethylene glycol (MIRALAX) packet 17 g  17 g Oral DAILY PRN    promethazine (PHENERGAN) tablet 12.5 mg  12.5 mg Oral Q6H PRN    Or    ondansetron (ZOFRAN) injection 4 mg  4 mg IntraVENous Q6H PRN    0.9% sodium chloride infusion  100 mL/hr IntraVENous CONTINUOUS    piperacillin-tazobactam (ZOSYN) 3.375 g in 0.9% sodium chloride (MBP/ADV) 100 mL  3.375 g IntraVENous Q8H    doxycycline (VIBRAMYCIN) 100 mg in 0.9% sodium chloride (MBP/ADV) 100 mL  100 mg IntraVENous Q12H    pantoprazole (PROTONIX) 40 mg in 0.9% sodium chloride 10 mL injection  40 mg IntraVENous Q12H       Care Plan discussed with: Patient/Nurse  Total time spent with patient: 30 minutes.   Benjamin Corado MD

## 2020-08-28 NOTE — PROGRESS NOTES
[address] [address] Pharmacy Dosing Services: Antimicrobial Stewardship Daily Doc    Consult for antibiotic dosing of Vancomycin by CHAPO Graf NP  Indication: CAP  Day of Therapy: 3 of 7    Vancomycin therapy:  Current maintenance dose: 750 mg IV every 12 hours   Dose calculated to approximate a therapeutic trough of 15-20 mcg/mL. Assessment/Plan: Afebrile; WBCs elevated; SCr decreased. Level 13 mcg/mL today - extrapolates to subtherapeutic trough of 12 mcg/mL. Vancomycin changed to 1,000 mg IV every 12 hours. Predicts therapeutic trough 17 mcg/mL. Dose administration notes:   Doses given appropriately as scheduled    Date Dose & Interval Measured (mcg/mL) Extrapolated (mcg/mL)   8/28/2020 750 mg IV every 12 hours 13 12                 Other Antimicrobial   (not dosed by pharmacist) Doxycycline 100 mg IV every 12 hours  Piperacillin/Tazobactam 3.375 grams IV every 8 hours   Cultures  8/28/2020 AFB: in process   8/27/2020 AFB: in process   8/26/2020 Pleural fluid: heavy staph aureus (pan sensitive) (final)   8/26/2020 Pleural fluid for anaerobes: no anaerobes isolated (final)   8/26/2020 Pleural fluid for fungus: in process   8/26/2020 MRSA nares: not present (final)   8/26/2020 AFB: in process   8/26/2020 Sputum: moderate staph aureus (pan sensitive); light normal respiratory guerline (final)   8/25/2020 Blood: no growth x 3 days (pending)   8/25/2020 Blood: no growth x 3 days (pending)   Serum Creatinine Lab Results   Component Value Date/Time    Creatinine 0.39 (L) 08/28/2020 05:05 AM       Creatinine Clearance Estimated Creatinine Clearance: 101.2 mL/min (A) (by C-G formula based on SCr of 0.39 mg/dL (L)).      Temp Temp: 97 °F (36.1 °C)       WBC Lab Results   Component Value Date/Time    WBC 21.4 (H) 08/28/2020 05:05 AM      H/H Lab Results   Component Value Date/Time    HGB 8.0 (L) 08/28/2020 05:05 AM      Platelets    Lab Results   Component Value Date/Time    PLATELET 360 93/87/1194 05:05 AM      Pharmacist Aleyda Lisa

## 2020-08-28 NOTE — PROGRESS NOTES
2315- Bedside and Verbal shift change report given to HARJIT Mcneill (oncoming nurse) by Liz Perez RN (offgoing nurse). Report included the following information SBAR, Kardex, Intake/Output and MAR.     0000- Initial assessment performed. MD aware of low B/P. Albumin administered at this time. Pt slightly agitated and only allowing RN to perform part of my assessment. Pt refusing to turn or allow full skin assessment at this time. Pain medication administered per patient request. Chest tube to suction, dressing intact. Pt in no distress at this time, denies further needs from RN. Will continue to monitor. 0330- Patient's b/p continues to be be low, SBP 80's. Pt asymptomatic. Dr. Marisa Farley notified, no further orders at this time. 0530- Pt refusing CHG bath at this time, agreeable to allow RN to perform pete care. Pt requested incontinence brief be placed. 6608- Attempted to call Dr. Marisa Farley x2. Phone went to voicemail using number that she answered last night. Attempting to report critical calcium of 6.4, Potassium 2.9. Will notify oncoming RN to attempt to call MD again. [facility]- Dr. Kashmir Miller notified of above. Orders placed to replete potassium. Bedside and Verbal shift change report given to Isis Mcneill RN (oncoming nurse) by Mary Kate Hope RN (offgoing nurse). Report included the following information SBAR, Kardex, Intake/Output and MAR.

## 2020-08-28 NOTE — PROGRESS NOTES
Shift Change:     Bedside and Verbal shift change report given to Lilia Manning (oncoming nurse) by Giana Sorenson (offgoing nurse). Report included the following information SBAR, Kardex and Recent Results. Shift Summary:    1930: Pnt stated he has had too many bowel movements today and does not want to eat anything. \"Take that food out of here and don't offer me anything. I don't want to keep pooping anymore. \" Attempts to educate about proper nutrition failed. 0220: Pnt called out stating he had a BM and needs to be cleaned up. Offered CHG and linen change, but pnt refused. Diaper changed. Pnt resting comfortably in bed. No further complaints at this time    Shift Change:     Bedside and Verbal shift change report given to [address] [place] [address] (oncoming nurse) by Lilia Manning (offgoing nurse). Report included the following information SBAR, Kardex and Recent Results.

## 2020-08-28 NOTE — PROGRESS NOTES
Occupational Therapy:  08/28/20    Chart reviewed for OT tx, spoke to RN who cleared pt for therapy. Pt received in bed, refusing to participate and reporting frequent bouts of diarrhea today. Educated pt on importance of safe gradual increase in activity and the effects of immobility while in acute setting. Pt continuing to refuse and becomes demanding, is not receptive to education. Offered modified activity at EOB or at bed level but pt continued to refuse. Pt is unmotivated to participate and demonstrates decreased insight into his deficits and condition. Will continue to follow pt for acute OT services but will decrease POC to 3x/week unless pt becomes more agreeable to participate.     Thank you,  Oscar Orr, OTR/L

## 2020-08-28 NOTE — PROGRESS NOTES
Bedside and Verbal shift change report given to Shahnaz Richmond, RN (oncoming nurse) by Frida Zuniga RN (offgoing nurse). Report included the following information SBAR, Kardex, ED Summary, Intake/Output, Recent Results, Med Rec Status and Cardiac Rhythm nsr     2000 full assessment complete. Pt has chest tube right side to suction. bp systolic 96/73 called md. Puneet Fleming. She stated to give a liter bolus. And to call her if two bps systolic less than 75.    2240 called dr. Puneet Fleming to notify her of the patient being positive for bilateral dvts. She is aware and stated they will be addressed at a later date. Also let her know patients bp is systolic 87 after bolus. She is ordering albumin. See mar.

## 2020-08-28 NOTE — PROGRESS NOTES
Name: [facility]: [facility]   : 1955 Admit Date: 2020   Phone: [phone]  Room: 324/01   PCP: Kush Mendoza MD  MRN: [id number]   Date: 2020  Code: Full Code          Chart and notes reviewed. Data reviewed. I review the patient's current medications in the medical record at each encounter.  I have evaluated and examined the patient. History of Present Illness:   is a pleasant, 61 yo gentleman that presented to [facility] with worsening shortness of breath and weakness. He tells me that he started feeling poorly in 2019 with a cough, however that resolved. His symptoms returned in 2019 and tells me that he has been struggling with cough, weight loss of 40+ lbs, and intermittent hemoptysis (phlegm mixed with blood), and diarrhea. He is a smoker of 2ppd for 20+ years. Also prior to the last two weeks, was \"drinking like a fish,\" at least 1 pint per day. He does not routinely prefer to seek traditional medical care and thus has delayed coming to the ED due to his symptoms. Reports worsening right sided chest pain and poor appetite. Images:  Personally reviewed. Chest/Abdominal CT:  Diffuse irregular opacification throughout the right lower lobe with multiple  areas of internal cavitation and air-fluid levels. Findings are more likely  related to infection than malignancy. Large right pleural effusion. Trace left pleural effusion. Bilateral pulmonary emboli. Clot burden is moderate. No evidence of right heart strain. Thrombus in the veins of the left lower extremity extending to the IVC. A  small amount of thrombus is also seen in the right common iliac vein. WBC 31.69  Hgb 9.7  ALT 47  AST 75  Na 135  Creat . 47  Albumin . 7  Lactic acid on admit 2.2; now 1.1  Hemoccult +   Blood cultures negative x 11 hours      Interval History:    Afebrile overnight  BP 80s/60s; MAPs upper 60s  Sats 96% on RA  WBC 21.4 - better  Hgb 8.0 - decreased  K 2.9  Ca 6.4; corrected calcium 8.4  LFTs mildly elevated  Pleural fluid culture + for occasional GPCs in clusters  Pleural fluid pH 6.8  Pleural fluid glucose <1  Pleural fluid consistent with exudate  Blood cx no growth x 3 days  Resp cx with moderate staph auereus  MRSA swab negative  HIV nonreactive  I/O: - 297ml    ECHO: EF 95-07%; grade 1 diastolic dysfunction; small pericardial effusion, no tamponade  BLE VD: positive for age indeterminate and acute deep venous thrombosis or thrombophlebitis in right and left CFV, FV, and popliteal veins. ROS: Feeling okay this morning. Denies SOB or CP. Denies fever, chills, or cough. States he had a BM and asking for breakfast.  Denies abd pain. Thinks LE edema is better. No past medical history on file. Past Surgical History:   Procedure Laterality Date    IR [facility]ER IVC FILTER  8/25/2020    IR THORACENTESIS/INSERT CHEST TUBE  8/26/2020       No family history on file. Social History     Tobacco Use    Smoking status: Current Every Day Smoker   Substance Use Topics    Alcohol use: Yes       No Known Allergies    Current Facility-Administered Medications   Medication Dose Route Frequency    potassium bicarb-citric acid (EFFER-K) tablet 20 mEq  20 mEq Oral BID    sodium chloride 10% 15 ml hypertonic neb solution  5 mL Nebulization QAM RT    Vancomycin Trough: please obtain at 0930 on 8/28 (obtain 30 min prior to 1000 dose on 8/28/2020) Thank you!    Other ONCE    vancomycin (VANCOCIN) 750 mg in 0.9% sodium chloride (MBP/ADV) 250 mL  750 mg IntraVENous Q12H    lidocaine (PF) (XYLOCAINE) 10 mg/mL (1 %) injection 10 mL  10 mL SubCUTAneous Rad Multiple    fentaNYL citrate (PF) injection  mcg   mcg IntraVENous Rad Multiple    cyanocobalamin (VITAMIN B12) tablet 1,000 mcg  1,000 mcg Oral DAILY    folic acid (FOLVITE) tablet 1 mg  1 mg Oral DAILY    lipase-protease-amylase (CREON 12,000) capsule 1 Cap  1 Cap Oral TID WITH MEALS    HYDROcodone-acetaminophen (NORCO) 5-325 mg per tablet 1 Tab  1 Tab Oral Q6H PRN    morphine injection 1 mg  1 mg IntraVENous Q4H PRN    sodium chloride (NS) flush 5-10 mL  5-10 mL IntraVENous PRN    sodium chloride (NS) flush 5-40 mL  5-40 mL IntraVENous Q8H    sodium chloride (NS) flush 5-40 mL  5-40 mL IntraVENous PRN    acetaminophen (TYLENOL) tablet 650 mg  650 mg Oral Q6H PRN    Or    acetaminophen (TYLENOL) suppository 650 mg  650 mg Rectal Q6H PRN    polyethylene glycol (MIRALAX) packet 17 g  17 g Oral DAILY PRN    promethazine (PHENERGAN) tablet 12.5 mg  12.5 mg Oral Q6H PRN    Or    ondansetron (ZOFRAN) injection 4 mg  4 mg IntraVENous Q6H PRN    0.9% sodium chloride infusion  100 mL/hr IntraVENous CONTINUOUS    piperacillin-tazobactam (ZOSYN) 3.375 g in 0.9% sodium chloride (MBP/ADV) 100 mL  3.375 g IntraVENous Q8H    doxycycline (VIBRAMYCIN) 100 mg in 0.9% sodium chloride (MBP/ADV) 100 mL  100 mg IntraVENous Q12H    pantoprazole (PROTONIX) 40 mg in 0.9% sodium chloride 10 mL injection  40 mg IntraVENous Q12H         REVIEW OF SYSTEMS   Negative except as stated in the HPI. Physical Exam:   Visit Vitals  BP 95/61   Pulse 84   Temp 97.3 °F (36.3 °C)   Resp 17   Ht 5' 10\" (1.778 m)   Wt 65.4 kg (144 lb 1.6 oz)   SpO2 96%   BMI 20.68 kg/m²       General:  Alert, cooperative, no distress, appears older stated age. Head:  Normocephalic, without obvious abnormality, atraumatic. Eyes:  Conjunctivae/corneas clear. .   Nose: Nares normal. Septum midline. Mucosa normal.    Throat: Lips, mucosa, and tongue normal. Poor dentition. Lungs:   Chest tube with serosanguinous fluid, no air leak. Diminished right base. Chest wall:  No tenderness or deformity. Heart:  Regular rate and rhythm, S1, S2 normal, no murmur, click, rub or gallop. Abdomen:   Soft, non-tender. Bowel sounds normal.    Extremities: Muscle wasting. Anasarca. Pulses: 2+ and symmetric all extremities. Skin: Skin color, texture, turgor normal.   Lymph nodes: Cervical nodes normal.   Neurologic: Grossly nonfocal       Lab Results   Component Value Date/Time    Sodium 141 08/28/2020 05:05 AM    Potassium 2.9 (L) 08/28/2020 05:05 AM    Chloride 111 (H) 08/28/2020 05:05 AM    CO2 24 08/28/2020 05:05 AM    BUN 6 08/28/2020 05:05 AM    Creatinine 0.39 (L) 08/28/2020 05:05 AM    Glucose 62 (L) 08/28/2020 05:05 AM    Calcium 6.4 (LL) 08/28/2020 05:05 AM    Lactic acid 1.1 08/25/2020 06:25 PM       Lab Results   Component Value Date/Time    WBC 21.4 (H) 08/28/2020 05:05 AM    HGB 8.0 (L) 08/28/2020 05:05 AM    PLATELET 911 06/53/3801 05:05 AM    .4 (H) 08/28/2020 05:05 AM       Lab Results   Component Value Date/Time    Alk. phosphatase 170 (H) 08/28/2020 05:05 AM    Protein, total 4.7 (L) 08/28/2020 05:05 AM    Albumin 1.5 (L) 08/28/2020 05:05 AM    Globulin 3.2 08/28/2020 05:05 AM       No results found for: IRON, FE, TIBC, IBCT, PSAT, FERR    No results found for: SR, CRP, ANALIA, ANAIGG, RA, RPR, RPRT, VDRLT, VDRLS, TSH, TSHEXT, TSHEXT     No results found for: PH, PHI, PCO2, PCO2I, PO2, PO2I, HCO3, HCO3I, FIO2, FIO2I    Lab Results   Component Value Date/Time    Troponin-I, Qt. <0.05 08/25/2020 04:07 PM        Lab Results   Component Value Date/Time    Culture result:  08/26/2020 04:48 PM     HEAVY  Preliminary report of probable S. aureus (Negative for antigen detection) confirmation and sensitivities to follow. Culture result: MRSA NOT PRESENT 08/26/2020 12:55 PM    Culture result:  08/26/2020 12:55 PM         Screening of patient nares for MRSA is for surveillance purposes and, if positive, to facilitate isolation considerations in high risk settings. It is not intended for automatic decolonization interventions per se as regimens are not sufficiently effective to warrant routine use.        No results found for: TOXA1, RPR, HBCM, HBSAG, HAAB, HCAB1, HAAT, G6PD, CRYAC, HIVGT, HIVR, HIV1, HIV12, HIVPC, HIVRPI    No results found for: VANCT, CPK    Lab Results   Component Value Date/Time    Color JENNIFFER 08/26/2020 03:12 AM    Appearance CLEAR 08/26/2020 03:12 AM    Specific gravity 1.005 08/26/2020 03:12 AM    pH (UA) 6.0 08/26/2020 03:12 AM    Protein TRACE (A) 08/26/2020 03:12 AM    Glucose Negative 08/26/2020 03:12 AM    Ketone TRACE (A) 08/26/2020 03:12 AM    Blood Negative 08/26/2020 03:12 AM    Urobilinogen 1.0 08/26/2020 03:12 AM    Nitrites Negative 08/26/2020 03:12 AM    Leukocyte Esterase SMALL (A) 08/26/2020 03:12 AM    WBC 5-10 08/26/2020 03:12 AM    RBC 5-10 08/26/2020 03:12 AM    Bacteria Negative 08/26/2020 03:12 AM       Images: personally visualized and report reviewed    CXR (8/28/2020): Right chest tube remains in place at the right lung base. There is a very small pleural effusion. Small apical pneumothorax is unchanged.   The lungs demonstrate increasing airspace opacities in the right mid lower lung zone. IMPRESSION  · Abnormal Chest CT: extensive cavitation throughout.   Sputum cx prelim with staph aureus    · Bilateral PEs s/p IVC filter due to GIB  · GIB  · Anemia  · BLE DVT  · Weight Loss  · Tobacco Use    PLAN  · Supplemental O2 if needed to keep sats >88%; remains on RA  · Continue chest tube to suction  · Repeat CXR tomorrow am  · Continue Zosyn, Doxy, Vanc  · Follow-up pleural fluid studies, cultures, cytology  · Follow blood and final sputum cultures   · F/U Quantiferon gold and AFB smears  · GI following, will eventually need work-up  · Speech consult pending  · IVF  · IV Protonix        [facility]

## 2020-08-28 NOTE — PROGRESS NOTES
SHIFT CHANGE:  7:42 AM Report received from Guillermo Meade RN. SBAR, Kardex, Procedure Summary, Intake/Output, MAR, Recent Results, Med Rec Status and Cardiac Rhythm NSR were discussed. SHIFT SUMMARY:  9:00 AM  Patient cleaned of large formed/soft brown stool. Patient is continent however refuses to use a bedpan. Patient requesting to wear a brief. 10:00 AM  Patient cleaned of large soft brown stool. Clean brief placed. 12:00 PM  Patient cleaned of medium soft dark brown stool. Patient states he normally does not have formed stools, states he's been having diarrhea for years. 3:42 PM  Patient cleaned of large soft dark brown stool. 5:41 PM  Patient cleaned of medium soft stool. Immediately needed to go again once cleaned up. Voiced his frustration over the frequent stools. States he takes a probiotic at home and [employer] which both help. Will notify MD.    6:00 PM  Dr. Maicol Jones called and notified of the above. Orders received for Q6H Immodium PRN, FloraQ daily and Pepto-bismal Q6H PRN. END OF SHIFT REPORT:  7:26 PM Bedside and Verbal shift change report given to Micah Prery RN (oncoming nurse) by Lisha Batres RN (offgoing nurse). Report included the following information SBAR, Kardex, Procedure Summary, Intake/Output, MAR, Recent Results, Med Rec Status and Cardiac Rhythm NSR.

## 2020-08-28 NOTE — PROGRESS NOTES
Problem: Dysphagia (Adult)  Goal: *Acute Goals and Plan of Care (Insert Text)  Description: Swallowing goals initiated 8-28-20:  1)  tolerate soft diet, thin liquids without s/s aspiration by 9-4-20  2) MBS after TB r/o  Outcome: Progressing Towards Goal   SPEECH LANGUAGE PATHOLOGY BEDSIDE SWALLOW EVALUATION  Patient: Gris Storey (8 y.o. male)  Date: 8/28/2020  Primary Diagnosis: Pneumonia [J18.9]        Precautions: aspiration  Fall    ASSESSMENT :  Based on the objective data described below, the patient presents with intermittent throat clearing and wet voice at meals. Evaluation limited by patient's reduced cooperation with PO and swallow evaluation. He is edentulous and states he needs soft foods. Admitted 8-25-20 with abnormal Chest CT:   . IMPRESSION:     1. Diffuse irregular opacification throughout the right lower lobe with multiple  areas of internal cavitation and air-fluid levels. Findings are more likely  related to infection than malignancy. 2. Large right pleural effusion. Trace left pleural effusion. 3. Bilateral pulmonary emboli. Clot burden is moderate. No evidence of right  heart strain. 4. Thrombus in the veins of the left lower extremity extending to the IVC. A  small amount of thrombus is also seen in the right common iliac vein. Also currently a TB r/o patient. He has had hemoptysis,  fecal occult blood, hyponatremia. PMH:  40 lb weight loss, R pleural effusoin, PE, DVT, ETOH, +tobacco.        Patient will benefit from skilled intervention to address the above impairments. Patients rehabilitation potential is considered to be Good     PLAN :  Recommendations and Planned Interventions:  Continue soft diet, thins. Will consider MBS once TB r/o. Frequency/Duration: Patient will be followed by speech-language pathology 1 time a week to address goals. Discharge Recommendations:  To Be Determined     SUBJECTIVE:   Patient stated You would make somebody a good housewife someday. OBJECTIVE:   No past medical history on file. Past Surgical History:   Procedure Laterality Date    IR [facility] IVC FILTER  8/25/2020    IR THORACENTESIS/INSERT CHEST TUBE  8/26/2020     Prior Level of Function/Home Situation:   Home Situation  Home Environment: Private residence  # Steps to Enter: 4  Rails to Enter: Yes  One/Two Story Residence: One story  Living Alone: Yes  Support Systems: Friends \ neighbors  Patient Expects to be Discharged to[de-identified] Private residence  Current DME Used/Available at Home: None  Diet prior to admission: regular, thins  Current Diet:  regular,thins   Cognitive and Communication Status:  Neurologic State: Alert, Appropriate for age  Orientation Level: Oriented to person, Oriented to place  Cognition: Follows commands, Impulsive, Impaired decision making  Perception: Appears intact  Perseveration: No perseveration noted  Safety/Judgement: Decreased insight into deficits  Oral Assessment:  Oral Assessment  Labial: No impairment  Dentition: Edentulous  Oral Hygiene: WFL  Lingual: No impairment  Velum: No impairment  Mandible: No impairment  P.O. Trials:  Patient Position: upright in bed  Vocal quality prior to P.O.: No impairment  Consistency Presented: Thin liquid; Solid(he agreed to take only a few bites and sips.)  How Presented: Self-fed/presented;Spoon;Straw;Successive swallows     Bolus Acceptance: No impairment  Bolus Formation/Control: (c/o need for soft foods \"I need soft foods. NO TEETH\")     Propulsion: (suspect he swallowed solids whole)  Oral Residue: None  Initiation of Swallow: (mild delay)  Laryngeal Elevation: Weak(appeared to have fair to good strength)  Aspiration Signs/Symptoms: Change vocal quality;Clear throat(intermittently with PO. ? )                        NOMS:   The NOMS functional outcome measure was used to quantify this patient's level of swallowing impairment.   Based on the NOMS, the patient was determined to be at level 5 for swallow function NOMS Swallowing Levels:  Level 1 (CN): NPO  Level 2 (CM): NPO but takes consistency in therapy  Level 3 (CL): Takes less than 50% of nutrition p.o. and continues with nonoral feedings; and/or safe with mod cues; and/or max diet restriction  Level 4 (CK): Safe swallow but needs mod cues; and/or mod diet restriction; and/or still requires some nonoral feeding/supplements  Level 5 (CJ): Safe swallow with min diet restriction; and/or needs min cues  Level 6 (CI): Independent with p.o.; rare cues; usually self cues; may need to avoid some foods or needs extra time  Level 7 ([address] [place] [address]): Independent for all p.o.  MARGARITA. (2003). National Outcomes Measurement System (NOMS): Adult Speech-Language Pathology User's Guide. Pain:  Pain Scale 1: Numeric (0 - 10)  Pain Intensity 1: 0       After treatment:   Patient left in no apparent distress in bed and Nursing notified    COMMUNICATION/EDUCATION:   Patient was educated regarding his deficit(s) of potential dysaphgia  as this relates to his diagnosis of PNA. He demonstrated Fair understanding as evidenced by lack of concern for deficits. .    The patient's plan of care including recommendations, planned interventions, and recommended diet changes were discussed with: Physical therapist, Physical therapy assistant, Occupational therapist, and Registered nurse. Patient/family have participated as able in goal setting and plan of care. Patient/family agree to work toward stated goals and plan of care.     Thank you for this referral.  Luis Enrique Cagle SLP  Time Calculation: 15 mins

## 2020-08-28 NOTE — PROGRESS NOTES
Care Management follow up     Due to [address]  assessment completed via EMR review and collaboration with nursing staff. No contact with patient for this assessment.     Patient admitted for pneumonia, hemoptysis, right pleural effusion, bilateral DVT, r/o TB.     RUR score 12%/ low risk     Current status  Patient currently requiring medical management including IV antibiotics and further evaluation. IVC filter placed 8/25/2020. R/O TB. Patient had Chest tube to suction.     Transition of Care Plan  1. Monitor patient status and response to treatment. 2. Medical management continues. 3. PT/OT evaluations pending, patient declining participation. 4. Unsure of discharge needs at this time.   5. CM to follow.     Dana Tubbs RN, MSN/Care manager

## 2020-08-28 NOTE — PROGRESS NOTES
Gastrointestinal Progress Note    8/28/2020    Admit Date: 8/25/2020    Subjective:     No complaints.   Oral intake is poor; states taste is issue  Current Facility-Administered Medications   Medication Dose Route Frequency    potassium bicarb-citric acid (EFFER-K) tablet 20 mEq  20 mEq Oral BID    vancomycin (VANCOCIN) 1,000 mg in 0.9% sodium chloride (MBP/ADV) 250 mL  1,000 mg IntraVENous Q12H    sodium chloride 10% 15 ml hypertonic neb solution  5 mL Nebulization QAM RT    lidocaine (PF) (XYLOCAINE) 10 mg/mL (1 %) injection 10 mL  10 mL SubCUTAneous Rad Multiple    fentaNYL citrate (PF) injection  mcg   mcg IntraVENous Rad Multiple    cyanocobalamin (VITAMIN B12) tablet 1,000 mcg  1,000 mcg Oral DAILY    folic acid (FOLVITE) tablet 1 mg  1 mg Oral DAILY    lipase-protease-amylase (CREON 12,000) capsule 1 Cap  1 Cap Oral TID WITH MEALS    HYDROcodone-acetaminophen (NORCO) 5-325 mg per tablet 1 Tab  1 Tab Oral Q6H PRN    morphine injection 1 mg  1 mg IntraVENous Q4H PRN    sodium chloride (NS) flush 5-10 mL  5-10 mL IntraVENous PRN    sodium chloride (NS) flush 5-40 mL  5-40 mL IntraVENous Q8H    sodium chloride (NS) flush 5-40 mL  5-40 mL IntraVENous PRN    acetaminophen (TYLENOL) tablet 650 mg  650 mg Oral Q6H PRN    Or    acetaminophen (TYLENOL) suppository 650 mg  650 mg Rectal Q6H PRN    polyethylene glycol (MIRALAX) packet 17 g  17 g Oral DAILY PRN    promethazine (PHENERGAN) tablet 12.5 mg  12.5 mg Oral Q6H PRN    Or    ondansetron (ZOFRAN) injection 4 mg  4 mg IntraVENous Q6H PRN    0.9% sodium chloride infusion  100 mL/hr IntraVENous CONTINUOUS    piperacillin-tazobactam (ZOSYN) 3.375 g in 0.9% sodium chloride (MBP/ADV) 100 mL  3.375 g IntraVENous Q8H    doxycycline (VIBRAMYCIN) 100 mg in 0.9% sodium chloride (MBP/ADV) 100 mL  100 mg IntraVENous Q12H    pantoprazole (PROTONIX) 40 mg in 0.9% sodium chloride 10 mL injection  40 mg IntraVENous Q12H        Objective:     Blood pressure 94/68, pulse 94, temperature 96.9 °F (36.1 °C), resp. rate 29, height 5' 10\" (1.778 m), weight 65.4 kg (144 lb 1.6 oz), SpO2 95 %.     08/28 0701 - 08/28 1900  In: 586.7 [P.O.:360; I.V.:226.7]  Out: -     08/26 1901 - 08/28 0700  In: 2952.9 [P.O.:1060; I.V.:1892.9]  Out: 6688 [Urine:890]    EXAM:  GENERAL: alert, cooperative, no distress, HEART: regular rate and rhythm, LUNGS: chest clear, no wheezing, rales, normal symmetric air entry, ABDOMEN:  Bowel sounds are normal, liver is not enlarged, spleen is not enlarged and EXTREMITY: mild edema      Data Review    Recent Results (from the past 24 hour(s))   ECHO ADULT COMPLETE    Collection Time: 08/27/20  3:53 PM   Result Value Ref Range    IVSd 1.03 (A) 0.6 - 1.0 cm    LVIDd 4.25 4.2 - 5.9 cm    LVIDs 2.94 cm    LVOT d 1.80 cm    LVPWd 0.63 0.6 - 1.0 cm    LVOT Peak Gradient 3.01 mmHg    LVOT Peak Velocity 86.68 cm/s    RVIDd 3.14 cm    Left Atrium Major Axis 2.44 cm    LA Volume 36.57 18 - 58 mL    LA Area 4C 14.04 cm2    LA Vol 2C 27.18 18 - 58 mL    LA Vol 4C 34.28 18 - 58 mL    LA Volume DISK BP 33.11 18 - 58 mL    Aortic Valve Area by Continuity of Peak Velocity 2.05 cm2    AoV PG 4.61 mmHg    Aortic Valve Systolic Peak Velocity 169.78 cm/s    MV A Thiago 63.76 centimeter/second    Mitral Valve E Wave Deceleration Time 0.21 s    MV E Thiago 69.55 centimeter/second    LV E' Lateral Velocity 10.83 centimeter/second    LV E' Septal Velocity 13.60 centimeter/second    Pulmonic Valve Systolic Peak Instantaneous Gradient 1.08 mmHg    Pulmonic Regurgitant End Max Velocity 51.87 cm/s    Tapse 2.10 (A) 1.5 - 2.0 cm    Triscuspid Valve Regurgitation Peak Gradient 17.70 mmHg    TR Max Velocity 210.36 cm/s    Ao Root D 3.40 cm    LV Mass .5 88 - 224 g    LV Mass AL Index 61.6 49 - 115 g/m2    Left Atrium Minor Axis 1.39 cm    LA Vol Index 20.77 16 - 28 ml/m2    LA Vol Index 15.44 16 - 28 ml/m2    LA Vol Index 19.47 16 - 28 ml/m2    ANAYA/BSA Pk Thiago 1.2 cm2/m2   CBC WITH AUTOMATED DIFF    Collection Time: 08/28/20  5:05 AM   Result Value Ref Range    WBC 21.4 (H) 4.1 - 11.1 K/uL    RBC 2.34 (L) 4.10 - 5.70 M/uL    HGB 8.0 (L) 12.1 - 17.0 g/dL    HCT 23.5 (L) 36.6 - 50.3 %    .4 (H) 80.0 - 99.0 FL    MCH 34.2 (H) 26.0 - 34.0 PG    MCHC 34.0 30.0 - 36.5 g/dL    RDW 20.8 (H) 11.5 - 14.5 %    PLATELET 565 304 - 639 K/uL    MPV 10.7 8.9 - 12.9 FL    NRBC 0.0 0  WBC    ABSOLUTE NRBC 0.00 0.00 - 0.01 K/uL    NEUTROPHILS 83 (H) 32 - 75 %    LYMPHOCYTES 11 (L) 12 - 49 %    MONOCYTES 3 (L) 5 - 13 %    EOSINOPHILS 0 0 - 7 %    BASOPHILS 0 0 - 1 %    IMMATURE GRANULOCYTES 3 (H) 0.0 - 0.5 %    ABS. NEUTROPHILS 17.8 (H) 1.8 - 8.0 K/UL    ABS. LYMPHOCYTES 2.4 0.8 - 3.5 K/UL    ABS. MONOCYTES 0.6 0.0 - 1.0 K/UL    ABS. EOSINOPHILS 0.0 0.0 - 0.4 K/UL    ABS. BASOPHILS 0.0 0.0 - 0.1 K/UL    ABS. IMM. GRANS. 0.6 (H) 0.00 - 0.04 K/UL    DF SMEAR SCANNED      RBC COMMENTS TARGET CELLS  PRESENT       METABOLIC PANEL, COMPREHENSIVE    Collection Time: 08/28/20  5:05 AM   Result Value Ref Range    Sodium 141 136 - 145 mmol/L    Potassium 2.9 (L) 3.5 - 5.1 mmol/L    Chloride 111 (H) 97 - 108 mmol/L    CO2 24 21 - 32 mmol/L    Anion gap 6 5 - 15 mmol/L    Glucose 62 (L) 65 - 100 mg/dL    BUN 6 6 - 20 MG/DL    Creatinine 0.39 (L) 0.70 - 1.30 MG/DL    BUN/Creatinine ratio 15 12 - 20      GFR est AA >60 >60 ml/min/1.73m2    GFR est non-AA >60 >60 ml/min/1.73m2    Calcium 6.4 (LL) 8.5 - 10.1 MG/DL    Bilirubin, total 1.8 (H) 0.2 - 1.0 MG/DL    ALT (SGPT) 46 12 - 78 U/L    AST (SGOT) 84 (H) 15 - 37 U/L    Alk.  phosphatase 170 (H) 45 - 117 U/L    Protein, total 4.7 (L) 6.4 - 8.2 g/dL    Albumin 1.5 (L) 3.5 - 5.0 g/dL    Globulin 3.2 2.0 - 4.0 g/dL    A-G Ratio 0.5 (L) 1.1 - 2.2     VANCOMYCIN, TROUGH    Collection Time: 08/28/20  9:36 AM   Result Value Ref Range    Vancomycin,trough 13.0 (H) 5.0 - 10.0 ug/mL    Reported dose date: NOT PROVIDED      Reported dose time: NOT PROVIDED      Reported dose: NOT PROVIDED UNITS       Assessment:     Principal Problem:    Pneumonia involving right lung (8/25/2020)    Active Problems:    Hemoptysis (8/25/2020)      Hyperbilirubinemia (8/25/2020)      Weight loss, unintentional (8/25/2020)      Pleural effusion, right (8/25/2020)      Bilateral pulmonary embolism (HCC) (8/25/2020)      Acute deep vein thrombosis (DVT) of both lower extremities ([address] (8/25/2020)      Alcohol use (8/25/2020)      Diarrhea (8/25/2020)      Fecal occult blood test positive (8/25/2020)      Hyponatremia with extracellular fluid depletion (8/25/2020)        Plan:     1.   Will try plan for endoscopic exam next week if he will permit

## 2020-08-29 ENCOUNTER — APPOINTMENT (OUTPATIENT)
Dept: GENERAL RADIOLOGY | Age: 65
DRG: 166 | End: 2020-08-29
Attending: PHYSICIAN ASSISTANT
Payer: COMMERCIAL

## 2020-08-29 ENCOUNTER — APPOINTMENT (OUTPATIENT)
Dept: GENERAL RADIOLOGY | Age: 65
DRG: 166 | End: 2020-08-29
Attending: INTERNAL MEDICINE
Payer: COMMERCIAL

## 2020-08-29 LAB
ADENOSINE DEAMINASE PLR-CCNC: 2.3 U/L (ref 0–9.4)
ALBUMIN SERPL-MCNC: 1.2 G/DL (ref 3.5–5)
ALBUMIN/GLOB SERPL: 0.3 {RATIO} (ref 1.1–2.2)
ALP SERPL-CCNC: 232 U/L (ref 45–117)
ALT SERPL-CCNC: 53 U/L (ref 12–78)
ANION GAP SERPL CALC-SCNC: 6 MMOL/L (ref 5–15)
AST SERPL-CCNC: 79 U/L (ref 15–37)
BASOPHILS # BLD: 0 K/UL (ref 0–0.1)
BASOPHILS NFR BLD: 0 % (ref 0–1)
BILIRUB SERPL-MCNC: 1.5 MG/DL (ref 0.2–1)
BUN SERPL-MCNC: 4 MG/DL (ref 6–20)
BUN/CREAT SERPL: 11 (ref 12–20)
CALCIUM SERPL-MCNC: 6.6 MG/DL (ref 8.5–10.1)
CHLORIDE SERPL-SCNC: 113 MMOL/L (ref 97–108)
CO2 SERPL-SCNC: 24 MMOL/L (ref 21–32)
CREAT SERPL-MCNC: 0.37 MG/DL (ref 0.7–1.3)
DIFFERENTIAL METHOD BLD: ABNORMAL
EOSINOPHIL # BLD: 0.2 K/UL (ref 0–0.4)
EOSINOPHIL NFR BLD: 1 % (ref 0–7)
ERYTHROCYTE [DISTWIDTH] IN BLOOD BY AUTOMATED COUNT: 20.4 % (ref 11.5–14.5)
GLOBULIN SER CALC-MCNC: 3.6 G/DL (ref 2–4)
GLUCOSE SERPL-MCNC: 66 MG/DL (ref 65–100)
HCT VFR BLD AUTO: 25.3 % (ref 36.6–50.3)
HGB BLD-MCNC: 8.5 G/DL (ref 12.1–17)
IMM GRANULOCYTES # BLD AUTO: 0 K/UL (ref 0–0.04)
IMM GRANULOCYTES NFR BLD AUTO: 0 % (ref 0–0.5)
LYMPHOCYTES # BLD: 3.5 K/UL (ref 0.8–3.5)
LYMPHOCYTES NFR BLD: 18 % (ref 12–49)
M TB IFN-G BLD-IMP: ABNORMAL
MAGNESIUM SERPL-MCNC: 1.7 MG/DL (ref 1.6–2.4)
MCH RBC QN AUTO: 34 PG (ref 26–34)
MCHC RBC AUTO-ENTMCNC: 33.6 G/DL (ref 30–36.5)
MCV RBC AUTO: 101.2 FL (ref 80–99)
MONOCYTES # BLD: 0.4 K/UL (ref 0–1)
MONOCYTES NFR BLD: 2 % (ref 5–13)
NEUTS BAND NFR BLD MANUAL: 1 %
NEUTS SEG # BLD: 15.5 K/UL (ref 1.8–8)
NEUTS SEG NFR BLD: 78 % (ref 32–75)
NRBC # BLD: 0 K/UL (ref 0–0.01)
NRBC BLD-RTO: 0 PER 100 WBC
PLATELET # BLD AUTO: 188 K/UL (ref 150–400)
PMV BLD AUTO: 11.2 FL (ref 8.9–12.9)
POTASSIUM SERPL-SCNC: 3.7 MMOL/L (ref 3.5–5.1)
PROT SERPL-MCNC: 4.8 G/DL (ref 6.4–8.2)
QUANTIFERON CRITERIA, QFI1T: ABNORMAL
QUANTIFERON MITOGEN VALUE: 0.13 IU/ML
QUANTIFERON NIL VALUE: 0 IU/ML
QUANTIFERON TB1 AG: 0.01 IU/ML
QUANTIFERON TB2 AG: 0.01 IU/ML
RBC # BLD AUTO: 2.5 M/UL (ref 4.1–5.7)
RBC MORPH BLD: ABNORMAL
RBC MORPH BLD: ABNORMAL
SODIUM SERPL-SCNC: 143 MMOL/L (ref 136–145)
SPECIMEN SOURCE: NORMAL
WBC # BLD AUTO: 19.6 K/UL (ref 4.1–11.1)
WBC MORPH BLD: ABNORMAL

## 2020-08-29 PROCEDURE — 65660000000 HC RM CCU STEPDOWN

## 2020-08-29 PROCEDURE — 36415 COLL VENOUS BLD VENIPUNCTURE: CPT

## 2020-08-29 PROCEDURE — 86480 TB TEST CELL IMMUN MEASURE: CPT

## 2020-08-29 PROCEDURE — 74011250636 HC RX REV CODE- 250/636: Performed by: NURSE PRACTITIONER

## 2020-08-29 PROCEDURE — 74011250637 HC RX REV CODE- 250/637: Performed by: FAMILY MEDICINE

## 2020-08-29 PROCEDURE — 74011250637 HC RX REV CODE- 250/637: Performed by: INTERNAL MEDICINE

## 2020-08-29 PROCEDURE — 85025 COMPLETE CBC W/AUTO DIFF WBC: CPT

## 2020-08-29 PROCEDURE — 80053 COMPREHEN METABOLIC PANEL: CPT

## 2020-08-29 PROCEDURE — 74011000258 HC RX REV CODE- 258: Performed by: INTERNAL MEDICINE

## 2020-08-29 PROCEDURE — 74011250636 HC RX REV CODE- 250/636: Performed by: INTERNAL MEDICINE

## 2020-08-29 PROCEDURE — 71045 X-RAY EXAM CHEST 1 VIEW: CPT

## 2020-08-29 PROCEDURE — 74011250636 HC RX REV CODE- 250/636: Performed by: FAMILY MEDICINE

## 2020-08-29 PROCEDURE — C9113 INJ PANTOPRAZOLE SODIUM, VIA: HCPCS | Performed by: INTERNAL MEDICINE

## 2020-08-29 PROCEDURE — 83735 ASSAY OF MAGNESIUM: CPT

## 2020-08-29 RX ADMIN — PIPERACILLIN AND TAZOBACTAM 3.38 G: 3; .375 INJECTION, POWDER, LYOPHILIZED, FOR SOLUTION INTRAVENOUS at 10:20

## 2020-08-29 RX ADMIN — SODIUM CHLORIDE 40 MG: 9 INJECTION INTRAMUSCULAR; INTRAVENOUS; SUBCUTANEOUS at 07:44

## 2020-08-29 RX ADMIN — PIPERACILLIN AND TAZOBACTAM 3.38 G: 3; .375 INJECTION, POWDER, LYOPHILIZED, FOR SOLUTION INTRAVENOUS at 17:03

## 2020-08-29 RX ADMIN — LOPERAMIDE HYDROCHLORIDE 2 MG: 2 CAPSULE ORAL at 00:51

## 2020-08-29 RX ADMIN — PANCRELIPASE 1 CAPSULE: 60000; 12000; 38000 CAPSULE, DELAYED RELEASE PELLETS ORAL at 07:43

## 2020-08-29 RX ADMIN — SODIUM CHLORIDE 40 MG: 9 INJECTION INTRAMUSCULAR; INTRAVENOUS; SUBCUTANEOUS at 20:26

## 2020-08-29 RX ADMIN — POTASSIUM BICARBONATE 20 MEQ: 782 TABLET, EFFERVESCENT ORAL at 17:03

## 2020-08-29 RX ADMIN — PANCRELIPASE 1 CAPSULE: 60000; 12000; 38000 CAPSULE, DELAYED RELEASE PELLETS ORAL at 11:43

## 2020-08-29 RX ADMIN — VANCOMYCIN HYDROCHLORIDE 1000 MG: 1 INJECTION, POWDER, LYOPHILIZED, FOR SOLUTION INTRAVENOUS at 07:44

## 2020-08-29 RX ADMIN — CYANOCOBALAMIN TAB 500 MCG 1000 MCG: 500 TAB at 07:44

## 2020-08-29 RX ADMIN — LOPERAMIDE HYDROCHLORIDE 2 MG: 2 CAPSULE ORAL at 07:54

## 2020-08-29 RX ADMIN — DOXYCYCLINE 100 MG: 100 INJECTION, POWDER, LYOPHILIZED, FOR SOLUTION INTRAVENOUS at 16:58

## 2020-08-29 RX ADMIN — SODIUM CHLORIDE 100 ML/HR: 900 INJECTION, SOLUTION INTRAVENOUS at 16:57

## 2020-08-29 RX ADMIN — DOXYCYCLINE 100 MG: 100 INJECTION, POWDER, LYOPHILIZED, FOR SOLUTION INTRAVENOUS at 05:03

## 2020-08-29 RX ADMIN — PIPERACILLIN AND TAZOBACTAM 3.38 G: 3; .375 INJECTION, POWDER, LYOPHILIZED, FOR SOLUTION INTRAVENOUS at 01:00

## 2020-08-29 RX ADMIN — HYDROCODONE BITARTRATE AND ACETAMINOPHEN 1 TABLET: 5; 325 TABLET ORAL at 21:19

## 2020-08-29 RX ADMIN — Medication 10 ML: at 21:19

## 2020-08-29 RX ADMIN — PANCRELIPASE 1 CAPSULE: 60000; 12000; 38000 CAPSULE, DELAYED RELEASE PELLETS ORAL at 16:58

## 2020-08-29 RX ADMIN — SODIUM CHLORIDE 100 ML/HR: 900 INJECTION, SOLUTION INTRAVENOUS at 05:04

## 2020-08-29 RX ADMIN — POTASSIUM BICARBONATE 20 MEQ: 782 TABLET, EFFERVESCENT ORAL at 07:44

## 2020-08-29 RX ADMIN — Medication 1 CAPSULE: at 07:43

## 2020-08-29 RX ADMIN — FOLIC ACID 1 MG: 1 TABLET ORAL at 07:43

## 2020-08-29 NOTE — PROGRESS NOTES
0700  Bedside and Verbal shift change report given to Abdi Hartman (oncoming nurse) by Liz Gonzalez (offgoing nurse). Report included the following information SBAR, Kardex, Procedure Summary, Intake/Output, MAR, Accordion, Recent Results and Cardiac Rhythm NSR. Initial assessment done. Denies any pain but complaining that he's having loose bowel movement since yesterday. And he refused to eat until diarrhea is resolve. 0800  Scheduled meds given including the probiotic that the patient is requesting. Initially he refused to take Imodium. But was able to educate the patient the importance of taking the medication. 1100  Quantiferon test drawn and sent. Visit Vitals  BP 93/72   Pulse 91   Temp 98.9 °F (37.2 °C)   Resp 21   Ht 5' 10\" (1.778 m)   Wt 67.2 kg (148 lb 3.2 oz)   SpO2 96%   BMI 21.26 kg/m²     1940  Bedside and Verbal shift change report given to jas LOMBARDI (oncoming nurse) by Elyssa Cordova RN (offgoing nurse). Report included the following information SBAR, Kardex, Procedure Summary, Intake/Output, MAR, Accordion and Recent Results.

## 2020-08-29 NOTE — PROGRESS NOTES
Name: [facility]: [address] [place] [address] Dr JURADOB: 1955 Admit Date: 2020   Phone: [phone]  Room: 324/01   PCP: Mariano Pimentel MD  MRN: [id number]   Date: 2020  Code: Full Code          Chart and notes reviewed. Data reviewed. I review the patient's current medications in the medical record at each encounter.  I have evaluated and examined the patient. History of Present Illness:   is a pleasant, 61 yo gentleman that presented to [facility] with worsening shortness of breath and weakness. He tells me that he started feeling poorly in 2019 with a cough, however that resolved. His symptoms returned in 2019 and tells me that he has been struggling with cough, weight loss of 40+ lbs, and intermittent hemoptysis (phlegm mixed with blood), and diarrhea. He is a smoker of 2ppd for 20+ years. Also prior to the last two weeks, was \"drinking like a fish,\" at least 1 pint per day. He does not routinely prefer to seek traditional medical care and thus has delayed coming to the ED due to his symptoms. Reports worsening right sided chest pain and poor appetite. Images:  Personally reviewed. Chest/Abdominal CT:  Diffuse irregular opacification throughout the right lower lobe with multiple  areas of internal cavitation and air-fluid levels. Findings are more likely  related to infection than malignancy. Large right pleural effusion. Trace left pleural effusion. Bilateral pulmonary emboli. Clot burden is moderate. No evidence of right heart strain. Thrombus in the veins of the left lower extremity extending to the IVC. A  small amount of thrombus is also seen in the right common iliac vein. WBC 31.69  Hgb 9.7  ALT 47  AST 75  Na 135  Creat . 47  Albumin . 7  Lactic acid on admit 2.2; now 1.1  Hemoccult +   Blood cultures negative x 11 hours      Interval History:        ECHO: EF 29-17%; grade 1 diastolic dysfunction; small pericardial effusion, no tamponade  BLE VD: positive for age indeterminate and acute deep venous thrombosis or thrombophlebitis in right and left CFV, FV, and popliteal veins. ROS: Says he feels well today. No sob. Cough w some brown or clear phlegm      No past medical history on file. Past Surgical History:   Procedure Laterality Date    IR [facility] IVC FILTER  8/25/2020    IR THORACENTESIS/INSERT CHEST TUBE  8/26/2020       No family history on file. Social History     Tobacco Use    Smoking status: Current Every Day Smoker   Substance Use Topics    Alcohol use: Yes       No Known Allergies    Current Facility-Administered Medications   Medication Dose Route Frequency    [START ON 8/30/2020] Vancomycin - Please draw trough level prior to dose due on 8/30 at 0800. Thanks!    Other ONCE    potassium bicarb-citric acid (EFFER-K) tablet 20 mEq  20 mEq Oral BID    vancomycin (VANCOCIN) 1,000 mg in 0.9% sodium chloride (MBP/ADV) 250 mL  1,000 mg IntraVENous Q12H    loperamide (IMODIUM) capsule 2 mg  2 mg Oral Q6H PRN    lactobac ac& pc-s.therm-b.anim (ANITA Q/RISAQUAD)  1 Cap Oral DAILY    bismuth subsalicylate (PEPTO-BISMOL) oral suspension 30 mL  30 mL Oral Q6H PRN    sodium chloride 10% 15 ml hypertonic neb solution  5 mL Nebulization QAM RT    lidocaine (PF) (XYLOCAINE) 10 mg/mL (1 %) injection 10 mL  10 mL SubCUTAneous Rad Multiple    fentaNYL citrate (PF) injection  mcg   mcg IntraVENous Rad Multiple    cyanocobalamin (VITAMIN B12) tablet 1,000 mcg  1,000 mcg Oral DAILY    folic acid (FOLVITE) tablet 1 mg  1 mg Oral DAILY    lipase-protease-amylase (CREON 12,000) capsule 1 Cap  1 Cap Oral TID WITH MEALS    HYDROcodone-acetaminophen (NORCO) 5-325 mg per tablet 1 Tab  1 Tab Oral Q6H PRN    morphine injection 1 mg  1 mg IntraVENous Q4H PRN    sodium chloride (NS) flush 5-10 mL  5-10 mL IntraVENous PRN    sodium chloride (NS) flush 5-40 mL  5-40 mL IntraVENous Q8H    sodium chloride (NS) flush 5-40 mL 5-40 mL IntraVENous PRN    acetaminophen (TYLENOL) tablet 650 mg  650 mg Oral Q6H PRN    Or    acetaminophen (TYLENOL) suppository 650 mg  650 mg Rectal Q6H PRN    polyethylene glycol (MIRALAX) packet 17 g  17 g Oral DAILY PRN    promethazine (PHENERGAN) tablet 12.5 mg  12.5 mg Oral Q6H PRN    Or    ondansetron (ZOFRAN) injection 4 mg  4 mg IntraVENous Q6H PRN    0.9% sodium chloride infusion  100 mL/hr IntraVENous CONTINUOUS    piperacillin-tazobactam (ZOSYN) 3.375 g in 0.9% sodium chloride (MBP/ADV) 100 mL  3.375 g IntraVENous Q8H    doxycycline (VIBRAMYCIN) 100 mg in 0.9% sodium chloride (MBP/ADV) 100 mL  100 mg IntraVENous Q12H    pantoprazole (PROTONIX) 40 mg in 0.9% sodium chloride 10 mL injection  40 mg IntraVENous Q12H         REVIEW OF SYSTEMS   Negative except as stated in the HPI. Physical Exam:   Visit Vitals  /73 (BP 1 Location: Left arm, BP Patient Position: At rest)   Pulse 92   Temp 97.5 °F (36.4 °C)   Resp 22   Ht 5' 10\" (1.778 m)   Wt 67.2 kg (148 lb 3.2 oz)   SpO2 98%   BMI 21.26 kg/m²       General:  Alert, cooperative, no distress, appears older stated age. Head:  Normocephalic, without obvious abnormality, atraumatic. Eyes:  Conjunctivae/corneas clear. .   Nose: Nares normal. Septum midline. Mucosa normal.    Throat: Lips, mucosa, and tongue normal. Poor dentition. Lungs:   Chest tube with serosanguinous fluid, no air leak. Diminished right base. Chest wall:  No tenderness or deformity. Heart:  Regular rate and rhythm, S1, S2 normal, no murmur, click, rub or gallop. Abdomen:   Soft, non-tender. Bowel sounds normal.    Extremities: Muscle wasting. Anasarca. Pulses: 2+ and symmetric all extremities.    Skin: Skin color, texture, turgor normal.   Lymph nodes: Cervical nodes normal.   Neurologic: Grossly nonfocal       Lab Results   Component Value Date/Time    Sodium 143 08/29/2020 05:14 AM    Potassium 3.7 08/29/2020 05:14 AM    Chloride 113 (H) 08/29/2020 05:14 AM    CO2 24 08/29/2020 05:14 AM    BUN 4 (L) 08/29/2020 05:14 AM    Creatinine 0.37 (L) 08/29/2020 05:14 AM    Glucose 66 08/29/2020 05:14 AM    Calcium 6.6 (L) 08/29/2020 05:14 AM    Magnesium 1.7 08/29/2020 05:14 AM    Lactic acid 1.1 08/25/2020 06:25 PM       Lab Results   Component Value Date/Time    WBC 19.6 (H) 08/29/2020 05:14 AM    HGB 8.5 (L) 08/29/2020 05:14 AM    PLATELET 157 39/15/0213 05:14 AM    .2 (H) 08/29/2020 05:14 AM       Lab Results   Component Value Date/Time    Alk. phosphatase 232 (H) 08/29/2020 05:14 AM    Protein, total 4.8 (L) 08/29/2020 05:14 AM    Albumin 1.2 (L) 08/29/2020 05:14 AM    Globulin 3.6 08/29/2020 05:14 AM       No results found for: IRON, FE, TIBC, IBCT, PSAT, FERR    No results found for: SR, CRP, ANALIA, ANAIGG, RA, RPR, RPRT, VDRLT, VDRLS, TSH, TSHEXT, TSHEXT     No results found for: PH, PHI, PCO2, PCO2I, PO2, PO2I, HCO3, HCO3I, FIO2, FIO2I    Lab Results   Component Value Date/Time    Troponin-I, Qt. <0.05 08/25/2020 04:07 PM        Lab Results   Component Value Date/Time    Culture result: HEAVY STAPHYLOCOCCUS AUREUS (A) 08/26/2020 04:48 PM    Culture result: NO ANAEROBES ISOLATED 08/26/2020 04:48 PM    Culture result: MRSA NOT PRESENT 08/26/2020 12:55 PM    Culture result:  08/26/2020 12:55 PM         Screening of patient nares for MRSA is for surveillance purposes and, if positive, to facilitate isolation considerations in high risk settings. It is not intended for automatic decolonization interventions per se as regimens are not sufficiently effective to warrant routine use.        No results found for: TOXA1, RPR, HBCM, HBSAG, HAAB, HCAB1, HAAT, G6PD, CRYAC, HIVGT, HIVR, HIV1, HIV12, HIVPC, HIVRPI    Lab Results   Component Value Date/Time    Vancomycin,trough 13.0 (H) 08/28/2020 09:36 AM       Lab Results   Component Value Date/Time    Color JENNIFFER 08/26/2020 03:12 AM    Appearance CLEAR 08/26/2020 03:12 AM    Specific gravity 1.005 08/26/2020 03:12 AM    pH (UA) 6.0 08/26/2020 03:12 AM    Protein TRACE (A) 08/26/2020 03:12 AM    Glucose Negative 08/26/2020 03:12 AM    Ketone TRACE (A) 08/26/2020 03:12 AM    Blood Negative 08/26/2020 03:12 AM    Urobilinogen 1.0 08/26/2020 03:12 AM    Nitrites Negative 08/26/2020 03:12 AM    Leukocyte Esterase SMALL (A) 08/26/2020 03:12 AM    WBC 5-10 08/26/2020 03:12 AM    RBC 5-10 08/26/2020 03:12 AM    Bacteria Negative 08/26/2020 03:12 AM       Images: personally visualized and report reviewed    CXR (8/28/2020): Right chest tube remains in place at the right lung base. There is a very small pleural effusion. Small apical pneumothorax is unchanged.   The lungs demonstrate increasing airspace opacities in the right mid lower lung zone. IMPRESSION  · Abnormal Chest CT: extensive cavitation throughout.   Pl fluid with MSSA  · Bilateral PEs s/p IVC filter due to GIB  · GIB  · Anemia  · BLE DVT  · Weight Loss  · Tobacco Use    PLAN  · Supplemental O2 if needed to keep sats >88%; remains on RA  · Continue chest tube to suction  · Repeat CXR tomorrow am  · Continue Zosyn, Doxy - will stop vanco  · Follow-up pleural fluid studies, cultures, cytology  · Follow blood and final sputum cultures   · F/U Quantiferon gold and AFB smears  · GI following, will eventually need work-up  · Speech consult pending  · IVF  · IV Protonix    Will see again on Mon    Kyle Goel MD

## 2020-08-29 NOTE — PROGRESS NOTES
Problem: Falls - Risk of  Goal: *Absence of Falls  Description: Document Yoni Allison Fall Risk and appropriate interventions in the flowsheet. Outcome: Progressing Towards Goal  Note: Fall Risk Interventions:  Mobility Interventions: Assess mobility with egress test, Bed/chair exit alarm, Communicate number of staff needed for ambulation/transfer, Mechanical lift, OT consult for ADLs, Patient to call before getting OOB, PT Consult for mobility concerns, PT Consult for assist device competence, Strengthening exercises (ROM-active/passive), Utilize walker, cane, or other assistive device, Utilize gait belt for transfers/ambulation         Medication Interventions: Bed/chair exit alarm, Patient to call before getting OOB, Teach patient to arise slowly    Elimination Interventions: Bed/chair exit alarm, Call light in reach, Patient to call for help with toileting needs, Stay With Me (per policy), Urinal in reach    History of Falls Interventions: Bed/chair exit alarm, Investigate reason for fall, Room close to nurse's station, Utilize gait belt for transfer/ambulation, Assess for delayed presentation/identification of injury for 48 hrs (comment for end date), Vital signs minimum Q4HRs X 24 hrs (comment for end date)         Problem: Patient Education: Go to Patient Education Activity  Goal: Patient/Family Education  Outcome: Progressing Towards Goal     Problem: Pressure Injury - Risk of  Goal: *Prevention of pressure injury  Description: Document Aj Scale and appropriate interventions in the flowsheet.   Outcome: Progressing Towards Goal  Note: Pressure Injury Interventions:       Moisture Interventions: Absorbent underpads, Apply protective barrier, creams and emollients, Assess need for specialty bed, Check for incontinence Q2 hours and as needed, Limit adult briefs, Maintain skin hydration (lotion/cream), Minimize layers, Moisture barrier, Offer toileting Q_hr    Activity Interventions: Assess need for specialty bed, Chair cushion, Pressure redistribution bed/mattress(bed type), Increase time out of bed, PT/OT evaluation    Mobility Interventions: Assess need for specialty bed, Chair cushion, Float heels, Pressure redistribution bed/mattress (bed type), PT/OT evaluation, Turn and reposition approx.  every two hours(pillow and wedges)    Nutrition Interventions: Document food/fluid/supplement intake, Discuss nutritional consult with provider, Offer support with meals,snacks and hydration    Friction and Shear Interventions: Apply protective barrier, creams and emollients                Problem: Patient Education: Go to Patient Education Activity  Goal: Patient/Family Education  Outcome: Progressing Towards Goal     Problem: Patient Education: Go to Patient Education Activity  Goal: Patient/Family Education  Outcome: Progressing Towards Goal     Problem: Pneumonia: Day 2  Goal: Activity/Safety  Outcome: Resolved/Met  Goal: Consults, if ordered  Outcome: Resolved/Met  Goal: Diagnostic Test/Procedures  Outcome: Resolved/Met  Goal: Nutrition/Diet  Outcome: Resolved/Met  Goal: Discharge Planning  Outcome: Resolved/Met  Goal: Medications  Outcome: Resolved/Met  Goal: Respiratory  Outcome: Resolved/Met  Goal: Treatments/Interventions/Procedures  Outcome: Resolved/Met  Goal: Psychosocial  Outcome: Resolved/Met  Goal: *Oxygen saturation within defined limits  Outcome: Resolved/Met  Goal: *Hemodynamically stable  Outcome: Resolved/Met  Goal: *Demonstrates progressive activity  Outcome: Resolved/Met  Goal: *Tolerating diet  Outcome: Resolved/Met  Goal: *Optimal pain control at patient's stated goal  Outcome: Resolved/Met     Problem: Pneumonia: Day 3  Goal: Off Pathway (Use only if patient is Off Pathway)  Outcome: Resolved/Not Met  Goal: Activity/Safety  Outcome: Progressing Towards Goal  Goal: Consults, if ordered  Outcome: Progressing Towards Goal  Goal: Diagnostic Test/Procedures  Outcome: Progressing Towards Goal  Goal: Nutrition/Diet  Outcome: Progressing Towards Goal  Goal: Discharge Planning  Outcome: Progressing Towards Goal  Goal: Medications  Outcome: Progressing Towards Goal  Goal: Respiratory  Outcome: Progressing Towards Goal  Goal: Treatments/Interventions/Procedures  Outcome: Progressing Towards Goal  Goal: Psychosocial  Outcome: Progressing Towards Goal  Goal: *Oxygen saturation within defined limits  Outcome: Progressing Towards Goal  Goal: *Hemodynamically stable  Outcome: Progressing Towards Goal  Goal: *Demonstrates progressive activity  Outcome: Progressing Towards Goal  Goal: *Tolerating diet  Outcome: Progressing Towards Goal  Goal: *Describes available resources and support systems  Outcome: Progressing Towards Goal  Goal: *Optimal pain control at patient's stated goal  Outcome: Progressing Towards Goal     Problem: Upper and Lower GI Bleed: Day 3  Goal: Activity/Safety  Outcome: Progressing Towards Goal  Goal: Diagnostic Test/Procedures  Outcome: Progressing Towards Goal  Goal: Nutrition/Diet  Outcome: Progressing Towards Goal  Goal: Discharge Planning  Outcome: Progressing Towards Goal  Goal: Medications  Outcome: Progressing Towards Goal  Goal: Treatments/Interventions/Procedures  Outcome: Progressing Towards Goal  Goal: Psychosocial  Outcome: Progressing Towards Goal     Problem: Risk for Spread of Infection  Goal: Prevent transmission of infectious organism to others  Description: Prevent the transmission of infectious organisms to other patients, staff members, and visitors.   Outcome: Progressing Towards Goal     Problem: Patient Education:  Go to Education Activity  Goal: Patient/Family Education  Outcome: Progressing Towards Goal     Problem: Patient Education: Go to Patient Education Activity  Goal: Patient/Family Education  Outcome: Progressing Towards Goal     Problem: Patient Education: Go to Patient Education Activity  Goal: Patient/Family Education  Outcome: Progressing Towards Goal     Problem: Patient Education: Go to Patient Education Activity  Goal: Patient/Family Education  Outcome: Progressing Towards Goal

## 2020-08-29 NOTE — PROGRESS NOTES
Daily Progress Note: 8/29/2020  Michael Leavitt MD    Assessment/Plan:   Pneumonia involving right lung (8/25/2020) /  Hemoptysis (8/25/2020) POA: extensive with internal cavitation and air fluid levels. Staph Aureus, leukocytosis improving  --pleural fluid studies, cxs, cytology, AFB and quantiferon gold pending.   -- zosyn, doxy, vanc  -- Pulm and Thoracic Surg following     Bilateral pulmonary embolism, acute ([facility] [address] (8/25/2020) / Acute deep vein thrombosis (DVT) of both lower extremities ([facility] [address] (8/25/2020):   - IVC filter placed   - holding anticoags due to dropping Hb and active hemoptysis and GI bleeding     Weight loss, unintentional (8/25/2020) POA: still concerning for malignancy but severe protein calorie malnutrition remains a DDx. Nutrition and speech therapy following  -Diet as tolerated  -calcium corrects     Hyperbilirubinemia (8/25/2020) POA: unclear etiology. CT abdomen unremarkable  - resolved     Pleural effusion, right (8/25/2020) POA: empyema, pneumonia vs malignant effusion. -pulmonology following  - Chest tube placed 8/26/20     Alcohol use (8/25/2020) POA: he says he has cut down recently. -Monitor for withdrawal     Chronic Diarrhea (8/25/2020) / Fecal occult blood test positive (8/25/2020) POA: check stool studies. Monitor Hgb.    - GI following  -imodium and peptobismol PRN  -FloraQ daily- or pt's home probiotics        Problem List:  Problem List as of 8/29/2020 Date Reviewed: 8/25/2020          Codes Class Noted - Resolved    * (Principal) Pneumonia involving right lung ICD-10-CM: J18.9  ICD-9-CM: 341  8/25/2020 - Present        Hemoptysis ICD-10-CM: R04.2  ICD-9-CM: 786.30  8/25/2020 - Present        Hyperbilirubinemia ICD-10-CM: E80.6  ICD-9-CM: 782.4  8/25/2020 - Present        Weight loss, unintentional ICD-10-CM: R63.4  ICD-9-CM: 783.21  8/25/2020 - Present        Pleural effusion, right ICD-10-CM: J90  ICD-9-CM: 511.9  8/25/2020 - Present        Bilateral pulmonary embolism ([facility] [address] ICD-10-CM: I26.99  ICD-9-CM: 415.19  8/25/2020 - Present        Acute deep vein thrombosis (DVT) of both lower extremities (HCC) ICD-10-CM: I82.403  ICD-9-CM: 453.40  8/25/2020 - Present        Alcohol use ICD-10-CM: Z72.89  ICD-9-CM: V49.89  8/25/2020 - Present        Diarrhea ICD-10-CM: R19.7  ICD-9-CM: 787.91  8/25/2020 - Present        Fecal occult blood test positive ICD-10-CM: R19.5  ICD-9-CM: 792.1  8/25/2020 - Present        Hyponatremia with extracellular fluid depletion ICD-10-CM: E87.1  ICD-9-CM: 276.1  8/25/2020 - Present            Subjective:     60 y.o. male who is being admitted for Pneumonia involving right lung. Mr. Radha Oates presented to our Emergency Department today complaining of generalized weakness, diarrhea and lethargy for several months. Patient was accompanied by his friend who gave most of the history. The patient does not like seeing physicians and apparently uses vitamin supplements. He is a smoker and drinks alcohol and has been progressively weaker over several months now. He has had pleuritic right sided chest pain associated with a productive cough and hemoptysis. He feel on the floor several days ago and was unable to get up. His friends helped him up and brought him to the ED for further testing. Initial CXR done showed airspace disease in right mid lower lung zone consistent with pneumonia. This is surrounded by a moderate right-sided pleural effusion. Upon review, he was noted to be cachectic with swollen lower extremities. CT scans chest abdomen and pelvis done showed a diffuse irregular opacification throughout the right lower lobe with multiple areas of internal cavitation and air-fluid levels. Findings are more likely related to infection than malignancy. Large right pleural effusion. Trace left pleural effusion. Bilateral pulmonary emboli. Clot burden is moderate. No evidence of right heart strain.  Thrombus in the veins of the left lower extremity extending to the IVC. A small amount of thrombus is also seen in the right common iliac vein. He will be admitted for further management. (Dr Bhavna Peralta)    :  Feeling a little better this AM but still c/o painful, pleuritic cough and occas thick sputum. No leg/calf pains. No known exposure to ill individuals/no travel - \"just staying in the last few months. \"  No temps. WBC 31.9K. Cultures neg so far.      :  Reports less cough, chest pain and feeling better overall. Appetite has returned. Chest tube placed yesterday with discharge obtained - pending cultures. Other cultures remain neg. Hb lower so cont to hold anticoag for now. GI has seen and work up when more stable. Remains on doxy, zosyn, vanc. WBC down to 28K. Bili back to normal.     :  Continues to grad improve. Still with some cough. No tremor or hallucinations so far. K+ lower - replacing. WBC coming down. Afeb. CXR this AM pending. Cultures remain neg so far. Quantiferon gold test pending as of this AM.  He reports hx of chronic LE edema for years. : Pt continues to have frequent stooling. GI plans endoscopy next week if pt allows. He was not willing to participate with PT/OT/nutritionist yesterday. Says despite our best efforts, we will not get his stooling to stop until he gets his home probiotics [friend will bring it]. He refuses to eat until the stooling is better.       Review of Systems:   A comprehensive review of systems was negative except for that written in the HPI. Objective:   Physical Exam:   Visit Vitals  /73   Pulse 92   Temp 97.5 °F (36.4 °C)   Resp 22   Ht 5' 10\" (1.778 m)   Wt 67.2 kg (148 lb 3.2 oz)   SpO2 98%   BMI 21.26 kg/m²      O2 Device: Room air  Temp (24hrs), Av.6 °F (36.4 °C), Min:96.9 °F (36.1 °C), Max:98.5 °F (36.9 °C)    No intake/output data recorded.  1901 -  0700  In: 5981.7 [P.O.:1320;  I.V.:4661.7]  Out: 1450 [Urine:950]  General:  Alert, cooperative, no distress, cachetic, ill appearing. Head:  Normocephalic, without obvious abnormality, atraumatic. Eyes:  [place] conjuntiva. PERRL, EOMs intact. Throat: Lips, mucosa, and tongue moist.   Neck: Supple, symmetrical, trachea midline, no adenopathy, thyroid: no enlargement/tenderness/nodules, no carotid bruit and no JVD. Lungs:   Better air exchange right,  Fine crackles anteriorly right,  a few scattered rhonchi. Chest wall:  Chest tube site right looks ok   Heart:  Regular rate and rhythm, S1, S2 normal, no murmur, click, rub or gallop. Abdomen:   Soft, non-tender. Bowel sounds normal. No masses,  No organomegaly. Extremities: no cyanosis. No calf tenderness;3+ pitting edema bilat. No cords palpated. Pulses: 2+ and symmetric all extremities. Skin: Skin color, texture, turgor normal. No rashes   Neurologic:  Alert and oriented X 3. Fine motor of hands and fingers normal.   equal.  No tremor. Gait not tested at this time. Sensation grossly normal to touch. Gross motor of extremities normal.       Data Review:   EXAM: CT CHEST ABD PELV W CONT 8/25/20  INDICATION: weight loss  COMPARISON: Radiographs 8/25/2020  CONTRAST: 100 mL of Isovue-370. FINDINGS:   CHEST WALL: No mass or axillary lymphadenopathy. THYROID: No nodule. MEDIASTINUM: No mass or lymphadenopathy. BRYAN: No mass or lymphadenopathy. THORACIC AORTA: No dissection or aneurysm. MAIN PULMONARY ARTERY: Normal in caliber. Thrombus is seen throughout the right  lower lobe pulmonary arteries. Thrombus is seen in the left lower lobe pulmonary  artery extending into the anterior and lateral basilar segments. TRACHEA/BRONCHI: Patent. ESOPHAGUS: No wall thickening or dilatation. HEART: Normal in size. PLEURA: There is a large right pleural effusion with enhancement of the pleural  surfaces. Trace left pleural effusion. LUNGS: There is irregular opacification throughout the right lower lobe.  There  are multiple areas of internal cavitation with air-fluid levels. Small areas of  atelectasis or scarring are seen in the lingula and inferior left lower lobe. LIVER: No mass. BILIARY TREE: Gallbladder is within normal limits. CBD is not dilated. SPLEEN: within normal limits. PANCREAS: No mass or ductal dilatation. ADRENALS: Unremarkable. KIDNEYS: Small nonobstructive stones are seen in the bilateral kidneys. No  evidence of hydronephrosis. No mass. STOMACH: Unremarkable. SMALL BOWEL: No dilatation or wall thickening. COLON: No dilatation or wall thickening. APPENDIX: Unremarkable  PERITONEUM: No ascites or pneumoperitoneum. RETROPERITONEUM: No lymphadenopathy or aortic aneurysm. REPRODUCTIVE ORGANS: Unremarkable  URINARY BLADDER: No mass or calculus. BONES: No destructive bone lesion. ABDOMINAL WALL: No mass or hernia. ADDITIONAL COMMENTS: There is thrombus in the left femoral veins extending into  the left iliac veins into the IVC. A small amount of thrombus is also present in  the right common iliac vein. IMPRESSION:  1. Diffuse irregular opacification throughout the right lower lobe with multiple  areas of internal cavitation and air-fluid levels. Findings are more likely  related to infection than malignancy. 2. Large right pleural effusion. Trace left pleural effusion. 3. Bilateral pulmonary emboli. Clot burden is moderate. No evidence of right  heart strain. 4. Thrombus in the veins of the left lower extremity extending to the IVC. A  small amount of thrombus is also seen in the right common iliac vein. EXAM:  IR THORACENTESIS/INSERT CHEST TUBE 8/26/20  INDICATION:  Request placement of large size pigtail catheter. Possible empyema. PROCEDURE:  Available history and imaging was reviewed which demonstrates a large partially  loculated right pleural effusion. Possibility of empyema, tuberculosis and other  etiologies are being considered clinically. Specific request for larger pigtail  catheter placement.   Following informed consent the patient was evaluated with ultrasound in the  angina/catheter lab department. This demonstrates a large right pleural  effusion. The location of the pleural effusion was marked and then the area was prepped  and draped. 1% lidocaine was used for local anesthesia. A small incision was made with an 11  blade. A micropuncture needle was used under direct fluoroscopic guidance to access the  pleural fluid. When this was achieved the microguidewire was placed followed by  a 5 [place] dilator. Through this a short [address] guidewire  was placed. The track was then dilated to 12 [place]. This was then followed by  placement of a 12 [place] Abscession pigtail catheter. The string for the pigtail  was removed prior to placement. The catheter was positioned under fluoroscopic  guidance and then secured in place at the skin with a 2-0 Prolene suture. The  site was then dressed with a Tegaderm Bioclusive dressing and will be connected  to a Pleur-evac for subsequent drainage. Prior to this approximately 100 mL of fluid was obtained and sent for cytology  as well as multiple cultures including aerobic, anaerobic, AFB and fungal.   The fluid was withdrawn was mildly cloudy serosanguineous in color. I did not  notice any foul smell. The procedure was adequately tolerated without significant blood loss or  evidence of complication. Final digital image of the chest was saved documenting catheter position. IMPRESSION:   Successful ultrasound and fluoroscopic guided placement of 12 [place] right-sided  chest tube. Patient will have ongoing drainage from Pleur-evac in his hospital  bed/room. Care and monitoring as per medical service and nursing service. ECHO 8/27/20  Interpretation Summary   Result status: Final result    · Image quality for this study was technically difficult. · Echo study was technically difficulty and limited due to patient's tolerance.   · LV: Estimated LVEF is 55 - 60%. Normal cavity size, wall thickness and systolic function (ejection fraction normal). Wall motion: normal. Mild (grade 1) left ventricular diastolic dysfunction. · Pericardium: Small pericardial effusion adjacent to right ventricle. Recent Days:  Recent Labs     08/29/20  0514 08/28/20  0505 08/27/20  0354   WBC 19.6* 21.4* 28.8*   HGB 8.5* 8.0* 8.8*   HCT 25.3* 23.5* 25.9*    194 227     Recent Labs     08/29/20  0514 08/28/20  0505 08/27/20  0354    141 138   K 3.7 2.9* 3.6   * 111* 106   CO2 24 24 25   GLU 66 62* 100   BUN 4* 6 10   CREA 0.37* 0.39* 0.55*   CA 6.6* 6.4* 6.5*   MG 1.7  --   --    ALB 1.2* 1.5* 0.6*   TBILI 1.5* 1.8* 1.0   ALT 53 46 38     No results for input(s): PH, PCO2, PO2, HCO3, FIO2 in the last 72 hours. 24 Hour Results:  Recent Results (from the past 24 hour(s))   VANCOMYCIN, TROUGH    Collection Time: 08/28/20  9:36 AM   Result Value Ref Range    Vancomycin,trough 13.0 (H) 5.0 - 10.0 ug/mL    Reported dose date: NOT PROVIDED      Reported dose time: NOT PROVIDED      Reported dose: NOT PROVIDED UNITS   CBC WITH AUTOMATED DIFF    Collection Time: 08/29/20  5:14 AM   Result Value Ref Range    WBC 19.6 (H) 4.1 - 11.1 K/uL    RBC 2.50 (L) 4.10 - 5.70 M/uL    HGB 8.5 (L) 12.1 - 17.0 g/dL    HCT 25.3 (L) 36.6 - 50.3 %    .2 (H) 80.0 - 99.0 FL    MCH 34.0 26.0 - 34.0 PG    MCHC 33.6 30.0 - 36.5 g/dL    RDW 20.4 (H) 11.5 - 14.5 %    PLATELET 625 346 - 031 K/uL    MPV 11.2 8.9 - 12.9 FL    NRBC 0.0 0  WBC    ABSOLUTE NRBC 0.00 0.00 - 0.01 K/uL    NEUTROPHILS 78 (H) 32 - 75 %    BAND NEUTROPHILS 1 %    LYMPHOCYTES 18 12 - 49 %    MONOCYTES 2 (L) 5 - 13 %    EOSINOPHILS 1 0 - 7 %    BASOPHILS 0 0 - 1 %    IMMATURE GRANULOCYTES 0 0.0 - 0.5 %    ABS. NEUTROPHILS 15.5 (H) 1.8 - 8.0 K/UL    ABS. LYMPHOCYTES 3.5 0.8 - 3.5 K/UL    ABS. MONOCYTES 0.4 0.0 - 1.0 K/UL    ABS. EOSINOPHILS 0.2 0.0 - 0.4 K/UL    ABS. BASOPHILS 0.0 0.0 - 0.1 K/UL    ABS. IMM. GRANS. 0.0 0.00 - 0.04 K/UL    DF MANUAL      RBC COMMENTS ANISOCYTOSIS  2+        RBC COMMENTS HYPOCHROMIA  3+        WBC COMMENTS TARGET CELLS     METABOLIC PANEL, COMPREHENSIVE    Collection Time: 08/29/20  5:14 AM   Result Value Ref Range    Sodium 143 136 - 145 mmol/L    Potassium 3.7 3.5 - 5.1 mmol/L    Chloride 113 (H) 97 - 108 mmol/L    CO2 24 21 - 32 mmol/L    Anion gap 6 5 - 15 mmol/L    Glucose 66 65 - 100 mg/dL    BUN 4 (L) 6 - 20 MG/DL    Creatinine 0.37 (L) 0.70 - 1.30 MG/DL    BUN/Creatinine ratio 11 (L) 12 - 20      GFR est AA >60 >60 ml/min/1.73m2    GFR est non-AA >60 >60 ml/min/1.73m2    Calcium 6.6 (L) 8.5 - 10.1 MG/DL    Bilirubin, total 1.5 (H) 0.2 - 1.0 MG/DL    ALT (SGPT) 53 12 - 78 U/L    AST (SGOT) 79 (H) 15 - 37 U/L    Alk.  phosphatase 232 (H) 45 - 117 U/L    Protein, total 4.8 (L) 6.4 - 8.2 g/dL    Albumin 1.2 (L) 3.5 - 5.0 g/dL    Globulin 3.6 2.0 - 4.0 g/dL    A-G Ratio 0.3 (L) 1.1 - 2.2     MAGNESIUM    Collection Time: 08/29/20  5:14 AM   Result Value Ref Range    Magnesium 1.7 1.6 - 2.4 mg/dL       Medications reviewed  Current Facility-Administered Medications   Medication Dose Route Frequency    potassium bicarb-citric acid (EFFER-K) tablet 20 mEq  20 mEq Oral BID    vancomycin (VANCOCIN) 1,000 mg in 0.9% sodium chloride (MBP/ADV) 250 mL  1,000 mg IntraVENous Q12H    loperamide (IMODIUM) capsule 2 mg  2 mg Oral Q6H PRN    lactobac ac& pc-s.therm-b.anim (ANITA Q/RISAQUAD)  1 Cap Oral DAILY    bismuth subsalicylate (PEPTO-BISMOL) oral suspension 30 mL  30 mL Oral Q6H PRN    sodium chloride 10% 15 ml hypertonic neb solution  5 mL Nebulization QAM RT    lidocaine (PF) (XYLOCAINE) 10 mg/mL (1 %) injection 10 mL  10 mL SubCUTAneous Rad Multiple    fentaNYL citrate (PF) injection  mcg   mcg IntraVENous Rad Multiple    cyanocobalamin (VITAMIN B12) tablet 1,000 mcg  1,000 mcg Oral DAILY    folic acid (FOLVITE) tablet 1 mg  1 mg Oral DAILY    lipase-protease-amylase (CREON 12,000) capsule 1 Cap  1 Cap Oral TID WITH MEALS    HYDROcodone-acetaminophen (NORCO) 5-325 mg per tablet 1 Tab  1 Tab Oral Q6H PRN    morphine injection 1 mg  1 mg IntraVENous Q4H PRN    sodium chloride (NS) flush 5-10 mL  5-10 mL IntraVENous PRN    sodium chloride (NS) flush 5-40 mL  5-40 mL IntraVENous Q8H    sodium chloride (NS) flush 5-40 mL  5-40 mL IntraVENous PRN    acetaminophen (TYLENOL) tablet 650 mg  650 mg Oral Q6H PRN    Or    acetaminophen (TYLENOL) suppository 650 mg  650 mg Rectal Q6H PRN    polyethylene glycol (MIRALAX) packet 17 g  17 g Oral DAILY PRN    promethazine (PHENERGAN) tablet 12.5 mg  12.5 mg Oral Q6H PRN    Or    ondansetron (ZOFRAN) injection 4 mg  4 mg IntraVENous Q6H PRN    0.9% sodium chloride infusion  100 mL/hr IntraVENous CONTINUOUS    piperacillin-tazobactam (ZOSYN) 3.375 g in 0.9% sodium chloride (MBP/ADV) 100 mL  3.375 g IntraVENous Q8H    doxycycline (VIBRAMYCIN) 100 mg in 0.9% sodium chloride (MBP/ADV) 100 mL  100 mg IntraVENous Q12H    pantoprazole (PROTONIX) 40 mg in 0.9% sodium chloride 10 mL injection  40 mg IntraVENous Q12H       Care Plan discussed with: Patient/Nurse  Total time spent with patient: 40 minutes.   Alice Parrish MD

## 2020-08-30 ENCOUNTER — APPOINTMENT (OUTPATIENT)
Dept: GENERAL RADIOLOGY | Age: 65
DRG: 166 | End: 2020-08-30
Attending: INTERNAL MEDICINE
Payer: COMMERCIAL

## 2020-08-30 LAB
ALBUMIN SERPL-MCNC: 1 G/DL (ref 3.5–5)
ALBUMIN/GLOB SERPL: 0.3 {RATIO} (ref 1.1–2.2)
ALP SERPL-CCNC: 300 U/L (ref 45–117)
ALT SERPL-CCNC: 57 U/L (ref 12–78)
ANION GAP SERPL CALC-SCNC: 7 MMOL/L (ref 5–15)
AST SERPL-CCNC: 89 U/L (ref 15–37)
BASOPHILS # BLD: 0 K/UL (ref 0–0.1)
BASOPHILS NFR BLD: 0 % (ref 0–1)
BILIRUB SERPL-MCNC: 1.5 MG/DL (ref 0.2–1)
BUN SERPL-MCNC: 4 MG/DL (ref 6–20)
BUN/CREAT SERPL: 8 (ref 12–20)
CALCIUM SERPL-MCNC: 6.4 MG/DL (ref 8.5–10.1)
CHLORIDE SERPL-SCNC: 114 MMOL/L (ref 97–108)
CO2 SERPL-SCNC: 23 MMOL/L (ref 21–32)
CREAT SERPL-MCNC: 0.49 MG/DL (ref 0.7–1.3)
DIFFERENTIAL METHOD BLD: ABNORMAL
EOSINOPHIL # BLD: 0 K/UL (ref 0–0.4)
EOSINOPHIL NFR BLD: 0 % (ref 0–7)
ERYTHROCYTE [DISTWIDTH] IN BLOOD BY AUTOMATED COUNT: 21.4 % (ref 11.5–14.5)
GLOBULIN SER CALC-MCNC: 3.5 G/DL (ref 2–4)
GLUCOSE SERPL-MCNC: 153 MG/DL (ref 65–100)
HCT VFR BLD AUTO: 23.9 % (ref 36.6–50.3)
HGB BLD-MCNC: 8.1 G/DL (ref 12.1–17)
IMM GRANULOCYTES # BLD AUTO: 0.6 K/UL (ref 0–0.04)
IMM GRANULOCYTES NFR BLD AUTO: 3 % (ref 0–0.5)
LYMPHOCYTES # BLD: 2.6 K/UL (ref 0.8–3.5)
LYMPHOCYTES NFR BLD: 14 % (ref 12–49)
MAGNESIUM SERPL-MCNC: 1.5 MG/DL (ref 1.6–2.4)
MCH RBC QN AUTO: 34.2 PG (ref 26–34)
MCHC RBC AUTO-ENTMCNC: 33.9 G/DL (ref 30–36.5)
MCV RBC AUTO: 100.8 FL (ref 80–99)
MONOCYTES # BLD: 0.7 K/UL (ref 0–1)
MONOCYTES NFR BLD: 4 % (ref 5–13)
NEUTS SEG # BLD: 14.8 K/UL (ref 1.8–8)
NEUTS SEG NFR BLD: 79 % (ref 32–75)
NRBC # BLD: 0 K/UL (ref 0–0.01)
NRBC BLD-RTO: 0 PER 100 WBC
PLATELET # BLD AUTO: 199 K/UL (ref 150–400)
PMV BLD AUTO: 11.3 FL (ref 8.9–12.9)
POTASSIUM SERPL-SCNC: 3 MMOL/L (ref 3.5–5.1)
PROT SERPL-MCNC: 4.5 G/DL (ref 6.4–8.2)
RBC # BLD AUTO: 2.37 M/UL (ref 4.1–5.7)
RBC MORPH BLD: ABNORMAL
SODIUM SERPL-SCNC: 144 MMOL/L (ref 136–145)
WBC # BLD AUTO: 18.7 K/UL (ref 4.1–11.1)

## 2020-08-30 PROCEDURE — 71045 X-RAY EXAM CHEST 1 VIEW: CPT

## 2020-08-30 PROCEDURE — 74011250637 HC RX REV CODE- 250/637: Performed by: INTERNAL MEDICINE

## 2020-08-30 PROCEDURE — 87116 MYCOBACTERIA CULTURE: CPT

## 2020-08-30 PROCEDURE — 74011250636 HC RX REV CODE- 250/636: Performed by: FAMILY MEDICINE

## 2020-08-30 PROCEDURE — C9113 INJ PANTOPRAZOLE SODIUM, VIA: HCPCS | Performed by: INTERNAL MEDICINE

## 2020-08-30 PROCEDURE — 65660000000 HC RM CCU STEPDOWN

## 2020-08-30 PROCEDURE — 74011250637 HC RX REV CODE- 250/637: Performed by: FAMILY MEDICINE

## 2020-08-30 PROCEDURE — 85025 COMPLETE CBC W/AUTO DIFF WBC: CPT

## 2020-08-30 PROCEDURE — 83735 ASSAY OF MAGNESIUM: CPT

## 2020-08-30 PROCEDURE — 80053 COMPREHEN METABOLIC PANEL: CPT

## 2020-08-30 PROCEDURE — 36415 COLL VENOUS BLD VENIPUNCTURE: CPT

## 2020-08-30 PROCEDURE — 74011250636 HC RX REV CODE- 250/636: Performed by: INTERNAL MEDICINE

## 2020-08-30 PROCEDURE — 74011000258 HC RX REV CODE- 258: Performed by: INTERNAL MEDICINE

## 2020-08-30 RX ORDER — POTASSIUM CHLORIDE 750 MG/1
40 TABLET, FILM COATED, EXTENDED RELEASE ORAL
Status: DISCONTINUED | OUTPATIENT
Start: 2020-08-30 | End: 2020-08-30

## 2020-08-30 RX ORDER — MAGNESIUM SULFATE HEPTAHYDRATE 40 MG/ML
2 INJECTION, SOLUTION INTRAVENOUS ONCE
Status: COMPLETED | OUTPATIENT
Start: 2020-08-30 | End: 2020-08-30

## 2020-08-30 RX ORDER — POTASSIUM CHLORIDE 750 MG/1
40 TABLET, FILM COATED, EXTENDED RELEASE ORAL ONCE
Status: COMPLETED | OUTPATIENT
Start: 2020-08-30 | End: 2020-08-30

## 2020-08-30 RX ORDER — POTASSIUM CHLORIDE 750 MG/1
40 TABLET, FILM COATED, EXTENDED RELEASE ORAL
Status: COMPLETED | OUTPATIENT
Start: 2020-08-30 | End: 2020-08-30

## 2020-08-30 RX ADMIN — CYANOCOBALAMIN TAB 500 MCG 1000 MCG: 500 TAB at 08:10

## 2020-08-30 RX ADMIN — PIPERACILLIN AND TAZOBACTAM 3.38 G: 3; .375 INJECTION, POWDER, LYOPHILIZED, FOR SOLUTION INTRAVENOUS at 02:04

## 2020-08-30 RX ADMIN — PANCRELIPASE 1 CAPSULE: 60000; 12000; 38000 CAPSULE, DELAYED RELEASE PELLETS ORAL at 08:10

## 2020-08-30 RX ADMIN — SODIUM CHLORIDE 40 MG: 9 INJECTION INTRAMUSCULAR; INTRAVENOUS; SUBCUTANEOUS at 20:41

## 2020-08-30 RX ADMIN — PIPERACILLIN AND TAZOBACTAM 3.38 G: 3; .375 INJECTION, POWDER, LYOPHILIZED, FOR SOLUTION INTRAVENOUS at 09:22

## 2020-08-30 RX ADMIN — PANCRELIPASE 1 CAPSULE: 24000; 76000; 120000 CAPSULE, DELAYED RELEASE PELLETS ORAL at 16:23

## 2020-08-30 RX ADMIN — PIPERACILLIN AND TAZOBACTAM 3.38 G: 3; .375 INJECTION, POWDER, LYOPHILIZED, FOR SOLUTION INTRAVENOUS at 17:55

## 2020-08-30 RX ADMIN — PANCRELIPASE 1 CAPSULE: 24000; 76000; 120000 CAPSULE, DELAYED RELEASE PELLETS ORAL at 13:21

## 2020-08-30 RX ADMIN — Medication 10 ML: at 05:35

## 2020-08-30 RX ADMIN — Medication 1 CAPSULE: at 08:10

## 2020-08-30 RX ADMIN — SODIUM CHLORIDE 100 ML/HR: 900 INJECTION, SOLUTION INTRAVENOUS at 02:04

## 2020-08-30 RX ADMIN — DOXYCYCLINE 100 MG: 100 INJECTION, POWDER, LYOPHILIZED, FOR SOLUTION INTRAVENOUS at 16:23

## 2020-08-30 RX ADMIN — POTASSIUM CHLORIDE 40 MEQ: 750 TABLET, FILM COATED, EXTENDED RELEASE ORAL at 16:23

## 2020-08-30 RX ADMIN — SODIUM CHLORIDE 100 ML/HR: 900 INJECTION, SOLUTION INTRAVENOUS at 15:03

## 2020-08-30 RX ADMIN — SODIUM CHLORIDE 40 MG: 9 INJECTION INTRAMUSCULAR; INTRAVENOUS; SUBCUTANEOUS at 08:10

## 2020-08-30 RX ADMIN — HYDROCODONE BITARTRATE AND ACETAMINOPHEN 1 TABLET: 5; 325 TABLET ORAL at 20:55

## 2020-08-30 RX ADMIN — POTASSIUM CHLORIDE 40 MEQ: 750 TABLET, FILM COATED, EXTENDED RELEASE ORAL at 05:28

## 2020-08-30 RX ADMIN — DOXYCYCLINE 100 MG: 100 INJECTION, POWDER, LYOPHILIZED, FOR SOLUTION INTRAVENOUS at 05:29

## 2020-08-30 RX ADMIN — FOLIC ACID 1 MG: 1 TABLET ORAL at 08:10

## 2020-08-30 RX ADMIN — Medication 10 ML: at 20:56

## 2020-08-30 RX ADMIN — MAGNESIUM SULFATE HEPTAHYDRATE 2 G: 40 INJECTION, SOLUTION INTRAVENOUS at 08:16

## 2020-08-30 NOTE — PROGRESS NOTES
0700  Bedside and Verbal shift change report given to Ayla Sommers (oncoming nurse) by Trevor Small RN (offgoing nurse). Report included the following information SBAR, Kardex, Procedure Summary, Intake/Output, MAR, Accordion, Recent Results and Cardiac Rhythm NSR. Initial assessment done. Denies any pain. Bed alarm, call bell within reach. Call bell in the room not working. Patient aware and asked the patient to use the phone instead and call the nurses station. Visit Vitals  /69   Pulse 93   Temp 98.2 °F (36.8 °C)   Resp 20   Ht 5' 10\" (1.778 m)   Wt 70.8 kg (156 lb)   SpO2 96%   BMI 22.38 kg/m²     1515  Was able to get sputum sample. With streaks of blood. 1533  Gave daily update to Kristine Leigh, patient's emergency contact. 1900  Bedside and Verbal shift change report given to Zoe LOMBARDI (oncoming nurse) by Mary Ga RN (offgoing nurse). Report included the following information SBAR, Kardex, Procedure Summary, Intake/Output, MAR, Accordion, Recent Results and Cardiac Rhythm NSR.

## 2020-08-30 NOTE — PROGRESS NOTES
Problem: Falls - Risk of  Goal: *Absence of Falls  Description: Document Patricia Rosenberg Fall Risk and appropriate interventions in the flowsheet. Outcome: Progressing Towards Goal  Note: Fall Risk Interventions:  Mobility Interventions: Bed/chair exit alarm, Communicate number of staff needed for ambulation/transfer, Patient to call before getting OOB, PT Consult for mobility concerns, Utilize walker, cane, or other assistive device, Utilize gait belt for transfers/ambulation         Medication Interventions: Bed/chair exit alarm, Patient to call before getting OOB, Teach patient to arise slowly, Utilize gait belt for transfers/ambulation    Elimination Interventions: Bed/chair exit alarm, Call light in reach, Patient to call for help with toileting needs, Stay With Me (per policy), Toilet paper/wipes in reach, Urinal in reach, Toileting schedule/hourly rounds    History of Falls Interventions: Bed/chair exit alarm, Investigate reason for fall, Utilize gait belt for transfer/ambulation, Assess for delayed presentation/identification of injury for 48 hrs (comment for end date), Vital signs minimum Q4HRs X 24 hrs (comment for end date)         Problem: Patient Education: Go to Patient Education Activity  Goal: Patient/Family Education  Outcome: Progressing Towards Goal     Problem: Pressure Injury - Risk of  Goal: *Prevention of pressure injury  Description: Document Aj Scale and appropriate interventions in the flowsheet.   Outcome: Progressing Towards Goal  Note: Pressure Injury Interventions:       Moisture Interventions: Absorbent underpads, Apply protective barrier, creams and emollients, Check for incontinence Q2 hours and as needed, Minimize layers, Moisture barrier, Offer toileting Q_hr    Activity Interventions: Increase time out of bed, Pressure redistribution bed/mattress(bed type), PT/OT evaluation    Mobility Interventions: HOB 30 degrees or less, Pressure redistribution bed/mattress (bed type), PT/OT evaluation    Nutrition Interventions: Offer support with meals,snacks and hydration    Friction and Shear Interventions: Apply protective barrier, creams and emollients, HOB 30 degrees or less, Minimize layers                Problem: Patient Education: Go to Patient Education Activity  Goal: Patient/Family Education  Outcome: Progressing Towards Goal     Problem: Patient Education: Go to Patient Education Activity  Goal: Patient/Family Education  Outcome: Progressing Towards Goal     Problem: Pneumonia: Day 2  Goal: Off Pathway (Use only if patient is Off Pathway)  Outcome: Progressing Towards Goal     Problem: Pneumonia: Day 3  Goal: Off Pathway (Use only if patient is Off Pathway)  Outcome: Progressing Towards Goal  Goal: Activity/Safety  Outcome: Progressing Towards Goal  Goal: Consults, if ordered  Outcome: Progressing Towards Goal  Goal: Diagnostic Test/Procedures  Outcome: Progressing Towards Goal  Goal: Nutrition/Diet  Outcome: Progressing Towards Goal  Goal: Discharge Planning  Outcome: Progressing Towards Goal  Goal: Medications  Outcome: Progressing Towards Goal  Goal: Respiratory  Outcome: Progressing Towards Goal  Goal: Treatments/Interventions/Procedures  Outcome: Progressing Towards Goal  Goal: Psychosocial  Outcome: Progressing Towards Goal  Goal: *Oxygen saturation within defined limits  Outcome: Progressing Towards Goal  Goal: *Hemodynamically stable  Outcome: Progressing Towards Goal  Goal: *Demonstrates progressive activity  Outcome: Progressing Towards Goal  Goal: *Tolerating diet  Outcome: Progressing Towards Goal  Goal: *Describes available resources and support systems  Outcome: Progressing Towards Goal  Goal: *Optimal pain control at patient's stated goal  Outcome: Progressing Towards Goal     Problem: Pneumonia: Day 4  Goal: Off Pathway (Use only if patient is Off Pathway)  Outcome: Progressing Towards Goal  Goal: Activity/Safety  Outcome: Progressing Towards Goal  Goal: Nutrition/Diet  Outcome: Progressing Towards Goal  Goal: Discharge Planning  Outcome: Progressing Towards Goal  Goal: Medications  Outcome: Progressing Towards Goal  Goal: Respiratory  Outcome: Progressing Towards Goal  Goal: Treatments/Interventions/Procedures  Outcome: Progressing Towards Goal  Goal: Psychosocial  Outcome: Progressing Towards Goal     Problem: Pneumonia: Discharge Outcomes  Goal: *Demonstrates progressive activity  Outcome: Progressing Towards Goal  Goal: *Describes follow-up/return visits to physicians  Outcome: Progressing Towards Goal  Goal: *Tolerating diet  Outcome: Progressing Towards Goal  Goal: *Verbalizes name, dosage, time, side effects, and number of days to continue medications  Outcome: Progressing Towards Goal  Goal: *Influenza immunization  Outcome: Progressing Towards Goal  Goal: *Pneumococcal immunization  Outcome: Progressing Towards Goal  Goal: *Respiratory status at baseline  Outcome: Progressing Towards Goal  Goal: *Vital signs within defined limits  Outcome: Progressing Towards Goal  Goal: *Describes available resources and support systems  Outcome: Progressing Towards Goal  Goal: *Optimal pain control at patient's stated goal  Outcome: Progressing Towards Goal     Problem: Patient Education: Go to Patient Education Activity  Goal: Patient/Family Education  Outcome: Progressing Towards Goal     Problem: Upper and Lower GI Bleed: Day 1  Goal: Off Pathway (Use only if patient is Off Pathway)  Outcome: Progressing Towards Goal     Problem: Upper and Lower GI Bleed: Day 2  Goal: Off Pathway (Use only if patient is Off Pathway)  Outcome: Progressing Towards Goal  Goal: Activity/Safety  Outcome: Progressing Towards Goal  Goal: Consults, if ordered  Outcome: Progressing Towards Goal  Goal: Diagnostic Test/Procedures  Outcome: Progressing Towards Goal  Goal: Nutrition/Diet  Outcome: Progressing Towards Goal  Goal: Discharge Planning  Outcome: Progressing Towards Goal  Goal: Medications  Outcome: Progressing Towards Goal  Goal: Respiratory  Outcome: Progressing Towards Goal  Goal: Treatments/Interventions/Procedures  Outcome: Progressing Towards Goal  Goal: Psychosocial  Outcome: Progressing Towards Goal  Goal: *Optimal pain control at patient's stated goal  Outcome: Progressing Towards Goal  Goal: *Hemodynamically stable  Outcome: Progressing Towards Goal  Goal: *Tolerating diet  Outcome: Progressing Towards Goal  Goal: *Demonstrates progressive activity  Outcome: Progressing Towards Goal     Problem: Upper and Lower GI Bleed: Day 3  Goal: Off Pathway (Use only if patient is Off Pathway)  Outcome: Progressing Towards Goal  Goal: Activity/Safety  Outcome: Progressing Towards Goal  Goal: Diagnostic Test/Procedures  Outcome: Progressing Towards Goal  Goal: Nutrition/Diet  Outcome: Progressing Towards Goal  Goal: Discharge Planning  Outcome: Progressing Towards Goal  Goal: Medications  Outcome: Progressing Towards Goal  Goal: Treatments/Interventions/Procedures  Outcome: Progressing Towards Goal  Goal: Psychosocial  Outcome: Progressing Towards Goal     Problem: Upper and Lower GI Bleed:  Discharge Outcomes  Goal: *Hemodynamically stable  Outcome: Progressing Towards Goal  Goal: *Lungs clear or at baseline  Outcome: Progressing Towards Goal  Goal: *Demonstrates independent activity or return to baseline  Outcome: Progressing Towards Goal  Goal: *Pain is controlled to three or less  Outcome: Progressing Towards Goal  Goal: *Verbalizes understanding and describes prescribed diet  Outcome: Progressing Towards Goal  Goal: *Tolerating diet  Outcome: Progressing Towards Goal  Goal: *Verbalizes name, dosage, time, side effects, and number of days to continue medications  Outcome: Progressing Towards Goal  Goal: *Anxiety reduced or absent  Outcome: Progressing Towards Goal  Goal: *Understands and describes signs and symptoms to report to providers(Stroke Metric)  Outcome: Progressing Towards Goal  Goal: *Describes follow-up/return visits to physicians  Outcome: Progressing Towards Goal  Goal: *Describes available resources and support systems  Outcome: Progressing Towards Goal     Problem: Risk for Spread of Infection  Goal: Prevent transmission of infectious organism to others  Description: Prevent the transmission of infectious organisms to other patients, staff members, and visitors.   Outcome: Progressing Towards Goal     Problem: Patient Education:  Go to Education Activity  Goal: Patient/Family Education  Outcome: Progressing Towards Goal

## 2020-08-30 NOTE — PROGRESS NOTES
Daily Progress Note: 8/30/2020  Jasmyne Grier MD    Assessment/Plan:   Pneumonia involving right lung (8/25/2020) /  Hemoptysis (8/25/2020) POA: extensive with internal cavitation and air fluid levels. Staph Aureus, leukocytosis improving. CXR unchanged  --pleural fluid studies, cxs, cytology, AFB and quantiferon gold pending.   -- zosyn, doxy  -- Pulm and Thoracic Surg following     Bilateral pulmonary embolism, acute ([address] (8/25/2020) / Acute deep vein thrombosis (DVT) of both lower extremities ([facility] [address] (8/25/2020):   - IVC filter placed   - holding anticoags due to dropping Hb and active hemoptysis and GI bleeding     Weight loss, unintentional (8/25/2020) POA: still concerning for malignancy but severe protein calorie malnutrition remains a DDx. Nutrition and speech therapy following  -Diet as tolerated  -calcium corrects     Hyperbilirubinemia (8/25/2020) POA: unclear etiology. CT abdomen unremarkable  - resolved     Pleural effusion, right (8/25/2020) POA: empyema, pneumonia vs malignant effusion. -pulmonology following  - Chest tube placed 8/26/20     Alcohol use (8/25/2020) POA: he says he has cut down recently. -Monitor for withdrawal     Chronic Diarrhea (8/25/2020) / Fecal occult blood test positive (8/25/2020) POA: check stool studies. Monitor Hgb.    - GI following  -imodium and peptobismol PRN  -FloraQ daily- or pt's home probiotics    Hypomag/hypokalemia:  -replete and monitor        Problem List:  Problem List as of 8/30/2020 Date Reviewed: 8/25/2020          Codes Class Noted - Resolved    * (Principal) Pneumonia involving right lung ICD-10-CM: J18.9  ICD-9-CM: 844  8/25/2020 - Present        Hemoptysis ICD-10-CM: R04.2  ICD-9-CM: 786.30  8/25/2020 - Present        Hyperbilirubinemia ICD-10-CM: E80.6  ICD-9-CM: 782.4  8/25/2020 - Present        Weight loss, unintentional ICD-10-CM: R63.4  ICD-9-CM: 783.21  8/25/2020 - Present        Pleural effusion, right ICD-10-CM: J90  ICD-9-CM: 511.9  8/25/2020 - Present        Bilateral pulmonary embolism (HCC) ICD-10-CM: I26.99  ICD-9-CM: 415.19  8/25/2020 - Present        Acute deep vein thrombosis (DVT) of both lower extremities (HCC) ICD-10-CM: I82.403  ICD-9-CM: 453.40  8/25/2020 - Present        Alcohol use ICD-10-CM: Z72.89  ICD-9-CM: V49.89  8/25/2020 - Present        Diarrhea ICD-10-CM: R19.7  ICD-9-CM: 787.91  8/25/2020 - Present        Fecal occult blood test positive ICD-10-CM: R19.5  ICD-9-CM: 792.1  8/25/2020 - Present        Hyponatremia with extracellular fluid depletion ICD-10-CM: E87.1  ICD-9-CM: 276.1  8/25/2020 - Present            Subjective:     60 y.o. male who is being admitted for Pneumonia involving right lung. Mr. Maury Lopez presented to our Emergency Department today complaining of generalized weakness, diarrhea and lethargy for several months. Patient was accompanied by his friend who gave most of the history. The patient does not like seeing physicians and apparently uses vitamin supplements. He is a smoker and drinks alcohol and has been progressively weaker over several months now. He has had pleuritic right sided chest pain associated with a productive cough and hemoptysis. He feel on the floor several days ago and was unable to get up. His friends helped him up and brought him to the ED for further testing. Initial CXR done showed airspace disease in right mid lower lung zone consistent with pneumonia. This is surrounded by a moderate right-sided pleural effusion. Upon review, he was noted to be cachectic with swollen lower extremities. CT scans chest abdomen and pelvis done showed a diffuse irregular opacification throughout the right lower lobe with multiple areas of internal cavitation and air-fluid levels. Findings are more likely related to infection than malignancy. Large right pleural effusion. Trace left pleural effusion. Bilateral pulmonary emboli. Clot burden is moderate.  No evidence of right heart strain. Thrombus in the veins of the left lower extremity extending to the IVC. A small amount of thrombus is also seen in the right common iliac vein. He will be admitted for further management. (Dr Raffi Hernandez)    8/26:  Feeling a little better this AM but still c/o painful, pleuritic cough and occas thick sputum. No leg/calf pains. No known exposure to ill individuals/no travel - \"just staying in the last few months. \"  No temps. WBC 31.9K. Cultures neg so far.      8/27:  Reports less cough, chest pain and feeling better overall. Appetite has returned. Chest tube placed yesterday with discharge obtained - pending cultures. Other cultures remain neg. Hb lower so cont to hold anticoag for now. GI has seen and work up when more stable. Remains on doxy, zosyn, vanc. WBC down to 28K. Bili back to normal.     8/28:  Continues to grad improve. Still with some cough. No tremor or hallucinations so far. K+ lower - replacing. WBC coming down. Afeb. CXR this AM pending. Cultures remain neg so far. Quantiferon gold test pending as of this AM.  He reports hx of chronic LE edema for years. 8/29: Pt continues to have frequent stooling. GI plans endoscopy next week if pt allows. He was not willing to participate with PT/OT/nutritionist yesterday. Says despite our best efforts, we will not get his stooling to stop until he gets his home probiotics [friend will bring it]. He refuses to eat until the stooling is better. 8/30:  Pt notes his stooling has settled fatuma to 2 per day. Still coughing and feeling run down. Tolerating a bit more PO today.     Review of Systems:   A comprehensive review of systems was negative except for that written in the HPI.     Objective:   Physical Exam:   Visit Vitals  BP 96/59 (BP 1 Location: Left arm, BP Patient Position: At rest)   Pulse 74   Temp 97.3 °F (36.3 °C)   Resp 19   Ht 5' 10\" (1.778 m)   Wt 70.8 kg (156 lb)   SpO2 98%   BMI 22.38 kg/m²      O2 Device: Room air  Temp (24hrs), Av.9 °F (36.6 °C), Min:97.3 °F (36.3 °C), Max:98.9 °F (37.2 °C)    No intake/output data recorded.  1901 -  0700  In: 5898.3 [P.O.:1460; I.V.:4438.3]  Out: 1655 [Urine:1375]  General:  Alert, cooperative, no distress, cachetic, ill appearing. Head:  Normocephalic, without obvious abnormality, atraumatic. Eyes:  [place] conjuntiva. PERRL, EOMs intact. Throat: Lips, mucosa, and tongue moist.   Neck: Supple, symmetrical, trachea midline, no adenopathy, thyroid: no enlargement/tenderness/nodules, no carotid bruit and no JVD. Lungs:   Better air exchange right,  Fine crackles anteriorly right,  a few scattered rhonchi. Chest wall:  Chest tube site right looks ok   Heart:  Regular rate and rhythm, S1, S2 normal, no murmur, click, rub or gallop. Abdomen:   Soft, non-tender. Bowel sounds normal. No masses,  No organomegaly. Extremities: no cyanosis. No calf tenderness;3+ pitting edema bilat. No cords palpated. Pulses: 2+ and symmetric all extremities. Skin: Skin color, texture, turgor normal. No rashes   Neurologic:  Alert and oriented X 3. Fine motor of hands and fingers normal.   equal.  No tremor. Gait not tested at this time. Sensation grossly normal to touch. Gross motor of extremities normal.       Data Review:   EXAM: CT CHEST ABD PELV W CONT 20  INDICATION: weight loss  COMPARISON: Radiographs 2020  CONTRAST: 100 mL of Isovue-370. FINDINGS:   CHEST WALL: No mass or axillary lymphadenopathy. THYROID: No nodule. MEDIASTINUM: No mass or lymphadenopathy. BRYAN: No mass or lymphadenopathy. THORACIC AORTA: No dissection or aneurysm. MAIN PULMONARY ARTERY: Normal in caliber. Thrombus is seen throughout the right  lower lobe pulmonary arteries. Thrombus is seen in the left lower lobe pulmonary  artery extending into the anterior and lateral basilar segments. TRACHEA/BRONCHI: Patent. ESOPHAGUS: No wall thickening or dilatation.   HEART: Normal in size.  PLEURA: There is a large right pleural effusion with enhancement of the pleural  surfaces. Trace left pleural effusion. LUNGS: There is irregular opacification throughout the right lower lobe. There  are multiple areas of internal cavitation with air-fluid levels. Small areas of  atelectasis or scarring are seen in the lingula and inferior left lower lobe. LIVER: No mass. BILIARY TREE: Gallbladder is within normal limits. CBD is not dilated. SPLEEN: within normal limits. PANCREAS: No mass or ductal dilatation. ADRENALS: Unremarkable. KIDNEYS: Small nonobstructive stones are seen in the bilateral kidneys. No  evidence of hydronephrosis. No mass. STOMACH: Unremarkable. SMALL BOWEL: No dilatation or wall thickening. COLON: No dilatation or wall thickening. APPENDIX: Unremarkable  PERITONEUM: No ascites or pneumoperitoneum. RETROPERITONEUM: No lymphadenopathy or aortic aneurysm. REPRODUCTIVE ORGANS: Unremarkable  URINARY BLADDER: No mass or calculus. BONES: No destructive bone lesion. ABDOMINAL WALL: No mass or hernia. ADDITIONAL COMMENTS: There is thrombus in the left femoral veins extending into  the left iliac veins into the IVC. A small amount of thrombus is also present in  the right common iliac vein. IMPRESSION:  1. Diffuse irregular opacification throughout the right lower lobe with multiple  areas of internal cavitation and air-fluid levels. Findings are more likely  related to infection than malignancy. 2. Large right pleural effusion. Trace left pleural effusion. 3. Bilateral pulmonary emboli. Clot burden is moderate. No evidence of right  heart strain. 4. Thrombus in the veins of the left lower extremity extending to the IVC. A  small amount of thrombus is also seen in the right common iliac vein. EXAM:  IR THORACENTESIS/INSERT CHEST TUBE 8/26/20  INDICATION:  Request placement of large size pigtail catheter. Possible empyema.   PROCEDURE:  Available history and imaging was reviewed which demonstrates a large partially  loculated right pleural effusion. Possibility of empyema, tuberculosis and other  etiologies are being considered clinically. Specific request for larger pigtail  catheter placement. Following informed consent the patient was evaluated with ultrasound in the  angina/catheter lab department. This demonstrates a large right pleural  effusion. The location of the pleural effusion was marked and then the area was prepped  and draped. 1% lidocaine was used for local anesthesia. A small incision was made with an 11  blade. A micropuncture needle was used under direct fluoroscopic guidance to access the  pleural fluid. When this was achieved the microguidewire was placed followed by  a 5 Micheal Hair dilator. Through this a short [address] guidewire  was placed. The track was then dilated to 12 Micheal Hair. This was then followed by  placement of a 12 Italian Abscession pigtail catheter. The string for the pigtail  was removed prior to placement. The catheter was positioned under fluoroscopic  guidance and then secured in place at the skin with a 2-0 Prolene suture. The  site was then dressed with a Tegaderm Bioclusive dressing and will be connected  to a Pleur-evac for subsequent drainage. Prior to this approximately 100 mL of fluid was obtained and sent for cytology  as well as multiple cultures including aerobic, anaerobic, AFB and fungal.   The fluid was withdrawn was mildly cloudy serosanguineous in color. I did not  notice any foul smell. The procedure was adequately tolerated without significant blood loss or  evidence of complication. Final digital image of the chest was saved documenting catheter position. IMPRESSION:   Successful ultrasound and fluoroscopic guided placement of 12 Italian right-sided  chest tube. Patient will have ongoing drainage from Pleur-evac in his hospital  bed/room.  Care and monitoring as per medical service and nursing service. ECHO 8/27/20  Interpretation Summary   Result status: Final result    · Image quality for this study was technically difficult. · Echo study was technically difficulty and limited due to patient's tolerance. · LV: Estimated LVEF is 55 - 60%. Normal cavity size, wall thickness and systolic function (ejection fraction normal). Wall motion: normal. Mild (grade 1) left ventricular diastolic dysfunction. · Pericardium: Small pericardial effusion adjacent to right ventricle. Recent Days:  Recent Labs     08/30/20  0202 08/29/20  0514 08/28/20  0505   WBC 18.7* 19.6* 21.4*   HGB 8.1* 8.5* 8.0*   HCT 23.9* 25.3* 23.5*    188 194     Recent Labs     08/30/20  0202 08/29/20  0514 08/28/20  0505    143 141   K 3.0* 3.7 2.9*   * 113* 111*   CO2 23 24 24   * 66 62*   BUN 4* 4* 6   CREA 0.49* 0.37* 0.39*   CA 6.4* 6.6* 6.4*   MG 1.5* 1.7  --    ALB 1.0* 1.2* 1.5*   TBILI 1.5* 1.5* 1.8*   ALT 57 53 46     No results for input(s): PH, PCO2, PO2, HCO3, FIO2 in the last 72 hours. 24 Hour Results:  Recent Results (from the past 24 hour(s))   CBC WITH AUTOMATED DIFF    Collection Time: 08/30/20  2:02 AM   Result Value Ref Range    WBC 18.7 (H) 4.1 - 11.1 K/uL    RBC 2.37 (L) 4.10 - 5.70 M/uL    HGB 8.1 (L) 12.1 - 17.0 g/dL    HCT 23.9 (L) 36.6 - 50.3 %    .8 (H) 80.0 - 99.0 FL    MCH 34.2 (H) 26.0 - 34.0 PG    MCHC 33.9 30.0 - 36.5 g/dL    RDW 21.4 (H) 11.5 - 14.5 %    PLATELET 026 052 - 425 K/uL    MPV 11.3 8.9 - 12.9 FL    NRBC 0.0 0  WBC    ABSOLUTE NRBC 0.00 0.00 - 0.01 K/uL    NEUTROPHILS 79 (H) 32 - 75 %    LYMPHOCYTES 14 12 - 49 %    MONOCYTES 4 (L) 5 - 13 %    EOSINOPHILS 0 0 - 7 %    BASOPHILS 0 0 - 1 %    IMMATURE GRANULOCYTES 3 (H) 0.0 - 0.5 %    ABS. NEUTROPHILS 14.8 (H) 1.8 - 8.0 K/UL    ABS. LYMPHOCYTES 2.6 0.8 - 3.5 K/UL    ABS. MONOCYTES 0.7 0.0 - 1.0 K/UL    ABS. EOSINOPHILS 0.0 0.0 - 0.4 K/UL    ABS. BASOPHILS 0.0 0.0 - 0.1 K/UL    ABS. IMM.  GRANS. 0.6 (H) 0.00 - 0.04 K/UL    DF SMEAR SCANNED      RBC COMMENTS TARGET CELLS  PRESENT       METABOLIC PANEL, COMPREHENSIVE    Collection Time: 08/30/20  2:02 AM   Result Value Ref Range    Sodium 144 136 - 145 mmol/L    Potassium 3.0 (L) 3.5 - 5.1 mmol/L    Chloride 114 (H) 97 - 108 mmol/L    CO2 23 21 - 32 mmol/L    Anion gap 7 5 - 15 mmol/L    Glucose 153 (H) 65 - 100 mg/dL    BUN 4 (L) 6 - 20 MG/DL    Creatinine 0.49 (L) 0.70 - 1.30 MG/DL    BUN/Creatinine ratio 8 (L) 12 - 20      GFR est AA >60 >60 ml/min/1.73m2    GFR est non-AA >60 >60 ml/min/1.73m2    Calcium 6.4 (LL) 8.5 - 10.1 MG/DL    Bilirubin, total 1.5 (H) 0.2 - 1.0 MG/DL    ALT (SGPT) 57 12 - 78 U/L    AST (SGOT) 89 (H) 15 - 37 U/L    Alk.  phosphatase 300 (H) 45 - 117 U/L    Protein, total 4.5 (L) 6.4 - 8.2 g/dL    Albumin 1.0 (L) 3.5 - 5.0 g/dL    Globulin 3.5 2.0 - 4.0 g/dL    A-G Ratio 0.3 (L) 1.1 - 2.2     MAGNESIUM    Collection Time: 08/30/20  2:02 AM   Result Value Ref Range    Magnesium 1.5 (L) 1.6 - 2.4 mg/dL       Medications reviewed  Current Facility-Administered Medications   Medication Dose Route Frequency    potassium chloride SR (KLOR-CON 10) tablet 40 mEq  40 mEq Oral ONCE    loperamide (IMODIUM) capsule 2 mg  2 mg Oral Q6H PRN    lactobac ac& pc-s.therm-b.anim (ANITA Q/RISAQUAD)  1 Cap Oral DAILY    bismuth subsalicylate (PEPTO-BISMOL) oral suspension 30 mL  30 mL Oral Q6H PRN    sodium chloride 10% 15 ml hypertonic neb solution  5 mL Nebulization QAM RT    cyanocobalamin (VITAMIN B12) tablet 1,000 mcg  1,000 mcg Oral DAILY    folic acid (FOLVITE) tablet 1 mg  1 mg Oral DAILY    lipase-protease-amylase (CREON 12,000) capsule 1 Cap  1 Cap Oral TID WITH MEALS    HYDROcodone-acetaminophen (NORCO) 5-325 mg per tablet 1 Tab  1 Tab Oral Q6H PRN    morphine injection 1 mg  1 mg IntraVENous Q4H PRN    sodium chloride (NS) flush 5-10 mL  5-10 mL IntraVENous PRN    sodium chloride (NS) flush 5-40 mL  5-40 mL IntraVENous Q8H    sodium chloride (NS) flush 5-40 mL  5-40 mL IntraVENous PRN    acetaminophen (TYLENOL) tablet 650 mg  650 mg Oral Q6H PRN    Or    acetaminophen (TYLENOL) suppository 650 mg  650 mg Rectal Q6H PRN    polyethylene glycol (MIRALAX) packet 17 g  17 g Oral DAILY PRN    promethazine (PHENERGAN) tablet 12.5 mg  12.5 mg Oral Q6H PRN    Or    ondansetron (ZOFRAN) injection 4 mg  4 mg IntraVENous Q6H PRN    0.9% sodium chloride infusion  100 mL/hr IntraVENous CONTINUOUS    piperacillin-tazobactam (ZOSYN) 3.375 g in 0.9% sodium chloride (MBP/ADV) 100 mL  3.375 g IntraVENous Q8H    doxycycline (VIBRAMYCIN) 100 mg in 0.9% sodium chloride (MBP/ADV) 100 mL  100 mg IntraVENous Q12H    pantoprazole (PROTONIX) 40 mg in 0.9% sodium chloride 10 mL injection  40 mg IntraVENous Q12H       Care Plan discussed with: Patient/Nurse  Total time spent with patient: 40 minutes.   Kaz Pelayo MD

## 2020-08-30 NOTE — PROGRESS NOTES
Gastrointestinal Progress Note    8/30/2020    Admit Date: 8/25/2020    Subjective:     States still has meal provoked loose bowel movements; no pain, vomiting, bleeding.   States does not feel up to neither EGD nor colonoscopy at this time    Current Facility-Administered Medications   Medication Dose Route Frequency    potassium chloride SR (KLOR-CON 10) tablet 40 mEq  40 mEq Oral ONCE    loperamide (IMODIUM) capsule 2 mg  2 mg Oral Q6H PRN    lactobac ac& pc-s.therm-b.anim (ANITA Q/RISAQUAD)  1 Cap Oral DAILY    bismuth subsalicylate (PEPTO-BISMOL) oral suspension 30 mL  30 mL Oral Q6H PRN    cyanocobalamin (VITAMIN B12) tablet 1,000 mcg  1,000 mcg Oral DAILY    folic acid (FOLVITE) tablet 1 mg  1 mg Oral DAILY    lipase-protease-amylase (CREON 12,000) capsule 1 Cap  1 Cap Oral TID WITH MEALS    HYDROcodone-acetaminophen (NORCO) 5-325 mg per tablet 1 Tab  1 Tab Oral Q6H PRN    morphine injection 1 mg  1 mg IntraVENous Q4H PRN    sodium chloride (NS) flush 5-10 mL  5-10 mL IntraVENous PRN    sodium chloride (NS) flush 5-40 mL  5-40 mL IntraVENous Q8H    sodium chloride (NS) flush 5-40 mL  5-40 mL IntraVENous PRN    acetaminophen (TYLENOL) tablet 650 mg  650 mg Oral Q6H PRN    Or    acetaminophen (TYLENOL) suppository 650 mg  650 mg Rectal Q6H PRN    polyethylene glycol (MIRALAX) packet 17 g  17 g Oral DAILY PRN    promethazine (PHENERGAN) tablet 12.5 mg  12.5 mg Oral Q6H PRN    Or    ondansetron (ZOFRAN) injection 4 mg  4 mg IntraVENous Q6H PRN    0.9% sodium chloride infusion  100 mL/hr IntraVENous CONTINUOUS    piperacillin-tazobactam (ZOSYN) 3.375 g in 0.9% sodium chloride (MBP/ADV) 100 mL  3.375 g IntraVENous Q8H    doxycycline (VIBRAMYCIN) 100 mg in 0.9% sodium chloride (MBP/ADV) 100 mL  100 mg IntraVENous Q12H    pantoprazole (PROTONIX) 40 mg in 0.9% sodium chloride 10 mL injection  40 mg IntraVENous Q12H        Objective:     Blood pressure 112/87, pulse 92, temperature 97.5 °F (36.4 °C), resp. rate 23, height 5' 10\" (1.778 m), weight 70.8 kg (156 lb), SpO2 100 %. 08/30 0701 - 08/30 1900  In: 150 [I.V.:150]  Out: -     08/28 1901 - 08/30 0700  In: 5998.3 [P.O.:1460; I.V.:4538.3]  Out: 1655 [Urine:1375]    EXAM:  GENERAL: alert, cooperative, no distress, HEART: regular rate and rhythm, LUNGS: mild rales heard lung bases, ABDOMEN:  Bowel sounds are normal, liver is not enlarged, spleen is not enlarged and EXTREMITY: edema diffuse      Data Review    Recent Results (from the past 24 hour(s))   CBC WITH AUTOMATED DIFF    Collection Time: 08/30/20  2:02 AM   Result Value Ref Range    WBC 18.7 (H) 4.1 - 11.1 K/uL    RBC 2.37 (L) 4.10 - 5.70 M/uL    HGB 8.1 (L) 12.1 - 17.0 g/dL    HCT 23.9 (L) 36.6 - 50.3 %    .8 (H) 80.0 - 99.0 FL    MCH 34.2 (H) 26.0 - 34.0 PG    MCHC 33.9 30.0 - 36.5 g/dL    RDW 21.4 (H) 11.5 - 14.5 %    PLATELET 349 114 - 339 K/uL    MPV 11.3 8.9 - 12.9 FL    NRBC 0.0 0  WBC    ABSOLUTE NRBC 0.00 0.00 - 0.01 K/uL    NEUTROPHILS 79 (H) 32 - 75 %    LYMPHOCYTES 14 12 - 49 %    MONOCYTES 4 (L) 5 - 13 %    EOSINOPHILS 0 0 - 7 %    BASOPHILS 0 0 - 1 %    IMMATURE GRANULOCYTES 3 (H) 0.0 - 0.5 %    ABS. NEUTROPHILS 14.8 (H) 1.8 - 8.0 K/UL    ABS. LYMPHOCYTES 2.6 0.8 - 3.5 K/UL    ABS. MONOCYTES 0.7 0.0 - 1.0 K/UL    ABS. EOSINOPHILS 0.0 0.0 - 0.4 K/UL    ABS. BASOPHILS 0.0 0.0 - 0.1 K/UL    ABS. IMM.  GRANS. 0.6 (H) 0.00 - 0.04 K/UL    DF SMEAR SCANNED      RBC COMMENTS TARGET CELLS  PRESENT       METABOLIC PANEL, COMPREHENSIVE    Collection Time: 08/30/20  2:02 AM   Result Value Ref Range    Sodium 144 136 - 145 mmol/L    Potassium 3.0 (L) 3.5 - 5.1 mmol/L    Chloride 114 (H) 97 - 108 mmol/L    CO2 23 21 - 32 mmol/L    Anion gap 7 5 - 15 mmol/L    Glucose 153 (H) 65 - 100 mg/dL    BUN 4 (L) 6 - 20 MG/DL    Creatinine 0.49 (L) 0.70 - 1.30 MG/DL    BUN/Creatinine ratio 8 (L) 12 - 20      GFR est AA >60 >60 ml/min/1.73m2    GFR est non-AA >60 >60 ml/min/1.73m2    Calcium 6.4 (LL) 8.5 - 10.1 MG/DL    Bilirubin, total 1.5 (H) 0.2 - 1.0 MG/DL    ALT (SGPT) 57 12 - 78 U/L    AST (SGOT) 89 (H) 15 - 37 U/L    Alk. phosphatase 300 (H) 45 - 117 U/L    Protein, total 4.5 (L) 6.4 - 8.2 g/dL    Albumin 1.0 (L) 3.5 - 5.0 g/dL    Globulin 3.5 2.0 - 4.0 g/dL    A-G Ratio 0.3 (L) 1.1 - 2.2     MAGNESIUM    Collection Time: 08/30/20  2:02 AM   Result Value Ref Range    Magnesium 1.5 (L) 1.6 - 2.4 mg/dL       Assessment:     Principal Problem:    Pneumonia involving right lung (8/25/2020)    Active Problems:    Hemoptysis (8/25/2020)      Hyperbilirubinemia (8/25/2020)      Weight loss, unintentional (8/25/2020)      Pleural effusion, right (8/25/2020)      Bilateral pulmonary embolism (HCC) (8/25/2020)      Acute deep vein thrombosis (DVT) of both lower extremities ([address] (8/25/2020)      Alcohol use (8/25/2020)      Diarrhea (8/25/2020)      Fecal occult blood test positive (8/25/2020)      Hyponatremia with extracellular fluid depletion (8/25/2020)        Plan:     1.   Increase creon dose

## 2020-08-30 NOTE — PROGRESS NOTES
SHIFT CHANGE:  1930 Bedside and Verbal shift change report given to Zoe LOMBARDI (oncoming nurse) by Sallie Jules (offgoing nurse). Report included the following information SBAR, Kardex, MAR and Recent Results. SHIFT SUMMARY:      END OF SHIFT REPORT:  0730  Bedside and Verbal shift change report given to [employer] (oncoming nurse) by Latonya Calderon (offgoing nurse). Report included the following information SBAR, Kardex, MAR and Recent Results.

## 2020-08-30 NOTE — PROGRESS NOTES
Daily Progress Note: 8/30/2020  Jasmyne Grier MD    Assessment/Plan:   Pneumonia involving right lung (8/25/2020) /  Hemoptysis (8/25/2020) POA: extensive with internal cavitation and air fluid levels. Staph Aureus, leukocytosis improving. CXR unchanged  --pleural fluid studies, cxs, cytology, AFB and quantiferon gold pending.   -- zosyn, doxy  -- Pulm and Thoracic Surg following     Bilateral pulmonary embolism, acute ([address] (8/25/2020) / Acute deep vein thrombosis (DVT) of both lower extremities ([facility] [address] (8/25/2020):   - IVC filter placed   - holding anticoags due to dropping Hb and active hemoptysis and GI bleeding     Weight loss, unintentional (8/25/2020) POA: still concerning for malignancy but severe protein calorie malnutrition remains a DDx. Nutrition and speech therapy following  -Diet as tolerated  -calcium corrects     Hyperbilirubinemia (8/25/2020) POA: unclear etiology. CT abdomen unremarkable  - resolved     Pleural effusion, right (8/25/2020) POA: empyema, pneumonia vs malignant effusion. -pulmonology following  - Chest tube placed 8/26/20     Alcohol use (8/25/2020) POA: he says he has cut down recently. -Monitor for withdrawal     Chronic Diarrhea (8/25/2020) / Fecal occult blood test positive (8/25/2020) POA: check stool studies. Monitor Hgb.    - GI following  -imodium and peptobismol PRN  -FloraQ daily- or pt's home probiotics    Hypomag/hypokalemia:  -replete and monitor        Problem List:  Problem List as of 8/30/2020 Date Reviewed: 8/25/2020          Codes Class Noted - Resolved    * (Principal) Pneumonia involving right lung ICD-10-CM: J18.9  ICD-9-CM: 821  8/25/2020 - Present        Hemoptysis ICD-10-CM: R04.2  ICD-9-CM: 786.30  8/25/2020 - Present        Hyperbilirubinemia ICD-10-CM: E80.6  ICD-9-CM: 782.4  8/25/2020 - Present        Weight loss, unintentional ICD-10-CM: R63.4  ICD-9-CM: 783.21  8/25/2020 - Present        Pleural effusion, right ICD-10-CM: J90  ICD-9-CM: 511.9  8/25/2020 - Present        Bilateral pulmonary embolism (HCC) ICD-10-CM: I26.99  ICD-9-CM: 415.19  8/25/2020 - Present        Acute deep vein thrombosis (DVT) of both lower extremities (HCC) ICD-10-CM: I82.403  ICD-9-CM: 453.40  8/25/2020 - Present        Alcohol use ICD-10-CM: Z72.89  ICD-9-CM: V49.89  8/25/2020 - Present        Diarrhea ICD-10-CM: R19.7  ICD-9-CM: 787.91  8/25/2020 - Present        Fecal occult blood test positive ICD-10-CM: R19.5  ICD-9-CM: 792.1  8/25/2020 - Present        Hyponatremia with extracellular fluid depletion ICD-10-CM: E87.1  ICD-9-CM: 276.1  8/25/2020 - Present            Subjective:     60 y.o. male who is being admitted for Pneumonia involving right lung. Mr. Dexter Aaron presented to our Emergency Department today complaining of generalized weakness, diarrhea and lethargy for several months. Patient was accompanied by his friend who gave most of the history. The patient does not like seeing physicians and apparently uses vitamin supplements. He is a smoker and drinks alcohol and has been progressively weaker over several months now. He has had pleuritic right sided chest pain associated with a productive cough and hemoptysis. He feel on the floor several days ago and was unable to get up. His friends helped him up and brought him to the ED for further testing. Initial CXR done showed airspace disease in right mid lower lung zone consistent with pneumonia. This is surrounded by a moderate right-sided pleural effusion. Upon review, he was noted to be cachectic with swollen lower extremities. CT scans chest abdomen and pelvis done showed a diffuse irregular opacification throughout the right lower lobe with multiple areas of internal cavitation and air-fluid levels. Findings are more likely related to infection than malignancy. Large right pleural effusion. Trace left pleural effusion. Bilateral pulmonary emboli. Clot burden is moderate.  No evidence of right heart strain. Thrombus in the veins of the left lower extremity extending to the IVC. A small amount of thrombus is also seen in the right common iliac vein. He will be admitted for further management. (Dr Chuck Villeda)    8/26:  Feeling a little better this AM but still c/o painful, pleuritic cough and occas thick sputum. No leg/calf pains. No known exposure to ill individuals/no travel - \"just staying in the last few months. \"  No temps. WBC 31.9K. Cultures neg so far.      8/27:  Reports less cough, chest pain and feeling better overall. Appetite has returned. Chest tube placed yesterday with discharge obtained - pending cultures. Other cultures remain neg. Hb lower so cont to hold anticoag for now. GI has seen and work up when more stable. Remains on doxy, zosyn, vanc. WBC down to 28K. Bili back to normal.     8/28:  Continues to grad improve. Still with some cough. No tremor or hallucinations so far. K+ lower - replacing. WBC coming down. Afeb. CXR this AM pending. Cultures remain neg so far. Quantiferon gold test pending as of this AM.  He reports hx of chronic LE edema for years. 8/29: Pt continues to have frequent stooling. GI plans endoscopy next week if pt allows. He was not willing to participate with PT/OT/nutritionist yesterday. Says despite our best efforts, we will not get his stooling to stop until he gets his home probiotics [friend will bring it]. He refuses to eat until the stooling is better. 8/30:  Pt is reluctant to admit that his stooling has improved. Down to 2 BMs yesterday instead of 6 the day before. Tolerating PO. Willing to eat more today. He is still having hemoptysis. Wanting to get out of bed. Willing to work with PT/OT.       Review of Systems:   A comprehensive review of systems was negative except for that written in the HPI.     Objective:   Physical Exam:   Visit Vitals  BP 94/65   Pulse 83   Temp 98 °F (36.7 °C)   Resp 24   Ht 5' 10\" (1.778 m)   Wt 70.8 kg (156 lb)   SpO2 96%   BMI 22.38 kg/m²      O2 Device: Room air  Temp (24hrs), Av °F (36.7 °C), Min:97.3 °F (36.3 °C), Max:98.9 °F (37.2 °C)    No intake/output data recorded.  1901 -  0700  In: 5898.3 [P.O.:1460; I.V.:4438.3]  Out: 1655 [Urine:1375]  General:  Alert, cooperative, no distress, cachetic, ill appearing. Head:  Normocephalic, without obvious abnormality, atraumatic. Eyes:  Eucha conjuntiva. PERRL, EOMs intact. Throat: Lips, mucosa, and tongue moist.   Neck: Supple, symmetrical, trachea midline, no adenopathy, thyroid: no enlargement/tenderness/nodules, no carotid bruit and no JVD. Lungs:   Better air exchange right,  Fine crackles anteriorly right,  a few scattered rhonchi. Chest wall:  Chest tube site right looks ok   Heart:  Regular rate and rhythm, S1, S2 normal, no murmur, click, rub or gallop. Abdomen:   Soft, non-tender. Bowel sounds normal. No masses,  No organomegaly. Extremities: no cyanosis. No calf tenderness;3+ pitting edema bilat. 2+ pitting edema in arms, No cords palpated. Pulses: 2+ and symmetric all extremities. Skin: Skin color, texture, turgor normal. No rashes   Neurologic:  Alert and oriented X 3. Fine motor of hands and fingers normal.   equal.  No tremor. Gait not tested at this time. Sensation grossly normal to touch. Gross motor of extremities normal.       Data Review:   EXAM: CT CHEST ABD PELV W CONT 20  INDICATION: weight loss  COMPARISON: Radiographs 2020  CONTRAST: 100 mL of Isovue-370. FINDINGS:   CHEST WALL: No mass or axillary lymphadenopathy. THYROID: No nodule. MEDIASTINUM: No mass or lymphadenopathy. BRYAN: No mass or lymphadenopathy. THORACIC AORTA: No dissection or aneurysm. MAIN PULMONARY ARTERY: Normal in caliber. Thrombus is seen throughout the right  lower lobe pulmonary arteries.  Thrombus is seen in the left lower lobe pulmonary  artery extending into the anterior and lateral basilar segments. TRACHEA/BRONCHI: Patent. ESOPHAGUS: No wall thickening or dilatation. HEART: Normal in size. PLEURA: There is a large right pleural effusion with enhancement of the pleural  surfaces. Trace left pleural effusion. LUNGS: There is irregular opacification throughout the right lower lobe. There  are multiple areas of internal cavitation with air-fluid levels. Small areas of  atelectasis or scarring are seen in the lingula and inferior left lower lobe. LIVER: No mass. BILIARY TREE: Gallbladder is within normal limits. CBD is not dilated. SPLEEN: within normal limits. PANCREAS: No mass or ductal dilatation. ADRENALS: Unremarkable. KIDNEYS: Small nonobstructive stones are seen in the bilateral kidneys. No  evidence of hydronephrosis. No mass. STOMACH: Unremarkable. SMALL BOWEL: No dilatation or wall thickening. COLON: No dilatation or wall thickening. APPENDIX: Unremarkable  PERITONEUM: No ascites or pneumoperitoneum. RETROPERITONEUM: No lymphadenopathy or aortic aneurysm. REPRODUCTIVE ORGANS: Unremarkable  URINARY BLADDER: No mass or calculus. BONES: No destructive bone lesion. ABDOMINAL WALL: No mass or hernia. ADDITIONAL COMMENTS: There is thrombus in the left femoral veins extending into  the left iliac veins into the IVC. A small amount of thrombus is also present in  the right common iliac vein. IMPRESSION:  1. Diffuse irregular opacification throughout the right lower lobe with multiple  areas of internal cavitation and air-fluid levels. Findings are more likely  related to infection than malignancy. 2. Large right pleural effusion. Trace left pleural effusion. 3. Bilateral pulmonary emboli. Clot burden is moderate. No evidence of right  heart strain. 4. Thrombus in the veins of the left lower extremity extending to the IVC. A  small amount of thrombus is also seen in the right common iliac vein.       EXAM:  IR THORACENTESIS/INSERT CHEST TUBE 8/26/20  INDICATION:  Request placement of large size pigtail catheter. Possible empyema. PROCEDURE:  Available history and imaging was reviewed which demonstrates a large partially  loculated right pleural effusion. Possibility of empyema, tuberculosis and other  etiologies are being considered clinically. Specific request for larger pigtail  catheter placement. Following informed consent the patient was evaluated with ultrasound in the  angina/catheter lab department. This demonstrates a large right pleural  effusion. The location of the pleural effusion was marked and then the area was prepped  and draped. 1% lidocaine was used for local anesthesia. A small incision was made with an 11  blade. A micropuncture needle was used under direct fluoroscopic guidance to access the  pleural fluid. When this was achieved the microguidewire was placed followed by  a 5 [place] dilator. Through this a short [address] guidewire  was placed. The track was then dilated to 12 [place]. This was then followed by  placement of a 12 [place] pigtail catheter. The string for the pigtail  was removed prior to placement. The catheter was positioned under fluoroscopic  guidance and then secured in place at the skin with a 2-0 Prolene suture. The  site was then dressed with a Tegaderm Bioclusive dressing and will be connected  to a Pleur-evac for subsequent drainage. Prior to this approximately 100 mL of fluid was obtained and sent for cytology  as well as multiple cultures including aerobic, anaerobic, AFB and fungal.   The fluid was withdrawn was mildly cloudy serosanguineous in color. I did not  notice any foul smell. The procedure was adequately tolerated without significant blood loss or  evidence of complication. Final digital image of the chest was saved documenting catheter position. IMPRESSION:   Successful ultrasound and fluoroscopic guided placement of 12 [place] right-sided  chest tube.  Patient will have ongoing drainage from Pleur-evac in his hospital  bed/room. Care and monitoring as per medical service and nursing service. ECHO 8/27/20  Interpretation Summary   Result status: Final result    · Image quality for this study was technically difficult. · Echo study was technically difficulty and limited due to patient's tolerance. · LV: Estimated LVEF is 55 - 60%. Normal cavity size, wall thickness and systolic function (ejection fraction normal). Wall motion: normal. Mild (grade 1) left ventricular diastolic dysfunction. · Pericardium: Small pericardial effusion adjacent to right ventricle. Recent Days:  Recent Labs     08/30/20  0202 08/29/20  0514 08/28/20  0505   WBC 18.7* 19.6* 21.4*   HGB 8.1* 8.5* 8.0*   HCT 23.9* 25.3* 23.5*    188 194     Recent Labs     08/30/20  0202 08/29/20  0514 08/28/20  0505    143 141   K 3.0* 3.7 2.9*   * 113* 111*   CO2 23 24 24   * 66 62*   BUN 4* 4* 6   CREA 0.49* 0.37* 0.39*   CA 6.4* 6.6* 6.4*   MG 1.5* 1.7  --    ALB 1.0* 1.2* 1.5*   TBILI 1.5* 1.5* 1.8*   ALT 57 53 46     No results for input(s): PH, PCO2, PO2, HCO3, FIO2 in the last 72 hours. 24 Hour Results:  Recent Results (from the past 24 hour(s))   CBC WITH AUTOMATED DIFF    Collection Time: 08/30/20  2:02 AM   Result Value Ref Range    WBC 18.7 (H) 4.1 - 11.1 K/uL    RBC 2.37 (L) 4.10 - 5.70 M/uL    HGB 8.1 (L) 12.1 - 17.0 g/dL    HCT 23.9 (L) 36.6 - 50.3 %    .8 (H) 80.0 - 99.0 FL    MCH 34.2 (H) 26.0 - 34.0 PG    MCHC 33.9 30.0 - 36.5 g/dL    RDW 21.4 (H) 11.5 - 14.5 %    PLATELET 791 309 - 919 K/uL    MPV 11.3 8.9 - 12.9 FL    NRBC 0.0 0  WBC    ABSOLUTE NRBC 0.00 0.00 - 0.01 K/uL    NEUTROPHILS 79 (H) 32 - 75 %    LYMPHOCYTES 14 12 - 49 %    MONOCYTES 4 (L) 5 - 13 %    EOSINOPHILS 0 0 - 7 %    BASOPHILS 0 0 - 1 %    IMMATURE GRANULOCYTES 3 (H) 0.0 - 0.5 %    ABS. NEUTROPHILS 14.8 (H) 1.8 - 8.0 K/UL    ABS. LYMPHOCYTES 2.6 0.8 - 3.5 K/UL    ABS. MONOCYTES 0.7 0.0 - 1.0 K/UL    ABS. EOSINOPHILS 0.0 0.0 - 0.4 K/UL    ABS. BASOPHILS 0.0 0.0 - 0.1 K/UL    ABS. IMM. GRANS. 0.6 (H) 0.00 - 0.04 K/UL    DF SMEAR SCANNED      RBC COMMENTS TARGET CELLS  PRESENT       METABOLIC PANEL, COMPREHENSIVE    Collection Time: 08/30/20  2:02 AM   Result Value Ref Range    Sodium 144 136 - 145 mmol/L    Potassium 3.0 (L) 3.5 - 5.1 mmol/L    Chloride 114 (H) 97 - 108 mmol/L    CO2 23 21 - 32 mmol/L    Anion gap 7 5 - 15 mmol/L    Glucose 153 (H) 65 - 100 mg/dL    BUN 4 (L) 6 - 20 MG/DL    Creatinine 0.49 (L) 0.70 - 1.30 MG/DL    BUN/Creatinine ratio 8 (L) 12 - 20      GFR est AA >60 >60 ml/min/1.73m2    GFR est non-AA >60 >60 ml/min/1.73m2    Calcium 6.4 (LL) 8.5 - 10.1 MG/DL    Bilirubin, total 1.5 (H) 0.2 - 1.0 MG/DL    ALT (SGPT) 57 12 - 78 U/L    AST (SGOT) 89 (H) 15 - 37 U/L    Alk.  phosphatase 300 (H) 45 - 117 U/L    Protein, total 4.5 (L) 6.4 - 8.2 g/dL    Albumin 1.0 (L) 3.5 - 5.0 g/dL    Globulin 3.5 2.0 - 4.0 g/dL    A-G Ratio 0.3 (L) 1.1 - 2.2     MAGNESIUM    Collection Time: 08/30/20  2:02 AM   Result Value Ref Range    Magnesium 1.5 (L) 1.6 - 2.4 mg/dL       Medications reviewed  Current Facility-Administered Medications   Medication Dose Route Frequency    potassium chloride SR (KLOR-CON 10) tablet 40 mEq  40 mEq Oral ONCE    magnesium sulfate 2 g/50 ml IVPB (premix or compounded)  2 g IntraVENous ONCE    potassium chloride SR (KLOR-CON 10) tablet 40 mEq  40 mEq Oral NOW    loperamide (IMODIUM) capsule 2 mg  2 mg Oral Q6H PRN    lactobac ac& pc-s.therm-b.anim (ANITA Q/RISAQUAD)  1 Cap Oral DAILY    bismuth subsalicylate (PEPTO-BISMOL) oral suspension 30 mL  30 mL Oral Q6H PRN    sodium chloride 10% 15 ml hypertonic neb solution  5 mL Nebulization QAM RT    cyanocobalamin (VITAMIN B12) tablet 1,000 mcg  1,000 mcg Oral DAILY    folic acid (FOLVITE) tablet 1 mg  1 mg Oral DAILY    lipase-protease-amylase (CREON 12,000) capsule 1 Cap  1 Cap Oral TID WITH MEALS    HYDROcodone-acetaminophen (NORCO) 5-325 mg per tablet 1 Tab  1 Tab Oral Q6H PRN    morphine injection 1 mg  1 mg IntraVENous Q4H PRN    sodium chloride (NS) flush 5-10 mL  5-10 mL IntraVENous PRN    sodium chloride (NS) flush 5-40 mL  5-40 mL IntraVENous Q8H    sodium chloride (NS) flush 5-40 mL  5-40 mL IntraVENous PRN    acetaminophen (TYLENOL) tablet 650 mg  650 mg Oral Q6H PRN    Or    acetaminophen (TYLENOL) suppository 650 mg  650 mg Rectal Q6H PRN    polyethylene glycol (MIRALAX) packet 17 g  17 g Oral DAILY PRN    promethazine (PHENERGAN) tablet 12.5 mg  12.5 mg Oral Q6H PRN    Or    ondansetron (ZOFRAN) injection 4 mg  4 mg IntraVENous Q6H PRN    0.9% sodium chloride infusion  100 mL/hr IntraVENous CONTINUOUS    piperacillin-tazobactam (ZOSYN) 3.375 g in 0.9% sodium chloride (MBP/ADV) 100 mL  3.375 g IntraVENous Q8H    doxycycline (VIBRAMYCIN) 100 mg in 0.9% sodium chloride (MBP/ADV) 100 mL  100 mg IntraVENous Q12H    pantoprazole (PROTONIX) 40 mg in 0.9% sodium chloride 10 mL injection  40 mg IntraVENous Q12H       Care Plan discussed with: Patient/Nurse  Total time spent with patient: 40 minutes.   Gabriel Donald MD

## 2020-08-30 NOTE — PROGRESS NOTES
Bedside and Verbal shift change report given to HARJIT Bauman (oncoming nurse) by Brennan Lopez RN (offgoing nurse). Report included the following information SBAR, Kardex, Intake/Output, MAR, Recent Results and Cardiac Rhythm NSR. Introduced self as primary RN. Initial assessment completed. VSS. Pt denies additional complaints at this time. Plan for the evening discussed with pt and he verbalized understanding. Bed locked and in low position with call bell within reach. Instructed pt to press call warren when needed. White board updated with this RN's name. 2115 Patient complained of pain and requested the prn pain medication norco.     Richard Number was administered. Patient states he did not get anything for dinner. It was reported that he had refused dinner on the previous shift. He was reminded that he had refused and food was offered. He refused stating that he did not want it. He states he has pudding and ensures at bedside that he can have instead. 0400 Notified Dr. Isabell Paulson of critical calcium of 6.4 and low potassium of 3.0. Verified albumin level with MD.  New order for potassium 40meq now and potassium 40meq once at dinner time and magnesium level. 0730 Bedside and Verbal shift change report given to Bernnan Lopez RN (oncoming nurse) by Naomy Garcia RN (offgoing nurse). Report included the following information SBAR, Kardex, Intake/Output, MAR, Recent Results and Cardiac Rhythm NSR.

## 2020-08-31 ENCOUNTER — APPOINTMENT (OUTPATIENT)
Dept: GENERAL RADIOLOGY | Age: 65
DRG: 166 | End: 2020-08-31
Attending: INTERNAL MEDICINE
Payer: COMMERCIAL

## 2020-08-31 LAB
BACTERIA SPEC CULT: NORMAL
BACTERIA SPEC CULT: NORMAL
BASOPHILS # BLD: 0 K/UL (ref 0–0.1)
BASOPHILS NFR BLD: 0 % (ref 0–1)
DIFFERENTIAL METHOD BLD: ABNORMAL
EOSINOPHIL # BLD: 0 K/UL (ref 0–0.4)
EOSINOPHIL NFR BLD: 0 % (ref 0–7)
ERYTHROCYTE [DISTWIDTH] IN BLOOD BY AUTOMATED COUNT: 22.3 % (ref 11.5–14.5)
HAV IGM SER QL: NONREACTIVE
HBV CORE IGM SER QL: NONREACTIVE
HBV SURFACE AG SER QL: <0.1 INDEX
HBV SURFACE AG SER QL: NEGATIVE
HCT VFR BLD AUTO: 24.5 % (ref 36.6–50.3)
HCV AB SERPL QL IA: NONREACTIVE
HCV COMMENT,HCGAC: NORMAL
HGB BLD-MCNC: 8.2 G/DL (ref 12.1–17)
IMM GRANULOCYTES # BLD AUTO: 0 K/UL
IMM GRANULOCYTES NFR BLD AUTO: 0 %
LYMPHOCYTES # BLD: 2.9 K/UL (ref 0.8–3.5)
LYMPHOCYTES NFR BLD: 17 % (ref 12–49)
MCH RBC QN AUTO: 34.5 PG (ref 26–34)
MCHC RBC AUTO-ENTMCNC: 33.5 G/DL (ref 30–36.5)
MCV RBC AUTO: 102.9 FL (ref 80–99)
METAMYELOCYTES NFR BLD MANUAL: 1 %
MONOCYTES # BLD: 1 K/UL (ref 0–1)
MONOCYTES NFR BLD: 6 % (ref 5–13)
MYELOCYTES NFR BLD MANUAL: 1 %
NEUTS BAND NFR BLD MANUAL: 1 % (ref 0–6)
NEUTS SEG # BLD: 12.8 K/UL (ref 1.8–8)
NEUTS SEG NFR BLD: 74 % (ref 32–75)
NRBC # BLD: 0 K/UL (ref 0–0.01)
NRBC BLD-RTO: 0 PER 100 WBC
PLATELET # BLD AUTO: 261 K/UL (ref 150–400)
PMV BLD AUTO: 12.6 FL (ref 8.9–12.9)
RBC # BLD AUTO: 2.38 M/UL (ref 4.1–5.7)
RBC MORPH BLD: ABNORMAL
RBC MORPH BLD: ABNORMAL
SERVICE CMNT-IMP: NORMAL
SERVICE CMNT-IMP: NORMAL
SP1: NORMAL
SP2: NORMAL
SP3: NORMAL
WBC # BLD AUTO: 17 K/UL (ref 4.1–11.1)

## 2020-08-31 PROCEDURE — 74011250637 HC RX REV CODE- 250/637: Performed by: PHYSICIAN ASSISTANT

## 2020-08-31 PROCEDURE — 71045 X-RAY EXAM CHEST 1 VIEW: CPT

## 2020-08-31 PROCEDURE — 74011000258 HC RX REV CODE- 258: Performed by: INTERNAL MEDICINE

## 2020-08-31 PROCEDURE — 97530 THERAPEUTIC ACTIVITIES: CPT

## 2020-08-31 PROCEDURE — 85025 COMPLETE CBC W/AUTO DIFF WBC: CPT

## 2020-08-31 PROCEDURE — 97116 GAIT TRAINING THERAPY: CPT

## 2020-08-31 PROCEDURE — 74011250636 HC RX REV CODE- 250/636: Performed by: FAMILY MEDICINE

## 2020-08-31 PROCEDURE — 74011250637 HC RX REV CODE- 250/637: Performed by: FAMILY MEDICINE

## 2020-08-31 PROCEDURE — 74011000250 HC RX REV CODE- 250: Performed by: INTERNAL MEDICINE

## 2020-08-31 PROCEDURE — 97110 THERAPEUTIC EXERCISES: CPT

## 2020-08-31 PROCEDURE — 36415 COLL VENOUS BLD VENIPUNCTURE: CPT

## 2020-08-31 PROCEDURE — 80074 ACUTE HEPATITIS PANEL: CPT

## 2020-08-31 PROCEDURE — 74011250637 HC RX REV CODE- 250/637: Performed by: INTERNAL MEDICINE

## 2020-08-31 PROCEDURE — 86038 ANTINUCLEAR ANTIBODIES: CPT

## 2020-08-31 PROCEDURE — 74011250636 HC RX REV CODE- 250/636: Performed by: INTERNAL MEDICINE

## 2020-08-31 PROCEDURE — C9113 INJ PANTOPRAZOLE SODIUM, VIA: HCPCS | Performed by: INTERNAL MEDICINE

## 2020-08-31 PROCEDURE — 65660000000 HC RM CCU STEPDOWN

## 2020-08-31 PROCEDURE — 97535 SELF CARE MNGMENT TRAINING: CPT | Performed by: OCCUPATIONAL THERAPIST

## 2020-08-31 PROCEDURE — 83516 IMMUNOASSAY NONANTIBODY: CPT

## 2020-08-31 RX ORDER — POTASSIUM CHLORIDE 750 MG/1
40 TABLET, FILM COATED, EXTENDED RELEASE ORAL
Status: COMPLETED | OUTPATIENT
Start: 2020-08-31 | End: 2020-08-31

## 2020-08-31 RX ADMIN — Medication 10 ML: at 05:57

## 2020-08-31 RX ADMIN — PANCRELIPASE 1 CAPSULE: 24000; 76000; 120000 CAPSULE, DELAYED RELEASE PELLETS ORAL at 09:13

## 2020-08-31 RX ADMIN — Medication 1 CAPSULE: at 09:07

## 2020-08-31 RX ADMIN — LOPERAMIDE HYDROCHLORIDE 2 MG: 2 CAPSULE ORAL at 09:07

## 2020-08-31 RX ADMIN — PIPERACILLIN AND TAZOBACTAM 3.38 G: 3; .375 INJECTION, POWDER, LYOPHILIZED, FOR SOLUTION INTRAVENOUS at 17:12

## 2020-08-31 RX ADMIN — DOXYCYCLINE 100 MG: 100 INJECTION, POWDER, LYOPHILIZED, FOR SOLUTION INTRAVENOUS at 17:12

## 2020-08-31 RX ADMIN — SODIUM CHLORIDE 100 ML/HR: 900 INJECTION, SOLUTION INTRAVENOUS at 01:04

## 2020-08-31 RX ADMIN — POTASSIUM CHLORIDE 40 MEQ: 750 TABLET, FILM COATED, EXTENDED RELEASE ORAL at 09:07

## 2020-08-31 RX ADMIN — PANCRELIPASE 1 CAPSULE: 24000; 76000; 120000 CAPSULE, DELAYED RELEASE PELLETS ORAL at 13:47

## 2020-08-31 RX ADMIN — SODIUM CHLORIDE 40 MG: 9 INJECTION INTRAMUSCULAR; INTRAVENOUS; SUBCUTANEOUS at 21:10

## 2020-08-31 RX ADMIN — Medication 10 ML: at 21:11

## 2020-08-31 RX ADMIN — Medication 10 ML: at 13:48

## 2020-08-31 RX ADMIN — DOXYCYCLINE 100 MG: 100 INJECTION, POWDER, LYOPHILIZED, FOR SOLUTION INTRAVENOUS at 05:57

## 2020-08-31 RX ADMIN — PANCRELIPASE 1 CAPSULE: 24000; 76000; 120000 CAPSULE, DELAYED RELEASE PELLETS ORAL at 17:12

## 2020-08-31 RX ADMIN — FOLIC ACID 1 MG: 1 TABLET ORAL at 09:07

## 2020-08-31 RX ADMIN — PIPERACILLIN AND TAZOBACTAM 3.38 G: 3; .375 INJECTION, POWDER, LYOPHILIZED, FOR SOLUTION INTRAVENOUS at 09:07

## 2020-08-31 RX ADMIN — CYANOCOBALAMIN TAB 500 MCG 1000 MCG: 500 TAB at 09:07

## 2020-08-31 RX ADMIN — SODIUM CHLORIDE 40 MG: 9 INJECTION INTRAMUSCULAR; INTRAVENOUS; SUBCUTANEOUS at 09:00

## 2020-08-31 RX ADMIN — PIPERACILLIN AND TAZOBACTAM 3.38 G: 3; .375 INJECTION, POWDER, LYOPHILIZED, FOR SOLUTION INTRAVENOUS at 03:37

## 2020-08-31 NOTE — PROGRESS NOTES
Nutrition Assessment     Type and Reason for Visit: Reassess    Nutrition Recommendations/Plan:   · Continue Regular diet order - will change consistency to Mechanical Soft due to difficulty chewing. · Continue multiple ONS to assist with meeting nutritional needs, hx of weight loss, malnutrition. Nutrition Assessment:      8/31: F/u. Diet ordered as Regular again. Called into pt's room as he is on airborne precaution for TB. Pt states he does have some difficulty chewing some foods. States food needs to be soft and most of our foods are. Will change consistency back to Mechanical Soft. Pt states he is eating okay, intakes are documented as 25-75% meals. Multiple ONS ordered. Pt states he likes the Ensure clear and Ensure Pudding and sometimes eats the [employer]. Labs: LFT's are increasing. Meds: Vit L65, folic acid, Creon, Probiotic. BM noted 8/31.    8/27;  Chart reviewed; med noted for pneumonia, TB. Pt on airborne precautions. RD completed comprehensive assessment with pt yesterday 8/26. Multiple ONS initiated. Will continue for now. Per Pulmonology note today, SLP to be consulted. Currently receiving a regular consistency diet with fair/good documented po intake. 8/26: 61 yo male admitted for pneumonia. PMhx: ETOH use daily. Underweight per BMI of 17.9. No weight hx in EMR. Pt being tested for COVID-19. Spoke with pt over phone, states he has been steadily losing weight and reports 50lb loss over the past 1.5 years. This is 29% loss which is significant for time frame. Pt states he has had no PO intakes for the last 1-2 weeks except for a few protein bars. Prior to that his intakes were still poor, only eating 1x/day which was either a frozen meal or sandwich. Regular diet ordered here. States he ate 50% of grits and pancakes this morning. Has no teeth and states he was not able to chew the sausage or fruit.   Requesting softer foods, willing to try chopped meats for now but may need even softer foods than that. Discussed ONS options, states Ensure Enlive causes him to have diarrhea. Willing to try Ensure Clear, Ensure pudding, and Magic Cup. C/o chronic diarrhea, states he takes a probiotic most days at home which helps with the diarrhea, requesting that here. Labs and meds reviewed. NaCl ordered at 75ml/hr. Patient Vitals for the past 72 hrs:   % Diet Eaten   08/31/20 1103 75 %   08/30/20 1755 50 %   08/30/20 1121 75 %     Last Weight Metric  Weight Loss Metrics 8/31/2020   Today's Wt 162 lb 14.4 oz   BMI 23.37 kg/m2       Malnutrition Assessment:  Malnutrition Status: Severe malnutrition     Estimated Daily Nutrient Needs:  Energy (kcal):  2034 kcals(REE 1373 x AF 1.3 + 250)  Protein (g):  68-80gm(1.2-1.4gm/kg/d)       Fluid (ml/day):  2034 ml(1ml/kg)    Nutrition Related Findings:  Edema: trace generalized; 4+ pitting BLE, 2+ pitting BUE      Current Nutrition Therapies:  DIET REGULAR  DIET NUTRITIONAL SUPPLEMENTS Breakfast, Lunch; Ensure Clear  DIET NUTRITIONAL SUPPLEMENTS Lunch; Magic Cups  DIET NUTRITIONAL SUPPLEMENTS Lunch, Dinner; Pudding, Fortified    Anthropometric Measures:  · Height:  5' 10\" (177.8 cm)  · Current Body Wt:  73.8 kg (162 lb 11.2 oz)  · BMI: 23.3    Nutrition Diagnosis:   · Underweight related to inadequate protein-energy intake as evidenced by BMI(17.9)     8/31: Nutrition Dx resolved at this time with new weight - BMI 23.4.     Nutrition Intervention:  Food and/or Nutrient Delivery: Continue current diet, Continue oral nutrition supplement  Nutrition Education and Counseling: No recommendations at this time  Coordination of Nutrition Care: Continued inpatient monitoring    Goals:  Consume > 75% meals + ONS to promote weight gain of 0.5-1lb within next 3-4 days       Nutrition Monitoring and Evaluation:   Food/Nutrient Intake Outcomes: Food and nutrient intake, Supplement intake  Physical Signs/Symptoms Outcomes: Chewing or swallowing, GI status, Weight    Discharge Planning:    Continue current diet, Continue oral nutrition supplement     Electronically signed by Geneva Nissen on 8/31/2020

## 2020-08-31 NOTE — PROGRESS NOTES
Daily Progress Note: 8/31/2020  Song Rangel MD    Assessment/Plan:   Pneumonia involving right lung (8/25/2020) /  Hemoptysis (8/25/2020) POA: extensive with internal cavitation and air fluid levels. Staph Aureus, leukocytosis improving. CXR unchanged  --pleural fluid studies, cxs, cytology, AFB and 2nd quantiferon gold pending.   -- zosyn, doxy  -- Pulm and Thoracic Surg following  -- so far AFB neg     Bilateral pulmonary embolism, acute ([address] (8/25/2020) / Acute deep vein thrombosis (DVT) of both lower extremities ([address] (8/25/2020):   - IVC filter placed   - holding anticoags due to dropping Hb and active hemoptysis and GI bleeding     Weight loss, unintentional (8/25/2020) POA: still concerning for malignancy but severe protein calorie malnutrition remains a DDx. Nutrition and speech therapy following  -Diet as tolerated  -calcium corrects     Hyperbilirubinemia (8/25/2020) POA: unclear etiology. CT abdomen unremarkable  - resolved     Pleural effusion, right (8/25/2020) POA: empyema, pneumonia vs malignant effusion. -pulmonology following  - Chest tube placed 8/26/20     Alcohol use (8/25/2020) POA: he says he has cut down recently. -Monitor for withdrawal     Chronic Diarrhea (8/25/2020) / Fecal occult blood test positive (8/25/2020) POA: check stool studies. Monitor Hgb.    - GI following  -imodium and peptobismol PRN  -FloraQ daily- or pt's home probiotics    Hypomag/hypokalemia:  -replete and monitor        Problem List:  Problem List as of 8/31/2020 Date Reviewed: 8/25/2020          Codes Class Noted - Resolved    * (Principal) Pneumonia involving right lung ICD-10-CM: J18.9  ICD-9-CM: 698  8/25/2020 - Present        Hemoptysis ICD-10-CM: R04.2  ICD-9-CM: 786.30  8/25/2020 - Present        Hyperbilirubinemia ICD-10-CM: E80.6  ICD-9-CM: 782.4  8/25/2020 - Present        Weight loss, unintentional ICD-10-CM: R63.4  ICD-9-CM: 783.21  8/25/2020 - Present        Pleural effusion, right ICD-10-CM: J90  ICD-9-CM: 511.9  8/25/2020 - Present        Bilateral pulmonary embolism ([address] ICD-10-CM: I26.99  ICD-9-CM: 415.19  8/25/2020 - Present        Acute deep vein thrombosis (DVT) of both lower extremities (HCC) ICD-10-CM: I82.403  ICD-9-CM: 453.40  8/25/2020 - Present        Alcohol use ICD-10-CM: Z72.89  ICD-9-CM: V49.89  8/25/2020 - Present        Diarrhea ICD-10-CM: R19.7  ICD-9-CM: 787.91  8/25/2020 - Present        Fecal occult blood test positive ICD-10-CM: R19.5  ICD-9-CM: 792.1  8/25/2020 - Present        Hyponatremia with extracellular fluid depletion ICD-10-CM: E87.1  ICD-9-CM: 276.1  8/25/2020 - Present            Subjective:     60 y.o. male who is being admitted for Pneumonia involving right lung. Mr. Jane Edwards presented to our Emergency Department today complaining of generalized weakness, diarrhea and lethargy for several months. Patient was accompanied by his friend who gave most of the history. The patient does not like seeing physicians and apparently uses vitamin supplements. He is a smoker and drinks alcohol and has been progressively weaker over several months now. He has had pleuritic right sided chest pain associated with a productive cough and hemoptysis. He feel on the floor several days ago and was unable to get up. His friends helped him up and brought him to the ED for further testing. Initial CXR done showed airspace disease in right mid lower lung zone consistent with pneumonia. This is surrounded by a moderate right-sided pleural effusion. Upon review, he was noted to be cachectic with swollen lower extremities. CT scans chest abdomen and pelvis done showed a diffuse irregular opacification throughout the right lower lobe with multiple areas of internal cavitation and air-fluid levels. Findings are more likely related to infection than malignancy. Large right pleural effusion. Trace left pleural effusion. Bilateral pulmonary emboli. Clot burden is moderate.  No evidence of right heart strain. Thrombus in the veins of the left lower extremity extending to the IVC. A small amount of thrombus is also seen in the right common iliac vein. He will be admitted for further management. (Dr Augustine Solid)    8/26:  Feeling a little better this AM but still c/o painful, pleuritic cough and occas thick sputum. No leg/calf pains. No known exposure to ill individuals/no travel - \"just staying in the last few months. \"  No temps. WBC 31.9K. Cultures neg so far.      8/27:  Reports less cough, chest pain and feeling better overall. Appetite has returned. Chest tube placed yesterday with discharge obtained - pending cultures. Other cultures remain neg. Hb lower so cont to hold anticoag for now. GI has seen and work up when more stable. Remains on doxy, zosyn, vanc. WBC down to 28K. Bili back to normal.     8/28:  Continues to grad improve. Still with some cough. No tremor or hallucinations so far. K+ lower - replacing. WBC coming down. Afeb. CXR this AM pending. Cultures remain neg so far. Quantiferon gold test pending as of this AM.  He reports hx of chronic LE edema for years. 8/29: Pt continues to have frequent stooling. GI plans endoscopy next week if pt allows. He was not willing to participate with PT/OT/nutritionist yesterday. Says despite our best efforts, we will not get his stooling to stop until he gets his home probiotics [friend will bring it]. He refuses to eat until the stooling is better. 8/30:  Pt is reluctant to admit that his stooling has improved. Down to 2 BMs yesterday instead of 6 the day before. Tolerating PO. Willing to eat more today. He is still having hemoptysis. Wanting to get out of bed. Willing to work with PT/OT. 8/31:  Feeling better. Appetite better. TB neg so far. First Quantiferon Gold was indeterminate - repeat pending. Afeb. WBC slowly coming down. Currently on Doxy and Zosyn. Replacing Mag and K+.   Hb now stable in 8s.     Review of Systems:   A comprehensive review of systems was negative except for that written in the HPI. Objective:   Physical Exam:   Visit Vitals  /74 (BP 1 Location: Left arm, BP Patient Position: At rest)   Pulse 79   Temp 97.7 °F (36.5 °C)   Resp 19   Ht 5' 10\" (1.778 m)   Wt 70.8 kg (156 lb)   SpO2 93%   BMI 22.38 kg/m²      O2 Device: Room air  Temp (24hrs), Av.8 °F (36.6 °C), Min:97.5 °F (36.4 °C), Max:98.2 °F (36.8 °C)    No intake/output data recorded.  1901 -  0700  In: 5698.3 [P.O.:1500; I.V.:4198.3]  Out: 1485 [Urine:1100]  General:  Alert, cooperative, no distress, cachetic, ill appearing. Head:  Normocephalic, without obvious abnormality, atraumatic. Eyes:  [place] conjuntiva. PERRL, EOMs intact. Throat: Lips, mucosa, and tongue moist.   Neck: Supple, symmetrical, trachea midline, no adenopathy, thyroid: no enlargement/tenderness/nodules, no carotid bruit and no JVD. Lungs:   Better air exchange right,  Fine crackles anteriorly right,  a few scattered rhonchi. Chest wall:  Chest tube site right looks ok   Heart:  Regular rate and rhythm, S1, S2 normal, no murmur, click, rub or gallop. Abdomen:   Soft, non-tender. Bowel sounds normal. No masses,  No organomegaly. Extremities: no cyanosis. No calf tenderness;3+ pitting edema bilat. 2+ pitting edema in arms, No cords palpated. Pulses: 2+ and symmetric all extremities. Skin: Skin color, texture, turgor normal. No rashes   Neurologic:  Alert and oriented X 3. Fine motor of hands and fingers normal.   equal.  No tremor. Gait not tested at this time. Sensation grossly normal to touch. Gross motor of extremities normal.       Data Review:   EXAM: CT CHEST ABD PELV W CONT 20  INDICATION: weight loss  COMPARISON: Radiographs 2020  CONTRAST: 100 mL of Isovue-370. FINDINGS:   CHEST WALL: No mass or axillary lymphadenopathy. THYROID: No nodule. MEDIASTINUM: No mass or lymphadenopathy.   BRYAN: No mass or lymphadenopathy. THORACIC AORTA: No dissection or aneurysm. MAIN PULMONARY ARTERY: Normal in caliber. Thrombus is seen throughout the right  lower lobe pulmonary arteries. Thrombus is seen in the left lower lobe pulmonary  artery extending into the anterior and lateral basilar segments. TRACHEA/BRONCHI: Patent. ESOPHAGUS: No wall thickening or dilatation. HEART: Normal in size. PLEURA: There is a large right pleural effusion with enhancement of the pleural  surfaces. Trace left pleural effusion. LUNGS: There is irregular opacification throughout the right lower lobe. There  are multiple areas of internal cavitation with air-fluid levels. Small areas of  atelectasis or scarring are seen in the lingula and inferior left lower lobe. LIVER: No mass. BILIARY TREE: Gallbladder is within normal limits. CBD is not dilated. SPLEEN: within normal limits. PANCREAS: No mass or ductal dilatation. ADRENALS: Unremarkable. KIDNEYS: Small nonobstructive stones are seen in the bilateral kidneys. No  evidence of hydronephrosis. No mass. STOMACH: Unremarkable. SMALL BOWEL: No dilatation or wall thickening. COLON: No dilatation or wall thickening. APPENDIX: Unremarkable  PERITONEUM: No ascites or pneumoperitoneum. RETROPERITONEUM: No lymphadenopathy or aortic aneurysm. REPRODUCTIVE ORGANS: Unremarkable  URINARY BLADDER: No mass or calculus. BONES: No destructive bone lesion. ABDOMINAL WALL: No mass or hernia. ADDITIONAL COMMENTS: There is thrombus in the left femoral veins extending into  the left iliac veins into the IVC. A small amount of thrombus is also present in  the right common iliac vein. IMPRESSION:  1. Diffuse irregular opacification throughout the right lower lobe with multiple  areas of internal cavitation and air-fluid levels. Findings are more likely  related to infection than malignancy. 2. Large right pleural effusion. Trace left pleural effusion. 3. Bilateral pulmonary emboli.  Clot burden is moderate. No evidence of right  heart strain. 4. Thrombus in the veins of the left lower extremity extending to the IVC. A  small amount of thrombus is also seen in the right common iliac vein. EXAM:  IR THORACENTESIS/INSERT CHEST TUBE 8/26/20  INDICATION:  Request placement of large size pigtail catheter. Possible empyema. PROCEDURE:  Available history and imaging was reviewed which demonstrates a large partially  loculated right pleural effusion. Possibility of empyema, tuberculosis and other  etiologies are being considered clinically. Specific request for larger pigtail  catheter placement. Following informed consent the patient was evaluated with ultrasound in the  angina/catheter lab department. This demonstrates a large right pleural  effusion. The location of the pleural effusion was marked and then the area was prepped  and draped. 1% lidocaine was used for local anesthesia. A small incision was made with an 11  blade. A micropuncture needle was used under direct fluoroscopic guidance to access the  pleural fluid. When this was achieved the microguidewire was placed followed by  a 5 Rowena Jakes dilator. Through this a short [address] guidewire  was placed. The track was then dilated to 12 Rowena Jakes. This was then followed by  placement of a 12 [place] [employer] pigtail catheter. The string for the pigtail  was removed prior to placement. The catheter was positioned under fluoroscopic  guidance and then secured in place at the skin with a 2-0 Prolene suture. The  site was then dressed with a Tegaderm Bioclusive dressing and will be connected  to a Pleur-evac for subsequent drainage. Prior to this approximately 100 mL of fluid was obtained and sent for cytology  as well as multiple cultures including aerobic, anaerobic, AFB and fungal.   The fluid was withdrawn was mildly cloudy serosanguineous in color. I did not  notice any foul smell.   The procedure was adequately tolerated without significant blood loss or  evidence of complication. Final digital image of the chest was saved documenting catheter position. IMPRESSION:   Successful ultrasound and fluoroscopic guided placement of 12 [place] right-sided  chest tube. Patient will have ongoing drainage from Pleur-evac in his hospital  bed/room. Care and monitoring as per medical service and nursing service. ECHO 8/27/20  Interpretation Summary   Result status: Final result    · Image quality for this study was technically difficult. · Echo study was technically difficulty and limited due to patient's tolerance. · LV: Estimated LVEF is 55 - 60%. Normal cavity size, wall thickness and systolic function (ejection fraction normal). Wall motion: normal. Mild (grade 1) left ventricular diastolic dysfunction. · Pericardium: Small pericardial effusion adjacent to right ventricle. Recent Days:  Recent Labs     08/31/20  0411 08/30/20  0202 08/29/20  0514   WBC 17.0* 18.7* 19.6*   HGB 8.2* 8.1* 8.5*   HCT 24.5* 23.9* 25.3*    199 188     Recent Labs     08/31/20  0600 08/30/20  0202 08/29/20  0514    144 143   K 4.0 3.0* 3.7   * 114* 113*   CO2 22 23 24   GLU 83 153* 66   BUN 3* 4* 4*   CREA 0.45* 0.49* 0.37*   CA 6.9* 6.4* 6.6*   MG  --  1.5* 1.7   ALB 0.9* 1.0* 1.2*   TBILI 1.3* 1.5* 1.5*   * 57 53     No results for input(s): PH, PCO2, PO2, HCO3, FIO2 in the last 72 hours.     24 Hour Results:  Recent Results (from the past 24 hour(s))   CBC WITH AUTOMATED DIFF    Collection Time: 08/31/20  4:11 AM   Result Value Ref Range    WBC 17.0 (H) 4.1 - 11.1 K/uL    RBC 2.38 (L) 4.10 - 5.70 M/uL    HGB 8.2 (L) 12.1 - 17.0 g/dL    HCT 24.5 (L) 36.6 - 50.3 %    .9 (H) 80.0 - 99.0 FL    MCH 34.5 (H) 26.0 - 34.0 PG    MCHC 33.5 30.0 - 36.5 g/dL    RDW 22.3 (H) 11.5 - 14.5 %    PLATELET 225 331 - 925 K/uL    MPV 12.6 8.9 - 12.9 FL    NRBC 0.0 0  WBC    ABSOLUTE NRBC 0.00 0.00 - 0.01 K/uL NEUTROPHILS 74 32 - 75 %    BAND NEUTROPHILS 1 0 - 6 %    LYMPHOCYTES 17 12 - 49 %    MONOCYTES 6 5 - 13 %    EOSINOPHILS 0 0 - 7 %    BASOPHILS 0 0 - 1 %    METAMYELOCYTES 1 (H) 0 %    MYELOCYTES 1 (H) 0 %    IMMATURE GRANULOCYTES 0 %    ABS. NEUTROPHILS 12.8 (H) 1.8 - 8.0 K/UL    ABS. LYMPHOCYTES 2.9 0.8 - 3.5 K/UL    ABS. MONOCYTES 1.0 0.0 - 1.0 K/UL    ABS. EOSINOPHILS 0.0 0.0 - 0.4 K/UL    ABS. BASOPHILS 0.0 0.0 - 0.1 K/UL    ABS. IMM. GRANS. 0.0 K/UL    DF MANUAL      RBC COMMENTS ANISOCYTOSIS  2+        RBC COMMENTS TARGET CELLS     METABOLIC PANEL, COMPREHENSIVE    Collection Time: 08/31/20  6:00 AM   Result Value Ref Range    Sodium 142 136 - 145 mmol/L    Potassium 4.0 3.5 - 5.1 mmol/L    Chloride 114 (H) 97 - 108 mmol/L    CO2 22 21 - 32 mmol/L    Anion gap 6 5 - 15 mmol/L    Glucose 83 65 - 100 mg/dL    BUN 3 (L) 6 - 20 MG/DL    Creatinine 0.45 (L) 0.70 - 1.30 MG/DL    BUN/Creatinine ratio 7 (L) 12 - 20      GFR est AA >60 >60 ml/min/1.73m2    GFR est non-AA >60 >60 ml/min/1.73m2    Calcium 6.9 (L) 8.5 - 10.1 MG/DL    Bilirubin, total 1.3 (H) 0.2 - 1.0 MG/DL    ALT (SGPT) 107 (H) 12 - 78 U/L    AST (SGOT) 201 (H) 15 - 37 U/L    Alk.  phosphatase 426 (H) 45 - 117 U/L    Protein, total 4.9 (L) 6.4 - 8.2 g/dL    Albumin 0.9 (L) 3.5 - 5.0 g/dL    Globulin 4.0 2.0 - 4.0 g/dL    A-G Ratio 0.2 (L) 1.1 - 2.2         Medications reviewed  Current Facility-Administered Medications   Medication Dose Route Frequency    potassium chloride SR (KLOR-CON 10) tablet 40 mEq  40 mEq Oral NOW    lipase-protease-amylase (CREON 24,000) capsule 1 Cap  1 Cap Oral TID WITH MEALS    loperamide (IMODIUM) capsule 2 mg  2 mg Oral Q6H PRN    lactobac ac& pc-s.therm-b.anim (ANITA Q/RISAQUAD)  1 Cap Oral DAILY    bismuth subsalicylate (PEPTO-BISMOL) oral suspension 30 mL  30 mL Oral Q6H PRN    cyanocobalamin (VITAMIN B12) tablet 1,000 mcg  1,000 mcg Oral DAILY    folic acid (FOLVITE) tablet 1 mg  1 mg Oral DAILY    HYDROcodone-acetaminophen (NORCO) 5-325 mg per tablet 1 Tab  1 Tab Oral Q6H PRN    morphine injection 1 mg  1 mg IntraVENous Q4H PRN    sodium chloride (NS) flush 5-10 mL  5-10 mL IntraVENous PRN    sodium chloride (NS) flush 5-40 mL  5-40 mL IntraVENous Q8H    sodium chloride (NS) flush 5-40 mL  5-40 mL IntraVENous PRN    acetaminophen (TYLENOL) tablet 650 mg  650 mg Oral Q6H PRN    Or    acetaminophen (TYLENOL) suppository 650 mg  650 mg Rectal Q6H PRN    polyethylene glycol (MIRALAX) packet 17 g  17 g Oral DAILY PRN    promethazine (PHENERGAN) tablet 12.5 mg  12.5 mg Oral Q6H PRN    Or    ondansetron (ZOFRAN) injection 4 mg  4 mg IntraVENous Q6H PRN    0.9% sodium chloride infusion  100 mL/hr IntraVENous CONTINUOUS    piperacillin-tazobactam (ZOSYN) 3.375 g in 0.9% sodium chloride (MBP/ADV) 100 mL  3.375 g IntraVENous Q8H    doxycycline (VIBRAMYCIN) 100 mg in 0.9% sodium chloride (MBP/ADV) 100 mL  100 mg IntraVENous Q12H    pantoprazole (PROTONIX) 40 mg in 0.9% sodium chloride 10 mL injection  40 mg IntraVENous Q12H       Care Plan discussed with: Patient/Nurse  Total time spent with patient: 40 minutes.   Skye Schuler MD

## 2020-08-31 NOTE — PROGRESS NOTES
Care Management follow up, LOS - 5 days     CM assessment completed via EMR review and collaboration with nursing staff. [facility] ER contact with patient for this assessment. Patient remains in airborne isolation, R/O TB.     Patient admitted for pneumonia, hemoptysis, right pleural effusion, bilateral DVT, r/o TB. GI bleed.     RUR score 13%/ low risk     Current status  Patient currently requiring medical management including IV antibiotics and potassium replete today. [address] filter placed 8/25/2020. R/O TB. Patient had Chest tube to suction.     Transition of Care Plan  1. Monitor patient status and response to treatment. 2. Medical management continues. 3. PT/OT working with patient. 4. Unsure of discharge needs at this time.   5. CM to follow.     Tyler Viveros RN, MSN/Care manager

## 2020-08-31 NOTE — PROGRESS NOTES
Problem: Self Care Deficits Care Plan (Adult)  Goal: *Acute Goals and Plan of Care (Insert Text)  Description:   FUNCTIONAL STATUS PRIOR TO ADMISSION: Patient was independent and active without use of DME. Patient was independent for basic and instrumental ADLs. HOME SUPPORT: The patient lived alone with no local support. Occupational Therapy Goals  Initiated 8/27/2020  1. Patient will perform grooming, standing at sink for at least 5 minutes, with supervision/set-up within 7 day(s). 2.  Patient will perform upper body dressing with modified independence within 7 day(s). 3.  Patient will perform lower body dressing with supervision/set-up within 7 day(s). 4.  Patient will perform toilet transfers with modified independence within 7 day(s). 5.  Patient will perform all aspects of toileting with modified independence within 7 day(s). 6.  Patient will participate in upper extremity therapeutic exercise/activities with independence for 5 minutes within 7 day(s). 7.  Patient will utilize energy conservation techniques during functional activities with verbal cues within 7 day(s). Outcome: Progressing Towards Goal     OCCUPATIONAL THERAPY TREATMENT  Patient: Trinidad Dietrich (8 y.o. male)  Date: 8/31/2020  Diagnosis: Pneumonia [J18.9]   Pneumonia involving right lung       Precautions: Fall, airborne  Chart, occupational therapy assessment, plan of care, and goals were reviewed. ASSESSMENT  Patient continues with skilled OT services and is progressing towards goals. He required increased time, frequent education on importance of active participation in all ADLs, mobility and exer. He was only agreeable to EOB activity and declined OOB. Recommend SNF vs HH therapy at discharge pending progress.     Current Level of Function Impacting Discharge (ADLs): max A LE and toileting ADLs    Other factors to consider for discharge: see above         PLAN :  Patient continues to benefit from skilled intervention to address the above impairments. Continue treatment per established plan of care. to address goals. Recommend with staff: OOB to chair for all meals, encourage active participation in all ADLs, BSC for toileting    Recommend next OT session: any ADLs, exer, standing    Recommendation for discharge: (in order for the patient to meet his/her long term goals)  To be determined: HH vs SNF    This discharge recommendation:  Has not yet been discussed the attending provider and/or case management    IF patient discharges home will need the following DME: TBD       SUBJECTIVE:   Patient stated Ajith Vasquez will do a little.     OBJECTIVE DATA SUMMARY:   Cognitive/Behavioral Status:  Neurologic State: Alert  Orientation Level: Oriented X4  Cognition: Appropriate for age attention/concentration; Follows commands  Perception: Appears intact  Perseveration: No perseveration noted  Safety/Judgement: Awareness of environment;Decreased awareness of need for assistance;Decreased awareness of need for safety;Decreased insight into deficits; Fall prevention    Functional Mobility and Transfers for ADLs:  Bed Mobility:  Supine to Sit: Moderate assistance; Additional time;Assist T0([id number] effort elicited)  Scooting: Contact guard assistance    Transfers:  Sit to Stand: Minimum assistance; Additional time;Assist x1(RW in front of pt)  Functional Transfers  Toilet Transfer : Minimum assistance; Additional time;Assist x1(RW and BSC)  Cues: Physical assistance;Verbal cues provided;Visual cues provided  Adaptive Equipment: Bedside commode;Walker (comment)       Balance:  Sitting: Intact  Standing: Intact; With support(RW)    ADL Intervention:  Lower Body Dressing Assistance  Socks: Total assistance (dependent)(pt declined active participation)  Position Performed: Long sitting on bed;Seated edge of bed  Cues: Don;Physical assistance; Tactile cues provided;Verbal cues provided;Visual cues provided    Patient instructed and indicated understanding energy conservation techniques to increase independence and safety during ADLs. Cognitive Retraining  Safety/Judgement: Awareness of environment;Decreased awareness of need for assistance;Decreased awareness of need for safety;Decreased insight into deficits; Fall prevention      Pain:  4/10    Activity Tolerance:   Fair and requires rest breaks  Please refer to the flowsheet for vital signs taken during this treatment. After treatment patient left in no apparent distress:   Call bell within reach and seated EOB with PT    COMMUNICATION/COLLABORATION:   The patients plan of care was discussed with: Physical therapist and Registered nurse.      Concetta Rios, OT  Time Calculation: 24 mins

## 2020-08-31 NOTE — PROGRESS NOTES
Gastrointestinal Progress Note    8/31/2020    Admit Date: 8/25/2020    Subjective:     Meal provoked loose bowel movements without abdominal pain, vomiting, visible bleeding persists. Still not ready for endoscopic exams    Current Facility-Administered Medications   Medication Dose Route Frequency    potassium chloride SR (KLOR-CON 10) tablet 40 mEq  40 mEq Oral NOW    lipase-protease-amylase (CREON 24,000) capsule 1 Cap  1 Cap Oral TID WITH MEALS    loperamide (IMODIUM) capsule 2 mg  2 mg Oral Q6H PRN    lactobac ac& pc-s.therm-b.anim (ANITA Q/RISAQUAD)  1 Cap Oral DAILY    bismuth subsalicylate (PEPTO-BISMOL) oral suspension 30 mL  30 mL Oral Q6H PRN    cyanocobalamin (VITAMIN B12) tablet 1,000 mcg  1,000 mcg Oral DAILY    folic acid (FOLVITE) tablet 1 mg  1 mg Oral DAILY    HYDROcodone-acetaminophen (NORCO) 5-325 mg per tablet 1 Tab  1 Tab Oral Q6H PRN    morphine injection 1 mg  1 mg IntraVENous Q4H PRN    sodium chloride (NS) flush 5-10 mL  5-10 mL IntraVENous PRN    sodium chloride (NS) flush 5-40 mL  5-40 mL IntraVENous Q8H    sodium chloride (NS) flush 5-40 mL  5-40 mL IntraVENous PRN    acetaminophen (TYLENOL) tablet 650 mg  650 mg Oral Q6H PRN    Or    acetaminophen (TYLENOL) suppository 650 mg  650 mg Rectal Q6H PRN    polyethylene glycol (MIRALAX) packet 17 g  17 g Oral DAILY PRN    promethazine (PHENERGAN) tablet 12.5 mg  12.5 mg Oral Q6H PRN    Or    ondansetron (ZOFRAN) injection 4 mg  4 mg IntraVENous Q6H PRN    0.9% sodium chloride infusion  100 mL/hr IntraVENous CONTINUOUS    piperacillin-tazobactam (ZOSYN) 3.375 g in 0.9% sodium chloride (MBP/ADV) 100 mL  3.375 g IntraVENous Q8H    doxycycline (VIBRAMYCIN) 100 mg in 0.9% sodium chloride (MBP/ADV) 100 mL  100 mg IntraVENous Q12H    pantoprazole (PROTONIX) 40 mg in 0.9% sodium chloride 10 mL injection  40 mg IntraVENous Q12H        Objective:     Blood pressure 105/74, pulse 79, temperature 97.7 °F (36.5 °C), resp.  rate 19, height 5' 10\" (1.778 m), weight 70.8 kg (156 lb), SpO2 93 %. No intake/output data recorded. 08/29 1901 - 08/31 0700  In: 5698.3 [P.O.:1500; I.V.:4198.3]  Out: 1485 [Urine:1100]    EXAM:  GENERAL: alert, fatigued, no distress, HEART: regular rate and rhythm, LUNGS: air entry reduced diffusely throughout both lungs, ABDOMEN:  Bowel sounds are normal, liver is not enlarged, spleen is not enlarged and EXTREMITY: edema diffuse      Data Review    Recent Results (from the past 24 hour(s))   CBC WITH AUTOMATED DIFF    Collection Time: 08/31/20  4:11 AM   Result Value Ref Range    WBC 17.0 (H) 4.1 - 11.1 K/uL    RBC 2.38 (L) 4.10 - 5.70 M/uL    HGB 8.2 (L) 12.1 - 17.0 g/dL    HCT 24.5 (L) 36.6 - 50.3 %    .9 (H) 80.0 - 99.0 FL    MCH 34.5 (H) 26.0 - 34.0 PG    MCHC 33.5 30.0 - 36.5 g/dL    RDW 22.3 (H) 11.5 - 14.5 %    PLATELET 630 226 - 947 K/uL    MPV 12.6 8.9 - 12.9 FL    NRBC 0.0 0  WBC    ABSOLUTE NRBC 0.00 0.00 - 0.01 K/uL    NEUTROPHILS 74 32 - 75 %    BAND NEUTROPHILS 1 0 - 6 %    LYMPHOCYTES 17 12 - 49 %    MONOCYTES 6 5 - 13 %    EOSINOPHILS 0 0 - 7 %    BASOPHILS 0 0 - 1 %    METAMYELOCYTES 1 (H) 0 %    MYELOCYTES 1 (H) 0 %    IMMATURE GRANULOCYTES 0 %    ABS. NEUTROPHILS 12.8 (H) 1.8 - 8.0 K/UL    ABS. LYMPHOCYTES 2.9 0.8 - 3.5 K/UL    ABS. MONOCYTES 1.0 0.0 - 1.0 K/UL    ABS. EOSINOPHILS 0.0 0.0 - 0.4 K/UL    ABS. BASOPHILS 0.0 0.0 - 0.1 K/UL    ABS. IMM.  GRANS. 0.0 K/UL    DF MANUAL      RBC COMMENTS ANISOCYTOSIS  2+        RBC COMMENTS TARGET CELLS     METABOLIC PANEL, COMPREHENSIVE    Collection Time: 08/31/20  6:00 AM   Result Value Ref Range    Sodium 142 136 - 145 mmol/L    Potassium 4.0 3.5 - 5.1 mmol/L    Chloride 114 (H) 97 - 108 mmol/L    CO2 22 21 - 32 mmol/L    Anion gap 6 5 - 15 mmol/L    Glucose 83 65 - 100 mg/dL    BUN 3 (L) 6 - 20 MG/DL    Creatinine 0.45 (L) 0.70 - 1.30 MG/DL    BUN/Creatinine ratio 7 (L) 12 - 20      GFR est AA >60 >60 ml/min/1.73m2    GFR est non-AA >60 >60 ml/min/1.73m2    Calcium 6.9 (L) 8.5 - 10.1 MG/DL    Bilirubin, total 1.3 (H) 0.2 - 1.0 MG/DL    ALT (SGPT) 107 (H) 12 - 78 U/L    AST (SGOT) 201 (H) 15 - 37 U/L    Alk. phosphatase 426 (H) 45 - 117 U/L    Protein, total 4.9 (L) 6.4 - 8.2 g/dL    Albumin 0.9 (L) 3.5 - 5.0 g/dL    Globulin 4.0 2.0 - 4.0 g/dL    A-G Ratio 0.2 (L) 1.1 - 2.2         Assessment:     Principal Problem:    Pneumonia involving right lung (8/25/2020)    Active Problems:    Hemoptysis (8/25/2020)      Hyperbilirubinemia (8/25/2020)      Weight loss, unintentional (8/25/2020)      Pleural effusion, right (8/25/2020)      Bilateral pulmonary embolism (HCC) (8/25/2020)      Acute deep vein thrombosis (DVT) of both lower extremities ([address] (8/25/2020)      Alcohol use (8/25/2020)      Diarrhea (8/25/2020)      Fecal occult blood test positive (8/25/2020)      Hyponatremia with extracellular fluid depletion (8/25/2020)        Plan:     1.   Viral and non viral hepatitis serologies

## 2020-08-31 NOTE — PROGRESS NOTES
0700-Bedside and Verbal shift change report given to [address] [place] [address] (oncoming nurse) by Adelina Edmonds (offgoing nurse). Report included the following information SBAR, Kardex, Intake/Output, MAR, Accordion, Recent Results, Med Rec Status and Cardiac Rhythm NSR. Frankie Francheska 0900- Labs collected. Patient refusing to be stuck again. Educated patient on importance of drawing labs to monitor patient, patient reports \"my arms look like this because yall have taken all my blood. \"     1900-Bedside and Verbal shift change report given to HARJIT Enriquez (oncoming nurse) by [address] [place] [address] (offgoing nurse). Report included the following information SBAR, Kardex, Intake/Output, MAR, Accordion, Recent Results and Med Rec Status.

## 2020-08-31 NOTE — PROGRESS NOTES
Problem: Mobility Impaired (Adult and Pediatric)  Goal: *Acute Goals and Plan of Care (Insert Text)  Description: FUNCTIONAL STATUS PRIOR TO ADMISSION: Pt reports independent baseline mobility without device    HOME SUPPORT PRIOR TO ADMISSION: The patient lived alone with friends to provide assistance intermittently. Physical Therapy Goals  Initiated 8/27/2020  1. Patient will move from supine to sit and sit to supine in bed with supervision/set-up within 7 day(s). 2.  Patient will transfer from bed to chair and chair to bed with supervision/set-up using the least restrictive device within 7 day(s). 3.  Patient will perform sit to stand with supervision/set-up within 7 day(s). 4.  Patient will ambulate with supervision/set-up for 100 feet with the least restrictive device within 7 day(s). 5.  Patient will ascend/descend 4 stairs with one handrail(s) with minimal assistance/contact guard assist within 7 day(s). Outcome: Progressing Towards Goal   PHYSICAL THERAPY TREATMENT  Patient: Yasemin Avery (13 y.o. male)  Date: 8/31/2020  Diagnosis: Pneumonia [J18.9]   Pneumonia involving right lung       Precautions: Fall  Chart, physical therapy assessment, plan of care and goals were reviewed. ASSESSMENT  Patient continues with skilled PT services and is progressing towards goals. Communicated with nurse cleared for therapy. Sit patient on the edge of bed with mod assist, sit to stand min assist, tolerated sitting and standing. Performed some active range of motion exercise on both LE while sitting and standing. Offered to be OOB to chair as tolerated however patient refuse to be up on the chair. Ambulate side steps towards the head of bed CGA. Assisted back to bed reposition up high on the bed and place bed to chair position. Educate and  Instructed patient to continue active range of motion exercise on both legs while up on chair or on bed.    Communicated with nurse who agreed to monitor patient. Current Level of Function Impacting Discharge (mobility/balance): min assist with transfers and CGA with ambulation using a rolling walker    Other factors to consider for discharge: none         PLAN :  Patient continues to benefit from skilled intervention to address the above impairments. Continue treatment per established plan of care. to address goals. Recommendation for discharge: (in order for the patient to meet his/her long term goals)  To be determined: pending progress from therapy    This discharge recommendation:  Has been made in collaboration with the attending provider and/or case management    IF patient discharges home will need the following DME: to be determined (TBD)       SUBJECTIVE:   Patient stated ok.     OBJECTIVE DATA SUMMARY:   Critical Behavior:  Neurologic State: Alert  Orientation Level: Oriented X4  Cognition: Appropriate for age attention/concentration, Follows commands  Safety/Judgement: Awareness of environment, Decreased awareness of need for assistance, Decreased awareness of need for safety, Decreased insight into deficits, Fall prevention  Functional Mobility Training:  Bed Mobility:  Rolling: Moderate assistance; Additional time  Supine to Sit: Moderate assistance; Additional time;Assist Z0(MEGCEDV effort elicited)  Sit to Supine: Moderate assistance; Additional time  Scooting: Contact guard assistance        Transfers:  Sit to Stand: Minimum assistance; Additional time;Assist x1(RW in front of pt)  Stand to Sit: Contact guard assistance  Stand Pivot Transfers: Contact guard assistance                          Balance:  Sitting: Intact  Standing: Intact; With support(RW)  Standing - Static: Fair  Standing - Dynamic : Fair  Ambulation/Gait Training:  Distance (ft): 3 Feet (ft)  Assistive Device: Walker, rolling;Gait belt  Ambulation - Level of Assistance: Contact guard assistance     Gait Description (WDL): Exceptions to WDL  Gait Abnormalities: Path deviations; Step to gait        Base of Support: Widened     Speed/Taty: Fluctuations  Step Length: Right shortened;Left shortened                      Therapeutic Exercises:    Instructed patient to continue active range of motion exercise on both legs while up on chair or on bed. Pain Ratin/10    Activity Tolerance:   Good  Please refer to the flowsheet for vital signs taken during this treatment. After treatment patient left in no apparent distress:   Supine in bed, Heels elevated for pressure relief, Call bell within reach, and Bed / chair alarm activated    COMMUNICATION/COLLABORATION:   The patients plan of care was discussed with: Occupational therapist and Registered nurse. Marely Hoyos PT,WCC.    Time Calculation: 29 mins

## 2020-08-31 NOTE — PROGRESS NOTES
Name: [facility]: [address]   : 1955 Admit Date: 2020   Phone: [phone]  Room: 324/01   PCP: Lesli Corado MD  MRN: [id number]   Date: 2020  Code: Full Code          Chart and notes reviewed. Data reviewed. I review the patient's current medications in the medical record at each encounter.  I have evaluated and examined the patient. History of Present Illness:   is a pleasant, 61 yo gentleman that presented to [facility] with worsening shortness of breath and weakness. He tells me that he started feeling poorly in 2019 with a cough, however that resolved. His symptoms returned in 2019 and tells me that he has been struggling with cough, weight loss of 40+ lbs, and intermittent hemoptysis (phlegm mixed with blood), and diarrhea. He is a smoker of 2ppd for 20+ years. Also prior to the last two weeks, was \"drinking like a fish,\" at least 1 pint per day. He does not routinely prefer to seek traditional medical care and thus has delayed coming to the ED due to his symptoms. Reports worsening right sided chest pain and poor appetite. Images:  Personally reviewed. Chest/Abdominal CT:  Diffuse irregular opacification throughout the right lower lobe with multiple  areas of internal cavitation and air-fluid levels. Findings are more likely  related to infection than malignancy. Large right pleural effusion. Trace left pleural effusion. Bilateral pulmonary emboli. Clot burden is moderate. No evidence of right heart strain. Thrombus in the veins of the left lower extremity extending to the IVC. A  small amount of thrombus is also seen in the right common iliac vein. WBC 31.69  Hgb 9.7  ALT 47  AST 75  Na 135  Creat . 47  Albumin . 7  Lactic acid on admit 2.2; now 1.1  Hemoccult +   Blood cultures negative x 11 hours      Interval History:  Afebrile  BP soft but stable  Sats 93% on RA  WBC 17 - trending down  Hgb 8.2 - The patient is a 81y Female complaining of chest pain. stable  K 3.0  Corrected Ca 8.8  Alk phos 300 - worse  AST 89 - trending up  Blood cx no growth x 5 days  Resp cx with moderate staph aureus  Pleural fluid cx heavy staph aureus - pan sensitive  Pleural fluid ABF smear neg  Pleural fluid cytology no cells diagnostic for malignancy  AFB smear neg x 2; 3rd specimen in process  8/26 quant TB gold indeterminate; 8/29 repeat in process  Chest tube with 230ml out      ECHO: EF 24-48%; grade 1 diastolic dysfunction; small pericardial effusion, no tamponade  BLE VD: positive for age indeterminate and acute deep venous thrombosis or thrombophlebitis in right and left CFV, FV, and popliteal veins. ROS: Feeling okay this morning. Denies SOB. Denies fever or chills. Mild discomfort at site of chest tube. Reports cough productive of brown sputum. Denies abd pain. Continues with LE edema - mostly unchanged. No past medical history on file. Past Surgical History:   Procedure Laterality Date    IR [facility] IVC FILTER  8/25/2020    IR THORACENTESIS/INSERT CHEST TUBE  8/26/2020       No family history on file.     Social History     Tobacco Use    Smoking status: Current Every Day Smoker   Substance Use Topics    Alcohol use: Yes       No Known Allergies    Current Facility-Administered Medications   Medication Dose Route Frequency    lipase-protease-amylase (CREON 24,000) capsule 1 Cap  1 Cap Oral TID WITH MEALS    loperamide (IMODIUM) capsule 2 mg  2 mg Oral Q6H PRN    lactobac ac& pc-s.therm-b.anim (ANITA Q/RISAQUAD)  1 Cap Oral DAILY    bismuth subsalicylate (PEPTO-BISMOL) oral suspension 30 mL  30 mL Oral Q6H PRN    cyanocobalamin (VITAMIN B12) tablet 1,000 mcg  1,000 mcg Oral DAILY    folic acid (FOLVITE) tablet 1 mg  1 mg Oral DAILY    HYDROcodone-acetaminophen (NORCO) 5-325 mg per tablet 1 Tab  1 Tab Oral Q6H PRN    morphine injection 1 mg  1 mg IntraVENous Q4H PRN    sodium chloride (NS) flush 5-10 mL  5-10 mL IntraVENous PRN    sodium chloride (NS) flush 5-40 mL  5-40 mL IntraVENous Q8H    sodium chloride (NS) flush 5-40 mL  5-40 mL IntraVENous PRN    acetaminophen (TYLENOL) tablet 650 mg  650 mg Oral Q6H PRN    Or    acetaminophen (TYLENOL) suppository 650 mg  650 mg Rectal Q6H PRN    polyethylene glycol (MIRALAX) packet 17 g  17 g Oral DAILY PRN    promethazine (PHENERGAN) tablet 12.5 mg  12.5 mg Oral Q6H PRN    Or    ondansetron (ZOFRAN) injection 4 mg  4 mg IntraVENous Q6H PRN    0.9% sodium chloride infusion  100 mL/hr IntraVENous CONTINUOUS    piperacillin-tazobactam (ZOSYN) 3.375 g in 0.9% sodium chloride (MBP/ADV) 100 mL  3.375 g IntraVENous Q8H    doxycycline (VIBRAMYCIN) 100 mg in 0.9% sodium chloride (MBP/ADV) 100 mL  100 mg IntraVENous Q12H    pantoprazole (PROTONIX) 40 mg in 0.9% sodium chloride 10 mL injection  40 mg IntraVENous Q12H         REVIEW OF SYSTEMS   Negative except as stated in the HPI. Physical Exam:   Visit Vitals  /74 (BP 1 Location: Left arm, BP Patient Position: At rest)   Pulse 79   Temp 97.7 °F (36.5 °C)   Resp 19   Ht 5' 10\" (1.778 m)   Wt 70.8 kg (156 lb)   SpO2 93%   BMI 22.38 kg/m²       General:  Alert, cooperative, no distress, appears older stated age. Head:  Normocephalic, without obvious abnormality, atraumatic. Eyes:  Conjunctivae/corneas clear. .   Nose: Nares normal. Septum midline. Mucosa normal.    Throat: Lips, mucosa, and tongue normal. Poor dentition. Lungs:   Chest tube with serosanguinous fluid, no air leak. Diminished right base. Chest wall:  No tenderness or deformity. Heart:  Regular rate and rhythm, S1, S2 normal, no murmur, click, rub or gallop. Abdomen:   Soft, non-tender. Bowel sounds normal.    Extremities: Muscle wasting. Anasarca. Pulses: 2+ and symmetric all extremities.    Skin: Skin color, texture, turgor normal.   Lymph nodes: Cervical nodes normal.   Neurologic: Grossly nonfocal       Lab Results   Component Value Date/Time    Sodium 144 08/30/2020 02:02 AM Potassium 3.0 (L) 08/30/2020 02:02 AM    Chloride 114 (H) 08/30/2020 02:02 AM    CO2 23 08/30/2020 02:02 AM    BUN 4 (L) 08/30/2020 02:02 AM    Creatinine 0.49 (L) 08/30/2020 02:02 AM    Glucose 153 (H) 08/30/2020 02:02 AM    Calcium 6.4 (LL) 08/30/2020 02:02 AM    Magnesium 1.5 (L) 08/30/2020 02:02 AM    Lactic acid 1.1 08/25/2020 06:25 PM       Lab Results   Component Value Date/Time    WBC 17.0 (H) 08/31/2020 04:11 AM    HGB 8.2 (L) 08/31/2020 04:11 AM    PLATELET 033 21/51/1926 04:11 AM    .9 (H) 08/31/2020 04:11 AM       Lab Results   Component Value Date/Time    Alk. phosphatase 300 (H) 08/30/2020 02:02 AM    Protein, total 4.5 (L) 08/30/2020 02:02 AM    Albumin 1.0 (L) 08/30/2020 02:02 AM    Globulin 3.5 08/30/2020 02:02 AM       No results found for: IRON, FE, TIBC, IBCT, PSAT, FERR    No results found for: SR, CRP, ANALIA, ANAIGG, RA, RPR, RPRT, VDRLT, VDRLS, TSH, TSHEXT, TSHEXT     No results found for: PH, PHI, PCO2, PCO2I, PO2, PO2I, HCO3, HCO3I, FIO2, FIO2I    Lab Results   Component Value Date/Time    Troponin-I, Qt. <0.05 08/25/2020 04:07 PM        Lab Results   Component Value Date/Time    Culture result: HEAVY STAPHYLOCOCCUS AUREUS (A) 08/26/2020 04:48 PM    Culture result: NO ANAEROBES ISOLATED 08/26/2020 04:48 PM    Culture result: MRSA NOT PRESENT 08/26/2020 12:55 PM    Culture result:  08/26/2020 12:55 PM         Screening of patient nares for MRSA is for surveillance purposes and, if positive, to facilitate isolation considerations in high risk settings. It is not intended for automatic decolonization interventions per se as regimens are not sufficiently effective to warrant routine use.        No results found for: TOXA1, RPR, HBCM, HBSAG, HAAB, HCAB1, HAAT, G6PD, CRYAC, HIVGT, HIVR, HIV1, HIV12, HIVPC, HIVRPI    Lab Results   Component Value Date/Time    Vancomycin,trough 13.0 (H) 08/28/2020 09:36 AM       Lab Results   Component Value Date/Time    Color JENNIFFER 08/26/2020 03:12 AM Appearance CLEAR 08/26/2020 03:12 AM    Specific gravity 1.005 08/26/2020 03:12 AM    pH (UA) 6.0 08/26/2020 03:12 AM    Protein TRACE (A) 08/26/2020 03:12 AM    Glucose Negative 08/26/2020 03:12 AM    Ketone TRACE (A) 08/26/2020 03:12 AM    Blood Negative 08/26/2020 03:12 AM    Urobilinogen 1.0 08/26/2020 03:12 AM    Nitrites Negative 08/26/2020 03:12 AM    Leukocyte Esterase SMALL (A) 08/26/2020 03:12 AM    WBC 5-10 08/26/2020 03:12 AM    RBC 5-10 08/26/2020 03:12 AM    Bacteria Negative 08/26/2020 03:12 AM       Images: personally visualized and report reviewed    CXR (8/28/2020): Right chest tube remains in place at the right lung base. There is a very small pleural effusion. Small apical pneumothorax is unchanged.   The lungs demonstrate increasing airspace opacities in the right mid lower lung zone. IMPRESSION  · Abnormal Chest CT: extensive cavitation throughout. Pl fluid with MSSA  · Bilateral PEs s/p IVC filter due to GIB  · GIB  · Anemia  · BLE DVT  · Weight Loss  · Tobacco Use    PLAN  · Supplemental O2 if needed to keep sats >88%; remains on RA  · Continue chest tube to suction  · Daily CXR  · Continue Zosyn, Doxy   · F/U repeat Quantiferon gold and AFB smears  · GI following, will eventually need work-up  · Replete K  · Speech eval appreciated: soft diet and thins.   MBS once ruled out for TB.  · IVF  · IV Protonix      [address] [place] [address]

## 2020-09-01 ENCOUNTER — APPOINTMENT (OUTPATIENT)
Dept: GENERAL RADIOLOGY | Age: 65
DRG: 166 | End: 2020-09-01
Attending: PHYSICIAN ASSISTANT
Payer: COMMERCIAL

## 2020-09-01 ENCOUNTER — APPOINTMENT (OUTPATIENT)
Dept: GENERAL RADIOLOGY | Age: 65
DRG: 166 | End: 2020-09-01
Attending: INTERNAL MEDICINE
Payer: COMMERCIAL

## 2020-09-01 LAB
ALBUMIN SERPL-MCNC: 0.9 G/DL (ref 3.5–5)
ALBUMIN/GLOB SERPL: 0.2 {RATIO} (ref 1.1–2.2)
ALP SERPL-CCNC: 401 U/L (ref 45–117)
ALT SERPL-CCNC: 92 U/L (ref 12–78)
ANA SER QL: NEGATIVE
ANION GAP SERPL CALC-SCNC: 6 MMOL/L (ref 5–15)
AST SERPL-CCNC: 128 U/L (ref 15–37)
BASOPHILS # BLD: 0 K/UL (ref 0–0.1)
BASOPHILS NFR BLD: 0 % (ref 0–1)
BILIRUB SERPL-MCNC: 1.4 MG/DL (ref 0.2–1)
BUN SERPL-MCNC: 3 MG/DL (ref 6–20)
BUN/CREAT SERPL: 8 (ref 12–20)
CALCIUM SERPL-MCNC: 7 MG/DL (ref 8.5–10.1)
CHLORIDE SERPL-SCNC: 114 MMOL/L (ref 97–108)
CO2 SERPL-SCNC: 24 MMOL/L (ref 21–32)
CREAT SERPL-MCNC: 0.39 MG/DL (ref 0.7–1.3)
DIFFERENTIAL METHOD BLD: ABNORMAL
EOSINOPHIL # BLD: 0 K/UL (ref 0–0.4)
EOSINOPHIL NFR BLD: 0 % (ref 0–7)
ERYTHROCYTE [DISTWIDTH] IN BLOOD BY AUTOMATED COUNT: 21.9 % (ref 11.5–14.5)
GLOBULIN SER CALC-MCNC: 4.2 G/DL (ref 2–4)
GLUCOSE SERPL-MCNC: 72 MG/DL (ref 65–100)
HCT VFR BLD AUTO: 23.2 % (ref 36.6–50.3)
HGB BLD-MCNC: 7.8 G/DL (ref 12.1–17)
IMM GRANULOCYTES # BLD AUTO: 0 K/UL
IMM GRANULOCYTES NFR BLD AUTO: 0 %
LYMPHOCYTES # BLD: 2.4 K/UL (ref 0.8–3.5)
LYMPHOCYTES NFR BLD: 15 % (ref 12–49)
MCH RBC QN AUTO: 34.8 PG (ref 26–34)
MCHC RBC AUTO-ENTMCNC: 33.6 G/DL (ref 30–36.5)
MCV RBC AUTO: 103.6 FL (ref 80–99)
MITOCHONDRIA M2 IGG SER-ACNC: <20 UNITS (ref 0–20)
MONOCYTES # BLD: 0.5 K/UL (ref 0–1)
MONOCYTES NFR BLD: 3 % (ref 5–13)
NEUTS BAND NFR BLD MANUAL: 2 % (ref 0–6)
NEUTS SEG # BLD: 13.3 K/UL (ref 1.8–8)
NEUTS SEG NFR BLD: 80 % (ref 32–75)
NRBC # BLD: 0 K/UL (ref 0–0.01)
NRBC BLD-RTO: 0 PER 100 WBC
PLATELET # BLD AUTO: 208 K/UL (ref 150–400)
PMV BLD AUTO: 11.3 FL (ref 8.9–12.9)
POTASSIUM SERPL-SCNC: 4.2 MMOL/L (ref 3.5–5.1)
PROT SERPL-MCNC: 5.1 G/DL (ref 6.4–8.2)
RBC # BLD AUTO: 2.24 M/UL (ref 4.1–5.7)
RBC MORPH BLD: ABNORMAL
RBC MORPH BLD: ABNORMAL
SODIUM SERPL-SCNC: 144 MMOL/L (ref 136–145)
WBC # BLD AUTO: 16.2 K/UL (ref 4.1–11.1)

## 2020-09-01 PROCEDURE — 74011250637 HC RX REV CODE- 250/637: Performed by: INTERNAL MEDICINE

## 2020-09-01 PROCEDURE — 71045 X-RAY EXAM CHEST 1 VIEW: CPT

## 2020-09-01 PROCEDURE — C9113 INJ PANTOPRAZOLE SODIUM, VIA: HCPCS | Performed by: INTERNAL MEDICINE

## 2020-09-01 PROCEDURE — 80053 COMPREHEN METABOLIC PANEL: CPT

## 2020-09-01 PROCEDURE — 74011250637 HC RX REV CODE- 250/637: Performed by: FAMILY MEDICINE

## 2020-09-01 PROCEDURE — 74011250636 HC RX REV CODE- 250/636: Performed by: INTERNAL MEDICINE

## 2020-09-01 PROCEDURE — 85025 COMPLETE CBC W/AUTO DIFF WBC: CPT

## 2020-09-01 PROCEDURE — 74011250636 HC RX REV CODE- 250/636: Performed by: FAMILY MEDICINE

## 2020-09-01 PROCEDURE — 36415 COLL VENOUS BLD VENIPUNCTURE: CPT

## 2020-09-01 PROCEDURE — 74011000258 HC RX REV CODE- 258: Performed by: INTERNAL MEDICINE

## 2020-09-01 PROCEDURE — 65660000000 HC RM CCU STEPDOWN

## 2020-09-01 RX ADMIN — PIPERACILLIN AND TAZOBACTAM 3.38 G: 3; .375 INJECTION, POWDER, LYOPHILIZED, FOR SOLUTION INTRAVENOUS at 01:30

## 2020-09-01 RX ADMIN — FOLIC ACID 1 MG: 1 TABLET ORAL at 08:59

## 2020-09-01 RX ADMIN — SODIUM CHLORIDE 40 MG: 9 INJECTION INTRAMUSCULAR; INTRAVENOUS; SUBCUTANEOUS at 08:58

## 2020-09-01 RX ADMIN — CYANOCOBALAMIN TAB 500 MCG 1000 MCG: 500 TAB at 09:00

## 2020-09-01 RX ADMIN — PANCRELIPASE 1 CAPSULE: 24000; 76000; 120000 CAPSULE, DELAYED RELEASE PELLETS ORAL at 13:12

## 2020-09-01 RX ADMIN — DOXYCYCLINE 100 MG: 100 INJECTION, POWDER, LYOPHILIZED, FOR SOLUTION INTRAVENOUS at 06:03

## 2020-09-01 RX ADMIN — PIPERACILLIN AND TAZOBACTAM 3.38 G: 3; .375 INJECTION, POWDER, LYOPHILIZED, FOR SOLUTION INTRAVENOUS at 09:00

## 2020-09-01 RX ADMIN — PANCRELIPASE 1 CAPSULE: 24000; 76000; 120000 CAPSULE, DELAYED RELEASE PELLETS ORAL at 08:59

## 2020-09-01 RX ADMIN — SODIUM CHLORIDE 100 ML/HR: 900 INJECTION, SOLUTION INTRAVENOUS at 01:30

## 2020-09-01 RX ADMIN — Medication 10 ML: at 21:41

## 2020-09-01 RX ADMIN — SODIUM CHLORIDE 100 ML/HR: 900 INJECTION, SOLUTION INTRAVENOUS at 13:13

## 2020-09-01 RX ADMIN — Medication 10 ML: at 17:11

## 2020-09-01 RX ADMIN — Medication 1 CAPSULE: at 08:59

## 2020-09-01 RX ADMIN — Medication 10 ML: at 06:03

## 2020-09-01 RX ADMIN — SODIUM CHLORIDE 40 MG: 9 INJECTION INTRAMUSCULAR; INTRAVENOUS; SUBCUTANEOUS at 21:41

## 2020-09-01 RX ADMIN — LOPERAMIDE HYDROCHLORIDE 2 MG: 2 CAPSULE ORAL at 13:13

## 2020-09-01 RX ADMIN — PANCRELIPASE 1 CAPSULE: 24000; 76000; 120000 CAPSULE, DELAYED RELEASE PELLETS ORAL at 17:05

## 2020-09-01 RX ADMIN — DOXYCYCLINE 100 MG: 100 INJECTION, POWDER, LYOPHILIZED, FOR SOLUTION INTRAVENOUS at 17:05

## 2020-09-01 NOTE — PROGRESS NOTES
Daily Progress Note: 9/1/2020  Beckie Rivera MD    Assessment/Plan:   Pneumonia involving right lung (8/25/2020) /  Hemoptysis (8/25/2020) POA: extensive with internal cavitation and air fluid levels. Staph Aureus, leukocytosis improving. CXR unchanged  --pleural fluid studies + staph, BC neg, cytology neg, AFB neg, and 2nd quantiferon gold pending.   -- zosyn, doxy  -- Pulm and Thoracic Surg following  -- so far AFB neg     Bilateral pulmonary embolism, acute ([facility] [address] (8/25/2020) / Acute deep vein thrombosis (DVT) of both lower extremities ([address] (8/25/2020):   - IVC filter placed   - holding anticoags due to dropping Hb and active hemoptysis and GI bleeding     Weight loss, unintentional (8/25/2020) POA: still concerning for malignancy but severe protein calorie malnutrition remains a DDx. Nutrition and speech therapy following  -Diet as tolerated  -calcium corrects     Hyperbilirubinemia (8/25/2020) POA: unclear etiology. CT abdomen unremarkable  - resolved     Pleural effusion, right (8/25/2020) POA: empyema, pneumonia vs malignant effusion. -pulmonology following  - Chest tube placed 8/26/20     Alcohol use (8/25/2020) POA: he says he has cut down recently. -Monitor for withdrawal     Chronic Diarrhea (8/25/2020) / Fecal occult blood test positive (8/25/2020) POA: check stool studies. Monitor Hgb.    -GI following- refusing any GI studies  -imodium and peptobismol PRN  -FloraQ daily- or pt's home probiotics    Hypomag/hypokalemia:  -replete and monitor        Problem List:  Problem List as of 9/1/2020 Date Reviewed: 8/25/2020          Codes Class Noted - Resolved    * (Principal) Pneumonia involving right lung ICD-10-CM: J18.9  ICD-9-CM: 240  8/25/2020 - Present        Hemoptysis ICD-10-CM: R04.2  ICD-9-CM: 786.30  8/25/2020 - Present        Hyperbilirubinemia ICD-10-CM: E80.6  ICD-9-CM: 782.4  8/25/2020 - Present        Weight loss, unintentional ICD-10-CM: R63.4  ICD-9-CM: 783.21 8/25/2020 - Present        Pleural effusion, right ICD-10-CM: J90  ICD-9-CM: 511.9  8/25/2020 - Present        Bilateral pulmonary embolism ([address] ICD-10-CM: I26.99  ICD-9-CM: 415.19  8/25/2020 - Present        Acute deep vein thrombosis (DVT) of both lower extremities (HCC) ICD-10-CM: I82.403  ICD-9-CM: 453.40  8/25/2020 - Present        Alcohol use ICD-10-CM: Z72.89  ICD-9-CM: V49.89  8/25/2020 - Present        Diarrhea ICD-10-CM: R19.7  ICD-9-CM: 787.91  8/25/2020 - Present        Fecal occult blood test positive ICD-10-CM: R19.5  ICD-9-CM: 792.1  8/25/2020 - Present        Hyponatremia with extracellular fluid depletion ICD-10-CM: E87.1  ICD-9-CM: 276.1  8/25/2020 - Present            Subjective:     60 y.o. male who is being admitted for Pneumonia involving right lung. Mr. Jane Edwards presented to our Emergency Department today complaining of generalized weakness, diarrhea and lethargy for several months. Patient was accompanied by his friend who gave most of the history. The patient does not like seeing physicians and apparently uses vitamin supplements. He is a smoker and drinks alcohol and has been progressively weaker over several months now. He has had pleuritic right sided chest pain associated with a productive cough and hemoptysis. He feel on the floor several days ago and was unable to get up. His friends helped him up and brought him to the ED for further testing. Initial CXR done showed airspace disease in right mid lower lung zone consistent with pneumonia. This is surrounded by a moderate right-sided pleural effusion. Upon review, he was noted to be cachectic with swollen lower extremities. CT scans chest abdomen and pelvis done showed a diffuse irregular opacification throughout the right lower lobe with multiple areas of internal cavitation and air-fluid levels. Findings are more likely related to infection than malignancy. Large right pleural effusion. Trace left pleural effusion.  Bilateral pulmonary emboli. Clot burden is moderate. No evidence of right heart strain. Thrombus in the veins of the left lower extremity extending to the IVC. A small amount of thrombus is also seen in the right common iliac vein. He will be admitted for further management. (Dr Edmar Amin)    8/26:  Feeling a little better this AM but still c/o painful, pleuritic cough and occas thick sputum. No leg/calf pains. No known exposure to ill individuals/no travel - \"just staying in the last few months. \"  No temps. WBC 31.9K. Cultures neg so far.      8/27:  Reports less cough, chest pain and feeling better overall. Appetite has returned. Chest tube placed yesterday with discharge obtained - pending cultures. Other cultures remain neg. Hb lower so cont to hold anticoag for now. GI has seen and work up when more stable. Remains on doxy, zosyn, vanc. WBC down to 28K. Bili back to normal.     8/28:  Continues to grad improve. Still with some cough. No tremor or hallucinations so far. K+ lower - replacing. WBC coming down. Afeb. CXR this AM pending. Cultures remain neg so far. Quantiferon gold test pending as of this AM.  He reports hx of chronic LE edema for years. 8/29: Pt continues to have frequent stooling. GI plans endoscopy next week if pt allows. He was not willing to participate with PT/OT/nutritionist yesterday. Says despite our best efforts, we will not get his stooling to stop until he gets his home probiotics [friend will bring it]. He refuses to eat until the stooling is better. 8/30:  Pt is reluctant to admit that his stooling has improved. Down to 2 BMs yesterday instead of 6 the day before. Tolerating PO. Willing to eat more today. He is still having hemoptysis. Wanting to get out of bed. Willing to work with PT/OT. 8/31:  Feeling better. Appetite better. TB neg so far. First Quantiferon Gold was indeterminate - repeat pending. Afeb. WBC slowly coming down. Currently on Doxy and Zosyn. Replacing Mag and K+. Hb now stable in 8s. : Little change. Not moving about much, he reports due to chest tube being painful when he moves. Hb 7.8. WBC 16. No sign of ETOH withdrawal.       Review of Systems:   A comprehensive review of systems was negative except for that written in the HPI. Objective:   Physical Exam:   Visit Vitals  /73 (BP 1 Location: Left arm, BP Patient Position: At rest)   Pulse 80   Temp 97.8 °F (36.6 °C)   Resp 19   Ht 5' 10\" (1.778 m)   Wt 161 lb 9.6 oz (73.3 kg)   SpO2 98%   BMI 23.19 kg/m²      O2 Device: Room air  Temp (24hrs), Av.9 °F (36.6 °C), Min:97.7 °F (36.5 °C), Max:98.1 °F (36.7 °C)    1901 -  0700  In: 2402 [P.O.:250; I.V.:2770]  Out: 850 [Urine:850]    07 -  190  In: 1353 [P.O.:1380; I.V.:3845]  Out: 7867 [Urine:1475]  General:  Alert, cooperative, no distress, cachetic, ill appearing. Head:  Normocephalic, without obvious abnormality, atraumatic. Eyes:  [place] conjuntiva. PERRL, EOMs intact. Throat: Lips, mucosa, and tongue moist.   Neck: Supple, symmetrical, trachea midline, no adenopathy, thyroid: no enlargement/tenderness/nodules, no carotid bruit and no JVD. Lungs:   Better air exchange right,  Fine crackles anteriorly right,  a few scattered rhonchi. Chest wall:  Chest tube site right looks ok   Heart:  Regular rate and rhythm, S1, S2 normal, no murmur, click, rub or gallop. Abdomen:   Soft, non-tender. Bowel sounds normal. No masses,  No organomegaly. Extremities: no cyanosis. No calf tenderness;3+ pitting edema bilat. 2+ pitting edema in arms, No cords palpated. Pulses: 2+ and symmetric all extremities. Skin: Skin color, texture, turgor normal. No rashes   Neurologic:  Alert and oriented X 3. Fine motor of hands and fingers normal.   equal.  No tremor. Gait not tested at this time. Sensation grossly normal to touch.   Gross motor of extremities normal.       Data Review:   EXAM: CT CHEST ABD PELV W CONT 8/25/20  INDICATION: weight loss  COMPARISON: Radiographs 8/25/2020  CONTRAST: 100 mL of Isovue-370. FINDINGS:   CHEST WALL: No mass or axillary lymphadenopathy. THYROID: No nodule. MEDIASTINUM: No mass or lymphadenopathy. BRYAN: No mass or lymphadenopathy. THORACIC AORTA: No dissection or aneurysm. MAIN PULMONARY ARTERY: Normal in caliber. Thrombus is seen throughout the right  lower lobe pulmonary arteries. Thrombus is seen in the left lower lobe pulmonary  artery extending into the anterior and lateral basilar segments. TRACHEA/BRONCHI: Patent. ESOPHAGUS: No wall thickening or dilatation. HEART: Normal in size. PLEURA: There is a large right pleural effusion with enhancement of the pleural  surfaces. Trace left pleural effusion. LUNGS: There is irregular opacification throughout the right lower lobe. There  are multiple areas of internal cavitation with air-fluid levels. Small areas of  atelectasis or scarring are seen in the lingula and inferior left lower lobe. LIVER: No mass. BILIARY TREE: Gallbladder is within normal limits. CBD is not dilated. SPLEEN: within normal limits. PANCREAS: No mass or ductal dilatation. ADRENALS: Unremarkable. KIDNEYS: Small nonobstructive stones are seen in the bilateral kidneys. No  evidence of hydronephrosis. No mass. STOMACH: Unremarkable. SMALL BOWEL: No dilatation or wall thickening. COLON: No dilatation or wall thickening. APPENDIX: Unremarkable  PERITONEUM: No ascites or pneumoperitoneum. RETROPERITONEUM: No lymphadenopathy or aortic aneurysm. REPRODUCTIVE ORGANS: Unremarkable  URINARY BLADDER: No mass or calculus. BONES: No destructive bone lesion. ABDOMINAL WALL: No mass or hernia. ADDITIONAL COMMENTS: There is thrombus in the left femoral veins extending into  the left iliac veins into the IVC. A small amount of thrombus is also present in  the right common iliac vein. IMPRESSION:  1.  Diffuse irregular opacification throughout the right lower lobe with multiple  areas of internal cavitation and air-fluid levels. Findings are more likely  related to infection than malignancy. 2. Large right pleural effusion. Trace left pleural effusion. 3. Bilateral pulmonary emboli. Clot burden is moderate. No evidence of right  heart strain. 4. Thrombus in the veins of the left lower extremity extending to the IVC. A  small amount of thrombus is also seen in the right common iliac vein. EXAM:  IR THORACENTESIS/INSERT CHEST TUBE 8/26/20  INDICATION:  Request placement of large size pigtail catheter. Possible empyema. PROCEDURE:  Available history and imaging was reviewed which demonstrates a large partially  loculated right pleural effusion. Possibility of empyema, tuberculosis and other  etiologies are being considered clinically. Specific request for larger pigtail  catheter placement. Following informed consent the patient was evaluated with ultrasound in the  angina/catheter lab department. This demonstrates a large right pleural  effusion. The location of the pleural effusion was marked and then the area was prepped  and draped. 1% lidocaine was used for local anesthesia. A small incision was made with an 11  blade. A micropuncture needle was used under direct fluoroscopic guidance to access the  pleural fluid. When this was achieved the microguidewire was placed followed by  a 5 [place] dilator. Through this a short [address] guidewire  was placed. The track was then dilated to 12 [place]. This was then followed by  placement of a 12 [place] pigtail catheter. The string for the pigtail  was removed prior to placement. The catheter was positioned under fluoroscopic  guidance and then secured in place at the skin with a 2-0 Prolene suture. The  site was then dressed with a Tegaderm Bioclusive dressing and will be connected  to a Pleur-evac for subsequent drainage.   Prior to this approximately 100 mL of fluid was obtained and sent for cytology  as well as multiple cultures including aerobic, anaerobic, AFB and fungal.   The fluid was withdrawn was mildly cloudy serosanguineous in color. I did not  notice any foul smell. The procedure was adequately tolerated without significant blood loss or  evidence of complication. Final digital image of the chest was saved documenting catheter position. IMPRESSION:   Successful ultrasound and fluoroscopic guided placement of 12 [place] right-sided  chest tube. Patient will have ongoing drainage from Pleur-evac in his hospital  bed/room. Care and monitoring as per medical service and nursing service. ECHO 8/27/20  Interpretation Summary   Result status: Final result    · Image quality for this study was technically difficult. · Echo study was technically difficulty and limited due to patient's tolerance. · LV: Estimated LVEF is 55 - 60%. Normal cavity size, wall thickness and systolic function (ejection fraction normal). Wall motion: normal. Mild (grade 1) left ventricular diastolic dysfunction. · Pericardium: Small pericardial effusion adjacent to right ventricle. Recent Days:  Recent Labs     09/01/20  0347 08/31/20  0411 08/30/20  0202   WBC 16.2* 17.0* 18.7*   HGB 7.8* 8.2* 8.1*   HCT 23.2* 24.5* 23.9*    261 199     Recent Labs     09/01/20  0347 08/31/20  0600 08/30/20  0202    142 144   K 4.2 4.0 3.0*   * 114* 114*   CO2 24 22 23   GLU 72 83 153*   BUN 3* 3* 4*   CREA 0.39* 0.45* 0.49*   CA 7.0* 6.9* 6.4*   MG  --   --  1.5*   ALB 0.9* 0.9* 1.0*   TBILI 1.4* 1.3* 1.5*   ALT 92* 107* 57     No results for input(s): PH, PCO2, PO2, HCO3, FIO2 in the last 72 hours.     24 Hour Results:  Recent Results (from the past 24 hour(s))   METABOLIC PANEL, COMPREHENSIVE    Collection Time: 08/31/20  6:00 AM   Result Value Ref Range    Sodium 142 136 - 145 mmol/L    Potassium 4.0 3.5 - 5.1 mmol/L    Chloride 114 (H) 97 - 108 mmol/L    CO2 22 21 - 32 mmol/L Anion gap 6 5 - 15 mmol/L    Glucose 83 65 - 100 mg/dL    BUN 3 (L) 6 - 20 MG/DL    Creatinine 0.45 (L) 0.70 - 1.30 MG/DL    BUN/Creatinine ratio 7 (L) 12 - 20      GFR est AA >60 >60 ml/min/1.73m2    GFR est non-AA >60 >60 ml/min/1.73m2    Calcium 6.9 (L) 8.5 - 10.1 MG/DL    Bilirubin, total 1.3 (H) 0.2 - 1.0 MG/DL    ALT (SGPT) 107 (H) 12 - 78 U/L    AST (SGOT) 201 (H) 15 - 37 U/L    Alk. phosphatase 426 (H) 45 - 117 U/L    Protein, total 4.9 (L) 6.4 - 8.2 g/dL    Albumin 0.9 (L) 3.5 - 5.0 g/dL    Globulin 4.0 2.0 - 4.0 g/dL    A-G Ratio 0.2 (L) 1.1 - 2.2     HEPATITIS PANEL, ACUTE    Collection Time: 08/31/20  9:16 AM   Result Value Ref Range    Hepatitis A, IgM NONREACTIVE NR      __          Hepatitis B surface Ag <0.10 Index    Hep B surface Ag Interp. Negative NEG      __          Hepatitis B core, IgM NONREACTIVE NR      __          Hep C  virus Ab Interp. NONREACTIVE NR      Hep C  virus Ab comment Method used is Siemens Advia Gientaur     CBC WITH AUTOMATED DIFF    Collection Time: 09/01/20  3:47 AM   Result Value Ref Range    WBC 16.2 (H) 4.1 - 11.1 K/uL    RBC 2.24 (L) 4.10 - 5.70 M/uL    HGB 7.8 (L) 12.1 - 17.0 g/dL    HCT 23.2 (L) 36.6 - 50.3 %    .6 (H) 80.0 - 99.0 FL    MCH 34.8 (H) 26.0 - 34.0 PG    MCHC 33.6 30.0 - 36.5 g/dL    RDW 21.9 (H) 11.5 - 14.5 %    PLATELET 195 211 - 305 K/uL    MPV 11.3 8.9 - 12.9 FL    NRBC 0.0 0  WBC    ABSOLUTE NRBC 0.00 0.00 - 0.01 K/uL    NEUTROPHILS 80 (H) 32 - 75 %    BAND NEUTROPHILS 2 0 - 6 %    LYMPHOCYTES 15 12 - 49 %    MONOCYTES 3 (L) 5 - 13 %    EOSINOPHILS 0 0 - 7 %    BASOPHILS 0 0 - 1 %    IMMATURE GRANULOCYTES 0 %    ABS. NEUTROPHILS 13.3 (H) 1.8 - 8.0 K/UL    ABS. LYMPHOCYTES 2.4 0.8 - 3.5 K/UL    ABS. MONOCYTES 0.5 0.0 - 1.0 K/UL    ABS. EOSINOPHILS 0.0 0.0 - 0.4 K/UL    ABS. BASOPHILS 0.0 0.0 - 0.1 K/UL    ABS. IMM.  GRANS. 0.0 K/UL    DF MANUAL      RBC COMMENTS ANISOCYTOSIS  2+        RBC COMMENTS TARGET CELLS     METABOLIC PANEL, COMPREHENSIVE    Collection Time: 09/01/20  3:47 AM   Result Value Ref Range    Sodium 144 136 - 145 mmol/L    Potassium 4.2 3.5 - 5.1 mmol/L    Chloride 114 (H) 97 - 108 mmol/L    CO2 24 21 - 32 mmol/L    Anion gap 6 5 - 15 mmol/L    Glucose 72 65 - 100 mg/dL    BUN 3 (L) 6 - 20 MG/DL    Creatinine 0.39 (L) 0.70 - 1.30 MG/DL    BUN/Creatinine ratio 8 (L) 12 - 20      GFR est AA >60 >60 ml/min/1.73m2    GFR est non-AA >60 >60 ml/min/1.73m2    Calcium 7.0 (L) 8.5 - 10.1 MG/DL    Bilirubin, total 1.4 (H) 0.2 - 1.0 MG/DL    ALT (SGPT) 92 (H) 12 - 78 U/L    AST (SGOT) 128 (H) 15 - 37 U/L    Alk.  phosphatase 401 (H) 45 - 117 U/L    Protein, total 5.1 (L) 6.4 - 8.2 g/dL    Albumin 0.9 (L) 3.5 - 5.0 g/dL    Globulin 4.2 (H) 2.0 - 4.0 g/dL    A-G Ratio 0.2 (L) 1.1 - 2.2         Medications reviewed  Current Facility-Administered Medications   Medication Dose Route Frequency    lipase-protease-amylase (CREON 24,000) capsule 1 Cap  1 Cap Oral TID WITH MEALS    loperamide (IMODIUM) capsule 2 mg  2 mg Oral Q6H PRN    lactobac ac& pc-s.therm-b.anim (ANITA Q/RISAQUAD)  1 Cap Oral DAILY    bismuth subsalicylate (PEPTO-BISMOL) oral suspension 30 mL  30 mL Oral Q6H PRN    cyanocobalamin (VITAMIN B12) tablet 1,000 mcg  1,000 mcg Oral DAILY    folic acid (FOLVITE) tablet 1 mg  1 mg Oral DAILY    HYDROcodone-acetaminophen (NORCO) 5-325 mg per tablet 1 Tab  1 Tab Oral Q6H PRN    morphine injection 1 mg  1 mg IntraVENous Q4H PRN    sodium chloride (NS) flush 5-10 mL  5-10 mL IntraVENous PRN    sodium chloride (NS) flush 5-40 mL  5-40 mL IntraVENous Q8H    sodium chloride (NS) flush 5-40 mL  5-40 mL IntraVENous PRN    acetaminophen (TYLENOL) tablet 650 mg  650 mg Oral Q6H PRN    Or    acetaminophen (TYLENOL) suppository 650 mg  650 mg Rectal Q6H PRN    polyethylene glycol (MIRALAX) packet 17 g  17 g Oral DAILY PRN    promethazine (PHENERGAN) tablet 12.5 mg  12.5 mg Oral Q6H PRN    Or    ondansetron (ZOFRAN) injection 4 mg 4 mg IntraVENous Q6H PRN    0.9% sodium chloride infusion  100 mL/hr IntraVENous CONTINUOUS    piperacillin-tazobactam (ZOSYN) 3.375 g in 0.9% sodium chloride (MBP/ADV) 100 mL  3.375 g IntraVENous Q8H    doxycycline (VIBRAMYCIN) 100 mg in 0.9% sodium chloride (MBP/ADV) 100 mL  100 mg IntraVENous Q12H    pantoprazole (PROTONIX) 40 mg in 0.9% sodium chloride 10 mL injection  40 mg IntraVENous Q12H       Care Plan discussed with: Patient/Nurse  Total time spent with patient: 40 minutes. Evette Rosen.  Byron Cannon MD

## 2020-09-01 NOTE — PROGRESS NOTES
0700-Bedside and Verbal shift change report given to Mauro/HARJIT Cai (oncoming nurse) by Daphney Neri (offgoing nurse). Report included the following information SBAR, Kardex, Intake/Output, MAR, Accordion, Recent Results and Med Rec Status. 1600-AFB smear negative. Notified Dr. Norma Chau. OK'd to remove precautions.

## 2020-09-01 NOTE — PROGRESS NOTES
GI note    Anemia  Hemoptysis  Pleural fluid with staph  Chest tube remains    So far 2 of 3 specimens negative for acid fast bacteria. Would prefer completion of that series of testing and improvement in his pulmonary process prior to endoscopic testing. I am following.   Russ Black MD

## 2020-09-01 NOTE — PROGRESS NOTES
Problem: Falls - Risk of  Goal: *Absence of Falls  Description: Document Bryant Hogan Fall Risk and appropriate interventions in the flowsheet. Outcome: Progressing Towards Goal  Note: Fall Risk Interventions:  Mobility Interventions: Assess mobility with egress test, Bed/chair exit alarm, Communicate number of staff needed for ambulation/transfer, OT consult for ADLs, Patient to call before getting OOB, PT Consult for mobility concerns, PT Consult for assist device competence         Medication Interventions: Assess postural VS orthostatic hypotension, Bed/chair exit alarm, Evaluate medications/consider consulting pharmacy, Patient to call before getting OOB, Teach patient to arise slowly    Elimination Interventions: Bed/chair exit alarm, Call light in reach, Patient to call for help with toileting needs    History of Falls Interventions: Bed/chair exit alarm, Consult care management for discharge planning, Evaluate medications/consider consulting pharmacy         Problem: Patient Education: Go to Patient Education Activity  Goal: Patient/Family Education  Outcome: Progressing Towards Goal     Problem: Pressure Injury - Risk of  Goal: *Prevention of pressure injury  Description: Document Aj Scale and appropriate interventions in the flowsheet. Outcome: Progressing Towards Goal  Note: Pressure Injury Interventions:       Moisture Interventions: Absorbent underpads, Apply protective barrier, creams and emollients, Assess need for specialty bed, Check for incontinence Q2 hours and as needed, Maintain skin hydration (lotion/cream), Minimize layers    Activity Interventions: Assess need for specialty bed, Increase time out of bed, Pressure redistribution bed/mattress(bed type), PT/OT evaluation    Mobility Interventions: Assess need for specialty bed, Float heels, HOB 30 degrees or less, Pressure redistribution bed/mattress (bed type), PT/OT evaluation, Turn and reposition approx.  every two hours(pillow and wedges)    Nutrition Interventions: Document food/fluid/supplement intake, Discuss nutritional consult with provider    Friction and Shear Interventions: Apply protective barrier, creams and emollients, HOB 30 degrees or less, Lift sheet                Problem: Patient Education: Go to Patient Education Activity  Goal: Patient/Family Education  Outcome: Progressing Towards Goal     Problem: Patient Education: Go to Patient Education Activity  Goal: Patient/Family Education  Outcome: Progressing Towards Goal     Problem: Pneumonia: Day 1  Goal: Off Pathway (Use only if patient is Off Pathway)  Outcome: Progressing Towards Goal     Problem: Pneumonia: Day 2  Goal: Off Pathway (Use only if patient is Off Pathway)  Outcome: Progressing Towards Goal     Problem: Pneumonia: Day 3  Goal: Off Pathway (Use only if patient is Off Pathway)  Outcome: Progressing Towards Goal  Goal: Activity/Safety  Outcome: Progressing Towards Goal  Goal: Consults, if ordered  Outcome: Progressing Towards Goal  Goal: Diagnostic Test/Procedures  Outcome: Progressing Towards Goal  Goal: Nutrition/Diet  Outcome: Progressing Towards Goal  Goal: Discharge Planning  Outcome: Progressing Towards Goal  Goal: Medications  Outcome: Progressing Towards Goal  Goal: Respiratory  Outcome: Progressing Towards Goal  Goal: Treatments/Interventions/Procedures  Outcome: Progressing Towards Goal  Goal: Psychosocial  Outcome: Progressing Towards Goal  Goal: *Oxygen saturation within defined limits  Outcome: Progressing Towards Goal  Goal: *Hemodynamically stable  Outcome: Progressing Towards Goal  Goal: *Demonstrates progressive activity  Outcome: Progressing Towards Goal  Goal: *Tolerating diet  Outcome: Progressing Towards Goal  Goal: *Describes available resources and support systems  Outcome: Progressing Towards Goal  Goal: *Optimal pain control at patient's stated goal  Outcome: Progressing Towards Goal     Problem: Pneumonia: Day 4  Goal: Off Pathway (Use only if patient is Off Pathway)  Outcome: Progressing Towards Goal  Goal: Activity/Safety  Outcome: Progressing Towards Goal  Goal: Nutrition/Diet  Outcome: Progressing Towards Goal  Goal: Discharge Planning  Outcome: Progressing Towards Goal  Goal: Medications  Outcome: Progressing Towards Goal  Goal: Respiratory  Outcome: Progressing Towards Goal  Goal: Treatments/Interventions/Procedures  Outcome: Progressing Towards Goal  Goal: Psychosocial  Outcome: Progressing Towards Goal     Problem: Pneumonia: Discharge Outcomes  Goal: *Demonstrates progressive activity  Outcome: Progressing Towards Goal  Goal: *Describes follow-up/return visits to physicians  Outcome: Progressing Towards Goal  Goal: *Tolerating diet  Outcome: Progressing Towards Goal  Goal: *Verbalizes name, dosage, time, side effects, and number of days to continue medications  Outcome: Progressing Towards Goal  Goal: *Influenza immunization  Outcome: Progressing Towards Goal  Goal: *Pneumococcal immunization  Outcome: Progressing Towards Goal  Goal: *Respiratory status at baseline  Outcome: Progressing Towards Goal  Goal: *Vital signs within defined limits  Outcome: Progressing Towards Goal  Goal: *Describes available resources and support systems  Outcome: Progressing Towards Goal  Goal: *Optimal pain control at patient's stated goal  Outcome: Progressing Towards Goal     Problem: Patient Education: Go to Patient Education Activity  Goal: Patient/Family Education  Outcome: Progressing Towards Goal     Problem: Upper and Lower GI Bleed: Day 1  Goal: Off Pathway (Use only if patient is Off Pathway)  Outcome: Progressing Towards Goal     Problem: Upper and Lower GI Bleed: Day 2  Goal: Off Pathway (Use only if patient is Off Pathway)  Outcome: Progressing Towards Goal  Goal: Activity/Safety  Outcome: Progressing Towards Goal  Goal: Consults, if ordered  Outcome: Progressing Towards Goal  Goal: Diagnostic Test/Procedures  Outcome: Progressing Towards Goal  Goal: Nutrition/Diet  Outcome: Progressing Towards Goal  Goal: Discharge Planning  Outcome: Progressing Towards Goal  Goal: Medications  Outcome: Progressing Towards Goal  Goal: Respiratory  Outcome: Progressing Towards Goal  Goal: Treatments/Interventions/Procedures  Outcome: Progressing Towards Goal  Goal: Psychosocial  Outcome: Progressing Towards Goal  Goal: *Optimal pain control at patient's stated goal  Outcome: Progressing Towards Goal  Goal: *Hemodynamically stable  Outcome: Progressing Towards Goal  Goal: *Tolerating diet  Outcome: Progressing Towards Goal  Goal: *Demonstrates progressive activity  Outcome: Progressing Towards Goal     Problem: Upper and Lower GI Bleed: Day 3  Goal: Off Pathway (Use only if patient is Off Pathway)  Outcome: Progressing Towards Goal  Goal: Activity/Safety  Outcome: Progressing Towards Goal  Goal: Diagnostic Test/Procedures  Outcome: Progressing Towards Goal  Goal: Nutrition/Diet  Outcome: Progressing Towards Goal  Goal: Discharge Planning  Outcome: Progressing Towards Goal  Goal: Medications  Outcome: Progressing Towards Goal  Goal: Treatments/Interventions/Procedures  Outcome: Progressing Towards Goal  Goal: Psychosocial  Outcome: Progressing Towards Goal     Problem: Upper and Lower GI Bleed:  Discharge Outcomes  Goal: *Hemodynamically stable  Outcome: Progressing Towards Goal  Goal: *Lungs clear or at baseline  Outcome: Progressing Towards Goal  Goal: *Demonstrates independent activity or return to baseline  Outcome: Progressing Towards Goal  Goal: *Pain is controlled to three or less  Outcome: Progressing Towards Goal  Goal: *Verbalizes understanding and describes prescribed diet  Outcome: Progressing Towards Goal  Goal: *Tolerating diet  Outcome: Progressing Towards Goal  Goal: *Verbalizes name, dosage, time, side effects, and number of days to continue medications  Outcome: Progressing Towards Goal  Goal: *Anxiety reduced or absent  Outcome: Progressing Towards Goal  Goal: *Understands and describes signs and symptoms to report to providers(Stroke Metric)  Outcome: Progressing Towards Goal  Goal: *Describes follow-up/return visits to physicians  Outcome: Progressing Towards Goal  Goal: *Describes available resources and support systems  Outcome: Progressing Towards Goal     Problem: Risk for Spread of Infection  Goal: Prevent transmission of infectious organism to others  Description: Prevent the transmission of infectious organisms to other patients, staff members, and visitors.   Outcome: Progressing Towards Goal     Problem: Patient Education:  Go to Education Activity  Goal: Patient/Family Education  Outcome: Progressing Towards Goal     Problem: Patient Education: Go to Patient Education Activity  Goal: Patient/Family Education  Outcome: Progressing Towards Goal     Problem: Patient Education: Go to Patient Education Activity  Goal: Patient/Family Education  Outcome: Progressing Towards Goal     Problem: Patient Education: Go to Patient Education Activity  Goal: Patient/Family Education  Outcome: Progressing Towards Goal

## 2020-09-01 NOTE — PROGRESS NOTES
0700: Bedside shift change report given to [address] and Tosin Lyman RN (oncoming nurse) by Asher Davis RN (offgoing nurse). Report included the following information SBAR, Kardex, ED Summary, Intake/Output, MAR, Med Rec Status and Cardiac Rhythm NSR.     0915: Chest tube changed to gravity.

## 2020-09-01 NOTE — PROGRESS NOTES
Name: [facility]: [address]   : 1955 Admit Date: 2020   Phone: [phone]  Room: 324/01   PCP: Elsy Higgins MD  MRN: [id number]   Date: 2020  Code: Full Code          Chart and notes reviewed. Data reviewed. I review the patient's current medications in the medical record at each encounter.  I have evaluated and examined the patient. History of Present Illness:   is a pleasant, 61 yo gentleman that presented to [facility] with worsening shortness of breath and weakness. He tells me that he started feeling poorly in 2019 with a cough, however that resolved. His symptoms returned in 2019 and tells me that he has been struggling with cough, weight loss of 40+ lbs, and intermittent hemoptysis (phlegm mixed with blood), and diarrhea. He is a smoker of 2ppd for 20+ years. Also prior to the last two weeks, was \"drinking like a fish,\" at least 1 pint per day. He does not routinely prefer to seek traditional medical care and thus has delayed coming to the ED due to his symptoms. Reports worsening right sided chest pain and poor appetite. Images:  Personally reviewed. Chest/Abdominal CT:  Diffuse irregular opacification throughout the right lower lobe with multiple  areas of internal cavitation and air-fluid levels. Findings are more likely  related to infection than malignancy. Large right pleural effusion. Trace left pleural effusion. Bilateral pulmonary emboli. Clot burden is moderate. No evidence of right heart strain. Thrombus in the veins of the left lower extremity extending to the IVC. A  small amount of thrombus is also seen in the right common iliac vein. WBC 31.69  Hgb 9.7  ALT 47  AST 75  Na 135  Creat . 47  Albumin . 7  Lactic acid on admit 2.2; now 1.1  Hemoccult +   Blood cultures negative x 11 hours      Interval History:  Afebrile  BP soft but stable; MAP 80  Sats 100% on RA  WBC 16.2 - trending down  Hgb 7.8 - stable  Alk phos 401    ALT 92  Blood cx no growth x 6 days  Resp cx with moderate staph aureus  Pleural fluid cx heavy staph aureus - pan sensitive  Pleural fluid ABF smear neg  Pleural fluid cytology no cells diagnostic for malignancy  AFB smear neg x 2; 3rd specimen in process  8/26 quant TB gold indeterminate; 8/29 repeat in process  Hep panel nonreactive  Chest tube with 75ml out      ECHO: EF 89-06%; grade 1 diastolic dysfunction; small pericardial effusion, no tamponade  BLE VD: positive for age indeterminate and acute deep venous thrombosis or thrombophlebitis in right and left CFV, FV, and popliteal veins. ROS: Feeling the same this morning. Denies SOB. Denies fever or chills. Mild discomfort at site of chest tube. Reports cough productive of brown sputum. Denies abd pain. Continues with LE edema - mostly unchanged. No past medical history on file. Past Surgical History:   Procedure Laterality Date    IR [facility] BROWN DEER IVC FILTER  8/25/2020    IR THORACENTESIS/INSERT CHEST TUBE  8/26/2020       No family history on file.     Social History     Tobacco Use    Smoking status: Current Every Day Smoker   Substance Use Topics    Alcohol use: Yes       No Known Allergies    Current Facility-Administered Medications   Medication Dose Route Frequency    lipase-protease-amylase (CREON 24,000) capsule 1 Cap  1 Cap Oral TID WITH MEALS    loperamide (IMODIUM) capsule 2 mg  2 mg Oral Q6H PRN    lactobac ac& pc-s.therm-b.anim (ANITA Q/RISAQUAD)  1 Cap Oral DAILY    bismuth subsalicylate (PEPTO-BISMOL) oral suspension 30 mL  30 mL Oral Q6H PRN    cyanocobalamin (VITAMIN B12) tablet 1,000 mcg  1,000 mcg Oral DAILY    folic acid (FOLVITE) tablet 1 mg  1 mg Oral DAILY    HYDROcodone-acetaminophen (NORCO) 5-325 mg per tablet 1 Tab  1 Tab Oral Q6H PRN    morphine injection 1 mg  1 mg IntraVENous Q4H PRN    sodium chloride (NS) flush 5-10 mL  5-10 mL IntraVENous PRN    sodium chloride (NS) flush 5-40 mL  5-40 mL IntraVENous Q8H    sodium chloride (NS) flush 5-40 mL  5-40 mL IntraVENous PRN    acetaminophen (TYLENOL) tablet 650 mg  650 mg Oral Q6H PRN    Or    acetaminophen (TYLENOL) suppository 650 mg  650 mg Rectal Q6H PRN    polyethylene glycol (MIRALAX) packet 17 g  17 g Oral DAILY PRN    promethazine (PHENERGAN) tablet 12.5 mg  12.5 mg Oral Q6H PRN    Or    ondansetron (ZOFRAN) injection 4 mg  4 mg IntraVENous Q6H PRN    0.9% sodium chloride infusion  100 mL/hr IntraVENous CONTINUOUS    piperacillin-tazobactam (ZOSYN) 3.375 g in 0.9% sodium chloride (MBP/ADV) 100 mL  3.375 g IntraVENous Q8H    doxycycline (VIBRAMYCIN) 100 mg in 0.9% sodium chloride (MBP/ADV) 100 mL  100 mg IntraVENous Q12H    pantoprazole (PROTONIX) 40 mg in 0.9% sodium chloride 10 mL injection  40 mg IntraVENous Q12H         REVIEW OF SYSTEMS   Negative except as stated in the HPI. Physical Exam:   Visit Vitals  BP 96/72   Pulse 86   Temp 98 °F (36.7 °C)   Resp 20   Ht 5' 10\" (1.778 m)   Wt 73.3 kg (161 lb 9.6 oz)   SpO2 100%   BMI 23.19 kg/m²       General:  Alert, cooperative, no distress, appears older stated age. Head:  Normocephalic, without obvious abnormality, atraumatic. Eyes:  Conjunctivae/corneas clear. .   Nose: Nares normal. Septum midline. Mucosa normal.    Throat: Lips, mucosa, and tongue normal. Poor dentition. Lungs:   Chest tube with serosanguinous fluid, no air leak. Diminished right base. Chest wall:  No tenderness or deformity. Heart:  Regular rate and rhythm, S1, S2 normal, no murmur, click, rub or gallop. Abdomen:   Soft, non-tender. Bowel sounds normal.    Extremities: Muscle wasting. Anasarca. Pulses: 2+ and symmetric all extremities.    Skin: Skin color, texture, turgor normal.   Lymph nodes: Cervical nodes normal.   Neurologic: Grossly nonfocal       Lab Results   Component Value Date/Time    Sodium 144 09/01/2020 03:47 AM    Potassium 4.2 09/01/2020 03:47 AM    Chloride 114 (H) 09/01/2020 03:47 AM    CO2 24 09/01/2020 03:47 AM    BUN 3 (L) 09/01/2020 03:47 AM    Creatinine 0.39 (L) 09/01/2020 03:47 AM    Glucose 72 09/01/2020 03:47 AM    Calcium 7.0 (L) 09/01/2020 03:47 AM    Magnesium 1.5 (L) 08/30/2020 02:02 AM    Lactic acid 1.1 08/25/2020 06:25 PM       Lab Results   Component Value Date/Time    WBC 16.2 (H) 09/01/2020 03:47 AM    HGB 7.8 (L) 09/01/2020 03:47 AM    PLATELET 740 90/39/4105 03:47 AM    .6 (H) 09/01/2020 03:47 AM       Lab Results   Component Value Date/Time    Alk.  phosphatase 401 (H) 09/01/2020 03:47 AM    Protein, total 5.1 (L) 09/01/2020 03:47 AM    Albumin 0.9 (L) 09/01/2020 03:47 AM    Globulin 4.2 (H) 09/01/2020 03:47 AM       No results found for: IRON, FE, TIBC, IBCT, PSAT, FERR    No results found for: SR, CRP, ANALIA, ANAIGG, RA, RPR, RPRT, VDRLT, VDRLS, TSH, TSHEXT, TSHEXT     No results found for: PH, PHI, PCO2, PCO2I, PO2, PO2I, HCO3, HCO3I, FIO2, FIO2I    Lab Results   Component Value Date/Time    Troponin-I, Qt. <0.05 08/25/2020 04:07 PM        Lab Results   Component Value Date/Time    Culture result: HEAVY STAPHYLOCOCCUS AUREUS (A) 08/26/2020 04:48 PM    Culture result: NO FUNGUS ISOLATED 5 DAYS 08/26/2020 04:48 PM    Culture result: NO ANAEROBES ISOLATED 08/26/2020 04:48 PM       Lab Results   Component Value Date/Time    Hepatitis B surface Ag <0.10 08/31/2020 09:16 AM       Lab Results   Component Value Date/Time    Vancomycin,trough 13.0 (H) 08/28/2020 09:36 AM       Lab Results   Component Value Date/Time    Color JENNIFFER 08/26/2020 03:12 AM    Appearance CLEAR 08/26/2020 03:12 AM    Specific gravity 1.005 08/26/2020 03:12 AM    pH (UA) 6.0 08/26/2020 03:12 AM    Protein TRACE (A) 08/26/2020 03:12 AM    Glucose Negative 08/26/2020 03:12 AM    Ketone TRACE (A) 08/26/2020 03:12 AM    Blood Negative 08/26/2020 03:12 AM    Urobilinogen 1.0 08/26/2020 03:12 AM    Nitrites Negative 08/26/2020 03:12 AM    Leukocyte Esterase SMALL (A) 08/26/2020 03:12 AM WBC 5-10 08/26/2020 03:12 AM    RBC 5-10 08/26/2020 03:12 AM    Bacteria Negative 08/26/2020 03:12 AM       Images: personally visualized and report reviewed    CXR (9/1/2020): There continues to be abnormal prominence of the lung markings (right greater than left). There continues to be abnormal hazy density involving the right lung. This is suggestive of developing infiltration. The apical pneumothorax on the right remains unchanged in size. There is blunting of both costophrenic angles. This is indicative of the presence of bilateral pleural effusions. The pigtail catheter which projected over the right lung base is unchanged in position. IMPRESSION  · Abnormal Chest CT: extensive cavitation throughout. Pl fluid with MSSA  · Bilateral PEs s/p IVC filter due to GIB  · GIB  · Anemia  · BLE DVT  · Weight Loss  · Tobacco Use    PLAN  · Supplemental O2 if needed to keep sats >88%; remains on RA  · Put chest tube to gravity. Repeat CXR this afternoon. · Daily CXR  · Continue Zosyn, Doxy   · F/U repeat Quantiferon gold and AFB smears  · GI following, will eventually need work-up  · Speech eval appreciated: soft diet and thins.   MBS once ruled out for TB.  · IVF  · IV Protonix      [address] [place] [address]

## 2020-09-01 NOTE — PROGRESS NOTES
Care Management follow up, LOS - 6 days     CM assessment completed via EMR review and collaboration with nursing staff. [facility] contact with patient for this assessment. Patient remains in airborne isolation, R/O TB.     Patient admitted for pneumonia, hemoptysis, right pleural effusion, bilateral DVT, r/o TB. GI bleed.     RUR score 13%/ low risk     Current status  Patient currently requiring medical management including IV antibiotics.  IVC filter placed 8/25/2020. R/O TB. Patient had Chest tube to suction. Await 3rd AFB testing to R/O TB.     Transition of Care Plan  1. Monitor patient status and response to treatment. 2. Medical management continues. 3. PT/OT working with patient. 4. Unsure of discharge needs at this time.   5. CM to follow.     Chris Camiol, RN, MSN/Care manager

## 2020-09-02 ENCOUNTER — APPOINTMENT (OUTPATIENT)
Dept: GENERAL RADIOLOGY | Age: 65
DRG: 166 | End: 2020-09-02
Attending: INTERNAL MEDICINE
Payer: COMMERCIAL

## 2020-09-02 ENCOUNTER — APPOINTMENT (OUTPATIENT)
Dept: GENERAL RADIOLOGY | Age: 65
DRG: 166 | End: 2020-09-02
Attending: PHYSICIAN ASSISTANT
Payer: COMMERCIAL

## 2020-09-02 LAB
ALBUMIN SERPL-MCNC: 0.9 G/DL (ref 3.5–5)
ALBUMIN/GLOB SERPL: 0.2 {RATIO} (ref 1.1–2.2)
ALP SERPL-CCNC: 391 U/L (ref 45–117)
ALT SERPL-CCNC: 97 U/L (ref 12–78)
ANION GAP SERPL CALC-SCNC: 4 MMOL/L (ref 5–15)
APTT PPP: 37 SEC (ref 22.1–32)
AST SERPL-CCNC: 136 U/L (ref 15–37)
BASOPHILS # BLD: 0 K/UL (ref 0–0.1)
BASOPHILS NFR BLD: 0 % (ref 0–1)
BILIRUB SERPL-MCNC: 1 MG/DL (ref 0.2–1)
BUN SERPL-MCNC: 4 MG/DL (ref 6–20)
BUN/CREAT SERPL: 10 (ref 12–20)
CALCIUM SERPL-MCNC: 7.2 MG/DL (ref 8.5–10.1)
CHLORIDE SERPL-SCNC: 111 MMOL/L (ref 97–108)
CO2 SERPL-SCNC: 27 MMOL/L (ref 21–32)
CREAT SERPL-MCNC: 0.42 MG/DL (ref 0.7–1.3)
DIFFERENTIAL METHOD BLD: ABNORMAL
EOSINOPHIL # BLD: 0 K/UL (ref 0–0.4)
EOSINOPHIL NFR BLD: 0 % (ref 0–7)
ERYTHROCYTE [DISTWIDTH] IN BLOOD BY AUTOMATED COUNT: 21.3 % (ref 11.5–14.5)
GLOBULIN SER CALC-MCNC: 4.1 G/DL (ref 2–4)
GLUCOSE SERPL-MCNC: 97 MG/DL (ref 65–100)
HCT VFR BLD AUTO: 23.6 % (ref 36.6–50.3)
HGB BLD-MCNC: 7.7 G/DL (ref 12.1–17)
IMM GRANULOCYTES # BLD AUTO: 0 K/UL
IMM GRANULOCYTES NFR BLD AUTO: 0 %
LYMPHOCYTES # BLD: 2.5 K/UL (ref 0.8–3.5)
LYMPHOCYTES NFR BLD: 16 % (ref 12–49)
M TB IFN-G BLD-IMP: NEGATIVE
MCH RBC QN AUTO: 34.1 PG (ref 26–34)
MCHC RBC AUTO-ENTMCNC: 32.6 G/DL (ref 30–36.5)
MCV RBC AUTO: 104.4 FL (ref 80–99)
MONOCYTES # BLD: 0.5 K/UL (ref 0–1)
MONOCYTES NFR BLD: 3 % (ref 5–13)
MYELOCYTES NFR BLD MANUAL: 3 %
NEUTS BAND NFR BLD MANUAL: 7 % (ref 0–6)
NEUTS SEG # BLD: 12.2 K/UL (ref 1.8–8)
NEUTS SEG NFR BLD: 71 % (ref 32–75)
NRBC # BLD: 0 K/UL (ref 0–0.01)
NRBC BLD-RTO: 0 PER 100 WBC
PLATELET # BLD AUTO: 245 K/UL (ref 150–400)
PMV BLD AUTO: 10.9 FL (ref 8.9–12.9)
POTASSIUM SERPL-SCNC: 3.6 MMOL/L (ref 3.5–5.1)
PROT SERPL-MCNC: 5 G/DL (ref 6.4–8.2)
QUANTIFERON CRITERIA, QFI1T: NORMAL
QUANTIFERON MITOGEN VALUE: 7.23 IU/ML
QUANTIFERON NIL VALUE: 0.02 IU/ML
QUANTIFERON TB1 AG: 0.02 IU/ML
QUANTIFERON TB2 AG: 0.02 IU/ML
RBC # BLD AUTO: 2.26 M/UL (ref 4.1–5.7)
RBC MORPH BLD: ABNORMAL
RBC MORPH BLD: ABNORMAL
SODIUM SERPL-SCNC: 142 MMOL/L (ref 136–145)
THERAPEUTIC RANGE,PTTT: ABNORMAL SECS (ref 58–77)
WBC # BLD AUTO: 15.7 K/UL (ref 4.1–11.1)

## 2020-09-02 PROCEDURE — C9113 INJ PANTOPRAZOLE SODIUM, VIA: HCPCS | Performed by: INTERNAL MEDICINE

## 2020-09-02 PROCEDURE — 74011250637 HC RX REV CODE- 250/637: Performed by: INTERNAL MEDICINE

## 2020-09-02 PROCEDURE — 85730 THROMBOPLASTIN TIME PARTIAL: CPT

## 2020-09-02 PROCEDURE — 97116 GAIT TRAINING THERAPY: CPT

## 2020-09-02 PROCEDURE — 74011250637 HC RX REV CODE- 250/637: Performed by: FAMILY MEDICINE

## 2020-09-02 PROCEDURE — 36415 COLL VENOUS BLD VENIPUNCTURE: CPT

## 2020-09-02 PROCEDURE — 71045 X-RAY EXAM CHEST 1 VIEW: CPT

## 2020-09-02 PROCEDURE — 80053 COMPREHEN METABOLIC PANEL: CPT

## 2020-09-02 PROCEDURE — 92526 ORAL FUNCTION THERAPY: CPT

## 2020-09-02 PROCEDURE — 74011000258 HC RX REV CODE- 258: Performed by: INTERNAL MEDICINE

## 2020-09-02 PROCEDURE — 77030040393 HC DRSG OPTIFOAM GENT MDII -B

## 2020-09-02 PROCEDURE — 97530 THERAPEUTIC ACTIVITIES: CPT

## 2020-09-02 PROCEDURE — 74011250636 HC RX REV CODE- 250/636: Performed by: INTERNAL MEDICINE

## 2020-09-02 PROCEDURE — 85025 COMPLETE CBC W/AUTO DIFF WBC: CPT

## 2020-09-02 PROCEDURE — 65660000000 HC RM CCU STEPDOWN

## 2020-09-02 RX ADMIN — LOPERAMIDE HYDROCHLORIDE 2 MG: 2 CAPSULE ORAL at 09:12

## 2020-09-02 RX ADMIN — PANCRELIPASE 1 CAPSULE: 24000; 76000; 120000 CAPSULE, DELAYED RELEASE PELLETS ORAL at 09:12

## 2020-09-02 RX ADMIN — PANCRELIPASE 1 CAPSULE: 24000; 76000; 120000 CAPSULE, DELAYED RELEASE PELLETS ORAL at 18:14

## 2020-09-02 RX ADMIN — DOXYCYCLINE 100 MG: 100 INJECTION, POWDER, LYOPHILIZED, FOR SOLUTION INTRAVENOUS at 04:30

## 2020-09-02 RX ADMIN — Medication 10 ML: at 18:15

## 2020-09-02 RX ADMIN — SODIUM CHLORIDE 40 MG: 9 INJECTION INTRAMUSCULAR; INTRAVENOUS; SUBCUTANEOUS at 21:59

## 2020-09-02 RX ADMIN — PANCRELIPASE 1 CAPSULE: 24000; 76000; 120000 CAPSULE, DELAYED RELEASE PELLETS ORAL at 13:20

## 2020-09-02 RX ADMIN — CYANOCOBALAMIN TAB 500 MCG 1000 MCG: 500 TAB at 09:12

## 2020-09-02 RX ADMIN — Medication 10 ML: at 21:59

## 2020-09-02 RX ADMIN — Medication: at 18:15

## 2020-09-02 RX ADMIN — SODIUM CHLORIDE 40 MG: 9 INJECTION INTRAMUSCULAR; INTRAVENOUS; SUBCUTANEOUS at 09:13

## 2020-09-02 RX ADMIN — Medication 1 CAPSULE: at 09:12

## 2020-09-02 RX ADMIN — FOLIC ACID 1 MG: 1 TABLET ORAL at 09:12

## 2020-09-02 NOTE — PROGRESS NOTES
Problem: Mobility Impaired (Adult and Pediatric)  Goal: *Acute Goals and Plan of Care (Insert Text)  Description: FUNCTIONAL STATUS PRIOR TO ADMISSION: Pt reports independent baseline mobility without device    HOME SUPPORT PRIOR TO ADMISSION: The patient lived alone with friends to provide assistance intermittently. Physical Therapy Goals  Initiated 8/27/2020; continue same goals as of 9/2/2020   1. Patient will move from supine to sit and sit to supine in bed with supervision/set-up within 7 day(s). 2.  Patient will transfer from bed to chair and chair to bed with supervision/set-up using the least restrictive device within 7 day(s). 3.  Patient will perform sit to stand with supervision/set-up within 7 day(s). 4.  Patient will ambulate with supervision/set-up for 100 feet with the least restrictive device within 7 day(s). 5.  Patient will ascend/descend 4 stairs with one handrail(s) with minimal assistance/contact guard assist within 7 day(s). Outcome: Progressing Towards Goal   PHYSICAL THERAPY TREATMENT: WEEKLY REASSESSMENT  Patient: Kenneth Cerrato (47 y.o. male)  Date: 9/2/2020  Primary Diagnosis: Pneumonia [J18.9]  Procedure(s) (LRB):  ESOPHAGOGASTRODUODENOSCOPY (EGD) (N/A)  COLONOSCOPY (N/A)     Precautions:   Fall      ASSESSMENT  Patient continues with skilled PT services and is progressing towards goals. Patient transferred to 5th floor for [facility]. Communicated with nurse cleared for therapy. Rolled on the edge of bed SBA, supine to sit SBA, sit to stand CGA, ambulate with rolling walker in the room towards the recliner offered to ambulate out on the hallway patient refuse just want to to go to the recliner. Offered to performed some active range of motion exercise on both LE while up on the chair patient refuse does want to do anything else but sit on the chair. Explained benefits of therapy verbalized understanding however continue to refuse to do exercise on both LE.  Educate and Instructed patient to continue active range of motion exercise on both legs while up on chair or on bed. Activated and tested chair alarm communicated with nurse who agreed to monitor patient. Patient's progression toward goals since last assessment: good    Current Level of Function Impacting Discharge (mobility/balance): CGA with transfers and ambulation using a rolling walker    Functional Outcome Measure: The patient scored 40/100 on the barthel index outcome measure . Other factors to consider for discharge: fall         PLAN :  Goals have been updated based on progression since last assessment. Patient continues to benefit from skilled intervention to address the above impairments. Recommendations and Planned Interventions: bed mobility training, transfer training, gait training, therapeutic exercises, neuromuscular re-education, orthotic/prosthetic training, patient and family training/education, and therapeutic activities      Frequency/Duration: Patient will be followed by physical therapy:  3 times a week to address goals. Recommendation for discharge: (in order for the patient to meet his/her long term goals)  To be determined: pending progress from therapy vs none    This discharge recommendation:  Has been made in collaboration with the attending provider and/or case management    IF patient discharges home will need the following DME: to be determined (TBD)         SUBJECTIVE:   Patient stated ok.     OBJECTIVE DATA SUMMARY:   HISTORY:    No past medical history on file.   Past Surgical History:   Procedure Laterality Date    IR PLC IVC FILTER  8/25/2020    IR THORACENTESIS/INSERT CHEST TUBE  8/26/2020       Personal factors and/or comorbidities impacting plan of care:     Home Situation  Home Environment: Private residence  # Steps to Enter: 4  Rails to Enter: Yes  One/Two Story Residence: One story  Living Alone: Yes  Support Systems: Friends \ neighbors  Patient Expects to be Discharged to[de-identified] Private residence  Current DME Used/Available at Home: None    EXAMINATION/PRESENTATION/DECISION MAKING:   Critical Behavior:  Neurologic State: Alert  Orientation Level: Oriented to person, Oriented to place, Oriented to situation, Oriented to time  Cognition: Appropriate decision making  Safety/Judgement: Decreased insight into deficits  Hearing: Auditory  Auditory Impairment: None    Range Of Motion:  AROM: Within functional limits           PROM: Within functional limits           Strength:    Strength: Generally decreased, functional                    Tone & Sensation:                                  Coordination:  Coordination: Within functional limits  Vision:      Functional Mobility:  Bed Mobility:  Rolling: Stand-by assistance  Supine to Sit: Stand-by assistance  Sit to Supine: Stand-by assistance  Scooting: Stand-by assistance  Transfers:  Sit to Stand: Contact guard assistance  Stand to Sit: Contact guard assistance  Stand Pivot Transfers: Contact guard assistance     Bed to Chair: Contact guard assistance              Balance:   Sitting: Intact; High guard  Standing: Impaired; With support  Standing - Static: Good  Standing - Dynamic : Fair  Ambulation/Gait Training:  Distance (ft): 10 Feet (ft)  Assistive Device: Walker, rolling;Gait belt  Ambulation - Level of Assistance: Contact guard assistance     Gait Description (WDL): Exceptions to WDL  Gait Abnormalities: Path deviations        Base of Support: Widened     Speed/Taty: Fluctuations                            Therapeutic Exercises:    Instructed patient to continue active range of motion exercise on both legs while up on chair or on bed. Functional Measure:  Barthel Index:    Bathin  Bladder: 0  Bowels: 5  Groomin  Dressin  Feeding: 10  Mobility: 0  Stairs: 0  Toilet Use: 5  Transfer (Bed to Chair and Back): 10  Total: 40/100       The Barthel ADL Index: Guidelines  1.  The index should be used as a record of what a patient does, not as a record of what a patient could do. 2. The main aim is to establish degree of independence from any help, physical or verbal, however minor and for whatever reason. 3. The need for supervision renders the patient not independent. 4. A patient's performance should be established using the best available evidence. Asking the patient, friends/relatives and nurses are the usual sources, but direct observation and common sense are also important. However direct testing is not needed. 5. Usually the patient's performance over the preceding 24-48 hours is important, but occasionally longer periods will be relevant. 6. Middle categories imply that the patient supplies over 50 per cent of the effort. 7. Use of aids to be independent is allowed. Dona Rievra., Barthel, D.W. (8618). Functional evaluation: the Barthel Index. [facility] (14)2. AVIVA Michelle, Elizabet Moyer, [facility]ieCritical [facility]., [place], [facility] (). Measuring the change indisability after inpatient rehabilitation; comparison of the responsiveness of the Barthel Index and Functional [place] Measure. Journal of Neurology, Neurosurgery, and Psychiatry, 66(4), 728-501. Robert Briceño, N.J.A, Rachel Lozano,  [email or web address].J.[email or web address], & Chauncey Holcomb, M.A. (2004.) Assessment of post-stroke quality of life in cost-effectiveness studies: The usefulness of the Barthel Index and the EuroQoL-5D. Quality of Life Research, 13, 427-43          Pain Ratin/10    Activity Tolerance:   Good  Please refer to the flowsheet for vital signs taken during this treatment. After treatment patient left in no apparent distress:   Sitting in chair, Heels elevated for pressure relief, Call bell within reach, and Bed / chair alarm activated    COMMUNICATION/EDUCATION:   The patients plan of care was discussed with: Registered nurse. Fall prevention education was provided and the patient/caregiver indicated understanding.     Thank you for this referral.  Rianna Macdonald, PT,WCC.    Time Calculation: 24 mins

## 2020-09-02 NOTE — PROGRESS NOTES
RN contacted SLP b/c patient will have chest tube removed today. Repeat CXR scheduled for noon. Then pulmonary will pull if appropriate at some point this afternoon. MBS deferred to tomorrow.

## 2020-09-02 NOTE — PROGRESS NOTES
[facility]. Salvador Arshad MD  [phone] office  [phone] [facility]     Gastroenterology Progress Note    September 2, 2020  Admit Date: 8/25/2020         Narrative Assessment and Plan   · Anemia  · Diarrhea  · Weight loss  · Hematochezia/blood in stool  · Empyema without definitive pulmonary neoplasm diagnosed. TB neg. Have reviewed the indications risks benefits and alternatives to diagnostic EGD and colonoscopy with the patient. He asks we proceed only if his chest tube can be removed before he has to prep. He would like to be able to get up to bedside commode during prep. I see that this chest tube may be removed later today. He is currently ventilating comfortably without supplemental O2 so I suspect he is an appropriate candidate for endoscopic procedures with propofol sedation, if other physicians feel he is not please advise me of such. As of now the plan is prep tomorrow for endoscopy and colonoscopy on Friday. Subjective:   · I'm pooping too much    Location:  Meal induced BM  Duration: denies vomiting  Timing: tolerating diet  Radiation: some discomfort at chest tube site   Associated Symptoms: denies abdominal pain  Aggravating/Alleviating factors: no     ROS:  The previous review of systems on initial consultation / H&P is noted and reviewed. Specific changes noted above in HPI.     Current Medications:     Current Facility-Administered Medications   Medication Dose Route Frequency    lipase-protease-amylase (CREON 24,000) capsule 1 Cap  1 Cap Oral TID WITH MEALS    loperamide (IMODIUM) capsule 2 mg  2 mg Oral Q6H PRN    lactobac ac& pc-s.therm-b.anim (ANITA Q/RISAQUAD)  1 Cap Oral DAILY    bismuth subsalicylate (PEPTO-BISMOL) oral suspension 30 mL  30 mL Oral Q6H PRN    cyanocobalamin (VITAMIN B12) tablet 1,000 mcg  1,000 mcg Oral DAILY    folic acid (FOLVITE) tablet 1 mg  1 mg Oral DAILY    HYDROcodone-acetaminophen (NORCO) 5-325 mg per tablet 1 Tab  1 Tab Oral Q6H PRN    morphine injection 1 mg  1 mg IntraVENous Q4H PRN    sodium chloride (NS) flush 5-10 mL  5-10 mL IntraVENous PRN    sodium chloride (NS) flush 5-40 mL  5-40 mL IntraVENous Q8H    sodium chloride (NS) flush 5-40 mL  5-40 mL IntraVENous PRN    acetaminophen (TYLENOL) tablet 650 mg  650 mg Oral Q6H PRN    Or    acetaminophen (TYLENOL) suppository 650 mg  650 mg Rectal Q6H PRN    polyethylene glycol (MIRALAX) packet 17 g  17 g Oral DAILY PRN    promethazine (PHENERGAN) tablet 12.5 mg  12.5 mg Oral Q6H PRN    Or    ondansetron (ZOFRAN) injection 4 mg  4 mg IntraVENous Q6H PRN    0.9% sodium chloride infusion  100 mL/hr IntraVENous CONTINUOUS    pantoprazole (PROTONIX) 40 mg in 0.9% sodium chloride 10 mL injection  40 mg IntraVENous Q12H       Objective:     VITALS:   Last 24hrs VS reviewed since prior progress note. Most recent are:  Visit Vitals  /71 (BP 1 Location: Left arm, BP Patient Position: At rest)   Pulse 80   Temp 97.6 °F (36.4 °C)   Resp 19   Ht 5' 10\" (1.778 m)   Wt 71.4 kg (157 lb 8 oz)   SpO2 98%   BMI 22.60 kg/m²     Temp (24hrs), Av.8 °F (36.6 °C), Min:97.6 °F (36.4 °C), Max:98.1 °F (36.7 °C)      Intake/Output Summary (Last 24 hours) at 2020 1149  Last data filed at 2020 0701  Gross per 24 hour   Intake 1583.33 ml   Output 1350 ml   Net 233.33 ml       EXAM:  General: Cachectic but Comfortable, no distress    HEENT:  Atraumatic skull, pupils equal  Lungs:   No abnormal audible breath sounds. Speaking in complete sentences  Heart:   No abnormal audible heart sounds. Well perfused  Abdomen:   Nondistended, nontender.   No mass, guarding or rebound  Musc:   No skeletal defects or deformities  Neurologic:   Cranial nerves grossly intact, moves all 4 extremities  Psych:    Good insight. Not anxious nor agitated  Derm:   No rash, jaundice, nor palpable dermatologic mass    Lab Data Reviewed:   Recent Labs     20  0356 20  0347 20  0411   WBC 15.7* 16.2* 17.0*   HGB 7.7* 7.8* 8.2*   HCT 23.6* 23.2* 24.5*    208 261     Recent Labs     09/02/20  0356 09/01/20  0347 08/31/20  0600    144 142   K 3.6 4.2 4.0   * 114* 114*   CO2 27 24 22   GLU 97 72 83   BUN 4* 3* 3*   CREA 0.42* 0.39* 0.45*   CA 7.2* 7.0* 6.9*   ALB 0.9* 0.9* 0.9*   TBILI 1.0 1.4* 1.3*   ALT 97* 92* 107*     No results found for: GLUCPOC  No results for input(s): PH, PCO2, PO2, HCO3, FIO2 in the last 72 hours. No results for input(s): INR, INREXT in the last 72 hours.         Assessment:   (See above)  Principal Problem:    Pneumonia involving right lung (8/25/2020)    Active Problems:    Hemoptysis (8/25/2020)      Hyperbilirubinemia (8/25/2020)      Weight loss, unintentional (8/25/2020)      Pleural effusion, right (8/25/2020)      Bilateral pulmonary embolism ([address] (8/25/2020)      Acute deep vein thrombosis (DVT) of both lower extremities ([address] (8/25/2020)      Alcohol use (8/25/2020)      Diarrhea (8/25/2020)      Fecal occult blood test positive (8/25/2020)      Hyponatremia with extracellular fluid depletion (8/25/2020)        Plan:   (See above)      Signed By: Nettie Anglin MD     9/2/2020  11:49 AM

## 2020-09-02 NOTE — ROUTINE PROCESS
1220 
Patient had a urine occurrence during the shift. Output greater than documented amount. Bedside and Verbal shift change report given to [facility] (oncoming nurse) by Bianca Velazquez (offgoing nurse). Report included the following information SBAR, Kardex, ED Summary, Procedure Summary, Intake/Output, MAR, Recent Results and Cardiac Rhythm NSR.

## 2020-09-02 NOTE — PROGRESS NOTES
Daily Progress Note: 9/2/2020  Dewayne Bernard MD    Assessment/Plan:   Pneumonia involving right lung (8/25/2020) /  Hemoptysis (8/25/2020) POA: extensive with internal cavitation and air fluid levels. Staph Aureus, leukocytosis improving. CXR unchanged  --pleural fluid studies + staph, BC neg, cytology neg, AFB neg smears x 3 - cultures pending, and 2nd quantiferon gold negative. -- zosyn, doxy  -- Pulm and Thoracic Surg following     Bilateral pulmonary embolism, acute ([facility] [address] (8/25/2020) / Acute deep vein thrombosis (DVT) of both lower extremities ([facility] [address] (8/25/2020):   - IVC filter placed   - holding anticoags due to dropping Hb and active hemoptysis and GI bleeding     Weight loss, unintentional (8/25/2020) POA: still concerning for malignancy but severe protein calorie malnutrition and empyema likely causes. Nutrition and speech therapy following  -Diet as tolerated  -calcium corrects     Hyperbilirubinemia (8/25/2020) POA: unclear etiology. CT abdomen unremarkable  - resolved     Pleural effusion, right (8/25/2020) POA: empyema, pneumonia vs malignant effusion. -pulmonology following  - Chest tube placed 8/26/20     Alcohol use (8/25/2020) POA: he says he has cut down recently. -Monitor for withdrawal     Chronic Diarrhea (8/25/2020) / Fecal occult blood test positive (8/25/2020) POA: check stool studies. Monitor Hgb.    -GI following- refusing any GI studies  -imodium and peptobismol PRN  -FloraQ daily- or pt's home probiotics    Hypomag/hypokalemia:  -replete and monitor        Problem List:  Problem List as of 9/2/2020 Date Reviewed: 8/25/2020          Codes Class Noted - Resolved    * (Principal) Pneumonia involving right lung ICD-10-CM: J18.9  ICD-9-CM: 481  8/25/2020 - Present        Hemoptysis ICD-10-CM: R04.2  ICD-9-CM: 786.30  8/25/2020 - Present        Hyperbilirubinemia ICD-10-CM: E80.6  ICD-9-CM: 782.4  8/25/2020 - Present        Weight loss, unintentional ICD-10-CM: R63.4  ICD-9-CM: 783.21  8/25/2020 - Present        Pleural effusion, right ICD-10-CM: J90  ICD-9-CM: 511.9  8/25/2020 - Present        Bilateral pulmonary embolism ([address] ICD-10-CM: I26.99  ICD-9-CM: 415.19  8/25/2020 - Present        Acute deep vein thrombosis (DVT) of both lower extremities (HCC) ICD-10-CM: I82.403  ICD-9-CM: 453.40  8/25/2020 - Present        Alcohol use ICD-10-CM: Z72.89  ICD-9-CM: V49.89  8/25/2020 - Present        Diarrhea ICD-10-CM: R19.7  ICD-9-CM: 787.91  8/25/2020 - Present        Fecal occult blood test positive ICD-10-CM: R19.5  ICD-9-CM: 792.1  8/25/2020 - Present        Hyponatremia with extracellular fluid depletion ICD-10-CM: E87.1  ICD-9-CM: 276.1  8/25/2020 - Present            Subjective:     60 y.o. male who is being admitted for Pneumonia involving right lung. Mr. Rowan Chaney presented to our Emergency Department today complaining of generalized weakness, diarrhea and lethargy for several months. Patient was accompanied by his friend who gave most of the history. The patient does not like seeing physicians and apparently uses vitamin supplements. He is a smoker and drinks alcohol and has been progressively weaker over several months now. He has had pleuritic right sided chest pain associated with a productive cough and hemoptysis. He feel on the floor several days ago and was unable to get up. His friends helped him up and brought him to the ED for further testing. Initial CXR done showed airspace disease in right mid lower lung zone consistent with pneumonia. This is surrounded by a moderate right-sided pleural effusion. Upon review, he was noted to be cachectic with swollen lower extremities. CT scans chest abdomen and pelvis done showed a diffuse irregular opacification throughout the right lower lobe with multiple areas of internal cavitation and air-fluid levels. Findings are more likely related to infection than malignancy. Large right pleural effusion.  Trace left pleural effusion. Bilateral pulmonary emboli. Clot burden is moderate. No evidence of right heart strain. Thrombus in the veins of the left lower extremity extending to the IVC. A small amount of thrombus is also seen in the right common iliac vein. He will be admitted for further management. (Dr Balbir Richmond)    8/26:  Feeling a little better this AM but still c/o painful, pleuritic cough and occas thick sputum. No leg/calf pains. No known exposure to ill individuals/no travel - \"just staying in the last few months. \"  No temps. WBC 31.9K. Cultures neg so far.      8/27:  Reports less cough, chest pain and feeling better overall. Appetite has returned. Chest tube placed yesterday with discharge obtained - pending cultures. Other cultures remain neg. Hb lower so cont to hold anticoag for now. GI has seen and work up when more stable. Remains on doxy, zosyn, vanc. WBC down to 28K. Bili back to normal.     8/28:  Continues to grad improve. Still with some cough. No tremor or hallucinations so far. K+ lower - replacing. WBC coming down. Afeb. CXR this AM pending. Cultures remain neg so far. Quantiferon gold test pending as of this AM.  He reports hx of chronic LE edema for years. 8/29: Pt continues to have frequent stooling. GI plans endoscopy next week if pt allows. He was not willing to participate with PT/OT/nutritionist yesterday. Says despite our best efforts, we will not get his stooling to stop until he gets his home probiotics [friend will bring it]. He refuses to eat until the stooling is better. 8/30:  Pt is reluctant to admit that his stooling has improved. Down to 2 BMs yesterday instead of 6 the day before. Tolerating PO. Willing to eat more today. He is still having hemoptysis. Wanting to get out of bed. Willing to work with PT/OT. 8/31:  Feeling better. Appetite better. TB neg so far. First Quantiferon Gold was indeterminate - repeat pending. Afeb. WBC slowly coming down. Currently on Doxy and Zosyn. Replacing Mag and K+. Hb now stable in 8s. : Little change. Not moving about much, he reports due to chest tube being painful when he moves. Hb 7.8. WBC 16. No sign of ETOH withdrawal.     :  Now reports he wants to move around more and get up and wants to work with PT. AFB smears neg x3, AFB cultures still pending of course. WBC slowly coming down. Afeb. Hb still slowly dropping - 7.7 today. May consider prophylactic dose of lovenox and watch Hb.  GI work up pending. Arms a little more swollen.       Review of Systems:   A comprehensive review of systems was negative except for that written in the HPI. Objective:   Physical Exam:   Visit Vitals  /69 (BP 1 Location: Left arm, BP Patient Position: At rest)   Pulse 77   Temp 97.6 °F (36.4 °C)   Resp 20   Ht 5' 10\" (1.778 m)   Wt 71.4 kg (157 lb 8 oz)   SpO2 97%   BMI 22.60 kg/m²      O2 Device: Room air  Temp (24hrs), Av.8 °F (36.6 °C), Min:97.5 °F (36.4 °C), Max:98.1 °F (36.7 °C)    No intake/output data recorded.  1901 -  0700  In: 5023.3 [P.O.:590; I.V.:4433.3]  Out: 2620 [Urine:2450]  General:  Alert, cooperative, no distress, cachetic, ill appearing. Head:  Normocephalic, without obvious abnormality, atraumatic. Eyes:  Enola conjuntiva. PERRL, EOMs intact. Throat: Lips, mucosa, and tongue moist.   Neck: Supple, symmetrical, trachea midline, no adenopathy, thyroid: no enlargement/tenderness/nodules, no carotid bruit and no JVD. Lungs:   Better air exchange,  fewer crackles anteriorly right,  a few scattered rhonchi. Chest wall:  Chest tube site right looks ok   Heart:  Regular rate and rhythm, S1, S2 normal, no murmur, click, rub or gallop. Abdomen:   Soft, non-tender. Bowel sounds normal. No masses,  No organomegaly. Extremities: no cyanosis. No calf tenderness;2+ pitting edema bilat. 2+ pitting edema in arms, No cords palpated.        Pulses: 2+ and symmetric all extremities. Skin: Skin color, texture, turgor normal. No rashes   Neurologic:  Alert and oriented X 3. Fine motor of hands and fingers normal.   equal.  No tremor. Gait not tested at this time. Sensation grossly normal to touch. Gross motor of extremities normal.       Data Review:   EXAM: CT CHEST ABD PELV W CONT 8/25/20  INDICATION: weight loss  COMPARISON: Radiographs 8/25/2020  CONTRAST: 100 mL of Isovue-370. FINDINGS:   CHEST WALL: No mass or axillary lymphadenopathy. THYROID: No nodule. MEDIASTINUM: No mass or lymphadenopathy. BRYAN: No mass or lymphadenopathy. THORACIC AORTA: No dissection or aneurysm. MAIN PULMONARY ARTERY: Normal in caliber. Thrombus is seen throughout the right  lower lobe pulmonary arteries. Thrombus is seen in the left lower lobe pulmonary  artery extending into the anterior and lateral basilar segments. TRACHEA/BRONCHI: Patent. ESOPHAGUS: No wall thickening or dilatation. HEART: Normal in size. PLEURA: There is a large right pleural effusion with enhancement of the pleural  surfaces. Trace left pleural effusion. LUNGS: There is irregular opacification throughout the right lower lobe. There  are multiple areas of internal cavitation with air-fluid levels. Small areas of  atelectasis or scarring are seen in the lingula and inferior left lower lobe. LIVER: No mass. BILIARY TREE: Gallbladder is within normal limits. CBD is not dilated. SPLEEN: within normal limits. PANCREAS: No mass or ductal dilatation. ADRENALS: Unremarkable. KIDNEYS: Small nonobstructive stones are seen in the bilateral kidneys. No  evidence of hydronephrosis. No mass. STOMACH: Unremarkable. SMALL BOWEL: No dilatation or wall thickening. COLON: No dilatation or wall thickening. APPENDIX: Unremarkable  PERITONEUM: No ascites or pneumoperitoneum. RETROPERITONEUM: No lymphadenopathy or aortic aneurysm. REPRODUCTIVE ORGANS: Unremarkable  URINARY BLADDER: No mass or calculus.   BONES: No destructive bone lesion. ABDOMINAL WALL: No mass or hernia. ADDITIONAL COMMENTS: There is thrombus in the left femoral veins extending into  the left iliac veins into the IVC. A small amount of thrombus is also present in  the right common iliac vein. IMPRESSION:  1. Diffuse irregular opacification throughout the right lower lobe with multiple  areas of internal cavitation and air-fluid levels. Findings are more likely  related to infection than malignancy. 2. Large right pleural effusion. Trace left pleural effusion. 3. Bilateral pulmonary emboli. Clot burden is moderate. No evidence of right  heart strain. 4. Thrombus in the veins of the left lower extremity extending to the IVC. A  small amount of thrombus is also seen in the right common iliac vein. EXAM:  IR THORACENTESIS/INSERT CHEST TUBE 8/26/20  INDICATION:  Request placement of large size pigtail catheter. Possible empyema. PROCEDURE:  Available history and imaging was reviewed which demonstrates a large partially  loculated right pleural effusion. Possibility of empyema, tuberculosis and other  etiologies are being considered clinically. Specific request for larger pigtail  catheter placement. Following informed consent the patient was evaluated with ultrasound in the  angina/catheter lab department. This demonstrates a large right pleural  effusion. The location of the pleural effusion was marked and then the area was prepped  and draped. 1% lidocaine was used for local anesthesia. A small incision was made with an 11  blade. A micropuncture needle was used under direct fluoroscopic guidance to access the  pleural fluid. When this was achieved the microguidewire was placed followed by  a 5 [place] dilator. Through this a short [address] guidewire  was placed. The track was then dilated to 12 [place]. This was then followed by  placement of a 12 [place] pigtail catheter.  The string for the pigtail  was removed prior to placement. The catheter was positioned under fluoroscopic  guidance and then secured in place at the skin with a 2-0 Prolene suture. The  site was then dressed with a Tegaderm Bioclusive dressing and will be connected  to a Pleur-evac for subsequent drainage. Prior to this approximately 100 mL of fluid was obtained and sent for cytology  as well as multiple cultures including aerobic, anaerobic, AFB and fungal.   The fluid was withdrawn was mildly cloudy serosanguineous in color. I did not  notice any foul smell. The procedure was adequately tolerated without significant blood loss or  evidence of complication. Final digital image of the chest was saved documenting catheter position. IMPRESSION:   Successful ultrasound and fluoroscopic guided placement of 12 [place] right-sided  chest tube. Patient will have ongoing drainage from Pleur-evac in his hospital  bed/room. Care and monitoring as per medical service and nursing service. ECHO 8/27/20  Interpretation Summary   Result status: Final result    · Image quality for this study was technically difficult. · Echo study was technically difficulty and limited due to patient's tolerance. · LV: Estimated LVEF is 55 - 60%. Normal cavity size, wall thickness and systolic function (ejection fraction normal). Wall motion: normal. Mild (grade 1) left ventricular diastolic dysfunction. · Pericardium: Small pericardial effusion adjacent to right ventricle. Portable AP upright view of the chest. 9/1/20  Direct comparison made to prior chest x-ray dated September 1, 2020. Cardiomediastinal silhouette is stable. Right basilar chest tube is unchanged in  position. There No residual pneumothorax is visualized. There are small  bilateral pleural effusions. There are persistent increased interstitial  markings at the right lung. There is persistent bibasilar patchy airspace  disease, right greater than left.   IMPRESSION: No residual pneumothorax visualized    Recent Days:  Recent Labs     09/02/20  0356 09/01/20  0347 08/31/20  0411   WBC 15.7* 16.2* 17.0*   HGB 7.7* 7.8* 8.2*   HCT 23.6* 23.2* 24.5*    208 261     Recent Labs     09/02/20  0356 09/01/20  0347 08/31/20  0600    144 142   K 3.6 4.2 4.0   * 114* 114*   CO2 27 24 22   GLU 97 72 83   BUN 4* 3* 3*   CREA 0.42* 0.39* 0.45*   CA 7.2* 7.0* 6.9*   ALB 0.9* 0.9* 0.9*   TBILI 1.0 1.4* 1.3*   ALT 97* 92* 107*     No results for input(s): PH, PCO2, PO2, HCO3, FIO2 in the last 72 hours. 24 Hour Results:  Recent Results (from the past 24 hour(s))   PTT    Collection Time: 09/02/20  3:56 AM   Result Value Ref Range    aPTT 37.0 (H) 22.1 - 32.0 sec    aPTT, therapeutic range     58.0 - 77.0 SECS   CBC WITH AUTOMATED DIFF    Collection Time: 09/02/20  3:56 AM   Result Value Ref Range    WBC 15.7 (H) 4.1 - 11.1 K/uL    RBC 2.26 (L) 4.10 - 5.70 M/uL    HGB 7.7 (L) 12.1 - 17.0 g/dL    HCT 23.6 (L) 36.6 - 50.3 %    .4 (H) 80.0 - 99.0 FL    MCH 34.1 (H) 26.0 - 34.0 PG    MCHC 32.6 30.0 - 36.5 g/dL    RDW 21.3 (H) 11.5 - 14.5 %    PLATELET 466 748 - 829 K/uL    MPV 10.9 8.9 - 12.9 FL    NRBC 0.0 0  WBC    ABSOLUTE NRBC 0.00 0.00 - 0.01 K/uL    NEUTROPHILS 71 32 - 75 %    BAND NEUTROPHILS 7 (H) 0 - 6 %    LYMPHOCYTES 16 12 - 49 %    MONOCYTES 3 (L) 5 - 13 %    EOSINOPHILS 0 0 - 7 %    BASOPHILS 0 0 - 1 %    MYELOCYTES 3 (H) 0 %    IMMATURE GRANULOCYTES 0 %    ABS. NEUTROPHILS 12.2 (H) 1.8 - 8.0 K/UL    ABS. LYMPHOCYTES 2.5 0.8 - 3.5 K/UL    ABS. MONOCYTES 0.5 0.0 - 1.0 K/UL    ABS. EOSINOPHILS 0.0 0.0 - 0.4 K/UL    ABS. BASOPHILS 0.0 0.0 - 0.1 K/UL    ABS. IMM.  GRANS. 0.0 K/UL    DF MANUAL      RBC COMMENTS ANISOCYTOSIS  2+        RBC COMMENTS TARGET CELLS  1+       METABOLIC PANEL, COMPREHENSIVE    Collection Time: 09/02/20  3:56 AM   Result Value Ref Range    Sodium 142 136 - 145 mmol/L    Potassium 3.6 3.5 - 5.1 mmol/L    Chloride 111 (H) 97 - 108 mmol/L    CO2 27 21 - 32 mmol/L    Anion gap 4 (L) 5 - 15 mmol/L    Glucose 97 65 - 100 mg/dL    BUN 4 (L) 6 - 20 MG/DL    Creatinine 0.42 (L) 0.70 - 1.30 MG/DL    BUN/Creatinine ratio 10 (L) 12 - 20      GFR est AA >60 >60 ml/min/1.73m2    GFR est non-AA >60 >60 ml/min/1.73m2    Calcium 7.2 (L) 8.5 - 10.1 MG/DL    Bilirubin, total 1.0 0.2 - 1.0 MG/DL    ALT (SGPT) 97 (H) 12 - 78 U/L    AST (SGOT) 136 (H) 15 - 37 U/L    Alk.  phosphatase 391 (H) 45 - 117 U/L    Protein, total 5.0 (L) 6.4 - 8.2 g/dL    Albumin 0.9 (L) 3.5 - 5.0 g/dL    Globulin 4.1 (H) 2.0 - 4.0 g/dL    A-G Ratio 0.2 (L) 1.1 - 2.2         Medications reviewed  Current Facility-Administered Medications   Medication Dose Route Frequency    lipase-protease-amylase (CREON 24,000) capsule 1 Cap  1 Cap Oral TID WITH MEALS    loperamide (IMODIUM) capsule 2 mg  2 mg Oral Q6H PRN    lactobac ac& pc-s.therm-b.anim (ANITA Q/RISAQUAD)  1 Cap Oral DAILY    bismuth subsalicylate (PEPTO-BISMOL) oral suspension 30 mL  30 mL Oral Q6H PRN    cyanocobalamin (VITAMIN B12) tablet 1,000 mcg  1,000 mcg Oral DAILY    folic acid (FOLVITE) tablet 1 mg  1 mg Oral DAILY    HYDROcodone-acetaminophen (NORCO) 5-325 mg per tablet 1 Tab  1 Tab Oral Q6H PRN    morphine injection 1 mg  1 mg IntraVENous Q4H PRN    sodium chloride (NS) flush 5-10 mL  5-10 mL IntraVENous PRN    sodium chloride (NS) flush 5-40 mL  5-40 mL IntraVENous Q8H    sodium chloride (NS) flush 5-40 mL  5-40 mL IntraVENous PRN    acetaminophen (TYLENOL) tablet 650 mg  650 mg Oral Q6H PRN    Or    acetaminophen (TYLENOL) suppository 650 mg  650 mg Rectal Q6H PRN    polyethylene glycol (MIRALAX) packet 17 g  17 g Oral DAILY PRN    promethazine (PHENERGAN) tablet 12.5 mg  12.5 mg Oral Q6H PRN    Or    ondansetron (ZOFRAN) injection 4 mg  4 mg IntraVENous Q6H PRN    0.9% sodium chloride infusion  100 mL/hr IntraVENous CONTINUOUS    pantoprazole (PROTONIX) 40 mg in 0.9% sodium chloride 10 mL injection  40 mg IntraVENous Q12H Care Plan discussed with: Patient/Nurse  Total time spent with patient: 40 minutes. Margaret Albrecht.  Keiko Edmonds MD

## 2020-09-02 NOTE — WOUND CARE
Wound care consult: 
Initial visit for skin assessment, alert no distress Staff at bedside- on janelle PALOMO surface. Assessment All skin folds and bony prominences assessed, turned with staff assistance. Incontinent of stool, wearing diaper. Skin is thin and fragile, scattered areas of bruising and discoloration, arms and lower legs edema. Upper spine - less than 2 cm area abraded skin now resolving. Sacral area - inner thighs and pete rectal skin red with excoriation. Heels and elbows intact- skin is dry and scaling Treatment Incontinent care given- zinc applied Repositioned in bed Heels floated Recommendations/Plan Turn, reposition every 2 hours as tolerated, float heels Incontinent care --- Apply Zinc to all open areas, moisture barrier as needed. Will follow, reconsult as needed.  
 
[address] [place] [address]

## 2020-09-02 NOTE — PROGRESS NOTES
Problem: Dysphagia (Adult)  Goal: *Acute Goals and Plan of Care (Insert Text)  Description: Swallowing goals initiated 8-28-20:  1)  tolerate soft diet, thin liquids without s/s aspiration by 9-4-20  2) MBS after TB r/o  Outcome: Not Progressing Towards Goal   SPEECH LANGUAGE PATHOLOGY DYSPHAGIA TREATMENT  Patient: Lela Martinez (25 y.o. male)  Date: 9/2/2020  Diagnosis: Pneumonia [J18.9]   Pneumonia involving right lung       Precautions: aspiration Fall    ASSESSMENT:  Patient r/o for TB. Still coughing at meals. Will need MBS. PLAN:  Recommendations and Planned Interventions:  Continue diet as tolerated. Will schedule MBS today or tomorrow. Patient continues to benefit from skilled intervention to address the above impairments. Continue treatment per established plan of care. Discharge Recommendations: To Be Determined     SUBJECTIVE:   Patient denies heartburn. He says he is coughing b/c \"there is a bunch of fluid in my lungs'. OBJECTIVE:   Cognitive and Communication Status:  Neurologic State: Alert  Orientation Level: Oriented to person, Oriented to place, Oriented to situation, Oriented to time  Cognition: Appropriate decision making  Perception: Appears intact  Perseveration: No perseveration noted  Safety/Judgement: Decreased insight into deficits  Dysphagia Treatment:  Oral Assessment:     P.O. Trials:  Patient Position: upright in bed  Vocal quality prior to P.O.: No impairment  Consistency Presented: Solid; Thin liquid(seen at meal.)  How Presented: Self-fed/presented;Spoon;Straw;Successive swallows   ORAL PHASE :  Good pO intake today  Bolus Acceptance: No impairment  Bolus Formation/Control: Impaired(edentulous.  \"soft diet\" per patient)     Propulsion: No impairment  Oral Residue: None  Pharyngeal phase:   Initiation of Swallow: No impairment  Laryngeal Elevation: (patient refused palpation \"you can watch\")     Pharyngeal Phase Characteristics: (frequent wet, nonproductive cough during the meal. worse than before the meal)                   Exercises:  Laryngeal Exercises:                                                                                                                                   Pain:  Pain Scale 1: Numeric (0 - 10)  Pain Intensity 1: 0       After treatment:   Patient left in no apparent distress in bed, Call bell within reach, and Nursing notified    COMMUNICATION/EDUCATION:   Patient was educated regarding his deficit(s) of dysphagi as this relates to his diagnosis of lung issues. .  He demonstrated Fair understanding as evidenced by discussion. .    The patient's plan of care including recommendations, planned interventions, and recommended diet changes were discussed with: Registered nurse.  pulm  [id number]  Hardeep Muñiz, SLP  Time Calculation: 25 mins

## 2020-09-02 NOTE — PROGRESS NOTES
Problem: Falls - Risk of  Goal: *Absence of Falls  Description: Document Deyanira Zhang Fall Risk and appropriate interventions in the flowsheet. Outcome: Progressing Towards Goal  Note: Fall Risk Interventions:  Mobility Interventions: Assess mobility with egress test, Bed/chair exit alarm, Communicate number of staff needed for ambulation/transfer, Patient to call before getting OOB         Medication Interventions: Bed/chair exit alarm, Patient to call before getting OOB, Teach patient to arise slowly    Elimination Interventions: Call light in reach, Bed/chair exit alarm, Patient to call for help with toileting needs, Stay With Me (per policy)    History of Falls Interventions: Bed/chair exit alarm, Investigate reason for fall         Problem: Patient Education: Go to Patient Education Activity  Goal: Patient/Family Education  Outcome: Progressing Towards Goal     Problem: Pressure Injury - Risk of  Goal: *Prevention of pressure injury  Description: Document Aj Scale and appropriate interventions in the flowsheet. Outcome: Progressing Towards Goal  Note: Pressure Injury Interventions:  Sensory Interventions: Assess changes in LOC, Turn and reposition approx. every two hours (pillows and wedges if needed)    Moisture Interventions: Absorbent underpads, Apply protective barrier, creams and emollients, Check for incontinence Q2 hours and as needed, Moisture barrier    Activity Interventions: Increase time out of bed    Mobility Interventions: Turn and reposition approx.  every two hours(pillow and wedges)    Nutrition Interventions: Document food/fluid/supplement intake    Friction and Shear Interventions: Apply protective barrier, creams and emollients                Problem: Patient Education: Go to Patient Education Activity  Goal: Patient/Family Education  Outcome: Progressing Towards Goal     Problem: Patient Education: Go to Patient Education Activity  Goal: Patient/Family Education  Outcome: Progressing Towards Goal     Problem: Patient Education: Go to Patient Education Activity  Goal: Patient/Family Education  Outcome: Progressing Towards Goal

## 2020-09-02 NOTE — PROGRESS NOTES
0700-Bedside and Verbal shift change report given to [facility] (oncoming nurse) by Thaddeus Morin (offgoing nurse). Report included the following information SBAR, Kardex, Intake/Output, MAR, Accordion, Recent Results and Cardiac Rhythm NSR. Citlaly Serrano 8594- Patients bilateral upper extremities appear to be more edematous and red today, notified Dr. Roderick Romeo. No new orders. Verbal order for downgrade to remote tele per Dr. Roderick Romeo. 0800- Ordered to clamp chest tube and remain clamped per VIDAL Amato.     0900- Chest tube clamped. 5546- Patient reported he did not work with PT yesterday. Spoke with Colletta Merlin, pts friends, regarding this. Both report they would like to work with PT/OT today. Notified Marcin Kapoor who reports he did not have time to see patient yesterday and will attempt to see this afternoon, notified pt and friend. 1040- Dual skin completed with HARJIT Mcdaniel. Pt has blanchable redness in mid saccum, under scrotum, and closed skin tears on left elbow. Zinc applied to all areas. Zinc placed on tear on back. CHG provided and incontinence care provided. Pt placed on mobility team now that TB is negative. 1240- Chest tube is to remain clamped, will repeat CXR tomorrow per VIDAL Amato.

## 2020-09-02 NOTE — PROGRESS NOTES
Name: [facility]: [address]   : 1955 Admit Date: 2020   Phone: [phone]  Room: 324/01   PCP: Porsha Rubio MD  MRN: [id number]   Date: 2020  Code: Full Code          Chart and notes reviewed. Data reviewed. I review the patient's current medications in the medical record at each encounter.  I have evaluated and examined the patient. History of Present Illness:   is a pleasant, 61 yo gentleman that presented to [facility] with worsening shortness of breath and weakness. He tells me that he started feeling poorly in 2019 with a cough, however that resolved. His symptoms returned in 2019 and tells me that he has been struggling with cough, weight loss of 40+ lbs, and intermittent hemoptysis (phlegm mixed with blood), and diarrhea. He is a smoker of 2ppd for 20+ years. Also prior to the last two weeks, was \"drinking like a fish,\" at least 1 pint per day. He does not routinely prefer to seek traditional medical care and thus has delayed coming to the ED due to his symptoms. Reports worsening right sided chest pain and poor appetite. Images:  Personally reviewed. Chest/Abdominal CT:  Diffuse irregular opacification throughout the right lower lobe with multiple  areas of internal cavitation and air-fluid levels. Findings are more likely  related to infection than malignancy. Large right pleural effusion. Trace left pleural effusion. Bilateral pulmonary emboli. Clot burden is moderate. No evidence of right heart strain. Thrombus in the veins of the left lower extremity extending to the IVC. A  small amount of thrombus is also seen in the right common iliac vein. WBC 31.69  Hgb 9.7  ALT 47  AST 75  Na 135  Creat . 47  Albumin . 7  Lactic acid on admit 2.2; now 1.1  Hemoccult +   Blood cultures negative x 11 hours      Interval History:  Afebrile  BP soft but stable; MAP 80  Sats 100% on RA  WBC 15.7 - trending down  Hgb 7.7 - trending back down  Alk phos 391    ALT 97  Blood cx no growth x 6 days  Resp cx with moderate staph aureus  Pleural fluid cx heavy staph aureus - pan sensitive  Pleural fluid ABF smear neg  Pleural fluid cytology no cells diagnostic for malignancy  AFB smear neg x 3  8/26 quant TB gold indeterminate; 8/29 repeat negative  Hep panel nonreactive  Chest tube with 120ml out      ECHO: EF 96-88%; grade 1 diastolic dysfunction; small pericardial effusion, no tamponade  BLE VD: positive for age indeterminate and acute deep venous thrombosis or thrombophlebitis in right and left CFV, FV, and popliteal veins. ROS: Feeling the same this morning. Denies SOB. Denies fever or chills. Mild discomfort at site of chest tube. Reports occasional cough productive of brown sputum. Denies abd pain. Continues with LE edema - mostly unchanged. No past medical history on file. Past Surgical History:   Procedure Laterality Date    IR [facility] BROWN DEER IVC FILTER  8/25/2020    IR THORACENTESIS/INSERT CHEST TUBE  8/26/2020       No family history on file.     Social History     Tobacco Use    Smoking status: Current Every Day Smoker   Substance Use Topics    Alcohol use: Yes       No Known Allergies    Current Facility-Administered Medications   Medication Dose Route Frequency    lipase-protease-amylase (CREON 24,000) capsule 1 Cap  1 Cap Oral TID WITH MEALS    loperamide (IMODIUM) capsule 2 mg  2 mg Oral Q6H PRN    lactobac ac& pc-s.therm-b.anim (ANITA Q/RISAQUAD)  1 Cap Oral DAILY    bismuth subsalicylate (PEPTO-BISMOL) oral suspension 30 mL  30 mL Oral Q6H PRN    cyanocobalamin (VITAMIN B12) tablet 1,000 mcg  1,000 mcg Oral DAILY    folic acid (FOLVITE) tablet 1 mg  1 mg Oral DAILY    HYDROcodone-acetaminophen (NORCO) 5-325 mg per tablet 1 Tab  1 Tab Oral Q6H PRN    morphine injection 1 mg  1 mg IntraVENous Q4H PRN    sodium chloride (NS) flush 5-10 mL  5-10 mL IntraVENous PRN    sodium chloride (NS) flush 5-40 mL 5-40 mL IntraVENous Q8H    sodium chloride (NS) flush 5-40 mL  5-40 mL IntraVENous PRN    acetaminophen (TYLENOL) tablet 650 mg  650 mg Oral Q6H PRN    Or    acetaminophen (TYLENOL) suppository 650 mg  650 mg Rectal Q6H PRN    polyethylene glycol (MIRALAX) packet 17 g  17 g Oral DAILY PRN    promethazine (PHENERGAN) tablet 12.5 mg  12.5 mg Oral Q6H PRN    Or    ondansetron (ZOFRAN) injection 4 mg  4 mg IntraVENous Q6H PRN    0.9% sodium chloride infusion  100 mL/hr IntraVENous CONTINUOUS    pantoprazole (PROTONIX) 40 mg in 0.9% sodium chloride 10 mL injection  40 mg IntraVENous Q12H         REVIEW OF SYSTEMS   Negative except as stated in the HPI. Physical Exam:   Visit Vitals  /71 (BP 1 Location: Left arm, BP Patient Position: At rest)   Pulse 80   Temp 97.6 °F (36.4 °C)   Resp 19   Ht 5' 10\" (1.778 m)   Wt 71.4 kg (157 lb 8 oz)   SpO2 98%   BMI 22.60 kg/m²       General:  Alert, cooperative, no distress, appears older stated age. Head:  Normocephalic, without obvious abnormality, atraumatic. Eyes:  Conjunctivae/corneas clear. .   Nose: Nares normal. Septum midline. Mucosa normal.    Throat: Lips, mucosa, and tongue normal. Poor dentition. Lungs:   Chest tube in place, no air leak. Mildly diminished over right base. Chest wall:  No tenderness or deformity. Heart:  Regular rate and rhythm, S1, S2 normal, no murmur, click, rub or gallop. Abdomen:   Soft, non-tender. Bowel sounds normal.    Extremities: Muscle wasting. Anasarca. Pulses: 2+ and symmetric all extremities.    Skin: Skin color, texture, turgor normal.   Lymph nodes: Cervical nodes normal.   Neurologic: Grossly nonfocal       Lab Results   Component Value Date/Time    Sodium 142 09/02/2020 03:56 AM    Potassium 3.6 09/02/2020 03:56 AM    Chloride 111 (H) 09/02/2020 03:56 AM    CO2 27 09/02/2020 03:56 AM    BUN 4 (L) 09/02/2020 03:56 AM    Creatinine 0.42 (L) 09/02/2020 03:56 AM    Glucose 97 09/02/2020 03:56 AM    Calcium 7.2 (L) 09/02/2020 03:56 AM    Magnesium 1.5 (L) 08/30/2020 02:02 AM    Lactic acid 1.1 08/25/2020 06:25 PM       Lab Results   Component Value Date/Time    WBC 15.7 (H) 09/02/2020 03:56 AM    HGB 7.7 (L) 09/02/2020 03:56 AM    PLATELET 624 54/75/3516 03:56 AM    .4 (H) 09/02/2020 03:56 AM       Lab Results   Component Value Date/Time    aPTT 37.0 (H) 09/02/2020 03:56 AM    Alk.  phosphatase 391 (H) 09/02/2020 03:56 AM    Protein, total 5.0 (L) 09/02/2020 03:56 AM    Albumin 0.9 (L) 09/02/2020 03:56 AM    Globulin 4.1 (H) 09/02/2020 03:56 AM       No results found for: IRON, FE, TIBC, IBCT, PSAT, FERR    No results found for: SR, CRP, ANALIA, ANAIGG, RA, RPR, RPRT, VDRLT, VDRLS, TSH, TSHEXT, TSHEXT     No results found for: PH, PHI, PCO2, PCO2I, PO2, PO2I, HCO3, HCO3I, FIO2, FIO2I    Lab Results   Component Value Date/Time    Troponin-I, Qt. <0.05 08/25/2020 04:07 PM        Lab Results   Component Value Date/Time    Culture result: HEAVY STAPHYLOCOCCUS AUREUS (A) 08/26/2020 04:48 PM    Culture result: NO FUNGUS ISOLATED 5 DAYS 08/26/2020 04:48 PM    Culture result: NO ANAEROBES ISOLATED 08/26/2020 04:48 PM       Lab Results   Component Value Date/Time    Hepatitis B surface Ag <0.10 08/31/2020 09:16 AM       Lab Results   Component Value Date/Time    Vancomycin,trough 13.0 (H) 08/28/2020 09:36 AM       Lab Results   Component Value Date/Time    Color JENNIFFER 08/26/2020 03:12 AM    Appearance CLEAR 08/26/2020 03:12 AM    Specific gravity 1.005 08/26/2020 03:12 AM    pH (UA) 6.0 08/26/2020 03:12 AM    Protein TRACE (A) 08/26/2020 03:12 AM    Glucose Negative 08/26/2020 03:12 AM    Ketone TRACE (A) 08/26/2020 03:12 AM    Blood Negative 08/26/2020 03:12 AM    Urobilinogen 1.0 08/26/2020 03:12 AM    Nitrites Negative 08/26/2020 03:12 AM    Leukocyte Esterase SMALL (A) 08/26/2020 03:12 AM    WBC 5-10 08/26/2020 03:12 AM    RBC 5-10 08/26/2020 03:12 AM    Bacteria Negative 08/26/2020 03:12 AM       Images: personally visualized and report reviewed    CXR (9/2/2020): Right basilar chest tube is stable in position. Patchy airspace disease throughout the right lung is not significantly changed. Left basilar atelectasis persists. There is a persistent trace right pleural effusion. No pneumothorax is visualized. IMPRESSION  · Abnormal Chest CT: extensive cavitation throughout. Pl fluid with MSSA  · Bilateral PEs s/p IVC filter due to GIB  · GIB  · Anemia  · BLE DVT  · Weight Loss  · Tobacco Use    PLAN  · Supplemental O2 if needed to keep sats >88%; remains on RA  · Clamp chest tube and repeat CXR this afternoon. · Daily CXR  · Completed course of Zosyn, Doxy   · GI following, will eventually need work-up  · Speech eval appreciated: soft diet and thins. MBS today.   · IVF  · IV Protonix      [facility]

## 2020-09-02 NOTE — PROGRESS NOTES
Bedside shift change report given to Kermit RN (oncoming nurse) by Leslie Whittaker RN and Chilo Gil RN (offgoing nurse). Report included the following information SBAR.

## 2020-09-03 ENCOUNTER — APPOINTMENT (OUTPATIENT)
Dept: GENERAL RADIOLOGY | Age: 65
DRG: 166 | End: 2020-09-03
Attending: PHYSICIAN ASSISTANT
Payer: COMMERCIAL

## 2020-09-03 ENCOUNTER — APPOINTMENT (OUTPATIENT)
Dept: GENERAL RADIOLOGY | Age: 65
DRG: 166 | End: 2020-09-03
Attending: INTERNAL MEDICINE
Payer: COMMERCIAL

## 2020-09-03 LAB
ALBUMIN SERPL-MCNC: 0.9 G/DL (ref 3.5–5)
ALBUMIN/GLOB SERPL: 0.2 {RATIO} (ref 1.1–2.2)
ALP SERPL-CCNC: 370 U/L (ref 45–117)
ALT SERPL-CCNC: 94 U/L (ref 12–78)
ANION GAP SERPL CALC-SCNC: 7 MMOL/L (ref 5–15)
AST SERPL-CCNC: 111 U/L (ref 15–37)
BILIRUB SERPL-MCNC: 1 MG/DL (ref 0.2–1)
BUN SERPL-MCNC: 4 MG/DL (ref 6–20)
BUN/CREAT SERPL: 9 (ref 12–20)
CALCIUM SERPL-MCNC: 7 MG/DL (ref 8.5–10.1)
CHLORIDE SERPL-SCNC: 109 MMOL/L (ref 97–108)
CO2 SERPL-SCNC: 25 MMOL/L (ref 21–32)
CREAT SERPL-MCNC: 0.47 MG/DL (ref 0.7–1.3)
GLOBULIN SER CALC-MCNC: 4 G/DL (ref 2–4)
GLUCOSE SERPL-MCNC: 73 MG/DL (ref 65–100)
POTASSIUM SERPL-SCNC: 3.2 MMOL/L (ref 3.5–5.1)
PROT SERPL-MCNC: 4.9 G/DL (ref 6.4–8.2)
SODIUM SERPL-SCNC: 141 MMOL/L (ref 136–145)

## 2020-09-03 PROCEDURE — 74011250637 HC RX REV CODE- 250/637: Performed by: FAMILY MEDICINE

## 2020-09-03 PROCEDURE — 74011250637 HC RX REV CODE- 250/637: Performed by: INTERNAL MEDICINE

## 2020-09-03 PROCEDURE — 65660000000 HC RM CCU STEPDOWN

## 2020-09-03 PROCEDURE — 94760 N-INVAS EAR/PLS OXIMETRY 1: CPT

## 2020-09-03 PROCEDURE — C9113 INJ PANTOPRAZOLE SODIUM, VIA: HCPCS | Performed by: INTERNAL MEDICINE

## 2020-09-03 PROCEDURE — 74011250636 HC RX REV CODE- 250/636: Performed by: INTERNAL MEDICINE

## 2020-09-03 PROCEDURE — 74230 X-RAY XM SWLNG FUNCJ C+: CPT

## 2020-09-03 PROCEDURE — 74011000250 HC RX REV CODE- 250: Performed by: SPECIALIST

## 2020-09-03 PROCEDURE — 80053 COMPREHEN METABOLIC PANEL: CPT

## 2020-09-03 PROCEDURE — 36415 COLL VENOUS BLD VENIPUNCTURE: CPT

## 2020-09-03 PROCEDURE — 74011250636 HC RX REV CODE- 250/636: Performed by: FAMILY MEDICINE

## 2020-09-03 PROCEDURE — 71045 X-RAY EXAM CHEST 1 VIEW: CPT

## 2020-09-03 PROCEDURE — 92611 MOTION FLUOROSCOPY/SWALLOW: CPT

## 2020-09-03 RX ORDER — POTASSIUM CHLORIDE 750 MG/1
40 TABLET, FILM COATED, EXTENDED RELEASE ORAL
Status: COMPLETED | OUTPATIENT
Start: 2020-09-03 | End: 2020-09-03

## 2020-09-03 RX ADMIN — FOLIC ACID 1 MG: 1 TABLET ORAL at 09:04

## 2020-09-03 RX ADMIN — CYANOCOBALAMIN TAB 500 MCG 1000 MCG: 500 TAB at 09:04

## 2020-09-03 RX ADMIN — POLYETHYLENE GLYCOL 3350, SODIUM SULFATE ANHYDROUS, SODIUM BICARBONATE, SODIUM CHLORIDE, POTASSIUM CHLORIDE 2000 ML: 236; 22.74; 6.74; 5.86; 2.97 POWDER, FOR SOLUTION ORAL at 16:40

## 2020-09-03 RX ADMIN — Medication: at 09:05

## 2020-09-03 RX ADMIN — POTASSIUM CHLORIDE 40 MEQ: 750 TABLET, FILM COATED, EXTENDED RELEASE ORAL at 09:04

## 2020-09-03 RX ADMIN — SODIUM CHLORIDE 40 MG: 9 INJECTION INTRAMUSCULAR; INTRAVENOUS; SUBCUTANEOUS at 21:58

## 2020-09-03 RX ADMIN — PANCRELIPASE 1 CAPSULE: 24000; 76000; 120000 CAPSULE, DELAYED RELEASE PELLETS ORAL at 12:02

## 2020-09-03 RX ADMIN — PANCRELIPASE 1 CAPSULE: 24000; 76000; 120000 CAPSULE, DELAYED RELEASE PELLETS ORAL at 09:04

## 2020-09-03 RX ADMIN — Medication: at 20:21

## 2020-09-03 RX ADMIN — POLYETHYLENE GLYCOL 3350, SODIUM SULFATE ANHYDROUS, SODIUM BICARBONATE, SODIUM CHLORIDE, POTASSIUM CHLORIDE 2000 ML: 236; 22.74; 6.74; 5.86; 2.97 POWDER, FOR SOLUTION ORAL at 20:20

## 2020-09-03 RX ADMIN — Medication 1 CAPSULE: at 09:04

## 2020-09-03 RX ADMIN — PANCRELIPASE 1 CAPSULE: 24000; 76000; 120000 CAPSULE, DELAYED RELEASE PELLETS ORAL at 16:40

## 2020-09-03 RX ADMIN — Medication 10 ML: at 16:42

## 2020-09-03 RX ADMIN — SODIUM CHLORIDE 100 ML/HR: 900 INJECTION, SOLUTION INTRAVENOUS at 00:50

## 2020-09-03 RX ADMIN — Medication 10 ML: at 05:52

## 2020-09-03 RX ADMIN — SODIUM CHLORIDE 40 MG: 9 INJECTION INTRAMUSCULAR; INTRAVENOUS; SUBCUTANEOUS at 09:05

## 2020-09-03 RX ADMIN — Medication 10 ML: at 21:59

## 2020-09-03 NOTE — PROGRESS NOTES
Name: [facility]: [address]   : 1955 Admit Date: 2020   Phone: [phone]  Room: 27 568798   PCP: Chyrl Runner, MD  MRN: [id number]   Date: 9/3/2020  Code: Full Code          Chart and notes reviewed. Data reviewed. I review the patient's current medications in the medical record at each encounter.  I have evaluated and examined the patient. History of Present Illness:   is a pleasant, 61 yo gentleman that presented to [facility] with worsening shortness of breath and weakness. He tells me that he started feeling poorly in 2019 with a cough, however that resolved. His symptoms returned in 2019 and tells me that he has been struggling with cough, weight loss of 40+ lbs, and intermittent hemoptysis (phlegm mixed with blood), and diarrhea. He is a smoker of 2ppd for 20+ years. Also prior to the last two weeks, was \"drinking like a fish,\" at least 1 pint per day. He does not routinely prefer to seek traditional medical care and thus has delayed coming to the ED due to his symptoms. Reports worsening right sided chest pain and poor appetite. Images:  Personally reviewed. Chest/Abdominal CT:  Diffuse irregular opacification throughout the right lower lobe with multiple  areas of internal cavitation and air-fluid levels. Findings are more likely  related to infection than malignancy. Large right pleural effusion. Trace left pleural effusion. Bilateral pulmonary emboli. Clot burden is moderate. No evidence of right heart strain. Thrombus in the veins of the left lower extremity extending to the IVC. A  small amount of thrombus is also seen in the right common iliac vein. WBC 31.69  Hgb 9.7  ALT 47  AST 75  Na 135  Creat . 47  Albumin . 7  Lactic acid on admit 2.2; now 1.1  Hemoccult +   Blood cultures negative x 11 hours      Interval History:  Afebrile  BP stable; MAP 70s-80s  Sats 98% on RA  WBC 15.7 - trending down  Hgb 7.7 - trending back down  K 3.2 - repleted  Alk phos 340    ALT 94  Blood cx no growth x 6 days  Resp cx with moderate staph aureus  Pleural fluid cx heavy staph aureus - pan sensitive  Pleural fluid ABF smear neg  Pleural fluid cytology no cells diagnostic for malignancy  AFB smear neg x 3  8/26 quant TB gold indeterminate; 8/29 repeat negative  Hep panel nonreactive  Chest tube with 50 out, then clamped      ECHO: EF 42-24%; grade 1 diastolic dysfunction; small pericardial effusion, no tamponade  BLE VD: positive for age indeterminate and acute deep venous thrombosis or thrombophlebitis in right and left CFV, FV, and popliteal veins. ROS: Feeling okay this morning. Denies SOB. Denies fever or chills. Mild discomfort at site of chest tube. Reports occasional cough. .  Denies abd pain. Continues with LE edema - mostly unchanged. No past medical history on file. Past Surgical History:   Procedure Laterality Date    IR [facility] BROWN DEER IVC FILTER  8/25/2020    IR THORACENTESIS/INSERT CHEST TUBE  8/26/2020       No family history on file.     Social History     Tobacco Use    Smoking status: Current Every Day Smoker   Substance Use Topics    Alcohol use: Yes       No Known Allergies    Current Facility-Administered Medications   Medication Dose Route Frequency    white petrolatum-mineral oiL (EUCERIN) cream   Topical BID    lipase-protease-amylase (CREON 24,000) capsule 1 Cap  1 Cap Oral TID WITH MEALS    loperamide (IMODIUM) capsule 2 mg  2 mg Oral Q6H PRN    lactobac ac& pc-s.therm-b.anim (ANITA Q/RISAQUAD)  1 Cap Oral DAILY    bismuth subsalicylate (PEPTO-BISMOL) oral suspension 30 mL  30 mL Oral Q6H PRN    cyanocobalamin (VITAMIN B12) tablet 1,000 mcg  1,000 mcg Oral DAILY    folic acid (FOLVITE) tablet 1 mg  1 mg Oral DAILY    HYDROcodone-acetaminophen (NORCO) 5-325 mg per tablet 1 Tab  1 Tab Oral Q6H PRN    morphine injection 1 mg  1 mg IntraVENous Q4H PRN    sodium chloride (NS) flush 5-10 mL  5-10 mL IntraVENous PRN    sodium chloride (NS) flush 5-40 mL  5-40 mL IntraVENous Q8H    sodium chloride (NS) flush 5-40 mL  5-40 mL IntraVENous PRN    acetaminophen (TYLENOL) tablet 650 mg  650 mg Oral Q6H PRN    Or    acetaminophen (TYLENOL) suppository 650 mg  650 mg Rectal Q6H PRN    polyethylene glycol (MIRALAX) packet 17 g  17 g Oral DAILY PRN    promethazine (PHENERGAN) tablet 12.5 mg  12.5 mg Oral Q6H PRN    Or    ondansetron (ZOFRAN) injection 4 mg  4 mg IntraVENous Q6H PRN    0.9% sodium chloride infusion  100 mL/hr IntraVENous CONTINUOUS    pantoprazole (PROTONIX) 40 mg in 0.9% sodium chloride 10 mL injection  40 mg IntraVENous Q12H         REVIEW OF SYSTEMS   Negative except as stated in the HPI. Physical Exam:   Visit Vitals  /69 (BP 1 Location: Right arm, BP Patient Position: At rest)   Pulse 85   Temp 98.1 °F (36.7 °C)   Resp 18   Ht 5' 10\" (1.778 m)   Wt 71.4 kg (157 lb 8 oz)   SpO2 98%   BMI 22.60 kg/m²       General:  Alert, cooperative, no distress, appears older stated age. Head:  Normocephalic, without obvious abnormality, atraumatic. Eyes:  Conjunctivae/corneas clear. .   Nose: Nares normal. Septum midline. Mucosa normal.    Throat: Lips, mucosa, and tongue normal. Poor dentition. Lungs:   Chest tube in place, clamped. Mildly diminished over right base. Chest wall:  No tenderness or deformity. Heart:  Regular rate and rhythm, S1, S2 normal, no murmur, click, rub or gallop. Abdomen:   Soft, non-tender. Bowel sounds normal.    Extremities: Muscle wasting. Anasarca. Pulses: 2+ and symmetric all extremities.    Skin: Skin color, texture, turgor normal.   Lymph nodes: Cervical nodes normal.   Neurologic: Grossly nonfocal       Lab Results   Component Value Date/Time    Sodium 141 09/03/2020 03:51 AM    Potassium 3.2 (L) 09/03/2020 03:51 AM    Chloride 109 (H) 09/03/2020 03:51 AM    CO2 25 09/03/2020 03:51 AM    BUN 4 (L) 09/03/2020 03:51 AM    Creatinine 0.47 (L) 09/03/2020 03:51 AM    Glucose 73 09/03/2020 03:51 AM    Calcium 7.0 (L) 09/03/2020 03:51 AM    Magnesium 1.5 (L) 08/30/2020 02:02 AM    Lactic acid 1.1 08/25/2020 06:25 PM       Lab Results   Component Value Date/Time    WBC 15.7 (H) 09/02/2020 03:56 AM    HGB 7.7 (L) 09/02/2020 03:56 AM    PLATELET 961 69/04/8772 03:56 AM    .4 (H) 09/02/2020 03:56 AM       Lab Results   Component Value Date/Time    aPTT 37.0 (H) 09/02/2020 03:56 AM    Alk.  phosphatase 370 (H) 09/03/2020 03:51 AM    Protein, total 4.9 (L) 09/03/2020 03:51 AM    Albumin 0.9 (L) 09/03/2020 03:51 AM    Globulin 4.0 09/03/2020 03:51 AM       No results found for: IRON, FE, TIBC, IBCT, PSAT, FERR    No results found for: SR, CRP, ANALIA, ANAIGG, RA, RPR, RPRT, VDRLT, VDRLS, TSH, TSHEXT, TSHEXT     No results found for: PH, PHI, PCO2, PCO2I, PO2, PO2I, HCO3, HCO3I, FIO2, FIO2I    Lab Results   Component Value Date/Time    Troponin-I, Qt. <0.05 08/25/2020 04:07 PM        Lab Results   Component Value Date/Time    Culture result: HEAVY STAPHYLOCOCCUS AUREUS (A) 08/26/2020 04:48 PM    Culture result: NO FUNGUS ISOLATED 5 DAYS 08/26/2020 04:48 PM    Culture result: NO ANAEROBES ISOLATED 08/26/2020 04:48 PM       Lab Results   Component Value Date/Time    Hepatitis B surface Ag <0.10 08/31/2020 09:16 AM       Lab Results   Component Value Date/Time    Vancomycin,trough 13.0 (H) 08/28/2020 09:36 AM       Lab Results   Component Value Date/Time    Color JENNIFFER 08/26/2020 03:12 AM    Appearance CLEAR 08/26/2020 03:12 AM    Specific gravity 1.005 08/26/2020 03:12 AM    pH (UA) 6.0 08/26/2020 03:12 AM    Protein TRACE (A) 08/26/2020 03:12 AM    Glucose Negative 08/26/2020 03:12 AM    Ketone TRACE (A) 08/26/2020 03:12 AM    Blood Negative 08/26/2020 03:12 AM    Urobilinogen 1.0 08/26/2020 03:12 AM    Nitrites Negative 08/26/2020 03:12 AM    Leukocyte Esterase SMALL (A) 08/26/2020 03:12 AM    WBC 5-10 08/26/2020 03:12 AM    RBC 5-10 08/26/2020 03:12 AM Bacteria Negative 08/26/2020 03:12 AM       Images: personally visualized and report reviewed    CXR (9/2/2020): Right basilar chest tube is stable in position. No significant interval change. IMPRESSION  · Abnormal Chest CT: extensive cavitation throughout. Pl fluid with MSSA  · Bilateral PEs s/p IVC filter due to GIB  · GIB  · Anemia  · BLE DVT  · Weight Loss  · Tobacco Use    PLAN  · Supplemental O2 if needed to keep sats >88%; remains on RA  · Removed chest tube  · CXR tomorrow am  · Completed course of Zosyn, Doxy   · Check am labs  · K repleted  · GI following, will eventually need work-up  · Speech eval appreciated: soft diet and thins. MBS today.   · IVF  · IV Protonix      Tejal Caicedo

## 2020-09-03 NOTE — PROGRESS NOTES
Daily Progress Note: 9/3/2020  Mariano Leos MD    Assessment/Plan:   Pneumonia involving right lung (8/25/2020) /  Hemoptysis (8/25/2020) POA: extensive with internal cavitation and air fluid levels. Staph Aureus, leukocytosis improving. CXR unchanged  --pleural fluid studies + staph, BC neg, cytology neg, AFB neg smears x 3 - cultures pending, and 2nd quantiferon gold negative. -- zosyn, doxy completed  -- Pulm and Thoracic Surg following     Bilateral pulmonary embolism, acute ([facility] (8/25/2020) / Acute deep vein thrombosis (DVT) of both lower extremities ([facility] (8/25/2020):   - IVC filter placed   - holding anticoags due to dropping Hb and active hemoptysis and GI bleeding     Weight loss, unintentional (8/25/2020) POA: still concerning for malignancy but severe protein calorie malnutrition and empyema likely causes. Nutrition and speech therapy following  -Diet as tolerated  -calcium corrects  -GI work up pending- hoping for endoscopy tomorrow     Hyperbilirubinemia (8/25/2020) POA: unclear etiology. CT abdomen unremarkable  - resolved     Pleural effusion, right (8/25/2020) POA: empyema, pneumonia vs malignant effusion. -pulmonology following  - Chest tube placed 8/26/20     Alcohol use (8/25/2020) POA: he says he has cut down recently. -Monitor for withdrawal     Chronic Diarrhea (8/25/2020) / Fecal occult blood test positive (8/25/2020) POA: check stool studies. Monitor Hgb.    -GI following- no longer refusing any GI studies  -imodium and peptobismol PRN  -FloraQ daily- or pt's home probiotics    Hypomag/hypokalemia:  -replete and monitor        Problem List:  Problem List as of 9/3/2020 Date Reviewed: 8/25/2020          Codes Class Noted - Resolved    * (Principal) Pneumonia involving right lung ICD-10-CM: J18.9  ICD-9-CM: 486  8/25/2020 - Present        Hemoptysis ICD-10-CM: R04.2  ICD-9-CM: 786.30  8/25/2020 - Present        Hyperbilirubinemia ICD-10-CM: E80.6  ICD-9-CM: 782.4 8/25/2020 - Present        Weight loss, unintentional ICD-10-CM: R63.4  ICD-9-CM: 783.21  8/25/2020 - Present        Pleural effusion, right ICD-10-CM: J90  ICD-9-CM: 511.9  8/25/2020 - Present        Bilateral pulmonary embolism ([address] ICD-10-CM: I26.99  ICD-9-CM: 415.19  8/25/2020 - Present        Acute deep vein thrombosis (DVT) of both lower extremities (HCC) ICD-10-CM: I82.403  ICD-9-CM: 453.40  8/25/2020 - Present        Alcohol use ICD-10-CM: Z72.89  ICD-9-CM: V49.89  8/25/2020 - Present        Diarrhea ICD-10-CM: R19.7  ICD-9-CM: 787.91  8/25/2020 - Present        Fecal occult blood test positive ICD-10-CM: R19.5  ICD-9-CM: 792.1  8/25/2020 - Present        Hyponatremia with extracellular fluid depletion ICD-10-CM: E87.1  ICD-9-CM: 276.1  8/25/2020 - Present            Subjective:     60 y.o. male who is being admitted for Pneumonia involving right lung. Mr. Alejandra Loving presented to our Emergency Department today complaining of generalized weakness, diarrhea and lethargy for several months. Patient was accompanied by his friend who gave most of the history. The patient does not like seeing physicians and apparently uses vitamin supplements. He is a smoker and drinks alcohol and has been progressively weaker over several months now. He has had pleuritic right sided chest pain associated with a productive cough and hemoptysis. He feel on the floor several days ago and was unable to get up. His friends helped him up and brought him to the ED for further testing. Initial CXR done showed airspace disease in right mid lower lung zone consistent with pneumonia. This is surrounded by a moderate right-sided pleural effusion. Upon review, he was noted to be cachectic with swollen lower extremities. CT scans chest abdomen and pelvis done showed a diffuse irregular opacification throughout the right lower lobe with multiple areas of internal cavitation and air-fluid levels.  Findings are more likely related to infection than malignancy. Large right pleural effusion. Trace left pleural effusion. Bilateral pulmonary emboli. Clot burden is moderate. No evidence of right heart strain. Thrombus in the veins of the left lower extremity extending to the IVC. A small amount of thrombus is also seen in the right common iliac vein. He will be admitted for further management. (Dr Charis Wick)    8/26:  Feeling a little better this AM but still c/o painful, pleuritic cough and occas thick sputum. No leg/calf pains. No known exposure to ill individuals/no travel - \"just staying in the last few months. \"  No temps. WBC 31.9K. Cultures neg so far.      8/27:  Reports less cough, chest pain and feeling better overall. Appetite has returned. Chest tube placed yesterday with discharge obtained - pending cultures. Other cultures remain neg. Hb lower so cont to hold anticoag for now. GI has seen and work up when more stable. Remains on doxy, zosyn, vanc. WBC down to 28K. Bili back to normal.     8/28:  Continues to grad improve. Still with some cough. No tremor or hallucinations so far. K+ lower - replacing. WBC coming down. Afeb. CXR this AM pending. Cultures remain neg so far. Quantiferon gold test pending as of this AM.  He reports hx of chronic LE edema for years. 8/29: Pt continues to have frequent stooling. GI plans endoscopy next week if pt allows. He was not willing to participate with PT/OT/nutritionist yesterday. Says despite our best efforts, we will not get his stooling to stop until he gets his home probiotics [friend will bring it]. He refuses to eat until the stooling is better. 8/30:  Pt is reluctant to admit that his stooling has improved. Down to 2 BMs yesterday instead of 6 the day before. Tolerating PO. Willing to eat more today. He is still having hemoptysis. Wanting to get out of bed. Willing to work with PT/OT. 8/31:  Feeling better. Appetite better. TB neg so far.   First Quantiferon Gold was indeterminate - repeat pending. Afeb. WBC slowly coming down. Currently on Doxy and Zosyn. Replacing Mag and K+. Hb now stable in 8s. : Little change. Not moving about much, he reports due to chest tube being painful when he moves. Hb 7.8. WBC 16. No sign of ETOH withdrawal.     :  Now reports he wants to move around more and get up and wants to work with PT. AFB smears neg x3, AFB cultures still pending of course. WBC slowly coming down. Afeb. Hb still slowly dropping - 7.7 today. May consider prophylactic dose of lovenox and watch Hb.  GI work up pending. Arms a little more swollen. 9/3: Feeling better. Hoping for endoscopy in am. Patient would like chest tube removed prior to prep and studies if possible. Speech would like MBS.      Review of Systems:   A comprehensive review of systems was negative except for that written in the HPI. Objective:   Physical Exam:   Visit Vitals  /65 (BP 1 Location: Left arm, BP Patient Position: At rest)   Pulse 79   Temp 97.3 °F (36.3 °C)   Resp 17   Ht 5' 10\" (1.778 m)   Wt 157 lb 8 oz (71.4 kg)   SpO2 99%   BMI 22.60 kg/m²      O2 Device: Room air  Temp (24hrs), Av.6 °F (36.4 °C), Min:97.3 °F (36.3 °C), Max:97.9 °F (36.6 °C)    No intake/output data recorded.  1901 -  0700  In: 1383.3 [P.O.:220; I.V.:1163.3]  Out: 6375 [Urine:1625]  General:  Alert, cooperative, no distress, cachetic, ill appearing. Head:  Normocephalic, without obvious abnormality, atraumatic. Eyes:  Gentry conjuntiva. PERRL, EOMs intact. Throat: Lips, mucosa, and tongue moist.   Neck: Supple, symmetrical, trachea midline, no adenopathy, thyroid: no enlargement/tenderness/nodules, no carotid bruit and no JVD. Lungs:   Better air exchange,  fewer crackles anteriorly right,  a few scattered rhonchi. Chest wall:  Chest tube site right looks ok   Heart:  Regular rate and rhythm, S1, S2 normal, no murmur, click, rub or gallop.    Abdomen:   Soft, non-tender. Bowel sounds normal. No masses,  No organomegaly. Extremities: no cyanosis. No calf tenderness;2+ pitting edema bilat. 2+ pitting edema in arms, No cords palpated. Pulses: 2+ and symmetric all extremities. Skin: Skin color, texture, turgor normal. No rashes   Neurologic:  Alert and oriented X 3. Fine motor of hands and fingers normal.   equal.  No tremor. Gait not tested at this time. Sensation grossly normal to touch. Gross motor of extremities normal.       Data Review:   EXAM: CT CHEST ABD PELV W CONT 8/25/20  INDICATION: weight loss  COMPARISON: Radiographs 8/25/2020  CONTRAST: 100 mL of Isovue-370. FINDINGS:   CHEST WALL: No mass or axillary lymphadenopathy. THYROID: No nodule. MEDIASTINUM: No mass or lymphadenopathy. BRYAN: No mass or lymphadenopathy. THORACIC AORTA: No dissection or aneurysm. MAIN PULMONARY ARTERY: Normal in caliber. Thrombus is seen throughout the right  lower lobe pulmonary arteries. Thrombus is seen in the left lower lobe pulmonary  artery extending into the anterior and lateral basilar segments. TRACHEA/BRONCHI: Patent. ESOPHAGUS: No wall thickening or dilatation. HEART: Normal in size. PLEURA: There is a large right pleural effusion with enhancement of the pleural  surfaces. Trace left pleural effusion. LUNGS: There is irregular opacification throughout the right lower lobe. There  are multiple areas of internal cavitation with air-fluid levels. Small areas of  atelectasis or scarring are seen in the lingula and inferior left lower lobe. LIVER: No mass. BILIARY TREE: Gallbladder is within normal limits. CBD is not dilated. SPLEEN: within normal limits. PANCREAS: No mass or ductal dilatation. ADRENALS: Unremarkable. KIDNEYS: Small nonobstructive stones are seen in the bilateral kidneys. No  evidence of hydronephrosis. No mass. STOMACH: Unremarkable. SMALL BOWEL: No dilatation or wall thickening.   COLON: No dilatation or wall thickening. APPENDIX: Unremarkable  PERITONEUM: No ascites or pneumoperitoneum. RETROPERITONEUM: No lymphadenopathy or aortic aneurysm. REPRODUCTIVE ORGANS: Unremarkable  URINARY BLADDER: No mass or calculus. BONES: No destructive bone lesion. ABDOMINAL WALL: No mass or hernia. ADDITIONAL COMMENTS: There is thrombus in the left femoral veins extending into  the left iliac veins into the IVC. A small amount of thrombus is also present in  the right common iliac vein. IMPRESSION:  1. Diffuse irregular opacification throughout the right lower lobe with multiple  areas of internal cavitation and air-fluid levels. Findings are more likely  related to infection than malignancy. 2. Large right pleural effusion. Trace left pleural effusion. 3. Bilateral pulmonary emboli. Clot burden is moderate. No evidence of right  heart strain. 4. Thrombus in the veins of the left lower extremity extending to the IVC. A  small amount of thrombus is also seen in the right common iliac vein. EXAM:  IR THORACENTESIS/INSERT CHEST TUBE 8/26/20  INDICATION:  Request placement of large size pigtail catheter. Possible empyema. PROCEDURE:  Available history and imaging was reviewed which demonstrates a large partially  loculated right pleural effusion. Possibility of empyema, tuberculosis and other  etiologies are being considered clinically. Specific request for larger pigtail  catheter placement. Following informed consent the patient was evaluated with ultrasound in the  angina/catheter lab department. This demonstrates a large right pleural  effusion. The location of the pleural effusion was marked and then the area was prepped  and draped. 1% lidocaine was used for local anesthesia. A small incision was made with an 11  blade. A micropuncture needle was used under direct fluoroscopic guidance to access the  pleural fluid. When this was achieved the microguidewire was placed followed by  a [address] [place] [address] dilator.  Through this a short [address] guidewire  was placed. The track was then dilated to [address] [place]. This was then followed by  placement of a 12 [place] pigtail catheter. The string for the pigtail  was removed prior to placement. The catheter was positioned under fluoroscopic  guidance and then secured in place at the skin with a 2-0 Prolene suture. The  site was then dressed with a Tegaderm Bioclusive dressing and will be connected  to a Pleur-evac for subsequent drainage. Prior to this approximately 100 mL of fluid was obtained and sent for cytology  as well as multiple cultures including aerobic, anaerobic, AFB and fungal.   The fluid was withdrawn was mildly cloudy serosanguineous in color. I did not  notice any foul smell. The procedure was adequately tolerated without significant blood loss or  evidence of complication. Final digital image of the chest was saved documenting catheter position. IMPRESSION:   Successful ultrasound and fluoroscopic guided placement of 12 [place] right-sided  chest tube. Patient will have ongoing drainage from Pleur-evac in his hospital  bed/room. Care and monitoring as per medical service and nursing service. ECHO 8/27/20  Interpretation Summary   Result status: Final result    · Image quality for this study was technically difficult. · Echo study was technically difficulty and limited due to patient's tolerance. · LV: Estimated LVEF is 55 - 60%. Normal cavity size, wall thickness and systolic function (ejection fraction normal). Wall motion: normal. Mild (grade 1) left ventricular diastolic dysfunction. · Pericardium: Small pericardial effusion adjacent to right ventricle. Portable AP upright view of the chest. 9/1/20  Direct comparison made to prior chest x-ray dated September 1, 2020. Cardiomediastinal silhouette is stable. Right basilar chest tube is unchanged in  position. There No residual pneumothorax is visualized.  There are small  bilateral pleural effusions. There are persistent increased interstitial  markings at the right lung. There is persistent bibasilar patchy airspace  disease, right greater than left. IMPRESSION: No residual pneumothorax visualized    Recent Days:  Recent Labs     09/02/20  0356 09/01/20 0347   WBC 15.7* 16.2*   HGB 7.7* 7.8*   HCT 23.6* 23.2*    208     Recent Labs     09/03/20  0351 09/02/20  0356 09/01/20 0347    142 144   K 3.2* 3.6 4.2   * 111* 114*   CO2 25 27 24   GLU 73 97 72   BUN 4* 4* 3*   CREA 0.47* 0.42* 0.39*   CA 7.0* 7.2* 7.0*   ALB 0.9* 0.9* 0.9*   TBILI 1.0 1.0 1.4*   ALT 94* 97* 92*     No results for input(s): PH, PCO2, PO2, HCO3, FIO2 in the last 72 hours. 24 Hour Results:  Recent Results (from the past 24 hour(s))   METABOLIC PANEL, COMPREHENSIVE    Collection Time: 09/03/20  3:51 AM   Result Value Ref Range    Sodium 141 136 - 145 mmol/L    Potassium 3.2 (L) 3.5 - 5.1 mmol/L    Chloride 109 (H) 97 - 108 mmol/L    CO2 25 21 - 32 mmol/L    Anion gap 7 5 - 15 mmol/L    Glucose 73 65 - 100 mg/dL    BUN 4 (L) 6 - 20 MG/DL    Creatinine 0.47 (L) 0.70 - 1.30 MG/DL    BUN/Creatinine ratio 9 (L) 12 - 20      GFR est AA >60 >60 ml/min/1.73m2    GFR est non-AA >60 >60 ml/min/1.73m2    Calcium 7.0 (L) 8.5 - 10.1 MG/DL    Bilirubin, total 1.0 0.2 - 1.0 MG/DL    ALT (SGPT) 94 (H) 12 - 78 U/L    AST (SGOT) 111 (H) 15 - 37 U/L    Alk.  phosphatase 370 (H) 45 - 117 U/L    Protein, total 4.9 (L) 6.4 - 8.2 g/dL    Albumin 0.9 (L) 3.5 - 5.0 g/dL    Globulin 4.0 2.0 - 4.0 g/dL    A-G Ratio 0.2 (L) 1.1 - 2.2         Medications reviewed  Current Facility-Administered Medications   Medication Dose Route Frequency    potassium chloride SR (KLOR-CON 10) tablet 40 mEq  40 mEq Oral NOW    white petrolatum-mineral oiL (EUCERIN) cream   Topical BID    lipase-protease-amylase (CREON 24,000) capsule 1 Cap  1 Cap Oral TID WITH MEALS    loperamide (IMODIUM) capsule 2 mg  2 mg Oral Q6H PRN    lactobac ac& pc-s.therm-b.anim (ANITA Q/RISAQUAD)  1 Cap Oral DAILY    bismuth subsalicylate (PEPTO-BISMOL) oral suspension 30 mL  30 mL Oral Q6H PRN    cyanocobalamin (VITAMIN B12) tablet 1,000 mcg  1,000 mcg Oral DAILY    folic acid (FOLVITE) tablet 1 mg  1 mg Oral DAILY    HYDROcodone-acetaminophen (NORCO) 5-325 mg per tablet 1 Tab  1 Tab Oral Q6H PRN    morphine injection 1 mg  1 mg IntraVENous Q4H PRN    sodium chloride (NS) flush 5-10 mL  5-10 mL IntraVENous PRN    sodium chloride (NS) flush 5-40 mL  5-40 mL IntraVENous Q8H    sodium chloride (NS) flush 5-40 mL  5-40 mL IntraVENous PRN    acetaminophen (TYLENOL) tablet 650 mg  650 mg Oral Q6H PRN    Or    acetaminophen (TYLENOL) suppository 650 mg  650 mg Rectal Q6H PRN    polyethylene glycol (MIRALAX) packet 17 g  17 g Oral DAILY PRN    promethazine (PHENERGAN) tablet 12.5 mg  12.5 mg Oral Q6H PRN    Or    ondansetron (ZOFRAN) injection 4 mg  4 mg IntraVENous Q6H PRN    0.9% sodium chloride infusion  100 mL/hr IntraVENous CONTINUOUS    pantoprazole (PROTONIX) 40 mg in 0.9% sodium chloride 10 mL injection  40 mg IntraVENous Q12H     Care Plan discussed with: Patient/Nurse  Total time spent with patient: 30 minutes.     Jacqueline Maya MD

## 2020-09-03 NOTE — PROGRESS NOTES
Transition of Care Plan:  RUR 13%-low  1. Monitor patient status and response to treatment. 2. Pneumonia, bilateral PE's, bilateral DVT's of legs; IVC filter placed  3. PT/OT recommending home health  4. GI following--to have endoscopy  5. CM  Following  SAMIRA Elliott RN

## 2020-09-03 NOTE — PROGRESS NOTES
Patient refused MBS b/c he wanted to eat and poop per RN. Patient agreed to reschedule for 1pm. Spoke with radiology and RN.

## 2020-09-03 NOTE — PROGRESS NOTES
Bedside and Verbal shift change report given to Emmy Wallace (oncoming nurse) by Yesica Lockett RN (offgoing nurse). Report included the following information SBAR and Kardex.

## 2020-09-04 ENCOUNTER — ANESTHESIA (OUTPATIENT)
Dept: ENDOSCOPY | Age: 65
DRG: 166 | End: 2020-09-04
Payer: COMMERCIAL

## 2020-09-04 ENCOUNTER — APPOINTMENT (OUTPATIENT)
Dept: GENERAL RADIOLOGY | Age: 65
DRG: 166 | End: 2020-09-04
Attending: INTERNAL MEDICINE
Payer: COMMERCIAL

## 2020-09-04 ENCOUNTER — ANESTHESIA EVENT (OUTPATIENT)
Dept: ENDOSCOPY | Age: 65
DRG: 166 | End: 2020-09-04
Payer: COMMERCIAL

## 2020-09-04 LAB
ALBUMIN SERPL-MCNC: 0.9 G/DL (ref 3.5–5)
ALBUMIN/GLOB SERPL: 0.2 {RATIO} (ref 1.1–2.2)
ALP SERPL-CCNC: 357 U/L (ref 45–117)
ALT SERPL-CCNC: 114 U/L (ref 12–78)
ANION GAP SERPL CALC-SCNC: 5 MMOL/L (ref 5–15)
AST SERPL-CCNC: 151 U/L (ref 15–37)
BASOPHILS # BLD: 0 K/UL (ref 0–0.1)
BASOPHILS NFR BLD: 0 % (ref 0–1)
BILIRUB SERPL-MCNC: 1.3 MG/DL (ref 0.2–1)
BUN SERPL-MCNC: 3 MG/DL (ref 6–20)
BUN/CREAT SERPL: 9 (ref 12–20)
CALCIUM SERPL-MCNC: 7.1 MG/DL (ref 8.5–10.1)
CHLORIDE SERPL-SCNC: 108 MMOL/L (ref 97–108)
CO2 SERPL-SCNC: 28 MMOL/L (ref 21–32)
CREAT SERPL-MCNC: 0.35 MG/DL (ref 0.7–1.3)
DIFFERENTIAL METHOD BLD: ABNORMAL
EOSINOPHIL # BLD: 0 K/UL (ref 0–0.4)
EOSINOPHIL NFR BLD: 0 % (ref 0–7)
ERYTHROCYTE [DISTWIDTH] IN BLOOD BY AUTOMATED COUNT: 20.9 % (ref 11.5–14.5)
ERYTHROCYTE [DISTWIDTH] IN BLOOD BY AUTOMATED COUNT: 21.2 % (ref 11.5–14.5)
GLOBULIN SER CALC-MCNC: 3.9 G/DL (ref 2–4)
GLUCOSE SERPL-MCNC: 59 MG/DL (ref 65–100)
HCT VFR BLD AUTO: 21.6 % (ref 36.6–50.3)
HCT VFR BLD AUTO: 24.2 % (ref 36.6–50.3)
HGB BLD-MCNC: 7 G/DL (ref 12.1–17)
HGB BLD-MCNC: 7.8 G/DL (ref 12.1–17)
IMM GRANULOCYTES # BLD AUTO: 0.3 K/UL (ref 0–0.04)
IMM GRANULOCYTES NFR BLD AUTO: 2 % (ref 0–0.5)
LYMPHOCYTES # BLD: 2.8 K/UL (ref 0.8–3.5)
LYMPHOCYTES NFR BLD: 19 % (ref 12–49)
MCH RBC QN AUTO: 34.1 PG (ref 26–34)
MCH RBC QN AUTO: 34.4 PG (ref 26–34)
MCHC RBC AUTO-ENTMCNC: 32.2 G/DL (ref 30–36.5)
MCHC RBC AUTO-ENTMCNC: 32.4 G/DL (ref 30–36.5)
MCV RBC AUTO: 105.4 FL (ref 80–99)
MCV RBC AUTO: 106.6 FL (ref 80–99)
MONOCYTES # BLD: 0.9 K/UL (ref 0–1)
MONOCYTES NFR BLD: 6 % (ref 5–13)
NEUTS SEG # BLD: 10.5 K/UL (ref 1.8–8)
NEUTS SEG NFR BLD: 73 % (ref 32–75)
NRBC # BLD: 0 K/UL (ref 0–0.01)
NRBC # BLD: 0 K/UL (ref 0–0.01)
NRBC BLD-RTO: 0 PER 100 WBC
NRBC BLD-RTO: 0 PER 100 WBC
PLATELET # BLD AUTO: 308 K/UL (ref 150–400)
PLATELET # BLD AUTO: 327 K/UL (ref 150–400)
PMV BLD AUTO: 10.3 FL (ref 8.9–12.9)
PMV BLD AUTO: 10.4 FL (ref 8.9–12.9)
POTASSIUM SERPL-SCNC: 3.2 MMOL/L (ref 3.5–5.1)
PROT SERPL-MCNC: 4.8 G/DL (ref 6.4–8.2)
RBC # BLD AUTO: 2.05 M/UL (ref 4.1–5.7)
RBC # BLD AUTO: 2.27 M/UL (ref 4.1–5.7)
RBC MORPH BLD: ABNORMAL
SODIUM SERPL-SCNC: 141 MMOL/L (ref 136–145)
WBC # BLD AUTO: 14.5 K/UL (ref 4.1–11.1)
WBC # BLD AUTO: 17.9 K/UL (ref 4.1–11.1)
WBC MORPH BLD: ABNORMAL

## 2020-09-04 PROCEDURE — 74011250636 HC RX REV CODE- 250/636: Performed by: SPECIALIST

## 2020-09-04 PROCEDURE — 77030021593 HC FCPS BIOP ENDOSC BSC -A: Performed by: SPECIALIST

## 2020-09-04 PROCEDURE — 74011250637 HC RX REV CODE- 250/637: Performed by: INTERNAL MEDICINE

## 2020-09-04 PROCEDURE — 74011250636 HC RX REV CODE- 250/636: Performed by: FAMILY MEDICINE

## 2020-09-04 PROCEDURE — 74011000258 HC RX REV CODE- 258: Performed by: NURSE ANESTHETIST, CERTIFIED REGISTERED

## 2020-09-04 PROCEDURE — 74011250636 HC RX REV CODE- 250/636: Performed by: NURSE ANESTHETIST, CERTIFIED REGISTERED

## 2020-09-04 PROCEDURE — 65660000000 HC RM CCU STEPDOWN

## 2020-09-04 PROCEDURE — 80053 COMPREHEN METABOLIC PANEL: CPT

## 2020-09-04 PROCEDURE — 71045 X-RAY EXAM CHEST 1 VIEW: CPT

## 2020-09-04 PROCEDURE — 0DJD8ZZ INSPECTION OF LOWER INTESTINAL TRACT, VIA NATURAL OR ARTIFICIAL OPENING ENDOSCOPIC: ICD-10-PCS | Performed by: SPECIALIST

## 2020-09-04 PROCEDURE — 85025 COMPLETE CBC W/AUTO DIFF WBC: CPT

## 2020-09-04 PROCEDURE — 74011000250 HC RX REV CODE- 250: Performed by: INTERNAL MEDICINE

## 2020-09-04 PROCEDURE — 74011000250 HC RX REV CODE- 250: Performed by: NURSE ANESTHETIST, CERTIFIED REGISTERED

## 2020-09-04 PROCEDURE — 85027 COMPLETE CBC AUTOMATED: CPT

## 2020-09-04 PROCEDURE — 76060000031 HC ANESTHESIA FIRST 0.5 HR: Performed by: SPECIALIST

## 2020-09-04 PROCEDURE — 88305 TISSUE EXAM BY PATHOLOGIST: CPT

## 2020-09-04 PROCEDURE — 74011250637 HC RX REV CODE- 250/637: Performed by: FAMILY MEDICINE

## 2020-09-04 PROCEDURE — 76040000019: Performed by: SPECIALIST

## 2020-09-04 PROCEDURE — 74011250637 HC RX REV CODE- 250/637: Performed by: SPECIALIST

## 2020-09-04 PROCEDURE — 0DJ08ZZ INSPECTION OF UPPER INTESTINAL TRACT, VIA NATURAL OR ARTIFICIAL OPENING ENDOSCOPIC: ICD-10-PCS | Performed by: SPECIALIST

## 2020-09-04 PROCEDURE — C9113 INJ PANTOPRAZOLE SODIUM, VIA: HCPCS | Performed by: INTERNAL MEDICINE

## 2020-09-04 PROCEDURE — 36415 COLL VENOUS BLD VENIPUNCTURE: CPT

## 2020-09-04 PROCEDURE — 74011250636 HC RX REV CODE- 250/636: Performed by: INTERNAL MEDICINE

## 2020-09-04 RX ORDER — DEXTROMETHORPHAN/PSEUDOEPHED 2.5-7.5/.8
1.2 DROPS ORAL
Status: DISCONTINUED | OUTPATIENT
Start: 2020-09-04 | End: 2020-09-04 | Stop reason: HOSPADM

## 2020-09-04 RX ORDER — SODIUM CHLORIDE 9 MG/ML
50 INJECTION, SOLUTION INTRAVENOUS CONTINUOUS
Status: DISPENSED | OUTPATIENT
Start: 2020-09-04 | End: 2020-09-04

## 2020-09-04 RX ORDER — POTASSIUM CHLORIDE 7.45 MG/ML
10 INJECTION INTRAVENOUS
Status: DISCONTINUED | OUTPATIENT
Start: 2020-09-04 | End: 2020-09-04

## 2020-09-04 RX ORDER — NALOXONE HYDROCHLORIDE 0.4 MG/ML
0.4 INJECTION, SOLUTION INTRAMUSCULAR; INTRAVENOUS; SUBCUTANEOUS
Status: DISCONTINUED | OUTPATIENT
Start: 2020-09-04 | End: 2020-09-04 | Stop reason: HOSPADM

## 2020-09-04 RX ORDER — FENTANYL CITRATE 50 UG/ML
25 INJECTION, SOLUTION INTRAMUSCULAR; INTRAVENOUS AS NEEDED
Status: DISCONTINUED | OUTPATIENT
Start: 2020-09-04 | End: 2020-09-04 | Stop reason: HOSPADM

## 2020-09-04 RX ORDER — SODIUM,POTASSIUM PHOSPHATES 280-250MG
2 POWDER IN PACKET (EA) ORAL 4 TIMES DAILY
Status: DISCONTINUED | OUTPATIENT
Start: 2020-09-04 | End: 2020-09-04

## 2020-09-04 RX ORDER — PROPOFOL 10 MG/ML
INJECTION, EMULSION INTRAVENOUS AS NEEDED
Status: DISCONTINUED | OUTPATIENT
Start: 2020-09-04 | End: 2020-09-04 | Stop reason: HOSPADM

## 2020-09-04 RX ORDER — MIDAZOLAM HYDROCHLORIDE 1 MG/ML
.25-5 INJECTION, SOLUTION INTRAMUSCULAR; INTRAVENOUS AS NEEDED
Status: DISCONTINUED | OUTPATIENT
Start: 2020-09-04 | End: 2020-09-04 | Stop reason: HOSPADM

## 2020-09-04 RX ORDER — EPHEDRINE SULFATE/0.9% NACL/PF 50 MG/5 ML
SYRINGE (ML) INTRAVENOUS AS NEEDED
Status: DISCONTINUED | OUTPATIENT
Start: 2020-09-04 | End: 2020-09-04 | Stop reason: HOSPADM

## 2020-09-04 RX ORDER — MESALAMINE 400 MG/1
800 CAPSULE, DELAYED RELEASE ORAL 3 TIMES DAILY
Status: DISCONTINUED | OUTPATIENT
Start: 2020-09-04 | End: 2020-09-16 | Stop reason: HOSPADM

## 2020-09-04 RX ORDER — LIDOCAINE HYDROCHLORIDE 20 MG/ML
INJECTION, SOLUTION EPIDURAL; INFILTRATION; INTRACAUDAL; PERINEURAL AS NEEDED
Status: DISCONTINUED | OUTPATIENT
Start: 2020-09-04 | End: 2020-09-04 | Stop reason: HOSPADM

## 2020-09-04 RX ORDER — PROPOFOL 10 MG/ML
INJECTION, EMULSION INTRAVENOUS
Status: DISCONTINUED | OUTPATIENT
Start: 2020-09-04 | End: 2020-09-04 | Stop reason: HOSPADM

## 2020-09-04 RX ORDER — FLUMAZENIL 0.1 MG/ML
0.2 INJECTION INTRAVENOUS
Status: DISCONTINUED | OUTPATIENT
Start: 2020-09-04 | End: 2020-09-04 | Stop reason: HOSPADM

## 2020-09-04 RX ADMIN — PROPOFOL 80 MG: 10 INJECTION, EMULSION INTRAVENOUS at 11:00

## 2020-09-04 RX ADMIN — PHENYLEPHRINE HYDROCHLORIDE 150 MCG: 10 INJECTION INTRAVENOUS at 11:16

## 2020-09-04 RX ADMIN — PHENYLEPHRINE HYDROCHLORIDE 100 MCG: 10 INJECTION INTRAVENOUS at 11:28

## 2020-09-04 RX ADMIN — PANCRELIPASE 1 CAPSULE: 24000; 76000; 120000 CAPSULE, DELAYED RELEASE PELLETS ORAL at 17:58

## 2020-09-04 RX ADMIN — PROPOFOL 140 MCG/KG/MIN: 10 INJECTION, EMULSION INTRAVENOUS at 11:00

## 2020-09-04 RX ADMIN — Medication 10 MG: at 11:27

## 2020-09-04 RX ADMIN — SODIUM CHLORIDE 40 MG: 9 INJECTION INTRAMUSCULAR; INTRAVENOUS; SUBCUTANEOUS at 08:18

## 2020-09-04 RX ADMIN — MESALAMINE 800 MG: 400 CAPSULE, DELAYED RELEASE ORAL at 21:34

## 2020-09-04 RX ADMIN — SODIUM CHLORIDE: 9 INJECTION, SOLUTION INTRAVENOUS at 10:50

## 2020-09-04 RX ADMIN — SODIUM CHLORIDE 50 ML/HR: 900 INJECTION, SOLUTION INTRAVENOUS at 21:35

## 2020-09-04 RX ADMIN — LIDOCAINE HYDROCHLORIDE 40 MG: 20 INJECTION, SOLUTION INTRAVENOUS at 11:00

## 2020-09-04 RX ADMIN — POTASSIUM CHLORIDE 10 MEQ: 10 INJECTION, SOLUTION INTRAVENOUS at 08:18

## 2020-09-04 RX ADMIN — POTASSIUM BICARBONATE 40 MEQ: 782 TABLET, EFFERVESCENT ORAL at 13:43

## 2020-09-04 RX ADMIN — PANCRELIPASE 1 CAPSULE: 24000; 76000; 120000 CAPSULE, DELAYED RELEASE PELLETS ORAL at 13:20

## 2020-09-04 RX ADMIN — SODIUM CHLORIDE 100 ML/HR: 900 INJECTION, SOLUTION INTRAVENOUS at 04:03

## 2020-09-04 RX ADMIN — Medication 10 ML: at 22:00

## 2020-09-04 RX ADMIN — MESALAMINE 800 MG: 400 CAPSULE, DELAYED RELEASE ORAL at 17:58

## 2020-09-04 RX ADMIN — PHENYLEPHRINE HYDROCHLORIDE 100 MCG: 10 INJECTION INTRAVENOUS at 11:14

## 2020-09-04 RX ADMIN — PHENYLEPHRINE HYDROCHLORIDE 100 MCG: 10 INJECTION INTRAVENOUS at 11:06

## 2020-09-04 NOTE — ROUTINE PROCESS
Dasha Ocampo 1955 
[id number] Situation: 
Verbal report received from: Preeti Atkinson Procedure: Procedure(s): ESOPHAGOGASTRODUODENOSCOPY (EGD) COLONOSCOPY 
ESOPHAGOGASTRODUODENAL (EGD) BIOPSY COLON BIOPSY Background: 
 
Preoperative diagnosis: anemia Postoperative diagnosis: Normal EGD Ulcertive Colitis Rectal Ulcer :  Dr. Storm Linares Assistant(s): Endoscopy RN-1: Terrence Christine Endoscopy RN-2: Ethan Henley RN Specimens:  
ID Type Source Tests Collected by Time Destination 1 : Biopsy Preservative Duodenum  Candida Medeiros MD 9/4/2020 1107 Pathology 2 : Right Colon Biopsy Prescoryative   Candida Medeiros MD 9/4/2020 1115 Pathology 3 : Left Colon Biopsy Preservative   Candida Medeiros MD 9/4/2020 1117 Pathology 4 : Rectal Ulcer Biopsy Preservative   Candida Medeiros MD 9/4/2020 1120 Pathology H. Pylori  no Assessment: 
Intra-procedure medications Anesthesia gave intra-procedure sedation and medications, see anesthesia flow sheet yes Intravenous fluids: NS@ [facility] Vital signs stable Abdominal assessment: round and soft Recommendation: 
Return to floor

## 2020-09-04 NOTE — PROGRESS NOTES
Bedside and Verbal shift change report given to   Nino Ingram Rn (oncoming nurse) by  Robert Lal (offgoing nurse). Report included the following information SBAR, Kardex, MAR, Accordion, Recent Results, Med Rec Status and Cardiac Rhythm  NSR.

## 2020-09-04 NOTE — PROGRESS NOTES
Bedside and Verbal shift change report given to Lizeth Lock (oncoming nurse) by [facility] (offgoing nurse). Report included the following information SBAR, Kardex, Intake/Output, MAR and Recent Results.

## 2020-09-04 NOTE — ANESTHESIA PREPROCEDURE EVALUATION
Relevant Problems   No relevant active problems       Anesthetic History   No history of anesthetic complications            Review of Systems / Medical History  Patient summary reviewed and pertinent labs reviewed    Pulmonary          Smoker         Neuro/Psych   Within defined limits           Cardiovascular  Within defined limits                Exercise tolerance: <4 METS     GI/Hepatic/Renal  Within defined limits              Endo/Other        Anemia     Other Findings   Comments: Alcohol use  Recent pneumonia  DVT  Hgb 7.0           Physical Exam    Airway  Mallampati: II  TM Distance: 4 - 6 cm  Neck ROM: normal range of motion   Mouth opening: Normal     Cardiovascular  Regular rate and rhythm,  S1 and S2 normal,  no murmur, click, rub, or gallop  Rhythm: regular  Rate: normal         Dental    Dentition: Poor dentition     Pulmonary  Breath sounds clear to auscultation               Abdominal  GI exam deferred       Other Findings            Anesthetic Plan    ASA: 4  Anesthesia type: MAC          Induction: Intravenous  Anesthetic plan and risks discussed with: Patient

## 2020-09-04 NOTE — PROGRESS NOTES
Transition of Care Plan:  RUR 13%-low  1. Monitor patient status and response to treatment. 2. Pneumonia, bilateral PE's, bilateral DVT's of legs; IVC filter placed  3. PT/OT recommending home health  4. GI following--to have endoscopy and c-scope today; CXR today  5. Chest tube out yesterday  6. Pulmonary, thoracic surgery and GI following  7. Pt may need snf at d/c; then plans to return home. He has a friend who can help out at home  8. CM following  DUSTY. Mary Dumont RN

## 2020-09-04 NOTE — PROGRESS NOTES
Anesthesia staff at patient's bedside administering anesthesia and monitoring patients vital signs throughout procedure. See anesthesia note. ABD remains soft and non-tender post procedure. Pt has no complaints at this time and tolerated the procedure well. Endoscope was pre-cleaned at bedside immediately following procedure by [facility].

## 2020-09-04 NOTE — PERIOP NOTES
Kenneth Cerrato  1955  [id number]    Situation:    Scheduled Procedure: Procedure(s):  ESOPHAGOGASTRODUODENOSCOPY (EGD)  COLONOSCOPY  Verbal report received from: Sunita Chopra RN  Preoperative diagnosis: anemia    Background:    Procedure: Procedure(s):  ESOPHAGOGASTRODUODENOSCOPY (EGD)  COLONOSCOPY  Physician performing procedure; Dr. Marcio Reese RN    NPO Status/Last PO Intake: midnight    Pregnancy Test:Not applicable If yes, result: none    Is the patient taking Blood Thinners:  NO  Is the patient diabetic:no       If yes, what was the last BS:    Time taken? Anything given? no           Does the patient have a Pacemaker/Defibrillator in place?: no   Does the patient need antibiotics before/during/after procedure: no   If the patient is having a colon, How much prep was drank? 75%   What were the Colon prep results? \"2-3 yellow liquid stools\" per report   Does the patient have SCD in place:no   Is patient on CONTACT precautions:no        If yes, what kind of CONTACT precautions:     Assessment:  Are the vital signs stable prior to patient coming to ENDO?  yes  Is the patient alert/oriented and able to sign consent for the procedures:yes    Does the patient have a patient IV in place?  yes     Recommendation:  Family or Friend present no     Permission to share finding with Family or Friend n/a

## 2020-09-04 NOTE — PROGRESS NOTES
This is a late entry for 9-3-20 due to [address]  SPEECH PATHOLOGY MODIFIED BARIUM SWALLOW STUDY/DISCHARGE  Patient: Gillian Leblanc (54 y.o. male)  Date: 9/4/2020  Primary Diagnosis: Pneumonia [J18.9]  Procedure(s) (LRB):  ESOPHAGOGASTRODUODENOSCOPY (EGD) (N/A)  COLONOSCOPY (N/A) Day of Surgery   Precautions: aspiration  Fall    ASSESSMENT :  Based on the objective data described below, the patient presents with mild pharyngeal weakness with mild residue in upper pharynx with purees, solids. This cleared with sips of thins. This may explain his intermittent coughing at meals. Mild aspiration risk, as he appeared to protect airway well with no penetration or aspiration. .    Patient will benefit from skilled intervention to address the above impairments. Patients rehabilitation potential is considered to be Good     PLAN :  Recommendations and Planned Interventions:  Continue diet as tolerated. Frequency/Duration: Patient will not be followed by speech-language pathology  Discharge Recommendations: None     SUBJECTIVE:   Patient stated [facility] many times are you going to see me swallow? . OBJECTIVE:   History reviewed. No pertinent past medical history. Past Surgical History:   Procedure Laterality Date    IR [facility] BROWN DEER IVC FILTER  8/25/2020    IR THORACENTESIS/INSERT CHEST TUBE  8/26/2020     Prior Level of Function/Home Situation:   Home Situation  Home Environment: Private residence  # Steps to Enter: 4  Rails to Enter: Yes  One/Two Story Residence: One story  Living Alone: Yes  Support Systems: Friends \ neighbors  Patient Expects to be Discharged to[de-identified] Private residence  Current DME Used/Available at Home: None  Diet prior to admission: regular, thins  Current Diet:  mech soft, thins   Radiologist: Kimberley Armando Views: Lateral  Patient Position: upright in Hausted chair    Trial 1: Trial 2:   Consistency Presented: Solid; Thin liquid;Puree;Pill/Tablet     How Presented: Self-fed/presented;Spoon;Straw;Successive swallows     Consistency Amount: (sips and bites)   ORAL PHASE:      Bolus Acceptance: No impairment     Bolus Formation/Control: Impaired(decreased chew due to reduced dentitia):    :     Propulsion: No impairment     Oral Residue: None   PHARYNGEAL PHASE:      Initiation of Swallow: No impairment     Timing: No impairment     Penetration: None     Aspiration/Timing: No evidence of aspiration     Pharyngeal Clearance: Vallecular residue(iwht purees and solids due to reduced BOT retraction and reduced laryngeal elevation and excursion)     Attempted Modifications: Alternate liquids/solids     Effective Modifications: Alternate liquids/solids                   Trial 3: Trial 4:                            :    :                                                                        Decreased Tongue Base Retraction?: Yes(mild)  Laryngeal Elevation: Reduced excursion with laryngeal vestibule gap(mild)  Aspiration/Penetration Score: 1 (No penetration or aspiration-Contrast does not enter the airway)  Pharyngeal Symmetry: Not assessed  Pharyngeal-Esophageal Segment: No impairment             NOMS:   The NOMS functional outcome measure was used to quantify this patient's level of swallowing impairment. Based on the NOMS, the patient was determined to be at level 6 for swallow function         NOMS Swallowing Levels:  Level 1 (CN): NPO  Level 2 (CM): NPO but takes consistency in therapy  Level 3 (CL): Takes less than 50% of nutrition p.o. and continues with nonoral feedings; and/or safe with mod cues; and/or max diet restriction  Level 4 (CK):  Safe swallow but needs mod cues; and/or mod diet restriction; and/or still requires some nonoral feeding/supplements  Level 5 (CJ): Safe swallow with min diet restriction; and/or needs min cues  Level 6 (CI): Independent with p.o.; rare cues; usually self cues; may need to avoid some foods or needs extra time  Level 7 [facility]): Independent for all p.o.  MARGARITA. (2003). National Outcomes Measurement System (NOMS): Adult Speech-Language Pathology User's Guide. COMMUNICATION/EDUCATION:   Patient was educated regarding His deficit(s) of DYSPHAGIA  as this relates to His diagnosis of PNA. He demonstrated Good understanding as evidenced by discussion. .    The patients plan of care including findings from MBS, recommendations, planned interventions, and recommended diet changes were discussed with: Registered nurse. pulm  Patient understands intent and goals of therapy, but is neutral about his/her participation.     Thank you for this referral.  Catherine Mathis, SLP  Time Calculation: 15 mins

## 2020-09-04 NOTE — PROGRESS NOTES
Bedside and Verbal shift change report given to Durga Aragon (oncoming nurse) by Bolivar Duran RN (offgoing nurse). Report included the following information SBAR, Kardex, Procedure Summary, Intake/Output, MAR and Accordion.

## 2020-09-04 NOTE — PROGRESS NOTES
Nutrition Assessment     Type and Reason for Visit: Reassess    Nutrition Recommendations/Plan:   · Continue regular, mech soft diet following procedure. · Continue multiple ONS to assist with meeting nutritional needs, hx of weight loss, malnutrition. Nutrition Assessment:      9/4: F/u. Pt currently NPO and OOR at time of visit for EGD/colonoscopy. Chronic diarrhea. Chest tube removed 9/3. Recorded intakes good, mostly >50% meals. Pt consuming ONS per previous note- will continue after procedure. Labs- K 3.2, Ca 7.1, BG 59-73-97. Meds reviewed. 8/31: F/u. Diet ordered as Regular again. Called into pt's room as he is on airborne precaution for TB. Pt states he does have some difficulty chewing some foods. States food needs to be soft and most of our foods are. Will change consistency back to Mechanical Soft. Pt states he is eating okay, intakes are documented as 25-75% meals. Multiple ONS ordered. Pt states he likes the Ensure clear and Ensure Pudding and sometimes eats the [employer]. Labs: LFT's are increasing. Meds: Vit E53, folic acid, Creon, Probiotic. BM noted 8/31.    8/27;  Chart reviewed; med noted for pneumonia, TB. Pt on airborne precautions. RD completed comprehensive assessment with pt yesterday 8/26. Multiple ONS initiated. Will continue for now. Per Pulmonology note today, SLP to be consulted. Currently receiving a regular consistency diet with fair/good documented po intake. 8/26: 59 yo male admitted for pneumonia. PMhx: ETOH use daily. Underweight per BMI of 17.9. No weight hx in EMR. Pt being tested for COVID-19. Spoke with pt over phone, states he has been steadily losing weight and reports 50lb loss over the past 1.5 years. This is 29% loss which is significant for time frame. Pt states he has had no PO intakes for the last 1-2 weeks except for a few protein bars.   Prior to that his intakes were still poor, only eating 1x/day which was either a frozen meal or sandwich. Regular diet ordered here. States he ate 50% of grits and pancakes this morning. Has no teeth and states he was not able to chew the sausage or fruit. Requesting softer foods, willing to try chopped meats for now but may need even softer foods than that. Discussed ONS options, states Ensure Enlive causes him to have diarrhea. Willing to try Ensure Clear, Ensure pudding, and Magic Cup. C/o chronic diarrhea, states he takes a probiotic most days at home which helps with the diarrhea, requesting that here. Labs and meds reviewed. NaCl ordered at 75ml/hr. Intakes:  Patient Vitals for the past 168 hrs:   % Diet Eaten   09/03/20 0912 100 %   09/01/20 1049 50 %   08/31/20 1600 25 %   08/31/20 1103 75 %   08/30/20 1755 50 %   08/30/20 1121 75 %   08/28/20 1338 25 %     Last Weight Metric  Weight Loss Metrics 9/3/2020   Today's Wt 172 lb 4.8 oz   BMI 24.72 kg/m2       Malnutrition Assessment:  Malnutrition Status: Severe malnutrition     Estimated Daily Nutrient Needs:  Energy (kcal):  2034 kcals(REE 1373 x AF 1.3 + 250)  Protein (g):  68-80gm(1.2-1.4gm/kg/d)       Fluid (ml/day):  2034 ml(1ml/kg)    Nutrition Related Findings:  LBM 9/3, 2+ LE, 1+ UE edema      Current Nutrition Therapies:  DIET NUTRITIONAL SUPPLEMENTS Breakfast, Lunch; Ensure Clear  DIET NUTRITIONAL SUPPLEMENTS Lunch; Magic Cups  DIET NUTRITIONAL SUPPLEMENTS Lunch, Dinner; Pudding, Fortified  DIET NPO  DIET ONE TIME MESSAGE    Anthropometric Measures:  · Height:  5' 10\" (177.8 cm)  · Current Body Wt:  73.8 kg (162 lb 11.2 oz)  · BMI: 23.3    Nutrition Diagnosis:   · Underweight related to inadequate protein-energy intake as evidenced by BMI(17.9)     8/31: Nutrition Dx resolved at this time with new weight - BMI 23.4.  9/4: Nutrition dx resolved BMI 24.7.     Nutrition Intervention:  Food and/or Nutrient Delivery: Continue current diet, Continue oral nutrition supplement  Nutrition Education and Counseling: No recommendations at this time  Coordination of Nutrition Care: Continued inpatient monitoring    Goals:  Consume > 75% meals + ONS to promote weight gain of 0.5-1lb within next 3-4 days       Nutrition Monitoring and Evaluation:   Food/Nutrient Intake Outcomes: Food and nutrient intake, Supplement intake  Physical Signs/Symptoms Outcomes: Chewing or swallowing, GI status, Weight    Discharge Planning:    Continue current diet, Continue oral nutrition supplement     Electronically signed by Hannah Amos on 9/4/2020

## 2020-09-04 NOTE — PROGRESS NOTES
Attempted to work with the patient for Physical Therapy, chart reviewed and discussed with nurse patient off the floor for a procedure. We will continue to follow up with the patient for therapy thank.

## 2020-09-04 NOTE — PROGRESS NOTES
Physician Progress Note      PATIENT:               Arsenio Scott  CSN #:                  [id number]  :                       1955  ADMIT DATE:       2020 3:51 PM  [address] [place] DATE:  RESPONDING  PROVIDER #:        Helga Clancy MD          QUERY TEXT:    Dear Family Practice Team,    Pt admitted with Pneumonia. Pt noted to have a Right Chest tube with positive MSSA from pleural fluid with mentioning of Empyema within documentation. If possible, please document in the progress notes and discharge summary if you are evaluating and/or treating any of the following: The medical record reflects the following:    Risk Factors: 63 Yr M admitted with Pneumonia with acute PE and DVT    Clinical Indicators: Patient arrived to the ED with c/o chest pain, diarrhea and lethargy with recent fall with inability to get up. Worsening fatigue and weight loss. Hadn't been to a PCP in over 40 years. Work up in the ED revealed a WBC 31.1, lactic 2.2, , RR 26 and BP 80/56. CT chest revealed diffuse irregular opacification throughout the right lower lobe with multiple areas of internal cavitation and air-fluid levels. Findings are more likely related to infection than malignancy. Large right pleural effusion. Trace left pleural effusion. Thrombus in the veins of the left lower extremity extending to the IVC. A small amount of thrombus is also seen in the right common iliac vein. IR placed a right chest tube for drainage and sent fluid for culture. Culture came back positive for HEAVY STAPHYLOCOCCUS AUREUS. In progress note on  it states, Weight loss, unintentional (2020) POA: still concerning for malignancy but severe protein calorie malnutrition and empyema likely causes. Per Pulmonary progress note on  states, Abnormal Chest CT: extensive cavitation throughout. Pl fluid with MSSA.       Treatment: Daily CBC/BMP, multiple CXR, IR for Chest tube placement/drainage, frequent monitoring/vital signs, completed course of doxycycline and Zoysn. Thank you,  Samuel Garcia RN, [facility]  [phone]  Options provided:  -- Gram positive pneumonia  -- MSSA pneumonia  -- Bacterial pneumonia  -- Empyema no Pneumonia  -- Other - I will add my own diagnosis  -- Disagree - Not applicable / Not valid  -- Disagree - Clinically unable to determine / Unknown  -- Refer to Clinical Documentation Reviewer    PROVIDER RESPONSE TEXT:    This patient has MSSA pneumonia. Query created by:  Nanci Wade on 9/2/2020 12:09 PM      Electronically signed by:  Barrington Bush MD 9/4/2020 6:45 AM

## 2020-09-04 NOTE — PROGRESS NOTES
TRANSFER - OUT REPORT:    Verbal report given to oDrcas(name) on Hannah Rolon  being transferred to [facility](unit) for routine post - op       Report consisted of patients Situation, Background, Assessment and   Recommendations(SBAR). Information from the following report(s) SBAR, Procedure Summary, Med Rec Status and Cardiac Rhythm SR was reviewed with the receiving nurse. Lines:   Peripheral IV 08/31/20 Distal;Left;Posterior Forearm (Active)   Site Assessment Clean, dry, & intact 09/04/20 0829   Phlebitis Assessment 0 09/04/20 0829   Infiltration Assessment 0 09/04/20 0829   Dressing Status Clean, dry, & intact 09/04/20 0829   Dressing Type Transparent;Tape 09/04/20 0829   Hub Color/Line Status Pink; Infusing 09/04/20 9281   Action Taken Open ports on tubing capped 09/04/20 0030   Alcohol Cap Used Yes 09/04/20 0030        Opportunity for questions and clarification was provided.       Patient transported with:   Monitor  Tech

## 2020-09-04 NOTE — PROGRESS NOTES
Daily Progress Note: 9/4/2020  Dewayne Bernard MD    Assessment/Plan:   Pneumonia involving right lung (8/25/2020) /  Hemoptysis (8/25/2020) POA: extensive with internal cavitation and air fluid levels. Staph Aureus, leukocytosis improving. CXR unchanged  --pleural fluid studies + staph, BC neg, cytology neg, AFB neg smears x 3 - cultures pending, and 2nd quantiferon gold negative. -- zosyn, doxy completed, Chest tube removed 9/3  -- Pulm and Thoracic Surg following     Bilateral pulmonary embolism, acute ([facility] [address] (8/25/2020) / Acute deep vein thrombosis (DVT) of both lower extremities ([facility] [address] (8/25/2020):   - IVC filter placed   - holding anticoags due to dropping Hb and active hemoptysis and GI bleeding     Weight loss, unintentional (8/25/2020) POA: still concerning for malignancy but severe protein calorie malnutrition and empyema likely causes. Nutrition and speech therapy following  -Diet as tolerated  -calcium corrects  -GI work up pending- hoping for endoscopy tomorrow     Hyperbilirubinemia (8/25/2020) POA: unclear etiology. CT abdomen unremarkable  - resolved     Pleural effusion, right (8/25/2020) POA: empyema, pneumonia vs malignant effusion. -pulmonology following  - Chest tube placed 8/26/20     Alcohol use (8/25/2020) POA: he says he has cut down recently. -Monitor for withdrawal     Chronic Diarrhea (8/25/2020) / Fecal occult blood test positive (8/25/2020) POA: check stool studies. Monitor Hgb.    -GI following- no longer refusing any GI studies  -imodium and peptobismol PRN  -FloraQ daily- or pt's home probiotics    Hypomag/hypokalemia:  -replete and monitor        Problem List:  Problem List as of 9/4/2020 Date Reviewed: 8/25/2020          Codes Class Noted - Resolved    * (Principal) Pneumonia involving right lung ICD-10-CM: J18.9  ICD-9-CM: 486  8/25/2020 - Present        Hemoptysis ICD-10-CM: R04.2  ICD-9-CM: 786.30  8/25/2020 - Present        Hyperbilirubinemia ICD-10-CM: E80.6  ICD-9-CM: 782.4  8/25/2020 - Present        Weight loss, unintentional ICD-10-CM: R63.4  ICD-9-CM: 783.21  8/25/2020 - Present        Pleural effusion, right ICD-10-CM: J90  ICD-9-CM: 511.9  8/25/2020 - Present        Bilateral pulmonary embolism ([address] ICD-10-CM: I26.99  ICD-9-CM: 415.19  8/25/2020 - Present        Acute deep vein thrombosis (DVT) of both lower extremities ([facility]) ICD-10-CM: I82.403  ICD-9-CM: 453.40  8/25/2020 - Present        Alcohol use ICD-10-CM: Z72.89  ICD-9-CM: V49.89  8/25/2020 - Present        Diarrhea ICD-10-CM: R19.7  ICD-9-CM: 787.91  8/25/2020 - Present        Fecal occult blood test positive ICD-10-CM: R19.5  ICD-9-CM: 792.1  8/25/2020 - Present        Hyponatremia with extracellular fluid depletion ICD-10-CM: E87.1  ICD-9-CM: 276.1  8/25/2020 - Present            Subjective:     60 y.o. male who is being admitted for Pneumonia involving right lung. Mr. Radha Oates presented to our Emergency Department today complaining of generalized weakness, diarrhea and lethargy for several months. Patient was accompanied by his friend who gave most of the history. The patient does not like seeing physicians and apparently uses vitamin supplements. He is a smoker and drinks alcohol and has been progressively weaker over several months now. He has had pleuritic right sided chest pain associated with a productive cough and hemoptysis. He feel on the floor several days ago and was unable to get up. His friends helped him up and brought him to the ED for further testing. Initial CXR done showed airspace disease in right mid lower lung zone consistent with pneumonia. This is surrounded by a moderate right-sided pleural effusion. Upon review, he was noted to be cachectic with swollen lower extremities. CT scans chest abdomen and pelvis done showed a diffuse irregular opacification throughout the right lower lobe with multiple areas of internal cavitation and air-fluid levels.  Findings are more likely related to infection than malignancy. Large right pleural effusion. Trace left pleural effusion. Bilateral pulmonary emboli. Clot burden is moderate. No evidence of right heart strain. Thrombus in the veins of the left lower extremity extending to the IVC. A small amount of thrombus is also seen in the right common iliac vein. He will be admitted for further management. (Dr Genaro Mario)    8/26:  Feeling a little better this AM but still c/o painful, pleuritic cough and occas thick sputum. No leg/calf pains. No known exposure to ill individuals/no travel - \"just staying in the last few months. \"  No temps. WBC 31.9K. Cultures neg so far.      8/27:  Reports less cough, chest pain and feeling better overall. Appetite has returned. Chest tube placed yesterday with discharge obtained - pending cultures. Other cultures remain neg. Hb lower so cont to hold anticoag for now. GI has seen and work up when more stable. Remains on doxy, zosyn, vanc. WBC down to 28K. Bili back to normal.     8/28:  Continues to grad improve. Still with some cough. No tremor or hallucinations so far. K+ lower - replacing. WBC coming down. Afeb. CXR this AM pending. Cultures remain neg so far. Quantiferon gold test pending as of this AM.  He reports hx of chronic LE edema for years. 8/29: Pt continues to have frequent stooling. GI plans endoscopy next week if pt allows. He was not willing to participate with PT/OT/nutritionist yesterday. Says despite our best efforts, we will not get his stooling to stop until he gets his home probiotics [friend will bring it]. He refuses to eat until the stooling is better. 8/30:  Pt is reluctant to admit that his stooling has improved. Down to 2 BMs yesterday instead of 6 the day before. Tolerating PO. Willing to eat more today. He is still having hemoptysis. Wanting to get out of bed. Willing to work with PT/OT. 8/31:  Feeling better. Appetite better. TB neg so far. First Quantiferon Gold was indeterminate - repeat pending. Afeb. WBC slowly coming down. Currently on Doxy and Zosyn. Replacing Mag and K+. Hb now stable in 8s. : Little change. Not moving about much, he reports due to chest tube being painful when he moves. Hb 7.8. WBC 16. No sign of ETOH withdrawal.     :  Now reports he wants to move around more and get up and wants to work with PT. AFB smears neg x3, AFB cultures still pending of course. WBC slowly coming down. Afeb. Hb still slowly dropping - 7.7 today. May consider prophylactic dose of lovenox and watch Hb.  GI work up pending. Arms a little more swollen. 9/3: Feeling better. Hoping for endoscopy in am. Patient would like chest tube removed prior to prep and studies if possible. Speech would like MBS.     :  Feeling better except for prep for colonoscopy - reports clear results last BM. Chest tube out yesterday. Hb down to 7.0 so still holding anticoag - may need transfusion. Upper and lower endoscopy planned today. CXR this AM pending. K+ down with the prep - replacing.      Review of Systems:   A comprehensive review of systems was negative except for that written in the HPI. Objective:   Physical Exam:   Visit Vitals  BP 93/57 (BP 1 Location: Left arm, BP Patient Position: At rest)   Pulse 87   Temp 97.7 °F (36.5 °C)   Resp 16   Ht 5' 10\" (1.778 m)   Wt 78.2 kg (172 lb 4.8 oz)   SpO2 95%   BMI 24.72 kg/m²      O2 Device: Room air  Temp (24hrs), Av.3 °F (36.8 °C), Min:97.7 °F (36.5 °C), Max:98.8 °F (37.1 °C)    1901 -  0700  In: -   Out: 400 [Urine:400]   701 - 1900  In: 0   Out: 6385 [Urine:1325]  General:  Alert, cooperative, no distress, cachetic, ill appearing. Head:  Normocephalic, without obvious abnormality, atraumatic. Eyes:  [place] conjuntiva. PERRL, EOMs intact.    Throat: Lips, mucosa, and tongue moist.   Neck: Supple, symmetrical, trachea midline, no adenopathy, thyroid: no enlargement/tenderness/nodules, no carotid bruit and no JVD. Lungs:   Better air exchange,  Clear at this moment. Chest wall: Former chest tube site right looks ok   Heart:  Regular rate and rhythm, S1, S2 normal, no murmur, click, rub or gallop. Abdomen:   Soft, non-tender. Bowel sounds normal. No masses,  No organomegaly. Extremities: no cyanosis. No calf tenderness;2+ pitting edema bilat. 2+ pitting edema in arms, No cords palpated. Pulses: 2+ and symmetric all extremities. Skin: Skin color, texture, turgor normal. No rashes   Neurologic:  Alert and oriented X 3. Fine motor of hands and fingers normal.   equal.  No tremor. Gait not tested at this time. Sensation grossly normal to touch. Gross motor of extremities normal.       Data Review:   EXAM: CT CHEST ABD PELV W CONT 8/25/20  INDICATION: weight loss  COMPARISON: Radiographs 8/25/2020  CONTRAST: 100 mL of Isovue-370. FINDINGS:   CHEST WALL: No mass or axillary lymphadenopathy. THYROID: No nodule. MEDIASTINUM: No mass or lymphadenopathy. BRYAN: No mass or lymphadenopathy. THORACIC AORTA: No dissection or aneurysm. MAIN PULMONARY ARTERY: Normal in caliber. Thrombus is seen throughout the right  lower lobe pulmonary arteries. Thrombus is seen in the left lower lobe pulmonary  artery extending into the anterior and lateral basilar segments. TRACHEA/BRONCHI: Patent. ESOPHAGUS: No wall thickening or dilatation. HEART: Normal in size. PLEURA: There is a large right pleural effusion with enhancement of the pleural  surfaces. Trace left pleural effusion. LUNGS: There is irregular opacification throughout the right lower lobe. There  are multiple areas of internal cavitation with air-fluid levels. Small areas of  atelectasis or scarring are seen in the lingula and inferior left lower lobe. LIVER: No mass. BILIARY TREE: Gallbladder is within normal limits. CBD is not dilated. SPLEEN: within normal limits.   PANCREAS: No mass or ductal dilatation. ADRENALS: Unremarkable. KIDNEYS: Small nonobstructive stones are seen in the bilateral kidneys. No  evidence of hydronephrosis. No mass. STOMACH: Unremarkable. SMALL BOWEL: No dilatation or wall thickening. COLON: No dilatation or wall thickening. APPENDIX: Unremarkable  PERITONEUM: No ascites or pneumoperitoneum. RETROPERITONEUM: No lymphadenopathy or aortic aneurysm. REPRODUCTIVE ORGANS: Unremarkable  URINARY BLADDER: No mass or calculus. BONES: No destructive bone lesion. ABDOMINAL WALL: No mass or hernia. ADDITIONAL COMMENTS: There is thrombus in the left femoral veins extending into  the left iliac veins into the IVC. A small amount of thrombus is also present in  the right common iliac vein. IMPRESSION:  1. Diffuse irregular opacification throughout the right lower lobe with multiple  areas of internal cavitation and air-fluid levels. Findings are more likely  related to infection than malignancy. 2. Large right pleural effusion. Trace left pleural effusion. 3. Bilateral pulmonary emboli. Clot burden is moderate. No evidence of right  heart strain. 4. Thrombus in the veins of the left lower extremity extending to the IVC. A  small amount of thrombus is also seen in the right common iliac vein. EXAM:  IR THORACENTESIS/INSERT CHEST TUBE 8/26/20  INDICATION:  Request placement of large size pigtail catheter. Possible empyema. PROCEDURE:  Available history and imaging was reviewed which demonstrates a large partially  loculated right pleural effusion. Possibility of empyema, tuberculosis and other  etiologies are being considered clinically. Specific request for larger pigtail  catheter placement. Following informed consent the patient was evaluated with ultrasound in the  angina/catheter lab department. This demonstrates a large right pleural  effusion. The location of the pleural effusion was marked and then the area was prepped  and draped.    1% lidocaine was used for local anesthesia. A small incision was made with an 11  blade. A micropuncture needle was used under direct fluoroscopic guidance to access the  pleural fluid. When this was achieved the microguidewire was placed followed by  a 5 [place] dilator. Through this a short [address] guidewire  was placed. The track was then dilated to 12 [place]. This was then followed by  placement of a 12 [place] Abscession pigtail catheter. The string for the pigtail  was removed prior to placement. The catheter was positioned under fluoroscopic  guidance and then secured in place at the skin with a 2-0 Prolene suture. The  site was then dressed with a Tegaderm Bioclusive dressing and will be connected  to a Pleur-evac for subsequent drainage. Prior to this approximately 100 mL of fluid was obtained and sent for cytology  as well as multiple cultures including aerobic, anaerobic, AFB and fungal.   The fluid was withdrawn was mildly cloudy serosanguineous in color. I did not  notice any foul smell. The procedure was adequately tolerated without significant blood loss or  evidence of complication. Final digital image of the chest was saved documenting catheter position. IMPRESSION:   Successful ultrasound and fluoroscopic guided placement of 12 [place] right-sided  chest tube. Patient will have ongoing drainage from Pleur-evac in his hospital  bed/room. Care and monitoring as per medical service and nursing service. ECHO 8/27/20  Interpretation Summary   Result status: Final result    · Image quality for this study was technically difficult. · Echo study was technically difficulty and limited due to patient's tolerance. · LV: Estimated LVEF is 55 - 60%. Normal cavity size, wall thickness and systolic function (ejection fraction normal). Wall motion: normal. Mild (grade 1) left ventricular diastolic dysfunction. · Pericardium: Small pericardial effusion adjacent to right ventricle.        Portable AP upright view of the chest. 9/1/20  Direct comparison made to prior chest x-ray dated September 1, 2020. Cardiomediastinal silhouette is stable. Right basilar chest tube is unchanged in  position. There No residual pneumothorax is visualized. There are small  bilateral pleural effusions. There are persistent increased interstitial  markings at the right lung. There is persistent bibasilar patchy airspace  disease, right greater than left. IMPRESSION: No residual pneumothorax visualized    Recent Days:  Recent Labs     09/04/20  0549 09/02/20  0356   WBC 14.5* 15.7*   HGB 7.0* 7.7*   HCT 21.6* 23.6*    245     Recent Labs     09/03/20  0351 09/02/20  0356    142   K 3.2* 3.6   * 111*   CO2 25 27   GLU 73 97   BUN 4* 4*   CREA 0.47* 0.42*   CA 7.0* 7.2*   ALB 0.9* 0.9*   TBILI 1.0 1.0   ALT 94* 97*     No results for input(s): PH, PCO2, PO2, HCO3, FIO2 in the last 72 hours. 24 Hour Results:  Recent Results (from the past 24 hour(s))   CBC WITH AUTOMATED DIFF    Collection Time: 09/04/20  5:49 AM   Result Value Ref Range    WBC 14.5 (H) 4.1 - 11.1 K/uL    RBC 2.05 (L) 4.10 - 5.70 M/uL    HGB 7.0 (L) 12.1 - 17.0 g/dL    HCT 21.6 (L) 36.6 - 50.3 %    .4 (H) 80.0 - 99.0 FL    MCH 34.1 (H) 26.0 - 34.0 PG    MCHC 32.4 30.0 - 36.5 g/dL    RDW 21.2 (H) 11.5 - 14.5 %    PLATELET 139 804 - 166 K/uL    MPV 10.4 8.9 - 12.9 FL    NRBC 0.0 0  WBC    ABSOLUTE NRBC 0.00 0.00 - 0.01 K/uL    NEUTROPHILS PENDING %    LYMPHOCYTES PENDING %    MONOCYTES PENDING %    EOSINOPHILS PENDING %    BASOPHILS PENDING %    IMMATURE GRANULOCYTES PENDING %    ABS. NEUTROPHILS PENDING K/UL    ABS. LYMPHOCYTES PENDING K/UL    ABS. MONOCYTES PENDING K/UL    ABS. EOSINOPHILS PENDING K/UL    ABS. BASOPHILS PENDING K/UL    ABS. IMM. GRANS.  PENDING K/UL    DF PENDING        Medications reviewed  Current Facility-Administered Medications   Medication Dose Route Frequency    white petrolatum-mineral oiL (EUCERIN) cream   Topical BID    lipase-protease-amylase (CREON 24,000) capsule 1 Cap  1 Cap Oral TID WITH MEALS    loperamide (IMODIUM) capsule 2 mg  2 mg Oral Q6H PRN    lactobac ac& pc-s.therm-b.anim (ANITA Q/RISAQUAD)  1 Cap Oral DAILY    bismuth subsalicylate (PEPTO-BISMOL) oral suspension 30 mL  30 mL Oral Q6H PRN    cyanocobalamin (VITAMIN B12) tablet 1,000 mcg  1,000 mcg Oral DAILY    folic acid (FOLVITE) tablet 1 mg  1 mg Oral DAILY    HYDROcodone-acetaminophen (NORCO) 5-325 mg per tablet 1 Tab  1 Tab Oral Q6H PRN    morphine injection 1 mg  1 mg IntraVENous Q4H PRN    sodium chloride (NS) flush 5-10 mL  5-10 mL IntraVENous PRN    sodium chloride (NS) flush 5-40 mL  5-40 mL IntraVENous Q8H    sodium chloride (NS) flush 5-40 mL  5-40 mL IntraVENous PRN    acetaminophen (TYLENOL) tablet 650 mg  650 mg Oral Q6H PRN    Or    acetaminophen (TYLENOL) suppository 650 mg  650 mg Rectal Q6H PRN    polyethylene glycol (MIRALAX) packet 17 g  17 g Oral DAILY PRN    promethazine (PHENERGAN) tablet 12.5 mg  12.5 mg Oral Q6H PRN    Or    ondansetron (ZOFRAN) injection 4 mg  4 mg IntraVENous Q6H PRN    0.9% sodium chloride infusion  100 mL/hr IntraVENous CONTINUOUS    pantoprazole (PROTONIX) 40 mg in 0.9% sodium chloride 10 mL injection  40 mg IntraVENous Q12H     Care Plan discussed with: Patient/Nurse  Total time spent with patient: 30 minutes.   Sajan Everett MD

## 2020-09-04 NOTE — PROGRESS NOTES
Name: [facility]: [facility]   : 1955 Admit Date: 2020   Phone: [phone]  Room:  374728   PCP: Dorian Garcia MD  MRN: [id number]   Date: 2020  Code: Full Code          Chart and notes reviewed. Data reviewed. I review the patient's current medications in the medical record at each encounter.  I have evaluated and examined the patient. History of Present Illness:   is a pleasant, 61 yo gentleman that presented to [facility] with worsening shortness of breath and weakness. He tells me that he started feeling poorly in 2019 with a cough, however that resolved. His symptoms returned in 2019 and tells me that he has been struggling with cough, weight loss of 40+ lbs, and intermittent hemoptysis (phlegm mixed with blood), and diarrhea. He is a smoker of 2ppd for 20+ years. Also prior to the last two weeks, was \"drinking like a fish,\" at least 1 pint per day. He does not routinely prefer to seek traditional medical care and thus has delayed coming to the ED due to his symptoms. Reports worsening right sided chest pain and poor appetite. Images:  Personally reviewed. Chest/Abdominal CT:  Diffuse irregular opacification throughout the right lower lobe with multiple  areas of internal cavitation and air-fluid levels. Findings are more likely  related to infection than malignancy. Large right pleural effusion. Trace left pleural effusion. Bilateral pulmonary emboli. Clot burden is moderate. No evidence of right heart strain. Thrombus in the veins of the left lower extremity extending to the IVC. A  small amount of thrombus is also seen in the right common iliac vein. WBC 31.69  Hgb 9.7  ALT 47  AST 75  Na 135  Creat . 47  Albumin . 7  Lactic acid on admit 2.2; now 1.1  Hemoccult +   Blood cultures negative x 11 hours      Interval History:  Afebrile  BP soft but stable; MAP 60s-70s  Sats 98% on RA  WBC 14.5 - trending down  Hgb 7.0 - trending back down  K 3.2 - repleted  LFTs elevated  Blood cx no growth x 6 days  Resp cx with moderate staph aureus  Pleural fluid cx heavy staph aureus - pan sensitive  Pleural fluid ABF smear neg  Pleural fluid cytology no cells diagnostic for malignancy  AFB smear neg x 3  8/26 quant TB gold indeterminate; 8/29 repeat negative  Hep panel nonreactive    MBS: Mild pharyngeal weakness with mild residue in upper pharynx with purees, solids. This cleared with sips of thins. This may explain his intermittent coughing at meals. Mild aspiration risk, as he appeared to protect airway well with no penetration or aspiration. ECHO: EF 90-10%; grade 1 diastolic dysfunction; small pericardial effusion, no tamponade  BLE VD: positive for age indeterminate and acute deep venous thrombosis or thrombophlebitis in right and left CFV, FV, and popliteal veins. ROS: Just returned to the room after his EGD and colonoscopy. States he's hungry. Denies SOB. Denies fever or chills. Denies CP. Reports occasional cough. .  Denies abd pain. Continues with LE edema - mostly unchanged. History reviewed. No pertinent past medical history. Past Surgical History:   Procedure Laterality Date    IR [facility] BROWN DEER IVC FILTER  8/25/2020    IR THORACENTESIS/INSERT CHEST TUBE  8/26/2020       History reviewed. No pertinent family history.     Social History     Tobacco Use    Smoking status: Current Every Day Smoker    Smokeless tobacco: Never Used   Substance Use Topics    Alcohol use: Yes     Comment: 1-2 beer per week       No Known Allergies    Current Facility-Administered Medications   Medication Dose Route Frequency    0.9% sodium chloride infusion  50 mL/hr IntraVENous CONTINUOUS    Probiotic - USANA Probiotic  (Patient Supplied)  1 Packet Oral DAILY    mesalamine (DELZICOL) DR capsule 800 mg  800 mg Oral TID    white petrolatum-mineral oiL (EUCERIN) cream   Topical BID    lipase-protease-amylase (CREON 24,000) capsule 1 Cap  1 Cap Oral TID WITH MEALS    loperamide (IMODIUM) capsule 2 mg  2 mg Oral Q6H PRN    bismuth subsalicylate (PEPTO-BISMOL) oral suspension 30 mL  30 mL Oral Q6H PRN    cyanocobalamin (VITAMIN B12) tablet 1,000 mcg  1,000 mcg Oral DAILY    folic acid (FOLVITE) tablet 1 mg  1 mg Oral DAILY    HYDROcodone-acetaminophen (NORCO) 5-325 mg per tablet 1 Tab  1 Tab Oral Q6H PRN    morphine injection 1 mg  1 mg IntraVENous Q4H PRN    sodium chloride (NS) flush 5-10 mL  5-10 mL IntraVENous PRN    sodium chloride (NS) flush 5-40 mL  5-40 mL IntraVENous Q8H    sodium chloride (NS) flush 5-40 mL  5-40 mL IntraVENous PRN    acetaminophen (TYLENOL) tablet 650 mg  650 mg Oral Q6H PRN    Or    acetaminophen (TYLENOL) suppository 650 mg  650 mg Rectal Q6H PRN    polyethylene glycol (MIRALAX) packet 17 g  17 g Oral DAILY PRN    promethazine (PHENERGAN) tablet 12.5 mg  12.5 mg Oral Q6H PRN    Or    ondansetron (ZOFRAN) injection 4 mg  4 mg IntraVENous Q6H PRN    0.9% sodium chloride infusion  100 mL/hr IntraVENous CONTINUOUS         REVIEW OF SYSTEMS   Negative except as stated in the HPI. Physical Exam:   Visit Vitals  BP (!) 88/55   Pulse 91   Temp 97.6 °F (36.4 °C)   Resp 14   Ht 5' 10\" (1.778 m)   Wt 78.2 kg (172 lb 4.8 oz)   SpO2 98%   BMI 24.72 kg/m²       General:  Alert, cooperative, no distress, appears older stated age. Head:  Normocephalic, without obvious abnormality, atraumatic. Eyes:  Conjunctivae/corneas clear. .   Nose: Nares normal. Septum midline. Mucosa normal.    Throat: Lips, mucosa, and tongue normal. Poor dentition. Lungs:   Mildly diminished over right base. Resp nonlabored. Chest wall:  No tenderness or deformity. Heart:  Regular rate and rhythm, S1, S2 normal, no murmur, click, rub or gallop. Abdomen:   Soft, non-tender. Bowel sounds normal.    Extremities: Muscle wasting. Anasarca. Pulses: 2+ and symmetric all extremities.    Skin: Skin color, texture, turgor normal. Lymph nodes: Cervical nodes normal.   Neurologic: Grossly nonfocal       Lab Results   Component Value Date/Time    Sodium 141 09/04/2020 05:49 AM    Potassium 3.2 (L) 09/04/2020 05:49 AM    Chloride 108 09/04/2020 05:49 AM    CO2 28 09/04/2020 05:49 AM    BUN 3 (L) 09/04/2020 05:49 AM    Creatinine 0.35 (L) 09/04/2020 05:49 AM    Glucose 59 (L) 09/04/2020 05:49 AM    Calcium 7.1 (L) 09/04/2020 05:49 AM    Magnesium 1.5 (L) 08/30/2020 02:02 AM    Lactic acid 1.1 08/25/2020 06:25 PM       Lab Results   Component Value Date/Time    WBC 14.5 (H) 09/04/2020 05:49 AM    HGB 7.0 (L) 09/04/2020 05:49 AM    PLATELET 411 34/89/9881 05:49 AM    .4 (H) 09/04/2020 05:49 AM       Lab Results   Component Value Date/Time    aPTT 37.0 (H) 09/02/2020 03:56 AM    Alk.  phosphatase 357 (H) 09/04/2020 05:49 AM    Protein, total 4.8 (L) 09/04/2020 05:49 AM    Albumin 0.9 (L) 09/04/2020 05:49 AM    Globulin 3.9 09/04/2020 05:49 AM       No results found for: IRON, FE, TIBC, IBCT, PSAT, FERR    No results found for: SR, CRP, ANALIA, ANAIGG, RA, RPR, RPRT, VDRLT, VDRLS, TSH, TSHEXT, TSHEXT     No results found for: PH, PHI, PCO2, PCO2I, PO2, PO2I, HCO3, HCO3I, FIO2, FIO2I    Lab Results   Component Value Date/Time    Troponin-I, Qt. <0.05 08/25/2020 04:07 PM        Lab Results   Component Value Date/Time    Culture result: HEAVY STAPHYLOCOCCUS AUREUS (A) 08/26/2020 04:48 PM    Culture result: NO FUNGUS ISOLATED 5 DAYS 08/26/2020 04:48 PM    Culture result: NO ANAEROBES ISOLATED 08/26/2020 04:48 PM       Lab Results   Component Value Date/Time    Hepatitis B surface Ag <0.10 08/31/2020 09:16 AM       Lab Results   Component Value Date/Time    Vancomycin,trough 13.0 (H) 08/28/2020 09:36 AM       Lab Results   Component Value Date/Time    Color JENNIFFER 08/26/2020 03:12 AM    Appearance CLEAR 08/26/2020 03:12 AM    Specific gravity 1.005 08/26/2020 03:12 AM    pH (UA) 6.0 08/26/2020 03:12 AM    Protein TRACE (A) 08/26/2020 03:12 AM Glucose Negative 08/26/2020 03:12 AM    Ketone TRACE (A) 08/26/2020 03:12 AM    Blood Negative 08/26/2020 03:12 AM    Urobilinogen 1.0 08/26/2020 03:12 AM    Nitrites Negative 08/26/2020 03:12 AM    Leukocyte Esterase SMALL (A) 08/26/2020 03:12 AM    WBC 5-10 08/26/2020 03:12 AM    RBC 5-10 08/26/2020 03:12 AM    Bacteria Negative 08/26/2020 03:12 AM       Images: personally visualized and report reviewed    CXR (9/4/2020): The right basilar chest tube is been removed. Diffuse airspace disease throughout the right lung is unchanged. Small right pleural effusion persists. No pneumothorax is visualized. IMPRESSION  · Abnormal Chest CT: extensive cavitation throughout. Pl fluid with MSSA  · Bilateral PEs s/p IVC filter due to GIB  · GIB  · Anemia - EGD unremarkable; colonoscopy with evidence of \"burned out UC\" and rectum with non bleeding ulcer. Biopsies taken of both. · BLE DVT  · Weight Loss  · Tobacco Use    PLAN  · Supplemental O2 if needed to keep sats >88%; remains on RA  · Completed course of Zosyn, Doxy   · Repeat H/H and transfuse if < 7  · K repleted  · GI following  · Speech eval appreciated. Okay for regular diet. · IVF  · IV Protonix    We will see as needed over the weekend and check back again on Tuesday if patient not discharged. Please call with questions.     Tejal Santana

## 2020-09-04 NOTE — ROUTINE PROCESS
Bedside shift change report given to Raman Estrada RN (oncoming nurse) by Ted Jones RN (offgoing nurse). Report included the following information SBAR, Kardex, Intake/Output, MAR and Accordion.

## 2020-09-04 NOTE — PROGRESS NOTES
Occupational therapy note:  Chart reviewed. Patient currently off the floor to endoscopy. Will follow up with patient at later time. Gloria Macias MS OTR/L

## 2020-09-04 NOTE — PROCEDURES
[address] [facility] MAXWELL Contreras MD  [phone]      2020    Esophagogastroduodenoscopy & Colonoscopy Procedure Note  Gillian Leblanc  : 1955  [facility] Medical Record Number: [id number]      Indications:    Occult blood in stool with possible UGI origin Anemia  and Screening Colonoscopy   Referring Physician:  Maria Guadalupe Rose MD  Anesthesia/Sedation: Conscious Sedation/Moderate Sedation/MAC  Endoscopist:  Dr. Ross Powell  Complications:  None  Estimated Blood Loss:  None    Permit:  The indications, risks, benefits and alternatives were reviewed with the patient or their decision maker who was provided an opportunity to ask questions and all questions were answered. The specific risks of esophagogastroduodenoscopy with conscious sedation were reviewed, including but not limited to anesthetic complication, bleeding, adverse drug reaction, missed lesion, infection, IV site reactions, and intestinal perforation which would lead to the need for surgical repair. Alternatives to EGD and colonoscopy including radiographic imaging, observation without testing, or laboratory testing were reviewed as well as the limitations of those alternatives discussed. After considering the options and having all their questions answered, the patient or their decision maker provided both verbal and written consent to proceed. -----------EGD------------   Procedure in Detail:  After obtaining informed consent, positioning of the patient in the left lateral decubitus position, and conduction of a pre-procedure pause or \"time out\" the endoscope was introduced into the mouth and advanced to the duodenum. A careful inspection was made, and findings or interventions are described below.     Findings:   Esophagus:normal  Stomach: normal   Duodenum/jejunum: normal .  A biopsy was taken from the duodenal mucosa for evaluation of villous atrophy or giardiasis. Hemostasis is confirmed from biopsy sites.        ----------Colonoscopy-----------    Procedure in Detail:  After obtaining informed consent, positioning of the patient in the left lateral decubitus position, and conduction of a pre-procedure pause or \"time out\" the endoscope was introduced into the anus and advanced to the terminal ileum. The quality of the colonic preparation was adequate. A careful inspection was made as the colonoscope was withdrawn, findings and interventions are described below. Findings:   Abnormal colonoscopy from dentate line to cecum. There is significant mucosal atrophy and scarring with innumerable pseudopolyps throughout the entire colon. Endoscopically this is most reminiscent of 'burned out' ulcerative colitis with pseudopolyposis. Cold forceps biopsies obtained from the right colon and left colon from both the apparent pseuodopolyps as well as the apparent atrophic areas between. In the rectum there is a deep 'punched out' appearing ulcer which is not bleeding. Differential diagnosis includes atypical neoplasm, ulceration from the hypothetical IBD, CMV ulceration, or stercoral ulcer. Cold forceps biopsies obtained from the edge and center of this ulcer for histologic evaluation.      ------------------------------  Specimens:    See above    Complications:   None; patient tolerated the procedure well. Impressions:  EGD:  Normal upper GI exam.  Colonoscopy: Endoscopic impression is that of longstanding ulcerative colitis now with atrophy of most of the colonic mucosa and pseudopolyposis. Ulcer in rectum. Recommendations:     - Await pathology.  -Usually I would consider steroid therapy but his active infection precludes the use of medications that might lead to immunosuppression. Will begin topical (oral) mesalamine treatment as we await biopsy results and his response to treatment for cavitary pneumonia.     OK to feed.  Following. Thank you for entrusting me with this patient's care. Please do not hesitate to contact me with any questions or if I can be of assistance with any of your other patients' GI needs. Signed By: Aby Zafar MD                        September 4, 2020    Surgical assistant none. Implants none unless specified.

## 2020-09-05 ENCOUNTER — APPOINTMENT (OUTPATIENT)
Dept: GENERAL RADIOLOGY | Age: 65
DRG: 166 | End: 2020-09-05
Attending: INTERNAL MEDICINE
Payer: COMMERCIAL

## 2020-09-05 LAB
ALBUMIN SERPL-MCNC: 0.9 G/DL (ref 3.5–5)
ALBUMIN/GLOB SERPL: 0.2 {RATIO} (ref 1.1–2.2)
ALP SERPL-CCNC: 306 U/L (ref 45–117)
ALT SERPL-CCNC: 90 U/L (ref 12–78)
ANION GAP SERPL CALC-SCNC: 5 MMOL/L (ref 5–15)
AST SERPL-CCNC: 101 U/L (ref 15–37)
BILIRUB SERPL-MCNC: 0.8 MG/DL (ref 0.2–1)
BUN SERPL-MCNC: 5 MG/DL (ref 6–20)
BUN/CREAT SERPL: 10 (ref 12–20)
CALCIUM SERPL-MCNC: 7.1 MG/DL (ref 8.5–10.1)
CHLORIDE SERPL-SCNC: 111 MMOL/L (ref 97–108)
CO2 SERPL-SCNC: 27 MMOL/L (ref 21–32)
COMMENT, HOLDF: NORMAL
CREAT SERPL-MCNC: 0.51 MG/DL (ref 0.7–1.3)
GLOBULIN SER CALC-MCNC: 4.1 G/DL (ref 2–4)
GLUCOSE SERPL-MCNC: 80 MG/DL (ref 65–100)
HCT VFR BLD AUTO: 21.5 % (ref 36.6–50.3)
HGB BLD-MCNC: 6.9 G/DL (ref 12.1–17)
POTASSIUM SERPL-SCNC: 3.4 MMOL/L (ref 3.5–5.1)
PROT SERPL-MCNC: 5 G/DL (ref 6.4–8.2)
SAMPLES BEING HELD,HOLD: NORMAL
SODIUM SERPL-SCNC: 143 MMOL/L (ref 136–145)

## 2020-09-05 PROCEDURE — 36415 COLL VENOUS BLD VENIPUNCTURE: CPT

## 2020-09-05 PROCEDURE — 74011250636 HC RX REV CODE- 250/636: Performed by: FAMILY MEDICINE

## 2020-09-05 PROCEDURE — 86900 BLOOD TYPING SEROLOGIC ABO: CPT

## 2020-09-05 PROCEDURE — P9016 RBC LEUKOCYTES REDUCED: HCPCS

## 2020-09-05 PROCEDURE — 74011250637 HC RX REV CODE- 250/637: Performed by: SPECIALIST

## 2020-09-05 PROCEDURE — 30233N1 TRANSFUSION OF NONAUTOLOGOUS RED BLOOD CELLS INTO PERIPHERAL VEIN, PERCUTANEOUS APPROACH: ICD-10-PCS | Performed by: FAMILY MEDICINE

## 2020-09-05 PROCEDURE — 80053 COMPREHEN METABOLIC PANEL: CPT

## 2020-09-05 PROCEDURE — 71045 X-RAY EXAM CHEST 1 VIEW: CPT

## 2020-09-05 PROCEDURE — 65660000000 HC RM CCU STEPDOWN

## 2020-09-05 PROCEDURE — 85018 HEMOGLOBIN: CPT

## 2020-09-05 PROCEDURE — 36430 TRANSFUSION BLD/BLD COMPNT: CPT

## 2020-09-05 PROCEDURE — 74011250637 HC RX REV CODE- 250/637: Performed by: FAMILY MEDICINE

## 2020-09-05 PROCEDURE — 86923 COMPATIBILITY TEST ELECTRIC: CPT

## 2020-09-05 PROCEDURE — 74011250637 HC RX REV CODE- 250/637: Performed by: INTERNAL MEDICINE

## 2020-09-05 RX ORDER — SODIUM CHLORIDE 9 MG/ML
250 INJECTION, SOLUTION INTRAVENOUS AS NEEDED
Status: DISCONTINUED | OUTPATIENT
Start: 2020-09-05 | End: 2020-09-16 | Stop reason: HOSPADM

## 2020-09-05 RX ORDER — FUROSEMIDE 10 MG/ML
10 INJECTION INTRAMUSCULAR; INTRAVENOUS ONCE
Status: COMPLETED | OUTPATIENT
Start: 2020-09-05 | End: 2020-09-05

## 2020-09-05 RX ADMIN — Medication 10 ML: at 14:00

## 2020-09-05 RX ADMIN — PANCRELIPASE 1 CAPSULE: 24000; 76000; 120000 CAPSULE, DELAYED RELEASE PELLETS ORAL at 17:38

## 2020-09-05 RX ADMIN — MESALAMINE 800 MG: 400 CAPSULE, DELAYED RELEASE ORAL at 08:33

## 2020-09-05 RX ADMIN — CYANOCOBALAMIN TAB 500 MCG 1000 MCG: 500 TAB at 08:33

## 2020-09-05 RX ADMIN — POTASSIUM BICARBONATE 20 MEQ: 782 TABLET, EFFERVESCENT ORAL at 12:55

## 2020-09-05 RX ADMIN — Medication: at 08:34

## 2020-09-05 RX ADMIN — SODIUM CHLORIDE 250 ML: 900 INJECTION, SOLUTION INTRAVENOUS at 10:55

## 2020-09-05 RX ADMIN — MESALAMINE 800 MG: 400 CAPSULE, DELAYED RELEASE ORAL at 17:38

## 2020-09-05 RX ADMIN — MESALAMINE 800 MG: 400 CAPSULE, DELAYED RELEASE ORAL at 20:48

## 2020-09-05 RX ADMIN — HYDROCODONE BITARTRATE AND ACETAMINOPHEN 1 TABLET: 5; 325 TABLET ORAL at 01:50

## 2020-09-05 RX ADMIN — POTASSIUM BICARBONATE 20 MEQ: 782 TABLET, EFFERVESCENT ORAL at 17:38

## 2020-09-05 RX ADMIN — FOLIC ACID 1 MG: 1 TABLET ORAL at 08:33

## 2020-09-05 RX ADMIN — Medication 10 ML: at 20:44

## 2020-09-05 RX ADMIN — PANCRELIPASE 1 CAPSULE: 24000; 76000; 120000 CAPSULE, DELAYED RELEASE PELLETS ORAL at 08:33

## 2020-09-05 RX ADMIN — Medication 10 ML: at 06:00

## 2020-09-05 RX ADMIN — FUROSEMIDE 10 MG: 10 INJECTION, SOLUTION INTRAMUSCULAR; INTRAVENOUS at 08:33

## 2020-09-05 RX ADMIN — PANCRELIPASE 1 CAPSULE: 24000; 76000; 120000 CAPSULE, DELAYED RELEASE PELLETS ORAL at 12:55

## 2020-09-05 NOTE — PROGRESS NOTES
Elieser Sandoval. Erik Pyle MD  [phone] office  [phone] voicemail     Gastroenterology Progress Note    September 5, 2020  Admit Date: 8/25/2020         Narrative Assessment and Plan   · Anemia  · Apparent finding of ulcerative pancolitis with rectal ulceration - biopsies pending, now getting oral mesalamine  · Pneumonia   · Weight  Loss    Discussed findings of colonoscopy - I think he's had UC for years. Will benefit from consideration of biologic therapy but needs to have pneumonia resolved and off antibiotics before we can consider that or steroids. He is stable without toxic megacolon or active bleeding  Getting blood today, continuing antibiotic therapy. He wants PT, reports he can't go home yet. I will see again this weekend if needed, call if so. Subjective:   · I am getting blood today    Location:  Weakness all over  Duration: days  Timing: intermittent  Radiation: none   Associated Symptoms: having trouble getting up  Aggravating/Alleviating factors:     ROS:  The previous review of systems on initial consultation / H&P is noted and reviewed. Specific changes noted above in HPI.     Current Medications:     Current Facility-Administered Medications   Medication Dose Route Frequency    0.9% sodium chloride infusion 250 mL  250 mL IntraVENous PRN    potassium bicarb-citric acid (EFFER-K) tablet 20 mEq  20 mEq Oral BID    Probiotic - USANA Probiotic  (Patient Supplied)  1 Packet Oral DAILY    mesalamine (DELZICOL) DR capsule 800 mg  800 mg Oral TID    white petrolatum-mineral oiL (EUCERIN) cream   Topical BID    lipase-protease-amylase (CREON 24,000) capsule 1 Cap  1 Cap Oral TID WITH MEALS    loperamide (IMODIUM) capsule 2 mg  2 mg Oral Q6H PRN    bismuth subsalicylate (PEPTO-BISMOL) oral suspension 30 mL  30 mL Oral Q6H PRN    cyanocobalamin (VITAMIN B12) tablet 1,000 mcg  1,000 mcg Oral DAILY    folic acid (FOLVITE) tablet 1 mg  1 mg Oral DAILY    HYDROcodone-acetaminophen (NORCO) 5-325 mg per tablet 1 Tab  1 Tab Oral Q6H PRN    morphine injection 1 mg  1 mg IntraVENous Q4H PRN    sodium chloride (NS) flush 5-10 mL  5-10 mL IntraVENous PRN    sodium chloride (NS) flush 5-40 mL  5-40 mL IntraVENous Q8H    sodium chloride (NS) flush 5-40 mL  5-40 mL IntraVENous PRN    acetaminophen (TYLENOL) tablet 650 mg  650 mg Oral Q6H PRN    Or    acetaminophen (TYLENOL) suppository 650 mg  650 mg Rectal Q6H PRN    polyethylene glycol (MIRALAX) packet 17 g  17 g Oral DAILY PRN    promethazine (PHENERGAN) tablet 12.5 mg  12.5 mg Oral Q6H PRN    Or    ondansetron (ZOFRAN) injection 4 mg  4 mg IntraVENous Q6H PRN       Objective:     VITALS:   Last 24hrs VS reviewed since prior progress note. Most recent are:  Visit Vitals  BP 97/64 (BP 1 Location: Right arm, BP Patient Position: At rest)   Pulse 82   Temp 97.6 °F (36.4 °C)   Resp 18   Ht 5' 10\" (1.778 m)   Wt 78.2 kg (172 lb 4.8 oz)   SpO2 98%   BMI 24.72 kg/m²     Temp (24hrs), Av.6 °F (36.4 °C), Min:97.4 °F (36.3 °C), Max:98.2 °F (36.8 °C)      Intake/Output Summary (Last 24 hours) at 2020 1018  Last data filed at 2020 1900  Gross per 24 hour   Intake 1411.67 ml   Output 500 ml   Net 911.67 ml       EXAM:  General:  Comfortable, no distress    HEENT:  Atraumatic skull, pupils equal  Lungs:   No abnormal audible breath sounds. Speaking in complete sentences  Heart:   No abnormal audible heart sounds. Well perfused  Abdomen:   Nondistended, nontender.   No mass, guarding or rebound  Musc:   No skeletal defects or deformities  Neurologic:   Cranial nerves grossly intact, moves all 4 extremities  Psych:    Good insight. Not anxious nor agitated  Derm:   No rash, jaundice, nor palpable dermatologic mass    Lab Data Reviewed:   Recent Labs     20  0129 20  1357 20  0549   WBC  --  17.9* 14.5*   HGB 6.9* 7.8* 7.0*   HCT 21.5* 24.2* 21.6*   PLT  --  327 308     Recent Labs     20  0125 09/04/20  0549 09/03/20  0351    141 141   K 3.4* 3.2* 3.2*   * 108 109*   CO2 27 28 25   GLU 80 59* 73   BUN 5* 3* 4*   CREA 0.51* 0.35* 0.47*   CA 7.1* 7.1* 7.0*   ALB 0.9* 0.9* 0.9*   TBILI 0.8 1.3* 1.0   ALT 90* 114* 94*     No results found for: GLUCPOC  No results for input(s): PH, PCO2, PO2, HCO3, FIO2 in the last 72 hours. No results for input(s): INR, INREXT in the last 72 hours.         Assessment:   (See above)  Principal Problem:    Pneumonia involving right lung (8/25/2020)    Active Problems:    Hemoptysis (8/25/2020)      Hyperbilirubinemia (8/25/2020)      Weight loss, unintentional (8/25/2020)      Pleural effusion, right (8/25/2020)      Bilateral pulmonary embolism ([address] (8/25/2020)      Acute deep vein thrombosis (DVT) of both lower extremities ([address] (8/25/2020)      Alcohol use (8/25/2020)      Diarrhea (8/25/2020)      Fecal occult blood test positive (8/25/2020)      Hyponatremia with extracellular fluid depletion (8/25/2020)        Plan:   (See above)      Signed By: Santi Fletcher MD     9/5/2020  10:18 AM

## 2020-09-05 NOTE — PROGRESS NOTES
Bedside and Verbal shift change report given to Candace Burkett RN (oncoming nurse) by Mickey Natarajan RN (offgoing nurse). Report included the following information SBAR, Kardex, Intake/Output, MAR, Accordion and Recent Results.

## 2020-09-05 NOTE — PROGRESS NOTES
Attempted Physical Therapy. Patient refusing stating \"you people ruined it for me. \"  States he wants to go home. States sitting up is difficult due to testicular swelling. Offered to work on gait training and then return to the bed. Patient still adamantly refusing. States he might work with Physical Therapy on Monday.

## 2020-09-05 NOTE — PROGRESS NOTES
Daily Progress Note: 9/5/2020  Alex Kang MD    Assessment/Plan:   Pneumonia involving right lung (8/25/2020) /  Hemoptysis (8/25/2020) POA: extensive with internal cavitation and air fluid levels. Staph Aureus, leukocytosis improving. CXR unchanged  --pleural fluid studies + staph, BC neg, cytology neg, AFB neg smears x 3 - cultures pending, and 2nd quantiferon gold negative. -- zosyn, doxy completed, Chest tube removed 9/3  -- Pulm and Thoracic Surg following     Bilateral pulmonary embolism, acute ([facility] [address] (8/25/2020) / Acute deep vein thrombosis (DVT) of both lower extremities ([facility] [address] (8/25/2020):   - IVC filter placed   - holding anticoags due to dropping Hb and active hemoptysis and GI bleeding     Weight loss, unintentional (8/25/2020) POA: still concerning for malignancy but severe protein calorie malnutrition and empyema likely causes. Nutrition and speech therapy following  -Diet as tolerated  -calcium corrects  -GI work up pending- hoping for endoscopy tomorrow     Hyperbilirubinemia (8/25/2020) POA: unclear etiology. CT abdomen unremarkable  - resolved     Pleural effusion, right (8/25/2020) POA: empyema, pneumonia vs malignant effusion. -pulmonology following  - Chest tube placed 8/26/20     Alcohol use (8/25/2020) POA: he says he has cut down recently. -Monitor for withdrawal     Chronic Diarrhea (8/25/2020) / Fecal occult blood test positive (8/25/2020) POA: check stool studies. Monitor Hgb.    -GI following- no longer refusing any GI studies  -imodium and peptobismol PRN  -FloraQ daily- or pt's home probiotics    Hypomag/hypokalemia:  -replete and monitor        Problem List:  Problem List as of 9/5/2020 Date Reviewed: 8/25/2020          Codes Class Noted - Resolved    * (Principal) Pneumonia involving right lung ICD-10-CM: J18.9  ICD-9-CM: 486  8/25/2020 - Present        Hemoptysis ICD-10-CM: R04.2  ICD-9-CM: 786.30  8/25/2020 - Present        Hyperbilirubinemia ICD-10-CM: E80.6  ICD-9-CM: 782.4  8/25/2020 - Present        Weight loss, unintentional ICD-10-CM: R63.4  ICD-9-CM: 783.21  8/25/2020 - Present        Pleural effusion, right ICD-10-CM: J90  ICD-9-CM: 511.9  8/25/2020 - Present        Bilateral pulmonary embolism ([address] ICD-10-CM: I26.99  ICD-9-CM: 415.19  8/25/2020 - Present        Acute deep vein thrombosis (DVT) of both lower extremities (HCC) ICD-10-CM: I82.403  ICD-9-CM: 453.40  8/25/2020 - Present        Alcohol use ICD-10-CM: Z72.89  ICD-9-CM: V49.89  8/25/2020 - Present        Diarrhea ICD-10-CM: R19.7  ICD-9-CM: 787.91  8/25/2020 - Present        Fecal occult blood test positive ICD-10-CM: R19.5  ICD-9-CM: 792.1  8/25/2020 - Present        Hyponatremia with extracellular fluid depletion ICD-10-CM: E87.1  ICD-9-CM: 276.1  8/25/2020 - Present            Subjective:     60 y.o. male who is being admitted for Pneumonia involving right lung. Mr. Sherif Nava presented to our Emergency Department today complaining of generalized weakness, diarrhea and lethargy for several months. Patient was accompanied by his friend who gave most of the history. The patient does not like seeing physicians and apparently uses vitamin supplements. He is a smoker and drinks alcohol and has been progressively weaker over several months now. He has had pleuritic right sided chest pain associated with a productive cough and hemoptysis. He feel on the floor several days ago and was unable to get up. His friends helped him up and brought him to the ED for further testing. Initial CXR done showed airspace disease in right mid lower lung zone consistent with pneumonia. This is surrounded by a moderate right-sided pleural effusion. Upon review, he was noted to be cachectic with swollen lower extremities. CT scans chest abdomen and pelvis done showed a diffuse irregular opacification throughout the right lower lobe with multiple areas of internal cavitation and air-fluid levels.  Findings are more likely related to infection than malignancy. Large right pleural effusion. Trace left pleural effusion. Bilateral pulmonary emboli. Clot burden is moderate. No evidence of right heart strain. Thrombus in the veins of the left lower extremity extending to the IVC. A small amount of thrombus is also seen in the right common iliac vein. He will be admitted for further management. (Dr Vanesa Holm)    8/26:  Feeling a little better this AM but still c/o painful, pleuritic cough and occas thick sputum. No leg/calf pains. No known exposure to ill individuals/no travel - \"just staying in the last few months. \"  No temps. WBC 31.9K. Cultures neg so far.      8/27:  Reports less cough, chest pain and feeling better overall. Appetite has returned. Chest tube placed yesterday with discharge obtained - pending cultures. Other cultures remain neg. Hb lower so cont to hold anticoag for now. GI has seen and work up when more stable. Remains on doxy, zosyn, vanc. WBC down to 28K. Bili back to normal.     8/28:  Continues to grad improve. Still with some cough. No tremor or hallucinations so far. K+ lower - replacing. WBC coming down. Afeb. CXR this AM pending. Cultures remain neg so far. Quantiferon gold test pending as of this AM.  He reports hx of chronic LE edema for years. 8/29: Pt continues to have frequent stooling. GI plans endoscopy next week if pt allows. He was not willing to participate with PT/OT/nutritionist yesterday. Says despite our best efforts, we will not get his stooling to stop until he gets his home probiotics [friend will bring it]. He refuses to eat until the stooling is better. 8/30:  Pt is reluctant to admit that his stooling has improved. Down to 2 BMs yesterday instead of 6 the day before. Tolerating PO. Willing to eat more today. He is still having hemoptysis. Wanting to get out of bed. Willing to work with PT/OT. 8/31:  Feeling better. Appetite better. TB neg so far. First Quantiferon Gold was indeterminate - repeat pending. Afeb. WBC slowly coming down. Currently on Doxy and Zosyn. Replacing Mag and K+. Hb now stable in 8s. 9/1: Little change. Not moving about much, he reports due to chest tube being painful when he moves. Hb 7.8. WBC 16. No sign of ETOH withdrawal.     9/2:  Now reports he wants to move around more and get up and wants to work with PT. AFB smears neg x3, AFB cultures still pending of course. WBC slowly coming down. Afeb. Hb still slowly dropping - 7.7 today. May consider prophylactic dose of lovenox and watch Hb.  GI work up pending. Arms a little more swollen. 9/3: Feeling better. Hoping for endoscopy in am. Patient would like chest tube removed prior to prep and studies if possible. Speech would like MBS.     9/4:  Feeling better except for prep for colonoscopy - reports clear results last BM. Chest tube out yesterday. Hb down to 7.0 so still holding anticoag - may need transfusion. Upper and lower endoscopy planned today. CXR this AM pending. K+ down with the prep - replacing. 9/5:  Little change. Still with occas productive cough. Now also c/o increasing LE edema and today notes scrotal edema and now wants something done about it. 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 Hb down to 6.9 today - would benefit from transfusion. Risks v benefits of transfusion discussed and he finally agreed. Appetite very good now, taking plenty of fluids now, so will DC IVFs. Low dose of lasix after the transfusion to help with the edema.         Review of Systems:   A comprehensive review of systems was negative except for that written in the HPI.     Objective:   Physical Exam:   Visit Vitals  BP 97/64 (BP 1 Location: Right arm, BP Patient Position: At rest)   Pulse 82   Temp 97.6 °F (36.4 °C)   Resp 18   Ht 5' 10\" (1.778 m)   Wt 78.2 kg (172 lb 4.8 oz)   SpO2 98%   BMI 24.72 kg/m²    O2 Flow Rate (L/min): 2 l/min O2 Device: Room air  Temp (24hrs), Av.6 °F (36.4 °C), Min:97.4 °F (36.3 °C), Max:98.2 °F (36.8 °C)    No intake/output data recorded.  1901 -  0700  In: 1411.7 [P.O.:120; I.V.:1291.7]  Out: 900 [Urine:900]  General:  Alert, cooperative, no distress, cachetic, ill appearing. Head:  Normocephalic, without obvious abnormality, atraumatic. Eyes:  [place] conjuntiva. PERRL, EOMs intact. Throat: Lips, mucosa, and tongue moist.   Neck: Supple, symmetrical, trachea midline, no adenopathy, thyroid: no enlargement/tenderness/nodules, no carotid bruit and no JVD. Lungs:   Better air exchange,  Clear at this moment. Chest wall: Former chest tube site right looks ok   Heart:  Regular rate and rhythm, S1, S2 normal, no murmur, click, rub or gallop. Abdomen:   Soft, non-tender. Bowel sounds normal. No masses,  No organomegaly. Extremities: no cyanosis. No calf tenderness;2+ pitting edema bilat. 2+ pitting edema in arms, No cords palpated. Pulses: 2+ and symmetric all extremities. Skin: Skin color, texture, turgor normal. No rashes   Neurologic:  Alert and oriented X 3. Fine motor of hands and fingers normal.   equal.  No tremor. Gait not tested at this time. Sensation grossly normal to touch. Gross motor of extremities normal.       Data Review:   EXAM: CT CHEST ABD PELV W CONT 20  INDICATION: weight loss  COMPARISON: Radiographs 2020  CONTRAST: 100 mL of Isovue-370. FINDINGS:   CHEST WALL: No mass or axillary lymphadenopathy. THYROID: No nodule. MEDIASTINUM: No mass or lymphadenopathy. BRYAN: No mass or lymphadenopathy. THORACIC AORTA: No dissection or aneurysm. MAIN PULMONARY ARTERY: Normal in caliber. Thrombus is seen throughout the right  lower lobe pulmonary arteries. Thrombus is seen in the left lower lobe pulmonary  artery extending into the anterior and lateral basilar segments. TRACHEA/BRONCHI: Patent. ESOPHAGUS: No wall thickening or dilatation.   HEART: Normal in size. PLEURA: There is a large right pleural effusion with enhancement of the pleural  surfaces. Trace left pleural effusion. LUNGS: There is irregular opacification throughout the right lower lobe. There  are multiple areas of internal cavitation with air-fluid levels. Small areas of  atelectasis or scarring are seen in the lingula and inferior left lower lobe. LIVER: No mass. BILIARY TREE: Gallbladder is within normal limits. CBD is not dilated. SPLEEN: within normal limits. PANCREAS: No mass or ductal dilatation. ADRENALS: Unremarkable. KIDNEYS: Small nonobstructive stones are seen in the bilateral kidneys. No  evidence of hydronephrosis. No mass. STOMACH: Unremarkable. SMALL BOWEL: No dilatation or wall thickening. COLON: No dilatation or wall thickening. APPENDIX: Unremarkable  PERITONEUM: No ascites or pneumoperitoneum. RETROPERITONEUM: No lymphadenopathy or aortic aneurysm. REPRODUCTIVE ORGANS: Unremarkable  URINARY BLADDER: No mass or calculus. BONES: No destructive bone lesion. ABDOMINAL WALL: No mass or hernia. ADDITIONAL COMMENTS: There is thrombus in the left femoral veins extending into  the left iliac veins into the IVC. A small amount of thrombus is also present in  the right common iliac vein. IMPRESSION:  1. Diffuse irregular opacification throughout the right lower lobe with multiple  areas of internal cavitation and air-fluid levels. Findings are more likely  related to infection than malignancy. 2. Large right pleural effusion. Trace left pleural effusion. 3. Bilateral pulmonary emboli. Clot burden is moderate. No evidence of right  heart strain. 4. Thrombus in the veins of the left lower extremity extending to the IVC. A  small amount of thrombus is also seen in the right common iliac vein. EXAM:  IR THORACENTESIS/INSERT CHEST TUBE 8/26/20  INDICATION:  Request placement of large size pigtail catheter. Possible empyema.   PROCEDURE:  Available history and imaging was reviewed which demonstrates a large partially  loculated right pleural effusion. Possibility of empyema, tuberculosis and other  etiologies are being considered clinically. Specific request for larger pigtail  catheter placement. Following informed consent the patient was evaluated with ultrasound in the  angina/catheter lab department. This demonstrates a large right pleural  effusion. The location of the pleural effusion was marked and then the area was prepped  and draped. 1% lidocaine was used for local anesthesia. A small incision was made with an 11  blade. A micropuncture needle was used under direct fluoroscopic guidance to access the  pleural fluid. When this was achieved the microguidewire was placed followed by  a 5 [place] dilator. Through this a short [address] guidewire  was placed. The track was then dilated to 12 [place]. This was then followed by  placement of a 12 [place] Abscession pigtail catheter. The string for the pigtail  was removed prior to placement. The catheter was positioned under fluoroscopic  guidance and then secured in place at the skin with a 2-0 Prolene suture. The  site was then dressed with a Tegaderm Bioclusive dressing and will be connected  to a Pleur-evac for subsequent drainage. Prior to this approximately 100 mL of fluid was obtained and sent for cytology  as well as multiple cultures including aerobic, anaerobic, AFB and fungal.   The fluid was withdrawn was mildly cloudy serosanguineous in color. I did not  notice any foul smell. The procedure was adequately tolerated without significant blood loss or  evidence of complication. Final digital image of the chest was saved documenting catheter position. IMPRESSION:   Successful ultrasound and fluoroscopic guided placement of 12 [place] right-sided  chest tube. Patient will have ongoing drainage from Pleur-evac in his hospital  bed/room.  Care and monitoring as per medical service and nursing service. ECHO 8/27/20  Interpretation Summary   Result status: Final result    · Image quality for this study was technically difficult. · Echo study was technically difficulty and limited due to patient's tolerance. · LV: Estimated LVEF is 55 - 60%. Normal cavity size, wall thickness and systolic function (ejection fraction normal). Wall motion: normal. Mild (grade 1) left ventricular diastolic dysfunction. · Pericardium: Small pericardial effusion adjacent to right ventricle. Portable AP upright view of the chest. 9/1/20  Direct comparison made to prior chest x-ray dated September 1, 2020. Cardiomediastinal silhouette is stable. Right basilar chest tube is unchanged in  position. There No residual pneumothorax is visualized. There are small  bilateral pleural effusions. There are persistent increased interstitial  markings at the right lung. There is persistent bibasilar patchy airspace  disease, right greater than left. IMPRESSION: No residual pneumothorax visualized    CXR 9/4/20  FINDINGS: AP portable imaging of the chest performed at 5:14 AM demonstrates a  stable cardiomediastinal silhouette. The right basilar chest tube is been  removed. Diffuse airspace disease throughout the right lung is unchanged. Small  right pleural effusion persists. No pneumothorax is visualized. There is  unchanged left basilar atelectasis. No significant osseous abnormalities are  seen.   IMPRESSION: No evidence of pneumothorax following chest tube removal.    Recent Days:  Recent Labs     09/05/20  0129 09/04/20  1357 09/04/20  0549   WBC  --  17.9* 14.5*   HGB 6.9* 7.8* 7.0*   HCT 21.5* 24.2* 21.6*   PLT  --  327 308     Recent Labs     09/05/20  0129 09/04/20  0549 09/03/20  0351    141 141   K 3.4* 3.2* 3.2*   * 108 109*   CO2 27 28 25   GLU 80 59* 73   BUN 5* 3* 4*   CREA 0.51* 0.35* 0.47*   CA 7.1* 7.1* 7.0*   ALB 0.9* 0.9* 0.9*   TBILI 0.8 1.3* 1.0   ALT 90* 114* 94*     No results for input(s): PH, PCO2, PO2, HCO3, FIO2 in the last 72 hours. 24 Hour Results:  Recent Results (from the past 24 hour(s))   CBC W/O DIFF    Collection Time: 09/04/20  1:57 PM   Result Value Ref Range    WBC 17.9 (H) 4.1 - 11.1 K/uL    RBC 2.27 (L) 4.10 - 5.70 M/uL    HGB 7.8 (L) 12.1 - 17.0 g/dL    HCT 24.2 (L) 36.6 - 50.3 %    .6 (H) 80.0 - 99.0 FL    MCH 34.4 (H) 26.0 - 34.0 PG    MCHC 32.2 30.0 - 36.5 g/dL    RDW 20.9 (H) 11.5 - 14.5 %    PLATELET 282 897 - 993 K/uL    MPV 10.3 8.9 - 12.9 FL    NRBC 0.0 0  WBC    ABSOLUTE NRBC 0.00 0.00 - 0.01 K/uL   HGB & HCT    Collection Time: 09/05/20  1:29 AM   Result Value Ref Range    HGB 6.9 (L) 12.1 - 17.0 g/dL    HCT 21.5 (L) 36.6 - 81.3 %   METABOLIC PANEL, COMPREHENSIVE    Collection Time: 09/05/20  1:29 AM   Result Value Ref Range    Sodium 143 136 - 145 mmol/L    Potassium 3.4 (L) 3.5 - 5.1 mmol/L    Chloride 111 (H) 97 - 108 mmol/L    CO2 27 21 - 32 mmol/L    Anion gap 5 5 - 15 mmol/L    Glucose 80 65 - 100 mg/dL    BUN 5 (L) 6 - 20 MG/DL    Creatinine 0.51 (L) 0.70 - 1.30 MG/DL    BUN/Creatinine ratio 10 (L) 12 - 20      GFR est AA >60 >60 ml/min/1.73m2    GFR est non-AA >60 >60 ml/min/1.73m2    Calcium 7.1 (L) 8.5 - 10.1 MG/DL    Bilirubin, total 0.8 0.2 - 1.0 MG/DL    ALT (SGPT) 90 (H) 12 - 78 U/L    AST (SGOT) 101 (H) 15 - 37 U/L    Alk.  phosphatase 306 (H) 45 - 117 U/L    Protein, total 5.0 (L) 6.4 - 8.2 g/dL    Albumin 0.9 (L) 3.5 - 5.0 g/dL    Globulin 4.1 (H) 2.0 - 4.0 g/dL    A-G Ratio 0.2 (L) 1.1 - 2.2         Medications reviewed  Current Facility-Administered Medications   Medication Dose Route Frequency    Probiotic - USANA Probiotic  (Patient Supplied)  1 Packet Oral DAILY    mesalamine (DELZICOL) DR capsule 800 mg  800 mg Oral TID    white petrolatum-mineral oiL (EUCERIN) cream   Topical BID    lipase-protease-amylase (CREON 24,000) capsule 1 Cap  1 Cap Oral TID WITH MEALS    loperamide (IMODIUM) capsule 2 mg  2 mg Oral Q6H PRN    bismuth subsalicylate (PEPTO-BISMOL) oral suspension 30 mL  30 mL Oral Q6H PRN    cyanocobalamin (VITAMIN B12) tablet 1,000 mcg  1,000 mcg Oral DAILY    folic acid (FOLVITE) tablet 1 mg  1 mg Oral DAILY    HYDROcodone-acetaminophen (NORCO) 5-325 mg per tablet 1 Tab  1 Tab Oral Q6H PRN    morphine injection 1 mg  1 mg IntraVENous Q4H PRN    sodium chloride (NS) flush 5-10 mL  5-10 mL IntraVENous PRN    sodium chloride (NS) flush 5-40 mL  5-40 mL IntraVENous Q8H    sodium chloride (NS) flush 5-40 mL  5-40 mL IntraVENous PRN    acetaminophen (TYLENOL) tablet 650 mg  650 mg Oral Q6H PRN    Or    acetaminophen (TYLENOL) suppository 650 mg  650 mg Rectal Q6H PRN    polyethylene glycol (MIRALAX) packet 17 g  17 g Oral DAILY PRN    promethazine (PHENERGAN) tablet 12.5 mg  12.5 mg Oral Q6H PRN    Or    ondansetron (ZOFRAN) injection 4 mg  4 mg IntraVENous Q6H PRN    0.9% sodium chloride infusion  50 mL/hr IntraVENous CONTINUOUS     Care Plan discussed with: Patient/Nurse  Total time spent with patient: 30 minutes.   Julia Boudreaux MD

## 2020-09-05 NOTE — PROGRESS NOTES
9/5/2020  Case Management Progress Note    4:12 PM  Choice letter signed, sent referral to [facility] for RW. Took RW from closet to patient's room (I wore a mask) per weekend protocol. Delivery tickets sent into [employer]. Patient's friend is interested in being caretaker but it looks like he will actually need skilled services, not something she can provide. Will continue to follow and complete consult.     Hanh Winter BS

## 2020-09-05 NOTE — PROGRESS NOTES
Bedside and Verbal shift change report given to [address] [place] [address] (oncoming nurse) by Abebe Odonnell RN (offgoing nurse). Report included the following information SBAR, Kardex, Intake/Output, MAR, Accordion and Recent Results.

## 2020-09-06 ENCOUNTER — APPOINTMENT (OUTPATIENT)
Dept: GENERAL RADIOLOGY | Age: 65
DRG: 166 | End: 2020-09-06
Attending: INTERNAL MEDICINE
Payer: COMMERCIAL

## 2020-09-06 LAB
ABO + RH BLD: NORMAL
ALBUMIN SERPL-MCNC: 0.9 G/DL (ref 3.5–5)
ALBUMIN/GLOB SERPL: 0.2 {RATIO} (ref 1.1–2.2)
ALP SERPL-CCNC: 314 U/L (ref 45–117)
ALT SERPL-CCNC: 97 U/L (ref 12–78)
ANION GAP SERPL CALC-SCNC: 5 MMOL/L (ref 5–15)
AST SERPL-CCNC: 118 U/L (ref 15–37)
BASOPHILS # BLD: 0 K/UL (ref 0–0.1)
BASOPHILS NFR BLD: 0 % (ref 0–1)
BILIRUB SERPL-MCNC: 0.8 MG/DL (ref 0.2–1)
BLD PROD TYP BPU: NORMAL
BLOOD GROUP ANTIBODIES SERPL: NORMAL
BPU ID: NORMAL
BUN SERPL-MCNC: 4 MG/DL (ref 6–20)
BUN/CREAT SERPL: 10 (ref 12–20)
CALCIUM SERPL-MCNC: 7.3 MG/DL (ref 8.5–10.1)
CHLORIDE SERPL-SCNC: 108 MMOL/L (ref 97–108)
CO2 SERPL-SCNC: 27 MMOL/L (ref 21–32)
CREAT SERPL-MCNC: 0.4 MG/DL (ref 0.7–1.3)
CROSSMATCH RESULT,%XM: NORMAL
DIFFERENTIAL METHOD BLD: ABNORMAL
EOSINOPHIL # BLD: 0 K/UL (ref 0–0.4)
EOSINOPHIL NFR BLD: 0 % (ref 0–7)
ERYTHROCYTE [DISTWIDTH] IN BLOOD BY AUTOMATED COUNT: 23 % (ref 11.5–14.5)
GLOBULIN SER CALC-MCNC: 4.3 G/DL (ref 2–4)
GLUCOSE SERPL-MCNC: 80 MG/DL (ref 65–100)
HCT VFR BLD AUTO: 26 % (ref 36.6–50.3)
HGB BLD-MCNC: 8.3 G/DL (ref 12.1–17)
IMM GRANULOCYTES # BLD AUTO: 0.1 K/UL (ref 0–0.04)
IMM GRANULOCYTES NFR BLD AUTO: 1 % (ref 0–0.5)
LYMPHOCYTES # BLD: 2.5 K/UL (ref 0.8–3.5)
LYMPHOCYTES NFR BLD: 18 % (ref 12–49)
MCH RBC QN AUTO: 32.7 PG (ref 26–34)
MCHC RBC AUTO-ENTMCNC: 31.9 G/DL (ref 30–36.5)
MCV RBC AUTO: 102.4 FL (ref 80–99)
MONOCYTES # BLD: 0.7 K/UL (ref 0–1)
MONOCYTES NFR BLD: 5 % (ref 5–13)
NEUTS SEG # BLD: 10.7 K/UL (ref 1.8–8)
NEUTS SEG NFR BLD: 76 % (ref 32–75)
NRBC # BLD: 0 K/UL (ref 0–0.01)
NRBC BLD-RTO: 0 PER 100 WBC
PLATELET # BLD AUTO: 341 K/UL (ref 150–400)
PMV BLD AUTO: 9.8 FL (ref 8.9–12.9)
POTASSIUM SERPL-SCNC: 3.2 MMOL/L (ref 3.5–5.1)
PROT SERPL-MCNC: 5.2 G/DL (ref 6.4–8.2)
RBC # BLD AUTO: 2.54 M/UL (ref 4.1–5.7)
RBC MORPH BLD: ABNORMAL
SODIUM SERPL-SCNC: 140 MMOL/L (ref 136–145)
SPECIMEN EXP DATE BLD: NORMAL
STATUS OF UNIT,%ST: NORMAL
UNIT DIVISION, %UDIV: 0
WBC # BLD AUTO: 14 K/UL (ref 4.1–11.1)
WBC MORPH BLD: ABNORMAL

## 2020-09-06 PROCEDURE — 65660000000 HC RM CCU STEPDOWN

## 2020-09-06 PROCEDURE — 80053 COMPREHEN METABOLIC PANEL: CPT

## 2020-09-06 PROCEDURE — 71045 X-RAY EXAM CHEST 1 VIEW: CPT

## 2020-09-06 PROCEDURE — 85025 COMPLETE CBC W/AUTO DIFF WBC: CPT

## 2020-09-06 PROCEDURE — 74011250637 HC RX REV CODE- 250/637: Performed by: FAMILY MEDICINE

## 2020-09-06 PROCEDURE — 74011250637 HC RX REV CODE- 250/637: Performed by: INTERNAL MEDICINE

## 2020-09-06 PROCEDURE — 74011250637 HC RX REV CODE- 250/637: Performed by: SPECIALIST

## 2020-09-06 PROCEDURE — 36415 COLL VENOUS BLD VENIPUNCTURE: CPT

## 2020-09-06 RX ADMIN — PANCRELIPASE 1 CAPSULE: 24000; 76000; 120000 CAPSULE, DELAYED RELEASE PELLETS ORAL at 12:55

## 2020-09-06 RX ADMIN — FOLIC ACID 1 MG: 1 TABLET ORAL at 08:35

## 2020-09-06 RX ADMIN — MESALAMINE 800 MG: 400 CAPSULE, DELAYED RELEASE ORAL at 22:56

## 2020-09-06 RX ADMIN — POTASSIUM BICARBONATE 20 MEQ: 782 TABLET, EFFERVESCENT ORAL at 08:37

## 2020-09-06 RX ADMIN — POTASSIUM BICARBONATE 20 MEQ: 782 TABLET, EFFERVESCENT ORAL at 17:21

## 2020-09-06 RX ADMIN — Medication 10 ML: at 14:00

## 2020-09-06 RX ADMIN — CYANOCOBALAMIN TAB 500 MCG 1000 MCG: 500 TAB at 08:35

## 2020-09-06 RX ADMIN — MESALAMINE 800 MG: 400 CAPSULE, DELAYED RELEASE ORAL at 17:21

## 2020-09-06 RX ADMIN — Medication 10 ML: at 06:00

## 2020-09-06 RX ADMIN — Medication 10 ML: at 22:00

## 2020-09-06 RX ADMIN — PANCRELIPASE 1 CAPSULE: 24000; 76000; 120000 CAPSULE, DELAYED RELEASE PELLETS ORAL at 17:21

## 2020-09-06 RX ADMIN — PANCRELIPASE 1 CAPSULE: 24000; 76000; 120000 CAPSULE, DELAYED RELEASE PELLETS ORAL at 08:35

## 2020-09-06 RX ADMIN — MESALAMINE 800 MG: 400 CAPSULE, DELAYED RELEASE ORAL at 08:35

## 2020-09-06 RX ADMIN — Medication: at 09:00

## 2020-09-06 NOTE — PROGRESS NOTES
0815 CBC missing from morning labs. Eneida Slot a new set and will [employer] with results. 0832 HGB 8.3 informed MD. No new orders.

## 2020-09-06 NOTE — PROGRESS NOTES
Follow up visit with Mr. Felipe Rubio on 5 Med Surg. He had a woman friend with him, he indicated she was his one and only friend. Provided a listening ear as he shared some of his frustrations. He hopes to able to regain his strength. He did not specify any particular belief in God. He did shared that his spirit is fine. Provided words of peace for the continuing journey.   Visited by: Marcelina Wilson., MS., [id number] [facility] [address] ([id number])

## 2020-09-06 NOTE — PROGRESS NOTES
Bedside and Verbal shift change report given to Shama Cheatham (oncoming nurse) by Shailesh Pink (offgoing nurse). Report included the following information SBAR, Kardex, Intake/Output, MAR, Accordion and Recent Results.

## 2020-09-06 NOTE — PROGRESS NOTES
9/6/2020  Case Management Progress Note    2:42 PM  Checked in with patient and his friend about inpatient rehab. They are most interested in [facility] and have told me to take the walker back as they don't need it now. I let patient know that he would need to work with PT in order to get inpatient rehab authorized by insurance. He is upset because 'physical therapy' tried to just sit him up in a chair and then left him for 10 minutes (?). Will get choice letter signed and start referral to [facility]. ESTRELLA Werner    8:35 AM  Noted from MD that patient will need inpatient rehab. Patient is not entirely amenable, would like his friend to obtain a physical therapy license. I will discuss this with patient today. Will continue to follow.     ESTRELLA Werner

## 2020-09-06 NOTE — PROGRESS NOTES
Daily Progress Note: 9/6/2020  Liam Myers MD    Assessment/Plan:   Pneumonia involving right lung (8/25/2020) /  Hemoptysis (8/25/2020) POA: extensive with internal cavitation and air fluid levels. Staph Aureus, leukocytosis improving. CXR unchanged  --pleural fluid studies + staph, BC neg, cytology neg, AFB neg smears x 3 - cultures pending, and 2nd quantiferon gold negative. -- zosyn, doxy completed, Chest tube removed 9/3  -- Pulm and Thoracic Surg following     Bilateral pulmonary embolism, acute ([facility] [address] (8/25/2020) / Acute deep vein thrombosis (DVT) of both lower extremities ([facility] [address] (8/25/2020):   - IVC filter placed   - holding anticoags due to dropping Hb and active hemoptysis and GI bleeding     Weight loss, unintentional (8/25/2020) POA: still concerning for malignancy but severe protein calorie malnutrition and empyema likely causes. Nutrition and speech therapy following  -Diet as tolerated  -calcium corrects  -GI work up pending- hoping for endoscopy tomorrow     Hyperbilirubinemia (8/25/2020) POA: unclear etiology. CT abdomen unremarkable  - resolved     Pleural effusion, right (8/25/2020) POA: empyema, pneumonia vs malignant effusion. -pulmonology following  - Chest tube placed 8/26/20     Alcohol use (8/25/2020) POA: he says he has cut down recently. -Monitor for withdrawal     Chronic Diarrhea (8/25/2020) / Fecal occult blood test positive (8/25/2020) POA: check stool studies. Monitor Hgb.    -GI following- no longer refusing any GI studies  -imodium and peptobismol PRN  -FloraQ daily- or pt's home probiotics    Hypomag/hypokalemia:  -replete and monitor        Problem List:  Problem List as of 9/6/2020 Date Reviewed: 8/25/2020          Codes Class Noted - Resolved    * (Principal) Pneumonia involving right lung ICD-10-CM: J18.9  ICD-9-CM: 486  8/25/2020 - Present        Hemoptysis ICD-10-CM: R04.2  ICD-9-CM: 786.30  8/25/2020 - Present        Hyperbilirubinemia ICD-10-CM: E80.6  ICD-9-CM: 782.4  8/25/2020 - Present        Weight loss, unintentional ICD-10-CM: R63.4  ICD-9-CM: 783.21  8/25/2020 - Present        Pleural effusion, right ICD-10-CM: J90  ICD-9-CM: 511.9  8/25/2020 - Present        Bilateral pulmonary embolism ([address] ICD-10-CM: I26.99  ICD-9-CM: 415.19  8/25/2020 - Present        Acute deep vein thrombosis (DVT) of both lower extremities (HCC) ICD-10-CM: I82.403  ICD-9-CM: 453.40  8/25/2020 - Present        Alcohol use ICD-10-CM: Z72.89  ICD-9-CM: V49.89  8/25/2020 - Present        Diarrhea ICD-10-CM: R19.7  ICD-9-CM: 787.91  8/25/2020 - Present        Fecal occult blood test positive ICD-10-CM: R19.5  ICD-9-CM: 792.1  8/25/2020 - Present        Hyponatremia with extracellular fluid depletion ICD-10-CM: E87.1  ICD-9-CM: 276.1  8/25/2020 - Present            Subjective:     60 y.o. male who is being admitted for Pneumonia involving right lung. Mr. Bradly Jerome presented to our Emergency Department today complaining of generalized weakness, diarrhea and lethargy for several months. Patient was accompanied by his friend who gave most of the history. The patient does not like seeing physicians and apparently uses vitamin supplements. He is a smoker and drinks alcohol and has been progressively weaker over several months now. He has had pleuritic right sided chest pain associated with a productive cough and hemoptysis. He feel on the floor several days ago and was unable to get up. His friends helped him up and brought him to the ED for further testing. Initial CXR done showed airspace disease in right mid lower lung zone consistent with pneumonia. This is surrounded by a moderate right-sided pleural effusion. Upon review, he was noted to be cachectic with swollen lower extremities. CT scans chest abdomen and pelvis done showed a diffuse irregular opacification throughout the right lower lobe with multiple areas of internal cavitation and air-fluid levels.  Findings are more likely related to infection than malignancy. Large right pleural effusion. Trace left pleural effusion. Bilateral pulmonary emboli. Clot burden is moderate. No evidence of right heart strain. Thrombus in the veins of the left lower extremity extending to the IVC. A small amount of thrombus is also seen in the right common iliac vein. He will be admitted for further management. (Dr Marisabel Neri)    8/26:  Feeling a little better this AM but still c/o painful, pleuritic cough and occas thick sputum. No leg/calf pains. No known exposure to ill individuals/no travel - \"just staying in the last few months. \"  No temps. WBC 31.9K. Cultures neg so far.      8/27:  Reports less cough, chest pain and feeling better overall. Appetite has returned. Chest tube placed yesterday with discharge obtained - pending cultures. Other cultures remain neg. Hb lower so cont to hold anticoag for now. GI has seen and work up when more stable. Remains on doxy, zosyn, vanc. WBC down to 28K. Bili back to normal.     8/28:  Continues to grad improve. Still with some cough. No tremor or hallucinations so far. K+ lower - replacing. WBC coming down. Afeb. CXR this AM pending. Cultures remain neg so far. Quantiferon gold test pending as of this AM.  He reports hx of chronic LE edema for years. 8/29: Pt continues to have frequent stooling. GI plans endoscopy next week if pt allows. He was not willing to participate with PT/OT/nutritionist yesterday. Says despite our best efforts, we will not get his stooling to stop until he gets his home probiotics [friend will bring it]. He refuses to eat until the stooling is better. 8/30:  Pt is reluctant to admit that his stooling has improved. Down to 2 BMs yesterday instead of 6 the day before. Tolerating PO. Willing to eat more today. He is still having hemoptysis. Wanting to get out of bed. Willing to work with PT/OT. 8/31:  Feeling better. Appetite better. TB neg so far. First Quantiferon Gold was indeterminate - repeat pending. Afeb. WBC slowly coming down. Currently on Doxy and Zosyn. Replacing Mag and K+. Hb now stable in 8s. 9/1: Little change. Not moving about much, he reports due to chest tube being painful when he moves. Hb 7.8. WBC 16. No sign of ETOH withdrawal.     9/2:  Now reports he wants to move around more and get up and wants to work with PT. AFB smears neg x3, AFB cultures still pending of course. WBC slowly coming down. Afeb. Hb still slowly dropping - 7.7 today. May consider prophylactic dose of lovenox and watch Hb.  GI work up pending. Arms a little more swollen. 9/3: Feeling better. Hoping for endoscopy in am. Patient would like chest tube removed prior to prep and studies if possible. Speech would like MBS.     9/4:  Feeling better except for prep for colonoscopy - reports clear results last BM. Chest tube out yesterday. Hb down to 7.0 so still holding anticoag - may need transfusion. Upper and lower endoscopy planned today. CXR this AM pending. K+ down with the prep - replacing. 9/5:  Little change. Still with occas productive cough. Now also c/o increasing LE edema and today notes scrotal edema and now wants something done about it. 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 Hb down to 6.9 today - would benefit from transfusion. Risks v benefits of transfusion discussed and he finally agreed. Appetite very good now, taking plenty of fluids now, so will DC IVFs. Low dose of lasix after the transfusion to help with the edema. 9/6:  Less edema in LEs and scrotum today but still quite a bit - will give another low dose of Lasix. Received 1 unit packed red cells yesterday. K+ sl low - replacing. Still having episodic loose stools but on questioning he reports chronic loose stools for \"years\" which would go along with his colonoscopy findings. AM CBC is still pending.   Discussed need for rehab and strongly advised he consider going with his degree of debility. Risk of falls and fractures discussed.       Review of Systems:   A comprehensive review of systems was negative except for that written in the HPI. Objective:   Physical Exam:   Visit Vitals  /70 (BP 1 Location: Right arm, BP Patient Position: At rest)   Pulse 72   Temp 97.5 °F (36.4 °C)   Resp 18   Ht 5' 10\" (1.778 m)   Wt 75.1 kg (165 lb 9.1 oz)   SpO2 95%   BMI 23.76 kg/m²    O2 Flow Rate (L/min): 2 l/min O2 Device: Room air  Temp (24hrs), Av.8 °F (36.6 °C), Min:97.5 °F (36.4 °C), Max:98.4 °F (36.9 °C)    No intake/output data recorded.  1901 -  0700  In: -   Out: 1033 [[id number]]  General:  Alert, cooperative, no distress, cachetic, ill appearing. Head:  Normocephalic, without obvious abnormality, atraumatic. Eyes:  [address] conjuntiva. PERRL, EOMs intact. Throat: Lips, mucosa, and tongue moist.   Neck: Supple, symmetrical, trachea midline, no adenopathy, thyroid: no enlargement/tenderness/nodules, no carotid bruit and no JVD. Lungs:   Better air exchange,  Clear at this moment. Chest wall: Former chest tube site right looks ok   Heart:  Regular rate and rhythm, S1, S2 normal, no murmur, click, rub or gallop. Abdomen:   Soft, non-tender. Bowel sounds normal. No masses,  No organomegaly. Extremities: no cyanosis. No calf tenderness;2+ pitting edema bilat. 2+ pitting edema in arms, No cords palpated. Pulses: 2+ and symmetric all extremities. Skin: Skin color, texture, turgor normal. No rashes   Neurologic:  Alert and oriented X 3. Fine motor of hands and fingers normal.   equal.  No tremor. Gait not tested at this time. Sensation grossly normal to touch. Gross motor of extremities normal.       Data Review:   EXAM: CT CHEST ABD PELV W CONT 20  INDICATION: weight loss  COMPARISON: Radiographs 2020  CONTRAST: 100 mL of Isovue-370.   FINDINGS:   CHEST WALL: No mass or axillary lymphadenopathy. THYROID: No nodule. MEDIASTINUM: No mass or lymphadenopathy. BRYAN: No mass or lymphadenopathy. THORACIC AORTA: No dissection or aneurysm. MAIN PULMONARY ARTERY: Normal in caliber. Thrombus is seen throughout the right  lower lobe pulmonary arteries. Thrombus is seen in the left lower lobe pulmonary  artery extending into the anterior and lateral basilar segments. TRACHEA/BRONCHI: Patent. ESOPHAGUS: No wall thickening or dilatation. HEART: Normal in size. PLEURA: There is a large right pleural effusion with enhancement of the pleural  surfaces. Trace left pleural effusion. LUNGS: There is irregular opacification throughout the right lower lobe. There  are multiple areas of internal cavitation with air-fluid levels. Small areas of  atelectasis or scarring are seen in the lingula and inferior left lower lobe. LIVER: No mass. BILIARY TREE: Gallbladder is within normal limits. CBD is not dilated. SPLEEN: within normal limits. PANCREAS: No mass or ductal dilatation. ADRENALS: Unremarkable. KIDNEYS: Small nonobstructive stones are seen in the bilateral kidneys. No  evidence of hydronephrosis. No mass. STOMACH: Unremarkable. SMALL BOWEL: No dilatation or wall thickening. COLON: No dilatation or wall thickening. APPENDIX: Unremarkable  PERITONEUM: No ascites or pneumoperitoneum. RETROPERITONEUM: No lymphadenopathy or aortic aneurysm. REPRODUCTIVE ORGANS: Unremarkable  URINARY BLADDER: No mass or calculus. BONES: No destructive bone lesion. ABDOMINAL WALL: No mass or hernia. ADDITIONAL COMMENTS: There is thrombus in the left femoral veins extending into  the left iliac veins into the IVC. A small amount of thrombus is also present in  the right common iliac vein. IMPRESSION:  1. Diffuse irregular opacification throughout the right lower lobe with multiple  areas of internal cavitation and air-fluid levels. Findings are more likely  related to infection than malignancy.   2. Large right pleural effusion. Trace left pleural effusion. 3. Bilateral pulmonary emboli. Clot burden is moderate. No evidence of right  heart strain. 4. Thrombus in the veins of the left lower extremity extending to the IVC. A  small amount of thrombus is also seen in the right common iliac vein. EXAM:  IR THORACENTESIS/INSERT CHEST TUBE 8/26/20  INDICATION:  Request placement of large size pigtail catheter. Possible empyema. PROCEDURE:  Available history and imaging was reviewed which demonstrates a large partially  loculated right pleural effusion. Possibility of empyema, tuberculosis and other  etiologies are being considered clinically. Specific request for larger pigtail  catheter placement. Following informed consent the patient was evaluated with ultrasound in the  angina/catheter lab department. This demonstrates a large right pleural  effusion. The location of the pleural effusion was marked and then the area was prepped  and draped. 1% lidocaine was used for local anesthesia. A small incision was made with an 11  blade. A micropuncture needle was used under direct fluoroscopic guidance to access the  pleural fluid. When this was achieved the microguidewire was placed followed by  a 5 Graham Kike dilator. Through this a short [address] guidewire  was placed. The track was then dilated to 12 Graham Kike. This was then followed by  placement of a 12 Citizen of [place] Abscession pigtail catheter. The string for the pigtail  was removed prior to placement. The catheter was positioned under fluoroscopic  guidance and then secured in place at the skin with a 2-0 Prolene suture. The  site was then dressed with a Tegaderm Bioclusive dressing and will be connected  to a Pleur-evac for subsequent drainage.   Prior to this approximately 100 mL of fluid was obtained and sent for cytology  as well as multiple cultures including aerobic, anaerobic, AFB and fungal.   The fluid was withdrawn was mildly cloudy serosanguineous in color. I did not  notice any foul smell. The procedure was adequately tolerated without significant blood loss or  evidence of complication. Final digital image of the chest was saved documenting catheter position. IMPRESSION:   Successful ultrasound and fluoroscopic guided placement of 12 [place] right-sided  chest tube. Patient will have ongoing drainage from Pleur-evac in his hospital  bed/room. Care and monitoring as per medical service and nursing service. ECHO 8/27/20  Interpretation Summary   Result status: Final result    · Image quality for this study was technically difficult. · Echo study was technically difficulty and limited due to patient's tolerance. · LV: Estimated LVEF is 55 - 60%. Normal cavity size, wall thickness and systolic function (ejection fraction normal). Wall motion: normal. Mild (grade 1) left ventricular diastolic dysfunction. · Pericardium: Small pericardial effusion adjacent to right ventricle. Portable AP upright view of the chest. 9/1/20  Direct comparison made to prior chest x-ray dated September 1, 2020. Cardiomediastinal silhouette is stable. Right basilar chest tube is unchanged in  position. There No residual pneumothorax is visualized. There are small  bilateral pleural effusions. There are persistent increased interstitial  markings at the right lung. There is persistent bibasilar patchy airspace  disease, right greater than left. IMPRESSION: No residual pneumothorax visualized    CXR 9/4/20  FINDINGS: AP portable imaging of the chest performed at 5:14 AM demonstrates a  stable cardiomediastinal silhouette. The right basilar chest tube is been  removed. Diffuse airspace disease throughout the right lung is unchanged. Small  right pleural effusion persists. No pneumothorax is visualized. There is  unchanged left basilar atelectasis. No significant osseous abnormalities are  seen.   IMPRESSION: No evidence of pneumothorax following chest tube removal.    Recent Days:  Recent Labs     09/05/20  0129 09/04/20  1357 09/04/20  0549   WBC  --  17.9* 14.5*   HGB 6.9* 7.8* 7.0*   HCT 21.5* 24.2* 21.6*   PLT  --  327 308     Recent Labs     09/06/20  0406 09/05/20  0129 09/04/20  0549    143 141   K 3.2* 3.4* 3.2*    111* 108   CO2 27 27 28   GLU 80 80 59*   BUN 4* 5* 3*   CREA 0.40* 0.51* 0.35*   CA 7.3* 7.1* 7.1*   ALB 0.9* 0.9* 0.9*   TBILI 0.8 0.8 1.3*   ALT 97* 90* 114*     No results for input(s): PH, PCO2, PO2, HCO3, FIO2 in the last 72 hours. 24 Hour Results:  Recent Results (from the past 24 hour(s))   TYPE + CROSSMATCH    Collection Time: 09/05/20  8:43 AM   Result Value Ref Range    Crossmatch Expiration 09/08/2020     ABO/Rh(D) Nerissa Challenger POSITIVE     Antibody screen NEG     Unit number [id number]     Blood component type RC LR     Unit division 00     Status of unit TRANSFUSED     Crossmatch result Compatible    SAMPLES BEING HELD    Collection Time: 09/05/20  5:00 PM   Result Value Ref Range    SAMPLES BEING HELD 1LAV     COMMENT        Add-on orders for these samples will be processed based on acceptable specimen integrity and analyte stability, which may vary by analyte. METABOLIC PANEL, COMPREHENSIVE    Collection Time: 09/06/20  4:06 AM   Result Value Ref Range    Sodium 140 136 - 145 mmol/L    Potassium 3.2 (L) 3.5 - 5.1 mmol/L    Chloride 108 97 - 108 mmol/L    CO2 27 21 - 32 mmol/L    Anion gap 5 5 - 15 mmol/L    Glucose 80 65 - 100 mg/dL    BUN 4 (L) 6 - 20 MG/DL    Creatinine 0.40 (L) 0.70 - 1.30 MG/DL    BUN/Creatinine ratio 10 (L) 12 - 20      GFR est AA >60 >60 ml/min/1.73m2    GFR est non-AA >60 >60 ml/min/1.73m2    Calcium 7.3 (L) 8.5 - 10.1 MG/DL    Bilirubin, total 0.8 0.2 - 1.0 MG/DL    ALT (SGPT) 97 (H) 12 - 78 U/L    AST (SGOT) 118 (H) 15 - 37 U/L    Alk.  phosphatase 314 (H) 45 - 117 U/L    Protein, total 5.2 (L) 6.4 - 8.2 g/dL    Albumin 0.9 (L) 3.5 - 5.0 g/dL    Globulin 4.3 (H) 2.0 - 4.0 g/dL    A-G Ratio 0.2 (L) 1.1 - 2.2         Medications reviewed  Current Facility-Administered Medications   Medication Dose Route Frequency    0.9% sodium chloride infusion 250 mL  250 mL IntraVENous PRN    potassium bicarb-citric acid (EFFER-K) tablet 20 mEq  20 mEq Oral BID    Probiotic - USANA Probiotic  (Patient Supplied)  1 Packet Oral DAILY    mesalamine (DELZICOL) DR capsule 800 mg  800 mg Oral TID    white petrolatum-mineral oiL (EUCERIN) cream   Topical BID    lipase-protease-amylase (CREON 24,000) capsule 1 Cap  1 Cap Oral TID WITH MEALS    loperamide (IMODIUM) capsule 2 mg  2 mg Oral Q6H PRN    bismuth subsalicylate (PEPTO-BISMOL) oral suspension 30 mL  30 mL Oral Q6H PRN    cyanocobalamin (VITAMIN B12) tablet 1,000 mcg  1,000 mcg Oral DAILY    folic acid (FOLVITE) tablet 1 mg  1 mg Oral DAILY    HYDROcodone-acetaminophen (NORCO) 5-325 mg per tablet 1 Tab  1 Tab Oral Q6H PRN    morphine injection 1 mg  1 mg IntraVENous Q4H PRN    sodium chloride (NS) flush 5-10 mL  5-10 mL IntraVENous PRN    sodium chloride (NS) flush 5-40 mL  5-40 mL IntraVENous Q8H    sodium chloride (NS) flush 5-40 mL  5-40 mL IntraVENous PRN    acetaminophen (TYLENOL) tablet 650 mg  650 mg Oral Q6H PRN    Or    acetaminophen (TYLENOL) suppository 650 mg  650 mg Rectal Q6H PRN    polyethylene glycol (MIRALAX) packet 17 g  17 g Oral DAILY PRN    promethazine (PHENERGAN) tablet 12.5 mg  12.5 mg Oral Q6H PRN    Or    ondansetron (ZOFRAN) injection 4 mg  4 mg IntraVENous Q6H PRN     Care Plan discussed with: Patient/Nurse  Total time spent with patient: 30 minutes.   Shea Shipley MD

## 2020-09-07 ENCOUNTER — APPOINTMENT (OUTPATIENT)
Dept: GENERAL RADIOLOGY | Age: 65
DRG: 166 | End: 2020-09-07
Attending: INTERNAL MEDICINE
Payer: COMMERCIAL

## 2020-09-07 LAB
ALBUMIN SERPL-MCNC: 1 G/DL (ref 3.5–5)
ALBUMIN/GLOB SERPL: 0.2 {RATIO} (ref 1.1–2.2)
ALP SERPL-CCNC: 275 U/L (ref 45–117)
ALT SERPL-CCNC: 85 U/L (ref 12–78)
ANION GAP SERPL CALC-SCNC: 3 MMOL/L (ref 5–15)
AST SERPL-CCNC: 98 U/L (ref 15–37)
BASOPHILS # BLD: 0 K/UL (ref 0–0.1)
BASOPHILS NFR BLD: 0 % (ref 0–1)
BILIRUB SERPL-MCNC: 0.9 MG/DL (ref 0.2–1)
BUN SERPL-MCNC: 4 MG/DL (ref 6–20)
BUN/CREAT SERPL: 12 (ref 12–20)
CALCIUM SERPL-MCNC: 7.6 MG/DL (ref 8.5–10.1)
CHLORIDE SERPL-SCNC: 105 MMOL/L (ref 97–108)
CO2 SERPL-SCNC: 29 MMOL/L (ref 21–32)
CREAT SERPL-MCNC: 0.33 MG/DL (ref 0.7–1.3)
DIFFERENTIAL METHOD BLD: ABNORMAL
EOSINOPHIL # BLD: 0 K/UL (ref 0–0.4)
EOSINOPHIL NFR BLD: 0 % (ref 0–7)
ERYTHROCYTE [DISTWIDTH] IN BLOOD BY AUTOMATED COUNT: 21.9 % (ref 11.5–14.5)
GLOBULIN SER CALC-MCNC: 4.4 G/DL (ref 2–4)
GLUCOSE SERPL-MCNC: 73 MG/DL (ref 65–100)
HCT VFR BLD AUTO: 24.1 % (ref 36.6–50.3)
HGB BLD-MCNC: 7.8 G/DL (ref 12.1–17)
IMM GRANULOCYTES # BLD AUTO: 0.2 K/UL (ref 0–0.04)
IMM GRANULOCYTES NFR BLD AUTO: 1 % (ref 0–0.5)
LYMPHOCYTES # BLD: 2.7 K/UL (ref 0.8–3.5)
LYMPHOCYTES NFR BLD: 18 % (ref 12–49)
MAGNESIUM SERPL-MCNC: 1.6 MG/DL (ref 1.6–2.4)
MCH RBC QN AUTO: 33.1 PG (ref 26–34)
MCHC RBC AUTO-ENTMCNC: 32.4 G/DL (ref 30–36.5)
MCV RBC AUTO: 102.1 FL (ref 80–99)
MONOCYTES # BLD: 0.8 K/UL (ref 0–1)
MONOCYTES NFR BLD: 5 % (ref 5–13)
NEUTS SEG # BLD: 11.4 K/UL (ref 1.8–8)
NEUTS SEG NFR BLD: 76 % (ref 32–75)
NRBC # BLD: 0 K/UL (ref 0–0.01)
NRBC BLD-RTO: 0 PER 100 WBC
PLATELET # BLD AUTO: 367 K/UL (ref 150–400)
PMV BLD AUTO: 9.9 FL (ref 8.9–12.9)
POTASSIUM SERPL-SCNC: 3.1 MMOL/L (ref 3.5–5.1)
PROT SERPL-MCNC: 5.4 G/DL (ref 6.4–8.2)
RBC # BLD AUTO: 2.36 M/UL (ref 4.1–5.7)
RBC MORPH BLD: ABNORMAL
SODIUM SERPL-SCNC: 137 MMOL/L (ref 136–145)
WBC # BLD AUTO: 15.1 K/UL (ref 4.1–11.1)

## 2020-09-07 PROCEDURE — 71045 X-RAY EXAM CHEST 1 VIEW: CPT

## 2020-09-07 PROCEDURE — 80053 COMPREHEN METABOLIC PANEL: CPT

## 2020-09-07 PROCEDURE — 83735 ASSAY OF MAGNESIUM: CPT

## 2020-09-07 PROCEDURE — 85025 COMPLETE CBC W/AUTO DIFF WBC: CPT

## 2020-09-07 PROCEDURE — 74011250637 HC RX REV CODE- 250/637: Performed by: FAMILY MEDICINE

## 2020-09-07 PROCEDURE — 65660000000 HC RM CCU STEPDOWN

## 2020-09-07 PROCEDURE — 97530 THERAPEUTIC ACTIVITIES: CPT

## 2020-09-07 PROCEDURE — 36415 COLL VENOUS BLD VENIPUNCTURE: CPT

## 2020-09-07 PROCEDURE — 74011250637 HC RX REV CODE- 250/637: Performed by: SPECIALIST

## 2020-09-07 PROCEDURE — 97116 GAIT TRAINING THERAPY: CPT

## 2020-09-07 PROCEDURE — 74011250637 HC RX REV CODE- 250/637: Performed by: INTERNAL MEDICINE

## 2020-09-07 PROCEDURE — 74011250636 HC RX REV CODE- 250/636: Performed by: FAMILY MEDICINE

## 2020-09-07 RX ORDER — FUROSEMIDE 10 MG/ML
10 INJECTION INTRAMUSCULAR; INTRAVENOUS ONCE
Status: COMPLETED | OUTPATIENT
Start: 2020-09-07 | End: 2020-09-07

## 2020-09-07 RX ADMIN — FOLIC ACID 1 MG: 1 TABLET ORAL at 09:10

## 2020-09-07 RX ADMIN — FUROSEMIDE 10 MG: 10 INJECTION, SOLUTION INTRAMUSCULAR; INTRAVENOUS at 09:10

## 2020-09-07 RX ADMIN — PANCRELIPASE 1 CAPSULE: 24000; 76000; 120000 CAPSULE, DELAYED RELEASE PELLETS ORAL at 16:07

## 2020-09-07 RX ADMIN — Medication: at 18:23

## 2020-09-07 RX ADMIN — Medication: at 09:10

## 2020-09-07 RX ADMIN — POTASSIUM BICARBONATE 40 MEQ: 782 TABLET, EFFERVESCENT ORAL at 09:10

## 2020-09-07 RX ADMIN — MESALAMINE 800 MG: 400 CAPSULE, DELAYED RELEASE ORAL at 22:38

## 2020-09-07 RX ADMIN — MESALAMINE 800 MG: 400 CAPSULE, DELAYED RELEASE ORAL at 09:12

## 2020-09-07 RX ADMIN — Medication 10 ML: at 14:00

## 2020-09-07 RX ADMIN — POTASSIUM BICARBONATE 40 MEQ: 782 TABLET, EFFERVESCENT ORAL at 18:23

## 2020-09-07 RX ADMIN — PANCRELIPASE 1 CAPSULE: 24000; 76000; 120000 CAPSULE, DELAYED RELEASE PELLETS ORAL at 08:58

## 2020-09-07 RX ADMIN — CYANOCOBALAMIN TAB 500 MCG 1000 MCG: 500 TAB at 09:10

## 2020-09-07 RX ADMIN — PANCRELIPASE 1 CAPSULE: 24000; 76000; 120000 CAPSULE, DELAYED RELEASE PELLETS ORAL at 11:49

## 2020-09-07 RX ADMIN — MESALAMINE 800 MG: 400 CAPSULE, DELAYED RELEASE ORAL at 16:07

## 2020-09-07 RX ADMIN — Medication 10 ML: at 06:00

## 2020-09-07 NOTE — PROGRESS NOTES
Bedside and Verbal shift change report given to HARJIT Meyer (oncoming nurse) by Monica White RN (offgoing nurse). Report included the following information SBAR, Intake/Output, MAR, Accordion, Recent Results and Med Rec Status.

## 2020-09-07 NOTE — PROGRESS NOTES
Problem: Mobility Impaired (Adult and Pediatric)  Goal: *Acute Goals and Plan of Care (Insert Text)  Description: FUNCTIONAL STATUS PRIOR TO ADMISSION: Pt reports independent baseline mobility without device    HOME SUPPORT PRIOR TO ADMISSION: The patient lived alone with friends to provide assistance intermittently. Physical Therapy Goals  Initiated 8/27/2020; continue same goals as of 9/2/2020   1. Patient will move from supine to sit and sit to supine in bed with supervision/set-up within 7 day(s). 2.  Patient will transfer from bed to chair and chair to bed with supervision/set-up using the least restrictive device within 7 day(s). 3.  Patient will perform sit to stand with supervision/set-up within 7 day(s). 4.  Patient will ambulate with supervision/set-up for 100 feet with the least restrictive device within 7 day(s). 5.  Patient will ascend/descend 4 stairs with one handrail(s) with minimal assistance/contact guard assist within 7 day(s). Outcome: Progressing Towards Goal   PHYSICAL THERAPY TREATMENT  Patient: Shaun Barrios (82 y.o. male)  Date: 9/7/2020  Diagnosis: Pneumonia [J18.9]   Pneumonia involving right lung  Procedure(s) (LRB):  ESOPHAGOGASTRODUODENOSCOPY (EGD) (N/A)  COLONOSCOPY (N/A)  ESOPHAGOGASTRODUODENAL (EGD) BIOPSY (N/A)  COLON BIOPSY (N/A) 3 Days Post-Op  Precautions: Fall  Chart, physical therapy assessment, plan of care and goals were reviewed. ASSESSMENT  Patient continues with skilled PT services and is progressing towards goals. His friend Nj Hedy is present and encouraging patient. She will try to be present between 11 am and 2 pm, which is helpful to encourage the patient. He performs bed mobility, dangles edge of bed, performed gait training in the room and in the bathroom, and returns to sitting on side of bed having declined transfer out of bed to chair due to scrotal swelling.  Patient requires rest break after each transfer - coughs with productive maroon sputum x 3. Current Level of Function Impacting Discharge (mobility/balance): min assist with transfers    Other factors to consider for discharge: limited endurance         PLAN :  Patient continues to benefit from skilled intervention to address the above impairments. Continue treatment per established plan of care. to address goals. Recommendation for discharge: (in order for the patient to meet his/her long term goals)  Therapy 3 hours per day 5-7 days per week    This discharge recommendation:  Has not yet been discussed the attending provider and/or case management    IF patient discharges home will need the following DME: rolling walker was ordered       SUBJECTIVE:   Patient stated I've been wanting to get up.   \"I want a shower. \"    OBJECTIVE DATA SUMMARY:   Critical Behavior:  Neurologic State: Alert, Eyes open to stimulus, Eyes open to pain, Eyes open spontaneously, Eyes open to voice  Orientation Level: Oriented X4  Cognition: Follows commands, Appropriate decision making  Safety/Judgement: Decreased insight into deficits  Functional Mobility Training:  Bed Mobility:     Supine to Sit: Stand-by assistance              Transfers:  Sit to Stand: Minimum assistance;Assist x1;Other (comment)(pt requests elevated bed height)  Stand to Sit: Contact guard assistance;Assist x1                             Balance:     Ambulation/Gait Training:  Distance (ft): 18 Feet (ft)  Assistive Device: Walker, rolling;Gait belt  Ambulation - Level of Assistance: Contact guard assistance;Assist x1                                           Pain Rating:  None reported by patient    Activity Tolerance:   Good and requires rest breaks  Please refer to the flowsheet for vital signs taken during this treatment.     After treatment patient left in no apparent distress:   Call bell within reach and Caregiver / family present    COMMUNICATION/COLLABORATION:   The patients plan of care was discussed with: Occupational therapy assistant and Certified nursing assistant/patient care technician.      Mayelin Chan, PT   Time Calculation: 25 mins

## 2020-09-07 NOTE — PROGRESS NOTES
Spoke with PT, pt requesting to take shower and to give pt 15 min d/t finishing lunch. OT arrived within 20min and pt already supine in bed and adamantly refusing to OOB. Family friend present in the room and reported that pt \"is no nonsense\" when it comes to time. Family friend (who was a care aide in ICU) asking if pt is ok to use [facility] with her assistance. Spoke with PT who reports it's ok since OT had never seen pt and refusing to practice transfer. Placed shower chair in bathroom  when pt is ready/willing to participate with OT. Pt is wanting to goto [facility], however, he will need to participate in therapy.

## 2020-09-07 NOTE — PROGRESS NOTES
Daily Progress Note: 9/7/2020  Vanessa Garner MD    Assessment/Plan:   Pneumonia involving right lung (8/25/2020) /  Hemoptysis (8/25/2020) POA: extensive with internal cavitation and air fluid levels. Staph Aureus, leukocytosis improving. CXR unchanged  --pleural fluid studies + staph, BC neg, cytology neg, AFB neg smears x 3 - cultures pending, and 2nd quantiferon gold negative. -- zosyn, doxy completed, Chest tube removed 9/3  -- Pulm following     Bilateral pulmonary embolism, acute ([address] (8/25/2020) / Acute deep vein thrombosis (DVT) of both lower extremities ([address] (8/25/2020):   - IVC filter placed   - holding anticoags due to dropping Hb and active hemoptysis and GI bleeding     Weight loss, unintentional (8/25/2020) POA: still concerning for malignancy but severe protein calorie malnutrition and empyema likely causes. Nutrition and speech therapy following  -Diet as tolerated  -calcium corrects  -GI work up pending- hoping for endoscopy tomorrow     Hyperbilirubinemia (8/25/2020) POA: unclear etiology. CT abdomen unremarkable  - resolved     Pleural effusion, right (8/25/2020) POA: empyema, pneumonia vs malignant effusion. -pulmonology following  - Chest tube placed 8/26/20     Alcohol use (8/25/2020) POA: he says he has cut down recently. -Monitor for withdrawal     Chronic Diarrhea (8/25/2020) / Fecal occult blood test positive (8/25/2020) POA: check stool studies. Monitor Hgb.    -GI following- no longer refusing any GI studies  -imodium and peptobismol PRN  -FloraQ daily- or pt's home probiotics    Hypomag/hypokalemia:  -replete and monitor        Problem List:  Problem List as of 9/7/2020 Date Reviewed: 8/25/2020          Codes Class Noted - Resolved    * (Principal) Pneumonia involving right lung ICD-10-CM: J18.9  ICD-9-CM: 486  8/25/2020 - Present        Hemoptysis ICD-10-CM: R04.2  ICD-9-CM: 786.30  8/25/2020 - Present        Hyperbilirubinemia ICD-10-CM: E80.6  ICD-9-CM: 782.4  8/25/2020 - Present        Weight loss, unintentional ICD-10-CM: R63.4  ICD-9-CM: 783.21  8/25/2020 - Present        Pleural effusion, right ICD-10-CM: J90  ICD-9-CM: 511.9  8/25/2020 - Present        Bilateral pulmonary embolism ([address] ICD-10-CM: I26.99  ICD-9-CM: 415.19  8/25/2020 - Present        Acute deep vein thrombosis (DVT) of both lower extremities ([facility]) ICD-10-CM: I82.403  ICD-9-CM: 453.40  8/25/2020 - Present        Alcohol use ICD-10-CM: Z72.89  ICD-9-CM: V49.89  8/25/2020 - Present        Diarrhea ICD-10-CM: R19.7  ICD-9-CM: 787.91  8/25/2020 - Present        Fecal occult blood test positive ICD-10-CM: R19.5  ICD-9-CM: 792.1  8/25/2020 - Present        Hyponatremia with extracellular fluid depletion ICD-10-CM: E87.1  ICD-9-CM: 276.1  8/25/2020 - Present            Subjective:     60 y.o. male who is being admitted for Pneumonia involving right lung. Mr. Albert Kiser presented to our Emergency Department today complaining of generalized weakness, diarrhea and lethargy for several months. Patient was accompanied by his friend who gave most of the history. The patient does not like seeing physicians and apparently uses vitamin supplements. He is a smoker and drinks alcohol and has been progressively weaker over several months now. He has had pleuritic right sided chest pain associated with a productive cough and hemoptysis. He feel on the floor several days ago and was unable to get up. His friends helped him up and brought him to the ED for further testing. Initial CXR done showed airspace disease in right mid lower lung zone consistent with pneumonia. This is surrounded by a moderate right-sided pleural effusion. Upon review, he was noted to be cachectic with swollen lower extremities. CT scans chest abdomen and pelvis done showed a diffuse irregular opacification throughout the right lower lobe with multiple areas of internal cavitation and air-fluid levels.  Findings are more likely related to infection than malignancy. Large right pleural effusion. Trace left pleural effusion. Bilateral pulmonary emboli. Clot burden is moderate. No evidence of right heart strain. Thrombus in the veins of the left lower extremity extending to the IVC. A small amount of thrombus is also seen in the right common iliac vein. He will be admitted for further management. (Dr Bhavna Peralta)    8/26:  Feeling a little better this AM but still c/o painful, pleuritic cough and occas thick sputum. No leg/calf pains. No known exposure to ill individuals/no travel - \"just staying in the last few months. \"  No temps. WBC 31.9K. Cultures neg so far.      8/27:  Reports less cough, chest pain and feeling better overall. Appetite has returned. Chest tube placed yesterday with discharge obtained - pending cultures. Other cultures remain neg. Hb lower so cont to hold anticoag for now. GI has seen and work up when more stable. Remains on doxy, zosyn, vanc. WBC down to 28K. Bili back to normal.     8/28:  Continues to grad improve. Still with some cough. No tremor or hallucinations so far. K+ lower - replacing. WBC coming down. Afeb. CXR this AM pending. Cultures remain neg so far. Quantiferon gold test pending as of this AM.  He reports hx of chronic LE edema for years. 8/29: Pt continues to have frequent stooling. GI plans endoscopy next week if pt allows. He was not willing to participate with PT/OT/nutritionist yesterday. Says despite our best efforts, we will not get his stooling to stop until he gets his home probiotics [friend will bring it]. He refuses to eat until the stooling is better. 8/30:  Pt is reluctant to admit that his stooling has improved. Down to 2 BMs yesterday instead of 6 the day before. Tolerating PO. Willing to eat more today. He is still having hemoptysis. Wanting to get out of bed. Willing to work with PT/OT. 8/31:  Feeling better. Appetite better. TB neg so far.   First Jacobo Salter was indeterminate - repeat pending. Afeb. WBC slowly coming down. Currently on Doxy and Zosyn. Replacing Mag and K+. Hb now stable in 8s. 9/1: Little change. Not moving about much, he reports due to chest tube being painful when he moves. Hb 7.8. WBC 16. No sign of ETOH withdrawal.     9/2:  Now reports he wants to move around more and get up and wants to work with PT. AFB smears neg x3, AFB cultures still pending of course. WBC slowly coming down. Afeb. Hb still slowly dropping - 7.7 today. May consider prophylactic dose of lovenox and watch Hb.  GI work up pending. Arms a little more swollen. 9/3: Feeling better. Hoping for endoscopy in am. Patient would like chest tube removed prior to prep and studies if possible. Speech would like MBS.     9/4:  Feeling better except for prep for colonoscopy - reports clear results last BM. Chest tube out yesterday. Hb down to 7.0 so still holding anticoag - may need transfusion. Upper and lower endoscopy planned today. CXR this AM pending. K+ down with the prep - replacing. 9/5:  Little change. Still with occas productive cough. Now also c/o increasing LE edema and today notes scrotal edema and now wants something done about it. 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 Hb down to 6.9 today - would benefit from transfusion. Risks v benefits of transfusion discussed and he finally agreed. Appetite very good now, taking plenty of fluids now, so will DC IVFs. Low dose of lasix after the transfusion to help with the edema. 9/6:  Less edema in LEs and scrotum today but still quite a bit - will give another low dose of Lasix. Received 1 unit packed red cells yesterday. K+ sl low - replacing. Still having episodic loose stools but on questioning he reports chronic loose stools for \"years\" which would go along with his colonoscopy findings. AM CBC is still pending.   Discussed need for rehab and strongly advised he consider going with his degree of debility. Risk of falls and fractures discussed. :  Still c/o scrotal and LE edema but sl better - one dose of 10mg lasix today. Discussed his low albumin as part of the problem. Less cough today but slight amount of hemoptysis this AM. Hb 7.8 this AM - was in the 8s after the transfusion. K+ still low - replacing. Declining to have dressings changed - discussed risks. Discussed his severe weakness and need for rehab again - still not very interested in going.       Review of Systems:   A comprehensive review of systems was negative except for that written in the HPI. Objective:   Physical Exam:   Visit Vitals  /61 (BP 1 Location: Right arm, BP Patient Position: At rest;Supine)   Pulse 83   Temp 98 °F (36.7 °C)   Resp 17   Ht 5' 10\" (1.778 m)   Wt 75.1 kg (165 lb 9.1 oz)   SpO2 97%   BMI 23.76 kg/m²    O2 Flow Rate (L/min): 2 l/min O2 Device: Room air  Temp (24hrs), Av.7 °F (36.5 °C), Min:97.4 °F (36.3 °C), Max:98 °F (36.7 °C)    No intake/output data recorded.  1901 -  0700  In: -   Out: 2790 [Urine:2225]  General:  Alert, cooperative, no distress, cachetic, ill appearing. Head:  Normocephalic, without obvious abnormality, atraumatic. Eyes:  Rocky conjuntiva. PERRL, EOMs intact. Throat: Lips, mucosa, and tongue moist.   Neck: Supple, symmetrical, trachea midline, no adenopathy, thyroid: no enlargement/tenderness/nodules, no carotid bruit and no JVD. Lungs:   Better air exchange,  Clear at this moment. Chest wall: Former chest tube site right looks ok   Heart:  Regular rate and rhythm, S1, S2 normal, no murmur, click, rub or gallop. Abdomen:   Soft, non-tender. Bowel sounds normal. No masses,  No organomegaly. Extremities: no cyanosis. No calf tenderness;2+ pitting edema bilat. 2+ pitting edema in arms, No cords palpated. Pulses: 2+ and symmetric all extremities.    Skin: Skin color, texture, turgor normal. No rashes   Neurologic: Alert and oriented X 3. Fine motor of hands and fingers normal.   equal.  No tremor. Gait not tested at this time. Sensation grossly normal to touch. Gross motor of extremities normal.       Data Review:   EXAM: CT CHEST ABD PELV W CONT 8/25/20  INDICATION: weight loss  COMPARISON: Radiographs 8/25/2020  CONTRAST: 100 mL of Isovue-370. FINDINGS:   CHEST WALL: No mass or axillary lymphadenopathy. THYROID: No nodule. MEDIASTINUM: No mass or lymphadenopathy. BRYAN: No mass or lymphadenopathy. THORACIC AORTA: No dissection or aneurysm. MAIN PULMONARY ARTERY: Normal in caliber. Thrombus is seen throughout the right  lower lobe pulmonary arteries. Thrombus is seen in the left lower lobe pulmonary  artery extending into the anterior and lateral basilar segments. TRACHEA/BRONCHI: Patent. ESOPHAGUS: No wall thickening or dilatation. HEART: Normal in size. PLEURA: There is a large right pleural effusion with enhancement of the pleural  surfaces. Trace left pleural effusion. LUNGS: There is irregular opacification throughout the right lower lobe. There  are multiple areas of internal cavitation with air-fluid levels. Small areas of  atelectasis or scarring are seen in the lingula and inferior left lower lobe. LIVER: No mass. BILIARY TREE: Gallbladder is within normal limits. CBD is not dilated. SPLEEN: within normal limits. PANCREAS: No mass or ductal dilatation. ADRENALS: Unremarkable. KIDNEYS: Small nonobstructive stones are seen in the bilateral kidneys. No  evidence of hydronephrosis. No mass. STOMACH: Unremarkable. SMALL BOWEL: No dilatation or wall thickening. COLON: No dilatation or wall thickening. APPENDIX: Unremarkable  PERITONEUM: No ascites or pneumoperitoneum. RETROPERITONEUM: No lymphadenopathy or aortic aneurysm. REPRODUCTIVE ORGANS: Unremarkable  URINARY BLADDER: No mass or calculus. BONES: No destructive bone lesion. ABDOMINAL WALL: No mass or hernia.   ADDITIONAL COMMENTS: There is thrombus in the left femoral veins extending into  the left iliac veins into the IVC. A small amount of thrombus is also present in  the right common iliac vein. IMPRESSION:  1. Diffuse irregular opacification throughout the right lower lobe with multiple  areas of internal cavitation and air-fluid levels. Findings are more likely  related to infection than malignancy. 2. Large right pleural effusion. Trace left pleural effusion. 3. Bilateral pulmonary emboli. Clot burden is moderate. No evidence of right  heart strain. 4. Thrombus in the veins of the left lower extremity extending to the IVC. A  small amount of thrombus is also seen in the right common iliac vein. EXAM:  IR THORACENTESIS/INSERT CHEST TUBE 8/26/20  INDICATION:  Request placement of large size pigtail catheter. Possible empyema. PROCEDURE:  Available history and imaging was reviewed which demonstrates a large partially  loculated right pleural effusion. Possibility of empyema, tuberculosis and other  etiologies are being considered clinically. Specific request for larger pigtail  catheter placement. Following informed consent the patient was evaluated with ultrasound in the  angina/catheter lab department. This demonstrates a large right pleural  effusion. The location of the pleural effusion was marked and then the area was prepped  and draped. 1% lidocaine was used for local anesthesia. A small incision was made with an 11  blade. A micropuncture needle was used under direct fluoroscopic guidance to access the  pleural fluid. When this was achieved the microguidewire was placed followed by  a 5 [place] dilator. Through this a short [address] guidewire  was placed. The track was then dilated to 12 [place]. This was then followed by  placement of a 12 [place] pigtail catheter. The string for the pigtail  was removed prior to placement.  The catheter was positioned under fluoroscopic  guidance and then secured in place at the skin with a 2-0 Prolene suture. The  site was then dressed with a Tegaderm Bioclusive dressing and will be connected  to a Pleur-evac for subsequent drainage. Prior to this approximately 100 mL of fluid was obtained and sent for cytology  as well as multiple cultures including aerobic, anaerobic, AFB and fungal.   The fluid was withdrawn was mildly cloudy serosanguineous in color. I did not  notice any foul smell. The procedure was adequately tolerated without significant blood loss or  evidence of complication. Final digital image of the chest was saved documenting catheter position. IMPRESSION:   Successful ultrasound and fluoroscopic guided placement of 12 [place] right-sided  chest tube. Patient will have ongoing drainage from Pleur-evac in his hospital  bed/room. Care and monitoring as per medical service and nursing service. ECHO 8/27/20  Interpretation Summary   Result status: Final result    · Image quality for this study was technically difficult. · Echo study was technically difficulty and limited due to patient's tolerance. · LV: Estimated LVEF is 55 - 60%. Normal cavity size, wall thickness and systolic function (ejection fraction normal). Wall motion: normal. Mild (grade 1) left ventricular diastolic dysfunction. · Pericardium: Small pericardial effusion adjacent to right ventricle. Portable AP upright view of the chest. 9/1/20  Direct comparison made to prior chest x-ray dated September 1, 2020. Cardiomediastinal silhouette is stable. Right basilar chest tube is unchanged in  position. There No residual pneumothorax is visualized. There are small  bilateral pleural effusions. There are persistent increased interstitial  markings at the right lung. There is persistent bibasilar patchy airspace  disease, right greater than left.   IMPRESSION: No residual pneumothorax visualized    CXR 9/4/20  FINDINGS: AP portable imaging of the chest performed at 5:14 AM demonstrates a  stable cardiomediastinal silhouette. The right basilar chest tube is been  removed. Diffuse airspace disease throughout the right lung is unchanged. Small  right pleural effusion persists. No pneumothorax is visualized. There is  unchanged left basilar atelectasis. No significant osseous abnormalities are  seen. IMPRESSION: No evidence of pneumothorax following chest tube removal.    Recent Days:  Recent Labs     09/07/20  0459 09/06/20  0832 09/05/20  0129 09/04/20  1357   WBC 15.1* 14.0*  --  17.9*   HGB 7.8* 8.3* 6.9* 7.8*   HCT 24.1* 26.0* 21.5* 24.2*    341  --  327     Recent Labs     09/07/20  0459 09/06/20  0406 09/05/20  0129    140 143   K 3.1* 3.2* 3.4*    108 111*   CO2 29 27 27   GLU 73 80 80   BUN 4* 4* 5*   CREA 0.33* 0.40* 0.51*   CA 7.6* 7.3* 7.1*   MG 1.6  --   --    ALB 1.0* 0.9* 0.9*   TBILI 0.9 0.8 0.8   ALT 85* 97* 90*     No results for input(s): PH, PCO2, PO2, HCO3, FIO2 in the last 72 hours. 24 Hour Results:  Recent Results (from the past 24 hour(s))   CBC WITH AUTOMATED DIFF    Collection Time: 09/06/20  8:32 AM   Result Value Ref Range    WBC 14.0 (H) 4.1 - 11.1 K/uL    RBC 2.54 (L) 4.10 - 5.70 M/uL    HGB 8.3 (L) 12.1 - 17.0 g/dL    HCT 26.0 (L) 36.6 - 50.3 %    .4 (H) 80.0 - 99.0 FL    MCH 32.7 26.0 - 34.0 PG    MCHC 31.9 30.0 - 36.5 g/dL    RDW 23.0 (H) 11.5 - 14.5 %    PLATELET 760 326 - 320 K/uL    MPV 9.8 8.9 - 12.9 FL    NRBC 0.0 0  WBC    ABSOLUTE NRBC 0.00 0.00 - 0.01 K/uL    NEUTROPHILS 76 (H) 32 - 75 %    LYMPHOCYTES 18 12 - 49 %    MONOCYTES 5 5 - 13 %    EOSINOPHILS 0 0 - 7 %    BASOPHILS 0 0 - 1 %    IMMATURE GRANULOCYTES 1 (H) 0.0 - 0.5 %    ABS. NEUTROPHILS 10.7 (H) 1.8 - 8.0 K/UL    ABS. LYMPHOCYTES 2.5 0.8 - 3.5 K/UL    ABS. MONOCYTES 0.7 0.0 - 1.0 K/UL    ABS. EOSINOPHILS 0.0 0.0 - 0.4 K/UL    ABS. BASOPHILS 0.0 0.0 - 0.1 K/UL    ABS. IMM.  GRANS. 0.1 (H) 0.00 - 0.04 K/UL    DF SMEAR SCANNED      RBC COMMENTS MACROCYTOSIS  1+        WBC COMMENTS REACTIVE LYMPHS     METABOLIC PANEL, COMPREHENSIVE    Collection Time: 09/07/20  4:59 AM   Result Value Ref Range    Sodium 137 136 - 145 mmol/L    Potassium 3.1 (L) 3.5 - 5.1 mmol/L    Chloride 105 97 - 108 mmol/L    CO2 29 21 - 32 mmol/L    Anion gap 3 (L) 5 - 15 mmol/L    Glucose 73 65 - 100 mg/dL    BUN 4 (L) 6 - 20 MG/DL    Creatinine 0.33 (L) 0.70 - 1.30 MG/DL    BUN/Creatinine ratio 12 12 - 20      GFR est AA >60 >60 ml/min/1.73m2    GFR est non-AA >60 >60 ml/min/1.73m2    Calcium 7.6 (L) 8.5 - 10.1 MG/DL    Bilirubin, total 0.9 0.2 - 1.0 MG/DL    ALT (SGPT) 85 (H) 12 - 78 U/L    AST (SGOT) 98 (H) 15 - 37 U/L    Alk. phosphatase 275 (H) 45 - 117 U/L    Protein, total 5.4 (L) 6.4 - 8.2 g/dL    Albumin 1.0 (L) 3.5 - 5.0 g/dL    Globulin 4.4 (H) 2.0 - 4.0 g/dL    A-G Ratio 0.2 (L) 1.1 - 2.2     CBC WITH AUTOMATED DIFF    Collection Time: 09/07/20  4:59 AM   Result Value Ref Range    WBC 15.1 (H) 4.1 - 11.1 K/uL    RBC 2.36 (L) 4.10 - 5.70 M/uL    HGB 7.8 (L) 12.1 - 17.0 g/dL    HCT 24.1 (L) 36.6 - 50.3 %    .1 (H) 80.0 - 99.0 FL    MCH 33.1 26.0 - 34.0 PG    MCHC 32.4 30.0 - 36.5 g/dL    RDW 21.9 (H) 11.5 - 14.5 %    PLATELET 590 015 - 915 K/uL    MPV 9.9 8.9 - 12.9 FL    NRBC 0.0 0  WBC    ABSOLUTE NRBC 0.00 0.00 - 0.01 K/uL    NEUTROPHILS 76 (H) 32 - 75 %    LYMPHOCYTES 18 12 - 49 %    MONOCYTES 5 5 - 13 %    EOSINOPHILS 0 0 - 7 %    BASOPHILS 0 0 - 1 %    IMMATURE GRANULOCYTES 1 (H) 0.0 - 0.5 %    ABS. NEUTROPHILS 11.4 (H) 1.8 - 8.0 K/UL    ABS. LYMPHOCYTES 2.7 0.8 - 3.5 K/UL    ABS. MONOCYTES 0.8 0.0 - 1.0 K/UL    ABS. EOSINOPHILS 0.0 0.0 - 0.4 K/UL    ABS. BASOPHILS 0.0 0.0 - 0.1 K/UL    ABS. IMM.  GRANS. 0.2 (H) 0.00 - 0.04 K/UL    DF SMEAR SCANNED      RBC COMMENTS ANISOCYTOSIS  2+       MAGNESIUM    Collection Time: 09/07/20  4:59 AM   Result Value Ref Range    Magnesium 1.6 1.6 - 2.4 mg/dL       Medications reviewed  Current Facility-Administered Medications   Medication Dose Route Frequency    potassium bicarb-citric acid (EFFER-K) tablet 20 mEq  20 mEq Oral BID    0.9% sodium chloride infusion 250 mL  250 mL IntraVENous PRN    Probiotic - USANA Probiotic  (Patient Supplied)  1 Packet Oral DAILY    mesalamine (DELZICOL) DR capsule 800 mg  800 mg Oral TID    white petrolatum-mineral oiL (EUCERIN) cream   Topical BID    lipase-protease-amylase (CREON 24,000) capsule 1 Cap  1 Cap Oral TID WITH MEALS    loperamide (IMODIUM) capsule 2 mg  2 mg Oral Q6H PRN    bismuth subsalicylate (PEPTO-BISMOL) oral suspension 30 mL  30 mL Oral Q6H PRN    cyanocobalamin (VITAMIN B12) tablet 1,000 mcg  1,000 mcg Oral DAILY    folic acid (FOLVITE) tablet 1 mg  1 mg Oral DAILY    HYDROcodone-acetaminophen (NORCO) 5-325 mg per tablet 1 Tab  1 Tab Oral Q6H PRN    morphine injection 1 mg  1 mg IntraVENous Q4H PRN    sodium chloride (NS) flush 5-10 mL  5-10 mL IntraVENous PRN    sodium chloride (NS) flush 5-40 mL  5-40 mL IntraVENous Q8H    sodium chloride (NS) flush 5-40 mL  5-40 mL IntraVENous PRN    acetaminophen (TYLENOL) tablet 650 mg  650 mg Oral Q6H PRN    Or    acetaminophen (TYLENOL) suppository 650 mg  650 mg Rectal Q6H PRN    polyethylene glycol (MIRALAX) packet 17 g  17 g Oral DAILY PRN    promethazine (PHENERGAN) tablet 12.5 mg  12.5 mg Oral Q6H PRN    Or    ondansetron (ZOFRAN) injection 4 mg  4 mg IntraVENous Q6H PRN     Care Plan discussed with: Patient/Nurse  Total time spent with patient: 30 minutes.   Marylou Mclean MD

## 2020-09-07 NOTE — PROGRESS NOTES
Bedside and Verbal shift change report given to   Arthur Kwok RN (oncoming nurse) by   Jose Moore RN (offgoing nurse). Report included the following information SBAR, Kardex, Intake/Output, MAR, Accordion, Recent Results and Med Rec Status.

## 2020-09-07 NOTE — PROGRESS NOTES
Patient having hemoptysis this morning, scant amount on several  tissues. Says he needs to talk to the doctor about it.  Patient refuses to let me change the dressing on the right side of his neck, states \"it'll hurt too much to take it off and I want the doctor to tell me it's okay to take off\", educated the patient on infection prevention

## 2020-09-08 ENCOUNTER — APPOINTMENT (OUTPATIENT)
Dept: GENERAL RADIOLOGY | Age: 65
DRG: 166 | End: 2020-09-08
Attending: FAMILY MEDICINE
Payer: COMMERCIAL

## 2020-09-08 ENCOUNTER — APPOINTMENT (OUTPATIENT)
Dept: GENERAL RADIOLOGY | Age: 65
DRG: 166 | End: 2020-09-08
Attending: INTERNAL MEDICINE
Payer: COMMERCIAL

## 2020-09-08 LAB
ALBUMIN SERPL-MCNC: 1 G/DL (ref 3.5–5)
ALBUMIN/GLOB SERPL: 0.2 {RATIO} (ref 1.1–2.2)
ALP SERPL-CCNC: 372 U/L (ref 45–117)
ALT SERPL-CCNC: 342 U/L (ref 12–78)
ANION GAP SERPL CALC-SCNC: 2 MMOL/L (ref 5–15)
AST SERPL-CCNC: 566 U/L (ref 15–37)
BASOPHILS # BLD: 0 K/UL (ref 0–0.1)
BASOPHILS NFR BLD: 0 % (ref 0–1)
BILIRUB SERPL-MCNC: 1.1 MG/DL (ref 0.2–1)
BUN SERPL-MCNC: 4 MG/DL (ref 6–20)
BUN/CREAT SERPL: 11 (ref 12–20)
CALCIUM SERPL-MCNC: 7.6 MG/DL (ref 8.5–10.1)
CHLORIDE SERPL-SCNC: 101 MMOL/L (ref 97–108)
CO2 SERPL-SCNC: 33 MMOL/L (ref 21–32)
CREAT SERPL-MCNC: 0.36 MG/DL (ref 0.7–1.3)
DIFFERENTIAL METHOD BLD: ABNORMAL
EOSINOPHIL # BLD: 0 K/UL (ref 0–0.4)
EOSINOPHIL NFR BLD: 0 % (ref 0–7)
ERYTHROCYTE [DISTWIDTH] IN BLOOD BY AUTOMATED COUNT: 21.4 % (ref 11.5–14.5)
GLOBULIN SER CALC-MCNC: 4.7 G/DL (ref 2–4)
GLUCOSE SERPL-MCNC: 63 MG/DL (ref 65–100)
HCT VFR BLD AUTO: 26 % (ref 36.6–50.3)
HGB BLD-MCNC: 8.2 G/DL (ref 12.1–17)
IMM GRANULOCYTES # BLD AUTO: 0.1 K/UL (ref 0–0.04)
IMM GRANULOCYTES NFR BLD AUTO: 1 % (ref 0–0.5)
LYMPHOCYTES # BLD: 3 K/UL (ref 0.8–3.5)
LYMPHOCYTES NFR BLD: 21 % (ref 12–49)
MAGNESIUM SERPL-MCNC: 1.8 MG/DL (ref 1.6–2.4)
MCH RBC QN AUTO: 32.8 PG (ref 26–34)
MCHC RBC AUTO-ENTMCNC: 31.5 G/DL (ref 30–36.5)
MCV RBC AUTO: 104 FL (ref 80–99)
MONOCYTES # BLD: 0.9 K/UL (ref 0–1)
MONOCYTES NFR BLD: 6 % (ref 5–13)
NEUTS SEG # BLD: 10.4 K/UL (ref 1.8–8)
NEUTS SEG NFR BLD: 72 % (ref 32–75)
NRBC # BLD: 0 K/UL (ref 0–0.01)
NRBC BLD-RTO: 0 PER 100 WBC
PLATELET # BLD AUTO: 384 K/UL (ref 150–400)
PMV BLD AUTO: 10.3 FL (ref 8.9–12.9)
POTASSIUM SERPL-SCNC: 3.9 MMOL/L (ref 3.5–5.1)
PROT SERPL-MCNC: 5.7 G/DL (ref 6.4–8.2)
RBC # BLD AUTO: 2.5 M/UL (ref 4.1–5.7)
RBC MORPH BLD: ABNORMAL
SODIUM SERPL-SCNC: 136 MMOL/L (ref 136–145)
WBC # BLD AUTO: 14.4 K/UL (ref 4.1–11.1)

## 2020-09-08 PROCEDURE — 97530 THERAPEUTIC ACTIVITIES: CPT

## 2020-09-08 PROCEDURE — 74011250637 HC RX REV CODE- 250/637: Performed by: INTERNAL MEDICINE

## 2020-09-08 PROCEDURE — 71045 X-RAY EXAM CHEST 1 VIEW: CPT

## 2020-09-08 PROCEDURE — 74011250637 HC RX REV CODE- 250/637: Performed by: FAMILY MEDICINE

## 2020-09-08 PROCEDURE — 83735 ASSAY OF MAGNESIUM: CPT

## 2020-09-08 PROCEDURE — 85025 COMPLETE CBC W/AUTO DIFF WBC: CPT

## 2020-09-08 PROCEDURE — 97116 GAIT TRAINING THERAPY: CPT

## 2020-09-08 PROCEDURE — 36415 COLL VENOUS BLD VENIPUNCTURE: CPT

## 2020-09-08 PROCEDURE — 87635 SARS-COV-2 COVID-19 AMP PRB: CPT

## 2020-09-08 PROCEDURE — 74011250637 HC RX REV CODE- 250/637: Performed by: SPECIALIST

## 2020-09-08 PROCEDURE — 80053 COMPREHEN METABOLIC PANEL: CPT

## 2020-09-08 PROCEDURE — 65660000000 HC RM CCU STEPDOWN

## 2020-09-08 RX ADMIN — Medication 10 ML: at 21:59

## 2020-09-08 RX ADMIN — POTASSIUM BICARBONATE 40 MEQ: 782 TABLET, EFFERVESCENT ORAL at 18:16

## 2020-09-08 RX ADMIN — FOLIC ACID 1 MG: 1 TABLET ORAL at 08:59

## 2020-09-08 RX ADMIN — Medication 10 ML: at 14:00

## 2020-09-08 RX ADMIN — CYANOCOBALAMIN TAB 500 MCG 1000 MCG: 500 TAB at 08:59

## 2020-09-08 RX ADMIN — PANCRELIPASE 1 CAPSULE: 24000; 76000; 120000 CAPSULE, DELAYED RELEASE PELLETS ORAL at 08:59

## 2020-09-08 RX ADMIN — PANCRELIPASE 1 CAPSULE: 24000; 76000; 120000 CAPSULE, DELAYED RELEASE PELLETS ORAL at 12:39

## 2020-09-08 RX ADMIN — Medication: at 18:16

## 2020-09-08 RX ADMIN — Medication: at 08:59

## 2020-09-08 RX ADMIN — POTASSIUM BICARBONATE 40 MEQ: 782 TABLET, EFFERVESCENT ORAL at 08:59

## 2020-09-08 RX ADMIN — LOPERAMIDE HYDROCHLORIDE 2 MG: 2 CAPSULE ORAL at 21:55

## 2020-09-08 RX ADMIN — Medication 10 ML: at 05:43

## 2020-09-08 RX ADMIN — MESALAMINE 800 MG: 400 CAPSULE, DELAYED RELEASE ORAL at 08:59

## 2020-09-08 RX ADMIN — LOPERAMIDE HYDROCHLORIDE 2 MG: 2 CAPSULE ORAL at 08:59

## 2020-09-08 RX ADMIN — MESALAMINE 800 MG: 400 CAPSULE, DELAYED RELEASE ORAL at 21:55

## 2020-09-08 RX ADMIN — MESALAMINE 800 MG: 400 CAPSULE, DELAYED RELEASE ORAL at 15:34

## 2020-09-08 RX ADMIN — HYDROCODONE BITARTRATE AND ACETAMINOPHEN 1 TABLET: 5; 325 TABLET ORAL at 21:55

## 2020-09-08 RX ADMIN — PANCRELIPASE 1 CAPSULE: 24000; 76000; 120000 CAPSULE, DELAYED RELEASE PELLETS ORAL at 18:16

## 2020-09-08 NOTE — PROGRESS NOTES
Daily Progress Note: 9/8/2020  Samantha Fields MD    Assessment/Plan:   Pneumonia involving right lung (8/25/2020) /  Hemoptysis (8/25/2020) POA: extensive with internal cavitation and air fluid levels. Staph Aureus, leukocytosis improving. CXR unchanged  --pleural fluid studies + staph, BC neg, cytology neg, AFB neg smears x 3 - cultures pending, and 2nd quantiferon gold negative. -- zosyn, doxy completed, Chest tube removed 9/3  -- Pulm following     Bilateral pulmonary embolism, acute ([address] (8/25/2020) / Acute deep vein thrombosis (DVT) of both lower extremities ([address] (8/25/2020):   - IVC filter placed   - holding anticoags due to dropping Hb and active hemoptysis and GI bleeding     Weight loss, unintentional (8/25/2020) POA: still concerning for malignancy but severe protein calorie malnutrition and empyema likely causes. Nutrition and speech therapy following  -Diet as tolerated  -calcium corrects  -GI work up pending- hoping for endoscopy tomorrow     Hyperbilirubinemia (8/25/2020) POA: unclear etiology. CT abdomen unremarkable  - resolved     Pleural effusion, right (8/25/2020) POA: empyema, pneumonia vs malignant effusion. -pulmonology following  - Chest tube placed 8/26/20     Alcohol use (8/25/2020) POA: he says he has cut down recently. -Monitor for withdrawal     Chronic Diarrhea (8/25/2020) / Fecal occult blood test positive (8/25/2020) POA: check stool studies. Monitor Hgb.    -GI following- no longer refusing any GI studies  -imodium and peptobismol PRN  -FloraQ daily- or pt's home probiotics    Hypomag/hypokalemia:  -replete and monitor        Problem List:  Problem List as of 9/8/2020 Date Reviewed: 8/25/2020          Codes Class Noted - Resolved    * (Principal) Pneumonia involving right lung ICD-10-CM: J18.9  ICD-9-CM: 486  8/25/2020 - Present        Hemoptysis ICD-10-CM: R04.2  ICD-9-CM: 786.30  8/25/2020 - Present        Hyperbilirubinemia ICD-10-CM: E80.6  ICD-9-CM: 782.4  8/25/2020 - Present        Weight loss, unintentional ICD-10-CM: R63.4  ICD-9-CM: 783.21  8/25/2020 - Present        Pleural effusion, right ICD-10-CM: J90  ICD-9-CM: 511.9  8/25/2020 - Present        Bilateral pulmonary embolism ([address] ICD-10-CM: I26.99  ICD-9-CM: 415.19  8/25/2020 - Present        Acute deep vein thrombosis (DVT) of both lower extremities ([facility]) ICD-10-CM: I82.403  ICD-9-CM: 453.40  8/25/2020 - Present        Alcohol use ICD-10-CM: Z72.89  ICD-9-CM: V49.89  8/25/2020 - Present        Diarrhea ICD-10-CM: R19.7  ICD-9-CM: 787.91  8/25/2020 - Present        Fecal occult blood test positive ICD-10-CM: R19.5  ICD-9-CM: 792.1  8/25/2020 - Present        Hyponatremia with extracellular fluid depletion ICD-10-CM: E87.1  ICD-9-CM: 276.1  8/25/2020 - Present            Subjective:     60 y.o. male who is being admitted for Pneumonia involving right lung. Mr. Lerry Baumgarten presented to our Emergency Department today complaining of generalized weakness, diarrhea and lethargy for several months. Patient was accompanied by his friend who gave most of the history. The patient does not like seeing physicians and apparently uses vitamin supplements. He is a smoker and drinks alcohol and has been progressively weaker over several months now. He has had pleuritic right sided chest pain associated with a productive cough and hemoptysis. He feel on the floor several days ago and was unable to get up. His friends helped him up and brought him to the ED for further testing. Initial CXR done showed airspace disease in right mid lower lung zone consistent with pneumonia. This is surrounded by a moderate right-sided pleural effusion. Upon review, he was noted to be cachectic with swollen lower extremities. CT scans chest abdomen and pelvis done showed a diffuse irregular opacification throughout the right lower lobe with multiple areas of internal cavitation and air-fluid levels.  Findings are more likely related to infection than malignancy. Large right pleural effusion. Trace left pleural effusion. Bilateral pulmonary emboli. Clot burden is moderate. No evidence of right heart strain. Thrombus in the veins of the left lower extremity extending to the IVC. A small amount of thrombus is also seen in the right common iliac vein. He will be admitted for further management. (Dr Edouard Plan)    8/26:  Feeling a little better this AM but still c/o painful, pleuritic cough and occas thick sputum. No leg/calf pains. No known exposure to ill individuals/no travel - \"just staying in the last few months. \"  No temps. WBC 31.9K. Cultures neg so far.      8/27:  Reports less cough, chest pain and feeling better overall. Appetite has returned. Chest tube placed yesterday with discharge obtained - pending cultures. Other cultures remain neg. Hb lower so cont to hold anticoag for now. GI has seen and work up when more stable. Remains on doxy, zosyn, vanc. WBC down to 28K. Bili back to normal.     8/28:  Continues to grad improve. Still with some cough. No tremor or hallucinations so far. K+ lower - replacing. WBC coming down. Afeb. CXR this AM pending. Cultures remain neg so far. Quantiferon gold test pending as of this AM.  He reports hx of chronic LE edema for years. 8/29: Pt continues to have frequent stooling. GI plans endoscopy next week if pt allows. He was not willing to participate with PT/OT/nutritionist yesterday. Says despite our best efforts, we will not get his stooling to stop until he gets his home probiotics [friend will bring it]. He refuses to eat until the stooling is better. 8/30:  Pt is reluctant to admit that his stooling has improved. Down to 2 BMs yesterday instead of 6 the day before. Tolerating PO. Willing to eat more today. He is still having hemoptysis. Wanting to get out of bed. Willing to work with PT/OT. 8/31:  Feeling better. Appetite better. TB neg so far.   First Dustin Mccracken was indeterminate - repeat pending. Afeb. WBC slowly coming down. Currently on Doxy and Zosyn. Replacing Mag and K+. Hb now stable in 8s. 9/1: Little change. Not moving about much, he reports due to chest tube being painful when he moves. Hb 7.8. WBC 16. No sign of ETOH withdrawal.     9/2:  Now reports he wants to move around more and get up and wants to work with PT. AFB smears neg x3, AFB cultures still pending of course. WBC slowly coming down. Afeb. Hb still slowly dropping - 7.7 today. May consider prophylactic dose of lovenox and watch Hb.  GI work up pending. Arms a little more swollen. 9/3: Feeling better. Hoping for endoscopy in am. Patient would like chest tube removed prior to prep and studies if possible. Speech would like MBS.     9/4:  Feeling better except for prep for colonoscopy - reports clear results last BM. Chest tube out yesterday. Hb down to 7.0 so still holding anticoag - may need transfusion. Upper and lower endoscopy planned today. CXR this AM pending. K+ down with the prep - replacing. 9/5:  Little change. Still with occas productive cough. Now also c/o increasing LE edema and today notes scrotal edema and now wants something done about it. 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 Hb down to 6.9 today - would benefit from transfusion. Risks v benefits of transfusion discussed and he finally agreed. Appetite very good now, taking plenty of fluids now, so will DC IVFs. Low dose of lasix after the transfusion to help with the edema. 9/6:  Less edema in LEs and scrotum today but still quite a bit - will give another low dose of Lasix. Received 1 unit packed red cells yesterday. K+ sl low - replacing. Still having episodic loose stools but on questioning he reports chronic loose stools for \"years\" which would go along with his colonoscopy findings. AM CBC is still pending.   Discussed need for rehab and strongly advised he consider going with his degree of debility. Risk of falls and fractures discussed. :  Still c/o scrotal and LE edema but sl better - one dose of 10mg lasix today. Discussed his low albumin as part of the problem. Less cough today but slight amount of hemoptysis this AM. Hb 7.8 this AM - was in the 8s after the transfusion. K+ still low - replacing. Declining to have dressings changed - discussed risks. Discussed his severe weakness and need for rehab again - still not very interested in going. :  Less scrotal swelling after the lasix - > 1.5 liter out. He did get up some in room yesterday and did fairly well but still very weak. Fewer loose stools. He has mult questions about Ulcerative colitis and basics discussed with him - advised him to discuss further with GI. Eating well and has high protein diet. Albumin still low. K+ back up. Still has cough - less productive so far today. Will check CXR today. ALT higher toay - monitoring.      Review of Systems:   A comprehensive review of systems was negative except for that written in the HPI. Objective:   Physical Exam:   Visit Vitals  /72 (BP 1 Location: Right arm, BP Patient Position: At rest)   Pulse 77   Temp 97.7 °F (36.5 °C)   Resp 17   Ht 5' 10\" (1.778 m)   Wt 78.5 kg (173 lb 1 oz) Comment: patient refused for me to remove stuff from bed before   SpO2 96%   BMI 24.83 kg/m²    O2 Flow Rate (L/min): 2 l/min O2 Device: Room air  Temp (24hrs), Av °F (36.7 °C), Min:97.7 °F (36.5 °C), Max:98.3 °F (36.8 °C)    1901 -  0700  In: 856 [I.V.:856]  Out: 0078 [Urine:1175]   701 - 1900  In: -   Out: 1692 [Urine:2930]  General:  Alert, cooperative, no distress, cachetic, ill appearing. Head:  Normocephalic, without obvious abnormality, atraumatic. Eyes:  Andes conjuntiva. PERRL, EOMs intact.    Throat: Lips, mucosa, and tongue moist.   Neck: Supple, symmetrical, trachea midline, no adenopathy, thyroid: no enlargement/tenderness/nodules, no carotid bruit and no JVD. Lungs:   Better air exchange,  Clear at this moment. Chest wall: Former chest tube site right looks ok   Heart:  Regular rate and rhythm, S1, S2 normal, no murmur, click, rub or gallop. Abdomen:   Soft, non-tender. Bowel sounds normal. No masses,  No organomegaly. Extremities: no cyanosis. No calf tenderness;2+ pitting edema bilat. 2+ pitting edema in arms, No cords palpated. Pulses: 2+ and symmetric all extremities. Skin: Skin color, texture, turgor normal. No rashes   Neurologic:  Alert and oriented X 3. Fine motor of hands and fingers normal.   equal.  No tremor. Gait not tested at this time. Sensation grossly normal to touch. Gross motor of extremities normal.       Data Review:   EXAM: CT CHEST ABD PELV W CONT 8/25/20  INDICATION: weight loss  COMPARISON: Radiographs 8/25/2020  CONTRAST: 100 mL of Isovue-370. FINDINGS:   CHEST WALL: No mass or axillary lymphadenopathy. THYROID: No nodule. MEDIASTINUM: No mass or lymphadenopathy. BRYAN: No mass or lymphadenopathy. THORACIC AORTA: No dissection or aneurysm. MAIN PULMONARY ARTERY: Normal in caliber. Thrombus is seen throughout the right  lower lobe pulmonary arteries. Thrombus is seen in the left lower lobe pulmonary  artery extending into the anterior and lateral basilar segments. TRACHEA/BRONCHI: Patent. ESOPHAGUS: No wall thickening or dilatation. HEART: Normal in size. PLEURA: There is a large right pleural effusion with enhancement of the pleural  surfaces. Trace left pleural effusion. LUNGS: There is irregular opacification throughout the right lower lobe. There  are multiple areas of internal cavitation with air-fluid levels. Small areas of  atelectasis or scarring are seen in the lingula and inferior left lower lobe. LIVER: No mass. BILIARY TREE: Gallbladder is within normal limits. CBD is not dilated. SPLEEN: within normal limits.   PANCREAS: No mass or ductal dilatation. ADRENALS: Unremarkable. KIDNEYS: Small nonobstructive stones are seen in the bilateral kidneys. No  evidence of hydronephrosis. No mass. STOMACH: Unremarkable. SMALL BOWEL: No dilatation or wall thickening. COLON: No dilatation or wall thickening. APPENDIX: Unremarkable  PERITONEUM: No ascites or pneumoperitoneum. RETROPERITONEUM: No lymphadenopathy or aortic aneurysm. REPRODUCTIVE ORGANS: Unremarkable  URINARY BLADDER: No mass or calculus. BONES: No destructive bone lesion. ABDOMINAL WALL: No mass or hernia. ADDITIONAL COMMENTS: There is thrombus in the left femoral veins extending into  the left iliac veins into the IVC. A small amount of thrombus is also present in  the right common iliac vein. IMPRESSION:  1. Diffuse irregular opacification throughout the right lower lobe with multiple  areas of internal cavitation and air-fluid levels. Findings are more likely  related to infection than malignancy. 2. Large right pleural effusion. Trace left pleural effusion. 3. Bilateral pulmonary emboli. Clot burden is moderate. No evidence of right  heart strain. 4. Thrombus in the veins of the left lower extremity extending to the IVC. A  small amount of thrombus is also seen in the right common iliac vein. EXAM:  IR THORACENTESIS/INSERT CHEST TUBE 8/26/20  INDICATION:  Request placement of large size pigtail catheter. Possible empyema. PROCEDURE:  Available history and imaging was reviewed which demonstrates a large partially  loculated right pleural effusion. Possibility of empyema, tuberculosis and other  etiologies are being considered clinically. Specific request for larger pigtail  catheter placement. Following informed consent the patient was evaluated with ultrasound in the  angina/catheter lab department. This demonstrates a large right pleural  effusion. The location of the pleural effusion was marked and then the area was prepped  and draped.    1% lidocaine was used for local anesthesia. A small incision was made with an 11  blade. A micropuncture needle was used under direct fluoroscopic guidance to access the  pleural fluid. When this was achieved the microguidewire was placed followed by  a 5 [place] dilator. Through this a short [address] guidewire  was placed. The track was then dilated to 12 [place]. This was then followed by  placement of a 12 [place] Abs[place] pigtail catheter. The string for the pigtail  was removed prior to placement. The catheter was positioned under fluoroscopic  guidance and then secured in place at the skin with a 2-0 Prolene suture. The  site was then dressed with a Tegaderm Bioclusive dressing and will be connected  to a Pleur-evac for subsequent drainage. Prior to this approximately 100 mL of fluid was obtained and sent for cytology  as well as multiple cultures including aerobic, anaerobic, AFB and fungal.   The fluid was withdrawn was mildly cloudy serosanguineous in color. I did not  notice any foul smell. The procedure was adequately tolerated without significant blood loss or  evidence of complication. Final digital image of the chest was saved documenting catheter position. IMPRESSION:   Successful ultrasound and fluoroscopic guided placement of 12 [place] right-sided  chest tube. Patient will have ongoing drainage from Pleur-evac in his hospital  bed/room. Care and monitoring as per medical service and nursing service. ECHO 8/27/20  Interpretation Summary   Result status: Final result    · Image quality for this study was technically difficult. · Echo study was technically difficulty and limited due to patient's tolerance. · LV: Estimated LVEF is 55 - 60%. Normal cavity size, wall thickness and systolic function (ejection fraction normal). Wall motion: normal. Mild (grade 1) left ventricular diastolic dysfunction. · Pericardium: Small pericardial effusion adjacent to right ventricle.        Portable AP upright view of the chest. 9/1/20  Direct comparison made to prior chest x-ray dated September 1, 2020. Cardiomediastinal silhouette is stable. Right basilar chest tube is unchanged in  position. There No residual pneumothorax is visualized. There are small  bilateral pleural effusions. There are persistent increased interstitial  markings at the right lung. There is persistent bibasilar patchy airspace  disease, right greater than left. IMPRESSION: No residual pneumothorax visualized    CXR 9/4/20  FINDINGS: AP portable imaging of the chest performed at 5:14 AM demonstrates a  stable cardiomediastinal silhouette. The right basilar chest tube is been  removed. Diffuse airspace disease throughout the right lung is unchanged. Small  right pleural effusion persists. No pneumothorax is visualized. There is  unchanged left basilar atelectasis. No significant osseous abnormalities are  seen. IMPRESSION: No evidence of pneumothorax following chest tube removal.    Recent Days:  Recent Labs     09/08/20  0348 09/07/20  0459 09/06/20  0832   WBC 14.4* 15.1* 14.0*   HGB 8.2* 7.8* 8.3*   HCT 26.0* 24.1* 26.0*    367 341     Recent Labs     09/08/20  0348 09/07/20  0459 09/06/20  0406    137 140   K 3.9 3.1* 3.2*    105 108   CO2 33* 29 27   GLU 63* 73 80   BUN 4* 4* 4*   CREA 0.36* 0.33* 0.40*   CA 7.6* 7.6* 7.3*   MG 1.8 1.6  --    ALB 1.0* 1.0* 0.9*   TBILI 1.1* 0.9 0.8   * 85* 97*     No results for input(s): PH, PCO2, PO2, HCO3, FIO2 in the last 72 hours.     24 Hour Results:  Recent Results (from the past 24 hour(s))   METABOLIC PANEL, COMPREHENSIVE    Collection Time: 09/08/20  3:48 AM   Result Value Ref Range    Sodium 136 136 - 145 mmol/L    Potassium 3.9 3.5 - 5.1 mmol/L    Chloride 101 97 - 108 mmol/L    CO2 33 (H) 21 - 32 mmol/L    Anion gap 2 (L) 5 - 15 mmol/L    Glucose 63 (L) 65 - 100 mg/dL    BUN 4 (L) 6 - 20 MG/DL    Creatinine 0.36 (L) 0.70 - 1.30 MG/DL    BUN/Creatinine ratio 11 (L) 12 - 20      GFR est AA >60 >60 ml/min/1.73m2    GFR est non-AA >60 >60 ml/min/1.73m2    Calcium 7.6 (L) 8.5 - 10.1 MG/DL    Bilirubin, total 1.1 (H) 0.2 - 1.0 MG/DL    ALT (SGPT) 342 (H) 12 - 78 U/L    AST (SGOT) 566 (H) 15 - 37 U/L    Alk. phosphatase 372 (H) 45 - 117 U/L    Protein, total 5.7 (L) 6.4 - 8.2 g/dL    Albumin 1.0 (L) 3.5 - 5.0 g/dL    Globulin 4.7 (H) 2.0 - 4.0 g/dL    A-G Ratio 0.2 (L) 1.1 - 2.2     CBC WITH AUTOMATED DIFF    Collection Time: 09/08/20  3:48 AM   Result Value Ref Range    WBC 14.4 (H) 4.1 - 11.1 K/uL    RBC 2.50 (L) 4.10 - 5.70 M/uL    HGB 8.2 (L) 12.1 - 17.0 g/dL    HCT 26.0 (L) 36.6 - 50.3 %    .0 (H) 80.0 - 99.0 FL    MCH 32.8 26.0 - 34.0 PG    MCHC 31.5 30.0 - 36.5 g/dL    RDW 21.4 (H) 11.5 - 14.5 %    PLATELET 514 733 - 680 K/uL    MPV 10.3 8.9 - 12.9 FL    NRBC 0.0 0  WBC    ABSOLUTE NRBC 0.00 0.00 - 0.01 K/uL    NEUTROPHILS 72 32 - 75 %    LYMPHOCYTES 21 12 - 49 %    MONOCYTES 6 5 - 13 %    EOSINOPHILS 0 0 - 7 %    BASOPHILS 0 0 - 1 %    IMMATURE GRANULOCYTES 1 (H) 0.0 - 0.5 %    ABS. NEUTROPHILS 10.4 (H) 1.8 - 8.0 K/UL    ABS. LYMPHOCYTES 3.0 0.8 - 3.5 K/UL    ABS. MONOCYTES 0.9 0.0 - 1.0 K/UL    ABS. EOSINOPHILS 0.0 0.0 - 0.4 K/UL    ABS. BASOPHILS 0.0 0.0 - 0.1 K/UL    ABS. IMM.  GRANS. 0.1 (H) 0.00 - 0.04 K/UL    DF SMEAR SCANNED      RBC COMMENTS NORMOCYTIC, NORMOCHROMIC     MAGNESIUM    Collection Time: 09/08/20  3:48 AM   Result Value Ref Range    Magnesium 1.8 1.6 - 2.4 mg/dL       Medications reviewed  Current Facility-Administered Medications   Medication Dose Route Frequency    potassium bicarb-citric acid (EFFER-K) tablet 40 mEq  40 mEq Oral BID    0.9% sodium chloride infusion 250 mL  250 mL IntraVENous PRN    Probiotic - USANA Probiotic  (Patient Supplied)  1 Packet Oral DAILY    mesalamine (DELZICOL) DR capsule 800 mg  800 mg Oral TID    white petrolatum-mineral oiL (EUCERIN) cream   Topical BID    lipase-protease-amylase (CREON 24,000) capsule 1 Cap  1 Cap Oral TID WITH MEALS    loperamide (IMODIUM) capsule 2 mg  2 mg Oral Q6H PRN    bismuth subsalicylate (PEPTO-BISMOL) oral suspension 30 mL  30 mL Oral Q6H PRN    cyanocobalamin (VITAMIN B12) tablet 1,000 mcg  1,000 mcg Oral DAILY    folic acid (FOLVITE) tablet 1 mg  1 mg Oral DAILY    HYDROcodone-acetaminophen (NORCO) 5-325 mg per tablet 1 Tab  1 Tab Oral Q6H PRN    morphine injection 1 mg  1 mg IntraVENous Q4H PRN    sodium chloride (NS) flush 5-10 mL  5-10 mL IntraVENous PRN    sodium chloride (NS) flush 5-40 mL  5-40 mL IntraVENous Q8H    sodium chloride (NS) flush 5-40 mL  5-40 mL IntraVENous PRN    acetaminophen (TYLENOL) tablet 650 mg  650 mg Oral Q6H PRN    Or    acetaminophen (TYLENOL) suppository 650 mg  650 mg Rectal Q6H PRN    polyethylene glycol (MIRALAX) packet 17 g  17 g Oral DAILY PRN    promethazine (PHENERGAN) tablet 12.5 mg  12.5 mg Oral Q6H PRN    Or    ondansetron (ZOFRAN) injection 4 mg  4 mg IntraVENous Q6H PRN     Care Plan discussed with: Patient/Nurse  Total time spent with patient: 30 minutes.   Gustavo Reddy MD

## 2020-09-08 NOTE — PROGRESS NOTES
Bedside shift change report given to [facility] RN (oncoming nurse) by Ponce Wellington RN (offgoing nurse). Report included the following information SBAR, Kardex and MAR.

## 2020-09-08 NOTE — PROGRESS NOTES
Problem: Self Care Deficits Care Plan (Adult)  Goal: *Acute Goals and Plan of Care (Insert Text)  Description:   FUNCTIONAL STATUS PRIOR TO ADMISSION: Patient was independent and active without use of DME. Patient was independent for basic and instrumental ADLs. HOME SUPPORT: The patient lived alone with no local support. Occupational Therapy Goals  Initiated 8/27/2020; Weekly re-assessment 9/8/2020, all goals remain appropriate. 1.  Patient will perform grooming, standing at sink for at least 5 minutes, with supervision/set-up within 7 day(s). 2.  Patient will perform upper body dressing with modified independence within 7 day(s). 3.  Patient will perform lower body dressing with supervision/set-up within 7 day(s). 4.  Patient will perform toilet transfers with modified independence within 7 day(s). 5.  Patient will perform all aspects of toileting with modified independence within 7 day(s). 6.  Patient will participate in upper extremity therapeutic exercise/activities with independence for 5 minutes within 7 day(s). 7.  Patient will utilize energy conservation techniques during functional activities with verbal cues within 7 day(s). Outcome: Progressing Towards Goal     OCCUPATIONAL THERAPY TREATMENT/WEEKLY RE-ASSESSMENT  Patient: Marcos Dillon (46 y.o. male)  Date: 9/8/2020  Diagnosis: Pneumonia [J18.9]   Pneumonia involving right lung  Procedure(s) (LRB):  ESOPHAGOGASTRODUODENOSCOPY (EGD) (N/A)  COLONOSCOPY (N/A)  ESOPHAGOGASTRODUODENAL (EGD) BIOPSY (N/A)  COLON BIOPSY (N/A) 4 Days Post-Op  Precautions: Fall  Chart, occupational therapy assessment, plan of care, and goals were reviewed. ASSESSMENT  Patient continues with skilled OT services and is slowly progressing towards goals. Pt remains limited by decreased activity tolerance, generalized weakness, poor motivation, impaired balance, and decreased endurance.  Pt is agreeable to only minimal activity and remains very particular and demanding throughout sessions. Pt is motivated to shower (notified that he would need MD order) but is not agreeable to practice shower transfer with shower chair and RW this session despite increased education and encouragement. He requires increased assistance to stand up from chair after sitting up for 1 hour this afternoon, up to mod/max A. Educated pt on UE exercises for triceps, shoulder, and back strengthening, however pt does not perform. His friend is present in room and assists with motivating pt in sessions and is receptive to education provided. Continue to recommend rehab at discharge to address deficits impacting safety and independence with ADLs and mobility and maximize return to highest level of function. All goals remain appropriate at this time. Will continue to follow per POC 3x/week. Current Level of Function Impacting Discharge (ADLs): up to mod/max A for LB dressing and bathing; mod A toileting; set up to min A UB ADLs; up to mod/max A sit to stand when fatigued, otherwise CGA to min A with rolling walker. Other factors to consider for discharge: poor motivation; decreased tolerance; lives alone; fall risk         PLAN :  Goals have been updated based on progression since last assessment. Patient continues to benefit from skilled intervention to address the above impairments. Continue to follow patient 3 times a week to address goals. Recommend with staff: 1 person assist mobility to bathroom for toileting; OOB to chair at least 2x/day for 60 minutes each time; encourage participation in ADLs as able    Recommend next OT session: UE strengthening; ADL transfers; ADLs as able    Recommendation for discharge: (in order for the patient to meet his/her long term goals)  Therapy up to 5 days/week in SNF setting; if pt refuses rehab, pt will require 24/7 physical assistance at home as well as follow up [facility] therapy.     This discharge recommendation:  Has been made in collaboration with the attending provider and/or case management    IF patient discharges home will need the following DME: shower chair, walker: rolling and wheelchair       SUBJECTIVE:   Patient stated That's all I'm going to do.     OBJECTIVE DATA SUMMARY:   Cognitive/Behavioral Status:  Neurologic State: Alert  Orientation Level: Oriented X4  Cognition: Follows commands  Perception: Appears intact  Perseveration: No perseveration noted  Safety/Judgement: Awareness of environment;Decreased awareness of need for safety; Fall prevention    Functional Mobility and Transfers for ADLs:  Bed Mobility:  Sit to Supine: Moderate assistance  Scooting: Stand-by assistance;Bed Modified(pt able to use bed rails to scoot up in bed)    Transfers:  Sit to Stand: Moderate assistance;Maximum assistance  Bed to Chair: Contact guard assistance(with rolling walker)    Balance:  Sitting: Intact  Standing: With support  Standing - Static: Fair  Standing - Dynamic : Fair    ADL Intervention:  Cognitive Retraining  Safety/Judgement: Awareness of environment;Decreased awareness of need for safety; Fall prevention    Therapeutic Exercises:   Educated pt on UE exercises for strengthening in preparation for ADL transfers and ADLs. Pt does not participate in exercises but his friend is attentive and receptive. Provided visual demonstration to increase clarity and carry-over. Educated on shoulder shrugs, scapular retraction/rows, shoulder flexion, shoulder abduction, chair push ups, and biceps curls. Functional Outcome Measure:  Barthel Index:    Bathin  Bladder: 5  Bowels: 5  Groomin  Dressin  Feeding: 10  Mobility: 0  Stairs: 0  Toilet Use: 5  Transfer (Bed to Chair and Back): 10  Total: 45/100        The Barthel ADL Index: Guidelines  1. The index should be used as a record of what a patient does, not as a record of what a patient could do.   2. The main aim is to establish degree of independence from any help, physical or verbal, however minor and for whatever reason. 3. The need for supervision renders the patient not independent. 4. A patient's performance should be established using the best available evidence. Asking the patient, friends/relatives and nurses are the usual sources, but direct observation and common sense are also important. However direct testing is not needed. 5. Usually the patient's performance over the preceding 24-48 hours is important, but occasionally longer periods will be relevant. 6. Middle categories imply that the patient supplies over 50 per cent of the effort. 7. Use of aids to be independent is allowed. Genesis Burgess., Barthel, D.W. (8964). Functional evaluation: the Barthel Index. [address] [facility] (14)2. James Garcia leonor AVIVA Jc, Valentino Perez., Malissa Katz., Evert Quintana, [address] [facility] (1999). Measuring the change indisability after inpatient rehabilitation; comparison of the responsiveness of the Barthel Index and Functional [place] Measure. Journal of Neurology, Neurosurgery, and Psychiatry, 66(4), 399-109. Bianca Alfaro, N.J.OWEN, NOEL Vasquez, & Mariela Robins, MJoseA. (2004.) Assessment of post-stroke quality of life in cost-effectiveness studies: The usefulness of the Barthel Index and the EuroQoL-5D. Quality of Life Research, 13, 427-43     Pain:  Pt reporting minimal pain this session    Activity Tolerance:   Fair and Poor  Please refer to the flowsheet for vital signs taken during this treatment. After treatment patient left in no apparent distress:   Supine in bed, Call bell within reach, Bed / chair alarm activated, Caregiver / family present and Side rails x 3    COMMUNICATION/COLLABORATION:   The patients plan of care was discussed with: Physical therapist and Registered nurse.      Montana Campa OT  Time Calculation: 10 mins

## 2020-09-08 NOTE — PROGRESS NOTES
Problem: Mobility Impaired (Adult and Pediatric)  Goal: *Acute Goals and Plan of Care (Insert Text)  Description: FUNCTIONAL STATUS PRIOR TO ADMISSION: Pt reports independent baseline mobility without device    HOME SUPPORT PRIOR TO ADMISSION: The patient lived alone with friends to provide assistance intermittently. Physical Therapy Goals  Initiated 8/27/2020; continue same goals as of 9/2/2020   1. Patient will move from supine to sit and sit to supine in bed with supervision/set-up within 7 day(s). 2.  Patient will transfer from bed to chair and chair to bed with supervision/set-up using the least restrictive device within 7 day(s). 3.  Patient will perform sit to stand with supervision/set-up within 7 day(s). 4.  Patient will ambulate with supervision/set-up for 100 feet with the least restrictive device within 7 day(s). 5.  Patient will ascend/descend 4 stairs with one handrail(s) with minimal assistance/contact guard assist within 7 day(s). Note:   PHYSICAL THERAPY TREATMENT  Patient: Santy Quiroga (84 y.o. male)  Date: 9/8/2020  Diagnosis: Pneumonia [J18.9]   Pneumonia involving right lung  Procedure(s) (LRB):  ESOPHAGOGASTRODUODENOSCOPY (EGD) (N/A)  COLONOSCOPY (N/A)  ESOPHAGOGASTRODUODENAL (EGD) BIOPSY (N/A)  COLON BIOPSY (N/A) 4 Days Post-Op  Precautions: Fall  Chart, physical therapy assessment, plan of care and goals were reviewed. ASSESSMENT  Patient continues with skilled PT services. Pt comes to stand with mod to max assist from chair. Pt ambulated 20ft with RW CGA at slow pace. Pt performed three standing squats with cues using RW. Pt back to bed with mod assist.Pt tolerated treatment fairly well. Continue goals. Current Level of Function Impacting Discharge (mobility/balance): Pt is mod to max assist for sit to stand. PLAN :  Patient continues to benefit from skilled intervention to address the above impairments. Continue treatment per established plan of care.   to address goals. Recommendation for discharge: (in order for the patient to meet his/her long term goals)  REHAB vs SNF    This discharge recommendation:  Has been made in collaboration with the attending provider and/or case management    IF patient discharges home will need the following DME: rolling walker       SUBJECTIVE:       OBJECTIVE DATA SUMMARY:   Critical Behavior:  Neurologic State: Alert  Orientation Level: Oriented X4  Cognition: Follows commands  Safety/Judgement: Awareness of environment, Decreased awareness of need for safety, Fall prevention  Functional Mobility Training:  Bed Mobility:        Sit to Supine: Moderate assistance  Scooting: Stand-by assistance;Bed Modified(pt able to use bed rails to scoot up in bed)        Transfers:  Sit to Stand: Moderate assistance;Maximum assistance  Stand to Sit: Contact guard assistance        Bed to Chair: Contact guard assistance(with rolling walker)                    Balance:  Sitting: Intact  Standing: With support  Standing - Static: Fair  Standing - Dynamic : Fair  Ambulation/Gait Training:  Distance (ft): 20 Feet (ft)  Assistive Device: Walker, rolling;Gait belt  Ambulation - Level of Assistance: Contact guard assistance        Gait Abnormalities: Decreased step clearance        Base of Support: Narrowed     Speed/Taty: Pace decreased (<100 feet/min)  Step Length: Right shortened;Left shortened                Activity Tolerance:   Fair  Please refer to the flowsheet for vital signs taken during this treatment.     After treatment patient left in no apparent distress:   Supine in bed    COMMUNICATION/COLLABORATION:   The patients plan of care was discussed with: Physical therapist.     Carolyne Yen PTA   Time Calculation: 23 mins

## 2020-09-08 NOTE — PROGRESS NOTES
Name: [facility]: [facility]   : 1955 Admit Date: 2020   Phone: [phone]  Room:  914205   PCP: Amos Purdy MD  MRN: [id number]   Date: 2020  Code: Full Code          Chart and notes reviewed. Data reviewed. I review the patient's current medications in the medical record at each encounter.  I have evaluated and examined the patient. History of Present Illness:   is a pleasant, 61 yo gentleman that presented to [facility] with worsening shortness of breath and weakness. He tells me that he started feeling poorly in 2019 with a cough, however that resolved. His symptoms returned in 2019 and tells me that he has been struggling with cough, weight loss of 40+ lbs, and intermittent hemoptysis (phlegm mixed with blood), and diarrhea. He is a smoker of 2ppd for 20+ years. Also prior to the last two weeks, was \"drinking like a fish,\" at least 1 pint per day. He does not routinely prefer to seek traditional medical care and thus has delayed coming to the ED due to his symptoms. Reports worsening right sided chest pain and poor appetite. Images:  Personally reviewed. Chest/Abdominal CT:  Diffuse irregular opacification throughout the right lower lobe with multiple  areas of internal cavitation and air-fluid levels. Findings are more likely  related to infection than malignancy. Large right pleural effusion. Trace left pleural effusion. Bilateral pulmonary emboli. Clot burden is moderate. No evidence of right heart strain. Thrombus in the veins of the left lower extremity extending to the IVC.  A      Interval History:  Afebrile  BP soft but stable; MAP 60s-70s  Sats 97% on RA  WBC 14.4 - trending down  Hgb 8.2; better  LFTs remain elevated  Blood cx no growth x 6 days  Resp cx with moderate staph aureus  Pleural fluid cx heavy staph aureus - pan sensitive  Pleural fluid ABF smear neg  Pleural fluid cytology no cells diagnostic for malignancy  AFB smear neg x 3  8/26 quant TB gold indeterminate; 8/29 repeat negative  Hep panel nonreactive    MBS: Mild pharyngeal weakness with mild residue in upper pharynx with purees, solids. This cleared with sips of thins. This may explain his intermittent coughing at meals. Mild aspiration risk, as he appeared to protect airway well with no penetration or aspiration. ECHO: EF 36-01%; grade 1 diastolic dysfunction; small pericardial effusion, no tamponade  BLE VD: positive for age indeterminate and acute deep venous thrombosis or thrombophlebitis in right and left CFV, FV, and popliteal veins. ROS: Main complaint today is due to frequent diarrhea. Denies worsening SOB-overall breathing \"okay. \"  Cough the same. History reviewed. No pertinent past medical history. Past Surgical History:   Procedure Laterality Date    COLONOSCOPY N/A 9/4/2020    COLONOSCOPY performed by Navin Moncada MD at [facility] IR [facility] BR[facility] DEER IVC FILTER  8/25/2020    IR THORACENTESIS/INSERT CHEST TUBE  8/26/2020       History reviewed. No pertinent family history.     Social History     Tobacco Use    Smoking status: Current Every Day Smoker    Smokeless tobacco: Never Used   Substance Use Topics    Alcohol use: Yes     Comment: 1-2 beer per week       No Known Allergies    Current Facility-Administered Medications   Medication Dose Route Frequency    potassium bicarb-citric acid (EFFER-K) tablet 40 mEq  40 mEq Oral BID    0.9% sodium chloride infusion 250 mL  250 mL IntraVENous PRN    Probiotic - USANA Probiotic  (Patient Supplied)  1 Packet Oral DAILY    mesalamine (DELZICOL) DR capsule 800 mg  800 mg Oral TID    white petrolatum-mineral oiL (EUCERIN) cream   Topical BID    lipase-protease-amylase (CREON 24,000) capsule 1 Cap  1 Cap Oral TID WITH MEALS    loperamide (IMODIUM) capsule 2 mg  2 mg Oral Q6H PRN    bismuth subsalicylate (PEPTO-BISMOL) oral suspension 30 mL  30 mL Oral Q6H PRN    cyanocobalamin (VITAMIN B12) tablet 1,000 mcg  1,000 mcg Oral DAILY    folic acid (FOLVITE) tablet 1 mg  1 mg Oral DAILY    HYDROcodone-acetaminophen (NORCO) 5-325 mg per tablet 1 Tab  1 Tab Oral Q6H PRN    morphine injection 1 mg  1 mg IntraVENous Q4H PRN    sodium chloride (NS) flush 5-10 mL  5-10 mL IntraVENous PRN    sodium chloride (NS) flush 5-40 mL  5-40 mL IntraVENous Q8H    sodium chloride (NS) flush 5-40 mL  5-40 mL IntraVENous PRN    acetaminophen (TYLENOL) tablet 650 mg  650 mg Oral Q6H PRN    Or    acetaminophen (TYLENOL) suppository 650 mg  650 mg Rectal Q6H PRN    polyethylene glycol (MIRALAX) packet 17 g  17 g Oral DAILY PRN    promethazine (PHENERGAN) tablet 12.5 mg  12.5 mg Oral Q6H PRN    Or    ondansetron (ZOFRAN) injection 4 mg  4 mg IntraVENous Q6H PRN         REVIEW OF SYSTEMS   Negative except as stated in the HPI. Physical Exam:   Visit Vitals  /66 (BP 1 Location: Right arm, BP Patient Position: At rest)   Pulse 73   Temp 98.1 °F (36.7 °C)   Resp 17   Ht 5' 10\" (1.778 m)   Wt 78.5 kg (173 lb 1 oz)   SpO2 97%   BMI 24.83 kg/m²       General:  Alert, cooperative, no distress, appears older stated age. Head:  Normocephalic, without obvious abnormality, atraumatic. Eyes:  Conjunctivae/corneas clear. .   Nose: Nares normal. Septum midline. Mucosa normal.    Throat: Lips, mucosa, and tongue normal. Poor dentition. Lungs:   Mildly diminished over right base. Chest wall:  No tenderness or deformity. Heart:  Regular rate and rhythm, S1, S2 normal, no murmur, click, rub or gallop. Abdomen:   Soft, non-tender. Bowel sounds normal.    Extremities: Muscle wasting. Anasarca. Pulses: 2+ and symmetric all extremities.    Skin: Skin color, texture, turgor normal.   Lymph nodes: Cervical nodes normal.   Neurologic: Grossly nonfocal       Lab Results   Component Value Date/Time    Sodium 136 09/08/2020 03:48 AM    Potassium 3.9 09/08/2020 03:48 AM    Chloride 101 09/08/2020 03:48 AM    CO2 33 (H) 09/08/2020 03:48 AM    BUN 4 (L) 09/08/2020 03:48 AM    Creatinine 0.36 (L) 09/08/2020 03:48 AM    Glucose 63 (L) 09/08/2020 03:48 AM    Calcium 7.6 (L) 09/08/2020 03:48 AM    Magnesium 1.8 09/08/2020 03:48 AM    Lactic acid 1.1 08/25/2020 06:25 PM       Lab Results   Component Value Date/Time    WBC 14.4 (H) 09/08/2020 03:48 AM    HGB 8.2 (L) 09/08/2020 03:48 AM    PLATELET 941 14/04/1822 03:48 AM    .0 (H) 09/08/2020 03:48 AM       Lab Results   Component Value Date/Time    aPTT 37.0 (H) 09/02/2020 03:56 AM    Alk.  phosphatase 372 (H) 09/08/2020 03:48 AM    Protein, total 5.7 (L) 09/08/2020 03:48 AM    Albumin 1.0 (L) 09/08/2020 03:48 AM    Globulin 4.7 (H) 09/08/2020 03:48 AM       No results found for: IRON, FE, TIBC, IBCT, PSAT, FERR    No results found for: SR, CRP, ANALIA, ANAIGG, RA, RPR, RPRT, VDRLT, VDRLS, TSH, TSHEXT, TSHEXT     No results found for: PH, PHI, PCO2, PCO2I, PO2, PO2I, HCO3, HCO3I, FIO2, FIO2I    Lab Results   Component Value Date/Time    Troponin-I, Qt. <0.05 08/25/2020 04:07 PM        Lab Results   Component Value Date/Time    Culture result: HEAVY STAPHYLOCOCCUS AUREUS (A) 08/26/2020 04:48 PM    Culture result: NO FUNGUS ISOLATED 12 DAYS 08/26/2020 04:48 PM    Culture result: NO ANAEROBES ISOLATED 08/26/2020 04:48 PM       Lab Results   Component Value Date/Time    Hepatitis B surface Ag <0.10 08/31/2020 09:16 AM       Lab Results   Component Value Date/Time    Vancomycin,trough 13.0 (H) 08/28/2020 09:36 AM       Lab Results   Component Value Date/Time    Color JENNIFFER 08/26/2020 03:12 AM    Appearance CLEAR 08/26/2020 03:12 AM    Specific gravity 1.005 08/26/2020 03:12 AM    pH (UA) 6.0 08/26/2020 03:12 AM    Protein TRACE (A) 08/26/2020 03:12 AM    Glucose Negative 08/26/2020 03:12 AM    Ketone TRACE (A) 08/26/2020 03:12 AM    Blood Negative 08/26/2020 03:12 AM    Urobilinogen 1.0 08/26/2020 03:12 AM    Nitrites Negative 08/26/2020 03:12 AM    Leukocyte Esterase SMALL (A) 08/26/2020 03:12 AM    WBC 5-10 08/26/2020 03:12 AM    RBC 5-10 08/26/2020 03:12 AM    Bacteria Negative 08/26/2020 03:12 AM       Images: personally visualized and report reviewed    CXR (9/4/2020): The right basilar chest tube is been removed. Diffuse airspace disease throughout the right lung is unchanged. Small right pleural effusion persists. No pneumothorax is visualized. IMPRESSION  · Abnormal Chest CT: extensive cavitation throughout. Pl fluid with MSSA  · Bilateral PEs s/p IVC filter due to GIB  · GIB  · Anemia - EGD unremarkable; colonoscopy with evidence of \"burned out UC\" and rectum with non bleeding ulcer. Biopsies taken of both. · BLE DVT  · Weight Loss  · Tobacco Use    PLAN  · Supplemental O2 if needed to keep sats >88%; remains on RA  · Completed course of Zosyn, Doxy   · Repeat H/H and transfuse if < 7  · GI following  · Speech eval appreciated. Okay for regular diet. · IV Protonix     is stable from a pulmonary standpoint. We will sign off and be available as needed for questions or concerns, and will arrange for follow-up in 2-3 weeks. Please call with questions.         Leno Pelayo NP

## 2020-09-08 NOTE — PROGRESS NOTES
Nutrition Assessment     Type and Reason for Visit: Reassess    Nutrition Recommendations/Plan:   · Changed diet to dental soft as pt is edentulous. · Continue Ensure Clear BID to promote adequate intake. D/c Ensure Pudding and orange Magic Cup 1x/d per pt preference. Nutrition Assessment:      9/8: F/u. Diet advanced to regular. RD changed diet to dental soft per pt preference as he has no teeth and c/o receiving foods that are too hard to chew. S/p colonoscopy, per GI. \"Endoscopic impression is that of longstanding ulcerative colitis now with atrophy of most of the colonic mucosa and pseudopolyposis. Ulcer in rectum. \" Pt says his appetite is good and he ate oatmeal and eggs for breakfast this AM. No c/o N/V. Recorded intakes good, % meals. Likes Ensure Clear and is drinking them. Likes [employer] and [employer] but isn't eating them as he thinks the milk products are worsening his diarrhea. Will d/c. Provided written/verbal UC diet education. RD contact info provided for questions. Labs- Ca 7.6, BG 63-73-80. Meds reviewed. 9/4: F/u. Pt currently NPO and OOR at time of visit for EGD/colonoscopy. Chronic diarrhea. Chest tube removed 9/3. Recorded intakes good, mostly >50% meals. Pt consuming ONS per previous note- will continue after procedure. Labs- K 3.2, Ca 7.1, BG 59-73-97. Meds reviewed. 8/31: F/u. Diet ordered as Regular again. Called into pt's room as he is on airborne precaution for TB. Pt states he does have some difficulty chewing some foods. States food needs to be soft and most of our foods are. Will change consistency back to Mechanical Soft. Pt states he is eating okay, intakes are documented as 25-75% meals. Multiple ONS ordered. Pt states he likes the Ensure clear and Ensure Pudding and sometimes eats the [employer]. Labs: LFT's are increasing. Meds: Vit E51, folic acid, Creon, Probiotic. BM noted 8/31.    8/27;  Chart reviewed; med noted for pneumonia, TB.   Pt on airborne precautions. RD completed comprehensive assessment with pt yesterday 8/26. Multiple ONS initiated. Will continue for now. Per Pulmonology note today, SLP to be consulted. Currently receiving a regular consistency diet with fair/good documented po intake. 8/26: 59 yo male admitted for pneumonia. PMhx: ETOH use daily. Underweight per BMI of 17.9. No weight hx in EMR. Pt being tested for COVID-19. Spoke with pt over phone, states he has been steadily losing weight and reports 50lb loss over the past 1.5 years. This is 29% loss which is significant for time frame. Pt states he has had no PO intakes for the last 1-2 weeks except for a few protein bars. Prior to that his intakes were still poor, only eating 1x/day which was either a frozen meal or sandwich. Regular diet ordered here. States he ate 50% of grits and pancakes this morning. Has no teeth and states he was not able to chew the sausage or fruit. Requesting softer foods, willing to try chopped meats for now but may need even softer foods than that. Discussed ONS options, states Ensure Enlive causes him to have diarrhea. Willing to try Ensure Clear, Ensure pudding, and Magic Cup. C/o chronic diarrhea, states he takes a probiotic most days at home which helps with the diarrhea, requesting that here. Labs and meds reviewed. NaCl ordered at 75ml/hr.       Intakes:  Patient Vitals for the past 168 hrs:   % Diet Eaten   09/08/20 0840 74 %   09/04/20 1233 75 %   09/03/20 0912 100 %   09/01/20 1049 50 %     Last Weight Metric  Weight Loss Metrics 9/8/2020   Today's Wt 173 lb 1 oz   BMI 24.83 kg/m2       Malnutrition Assessment:  Malnutrition Status: Severe malnutrition     Estimated Daily Nutrient Needs:  Energy (kcal):  2034 kcals(REE 1373 x AF 1.3 + 250)  Protein (g):  68-80gm(1.2-1.4gm/kg/d)       Fluid (ml/day):  2034 ml(1ml/kg)    Nutrition Related Findings:  LBM 9/6; 2+ UE,1+ LE edema      Current Nutrition Therapies:  DIET NUTRITIONAL SUPPLEMENTS Breakfast, Lunch; Ensure Clear  DIET ONE TIME MESSAGE  DIET DENTAL SOFT (SOFT SOLID)    Anthropometric Measures:  · Height:  5' 10\" (177.8 cm)  · Current Body Wt:  73.8 kg (162 lb 11.2 oz)  · BMI: 23.3    Nutrition Diagnosis:   · Underweight related to inadequate protein-energy intake as evidenced by BMI(17.9)     8/31: Nutrition Dx resolved at this time with new weight - BMI 23.4.  9/4: Nutrition dx resolved BMI 24.7.  9/8: Nutrition dx resolved, BMI 24.8 (per RN, bed weight done with blankets,pillows,teli box,call light 3 sheets as pt refused to have them removed before weighing).     Nutrition Intervention:  Food and/or Nutrient Delivery: Continue current diet, Continue oral nutrition supplement  Nutrition Education and Counseling: No recommendations at this time  Coordination of Nutrition Care: Continued inpatient monitoring    Goals:  PO intake >75% meals + ONS with 0.5#/week weight gain next 5-7 days       Nutrition Monitoring and Evaluation:   Food/Nutrient Intake Outcomes: Food and nutrient intake, Supplement intake  Physical Signs/Symptoms Outcomes: Chewing or swallowing, GI status, Weight    Discharge Planning:    Continue current diet, Continue oral nutrition supplement     Electronically signed by Mitul Lemus on 9/8/2020

## 2020-09-08 NOTE — ANESTHESIA POSTPROCEDURE EVALUATION
Procedure(s):  ESOPHAGOGASTRODUODENOSCOPY (EGD)  COLONOSCOPY  ESOPHAGOGASTRODUODENAL (EGD) BIOPSY  COLON BIOPSY.     MAC    Anesthesia Post Evaluation      Multimodal analgesia: multimodal analgesia not used between 6 hours prior to anesthesia start to PACU discharge  Patient location during evaluation: PACU  Patient participation: complete - patient participated  Level of consciousness: awake  Pain management: adequate  Airway patency: patent  Anesthetic complications: no  Cardiovascular status: acceptable, blood pressure returned to baseline and hemodynamically stable  Respiratory status: acceptable  Hydration status: acceptable  Post anesthesia nausea and vomiting:  controlled      INITIAL Post-op Vital signs:   Vitals Value Taken Time   /75 9/8/2020  2:55 PM   Temp 36.4 °C (97.5 °F) 9/8/2020  2:55 PM   Pulse 89 9/8/2020  2:55 PM   Resp 17 9/8/2020  2:55 PM   SpO2 96 % 9/8/2020  2:55 PM

## 2020-09-08 NOTE — ADT AUTH CERT NOTES
9/4 and 9/7 Utilization Reviews  
 
   
Pneumonia - Care Day 14 (9/7/2020) by Maddy Mattson, RN  
 
   
Review Status  Review Entered Completed  9/8/2020 14:00   
   
Criteria Review Care Day: 14 Care Date: 9/7/2020 Level of Care: Step Down Guideline Day 2 Level Of Care (X) Floor Clinical Status   
(X) * No CO2 retention or acidosis 9/8/2020 14:00:55 EDT by Mykel Baumann   
  Labs: WBC 15.1, RBC 2.36, HGb 7.8, HCT 24.1, RDW 21.9, Neutro 76, potassium 3.1, anion gap 3, BUN 4, creat 0.33, calcium 7.6, protein 5.4, albumin 1.0, globulin 4.4, ALT 85, AST 98, alk phos 275   
(X) * No requirement for mechanical ventilation   
(X) * Hypotension absent 9/8/2020 14:00:55 EDT by Mykel Baumann   
  98.3, 118/76, HR 92, RR 16, O2 sat 98, Ra   
(X) * Afebrile or fever improved   
(X) * No hypoxia on room air or oxygenation improved   
(X) * Mental status improved or at baseline Activity   
(X) * Increased activity Routes   
(X) Oral hydration, medications 9/8/2020 14:00:55 EDT by Mykel Baumann   
  Meds: vit Z36 po, folic acid po, creon po tid, delzicol po 800mg tid, effer k po 40meq bid, Lasix iv 10mg * Milestone Additional Notes CHEST XR:   
Improving pulmonary edema. Persistent patchy right upper lobe   
airspace disease Still c/o scrotal and LE edema but sl better - one dose of 10mg lasix today.  Discussed his low albumin as part of the problem.  Less cough today but slight amount of hemoptysis this AM. Hb 7.8 this AM - was in the 8s after the transfusion.  K+ still low - replacing.  Declining to have dressings changed - discussed risks.  Discussed his severe weakness and need for rehab again - still not very interested in going.     
Pneumonia involving right lung (8/25/2020) /  Hemoptysis (8/25/2020) POA: extensive with internal cavitation and air fluid levels.  Staph Aureus, leukocytosis improving. CXR unchanged --pleural fluid studies + staph, BC neg, cytology neg, AFB neg smears x 3 - cultures pending, and 2nd quantiferon gold negative. -- zosyn, doxy completed, Chest tube removed 9/3   
-- Pulm following   
  
   
Pneumonia - Care Day 11 (9/4/2020) by Alcira Lei RN  
 
   
Review Status  Review Entered Completed  9/8/2020 13:53   
   
Criteria Review Care Day: 11 Care Date: 9/4/2020 Level of Care: Step Down Guideline Day 2 Level Of Care (X) Floor Clinical Status   
(X) * No CO2 retention or acidosis 9/8/2020 13:53:34 EDT by Katherine Dawn   
  Labs: WBC 17.9, RBC 2.05, HGb 7.0, HCT 21.6, RDW 21.2, potassium 3.2, glucose 59, Bun 3, Creat 0.35, calcium 7.1, bili 1.3, protein 4.8, albumin 0.9, , ASt 151, Alk phos 357   
(X) * No requirement for mechanical ventilation ( ) * Hypotension absent 9/8/2020 13:53:34 EDT by Katherine Dawn   
  97.4, 85/53, HR 92, RR 25, 02 sat 91, RA   
(X) * Afebrile or fever improved   
(X) * No hypoxia on room air or oxygenation improved   
(X) * Mental status improved or at baseline Activity   
(X) * Increased activity Routes   
(X) Oral hydration, medications 9/8/2020 13:53:34 EDT by Katherine Dawn   
  Meds: creon po tid, delzicol po 800mg tid, effer k po 40meq, NS 50 ml/hr, protonix iv 40mg, potassium iv 10meq * Milestone Additional Notes CEST XR:   
No evidence of pneumothorax following chest tube removal.   
  
Hosptialistl Feeling better except for prep for colonoscopy - reports clear results last BM.  Chest tube out yesterday.  Hb down to 7.0 so still holding anticoag - may need transfusion. Coralyn Fortune and lower endoscopy planned today.  CXR this AM pending.  K+ down with the prep - replacing. Pneumonia involving right lung (8/25/2020) /  Hemoptysis (8/25/2020) POA: extensive with internal cavitation and air fluid levels.  Staph Aureus, leukocytosis improving. CXR unchanged --pleural fluid studies + staph, BC neg, cytology neg, AFB neg smears x 3 - cultures pending, and 2nd quantiferon gold negative. -- zosyn, doxy completed, Chest tube removed 9/3   
  
EGD:   
Findings:   
Esophagus:normal   
Stomach: normal   
Duodenum/jejunum: normal .  A biopsy was taken from the duodenal mucosa for evaluation of villous atrophy or giardiasis.  Hemostasis is confirmed from biopsy sites.   
    
  
Colonoscopy:   
Findings:   
Abnormal colonoscopy from dentate line to cecum.    
There is significant mucosal atrophy and scarring with innumerable pseudopolyps throughout the entire colon.  Endoscopically this is most reminiscent of 'burned out' ulcerative colitis with pseudopolyposis.  Cold forceps biopsies obtained from the right colon and left colon from both the apparent pseuodopolyps as well as the apparent atrophic areas between.   
    
In the rectum there is a deep 'punched out' appearing ulcer which is not bleeding.  Differential diagnosis includes atypical neoplasm, ulceration from the hypothetical IBD, CMV ulceration, or stercoral ulcer.  Cold forceps biopsies obtained from the edge and center of this ulcer for histologic evaluation.   
    
 - Await pathology.   
-Usually I would consider steroid therapy but his active infection precludes the use of medications that might lead to immunosuppression.  Will begin topical (oral) mesalamine treatment as we await biopsy results and his response to treatment for cavitary pneumonia.

## 2020-09-08 NOTE — PROGRESS NOTES
Bedside and Verbal shift change report given to Jerson Rose (oncoming nurse) by Uvaldo Mckeon (offgoing nurse). Report included the following information SBAR, Kardex, Intake/Output, MAR and Recent Results.

## 2020-09-08 NOTE — PROGRESS NOTES
9/8/2020  2:25 PM  Update via chart review pt continuing o require medical management for PNA R lung, B/L PE, chronic diarrhea. Transtions of Care Plan:  RUR  14 % Moderate Risk  1. R PNA, B/L PE, check CXR today  2. Chronic Diarrhea, continue mesalamine for 6-8wks,  GI has signed off  3. Plan for IPR when stable, initial referral to [facility] declined d/t [employer] insurance termed 8/31. I emailed [facility] to verify. Pt's friend states [facility] application submitted today. Discussed additional facilities, pt agreed to [facility], choice letter signed, referral sent in [employer]. 4. COVID for placement ordered, result pending  5. D/C when stable to IPR  6. Outpatient f/u PCP  7.  Transport TBD pending progress family vs [facility]

## 2020-09-08 NOTE — PROGRESS NOTES
Jose Plummer MD  [phone] office  [phone] voicemail     Gastroenterology Progress Note    September 8, 2020  Admit Date: 8/25/2020         Narrative Assessment and Plan   · Ulcerative colitis - visual/endoscopic diagnosis based on the appearance of colon seen last week during colonoscopy. He reports ongoing symptoms but no bleeding, just frequent stool    Plan is that he continue mesalamine for 6-8 weeks and then re-evaluate his progress in the office. I'll review pathology when available and contact him if this leads to a change in his plan. I will see again as needed, but for now I am signing off. I have no GI objections to discharge. Subjective:   · I didn't have to go to the toilet overnight    Location:  4-6 stools this morning after arising  Duration: no visible bleeding  Timing: no vomiting  Radiation: still having cough   Associated Symptoms:   Aggravating/Alleviating factors:     ROS:  The previous review of systems on initial consultation / H&P is noted and reviewed. Specific changes noted above in HPI.     Current Medications:     Current Facility-Administered Medications   Medication Dose Route Frequency    potassium bicarb-citric acid (EFFER-K) tablet 40 mEq  40 mEq Oral BID    0.9% sodium chloride infusion 250 mL  250 mL IntraVENous PRN    Probiotic - USANA Probiotic  (Patient Supplied)  1 Packet Oral DAILY    mesalamine (DELZICOL) DR capsule 800 mg  800 mg Oral TID    white petrolatum-mineral oiL (EUCERIN) cream   Topical BID    lipase-protease-amylase (CREON 24,000) capsule 1 Cap  1 Cap Oral TID WITH MEALS    loperamide (IMODIUM) capsule 2 mg  2 mg Oral Q6H PRN    bismuth subsalicylate (PEPTO-BISMOL) oral suspension 30 mL  30 mL Oral Q6H PRN    cyanocobalamin (VITAMIN B12) tablet 1,000 mcg  1,000 mcg Oral DAILY    folic acid (FOLVITE) tablet 1 mg  1 mg Oral DAILY    HYDROcodone-acetaminophen (NORCO) 5-325 mg per tablet 1 Tab  1 Tab Oral Q6H PRN    morphine injection 1 mg  1 mg IntraVENous Q4H PRN    sodium chloride (NS) flush 5-10 mL  5-10 mL IntraVENous PRN    sodium chloride (NS) flush 5-40 mL  5-40 mL IntraVENous Q8H    sodium chloride (NS) flush 5-40 mL  5-40 mL IntraVENous PRN    acetaminophen (TYLENOL) tablet 650 mg  650 mg Oral Q6H PRN    Or    acetaminophen (TYLENOL) suppository 650 mg  650 mg Rectal Q6H PRN    polyethylene glycol (MIRALAX) packet 17 g  17 g Oral DAILY PRN    promethazine (PHENERGAN) tablet 12.5 mg  12.5 mg Oral Q6H PRN    Or    ondansetron (ZOFRAN) injection 4 mg  4 mg IntraVENous Q6H PRN       Objective:     VITALS:   Last 24hrs VS reviewed since prior progress note. Most recent are:  Visit Vitals  /66 (BP 1 Location: Right arm, BP Patient Position: At rest)   Pulse 73   Temp 98.1 °F (36.7 °C)   Resp 17   Ht 5' 10\" (1.778 m)   Wt 78.5 kg (173 lb 1 oz)   SpO2 97%   BMI 24.83 kg/m²     Temp (24hrs), Av.9 °F (36.6 °C), Min:97.6 °F (36.4 °C), Max:98.1 °F (36.7 °C)      Intake/Output Summary (Last 24 hours) at 2020 1209  Last data filed at 2020 1052  Gross per 24 hour   Intake 1246 ml   Output 2000 ml   Net -754 ml       EXAM:  General:  Comfortable, no distress    HEENT:  Atraumatic skull, pupils equal  Lungs:   No abnormal audible breath sounds. Speaking in complete sentences  Heart:   No abnormal audible heart sounds. Well perfused  Abdomen:   Nondistended, nontender.   No mass, guarding or rebound  Musc:  No skeletal defects or deformities  Neurologic:   Cranial nerves grossly intact, moves all 4 extremities  Psych:    Good insight. Not anxious nor agitated  Derm:   No rash, jaundice, nor palpable dermatologic mass    Lab Data Reviewed:   Recent Labs     20  0348 20  0459 20  0832   WBC 14.4* 15.1* 14.0*   HGB 8.2* 7.8* 8.3*   HCT 26.0* 24.1* 26.0*    367 341     Recent Labs     20  0348 20  0459 20  0406    137 140   K 3.9 3.1* 3.2*    105 108 CO2 33* 29 27   GLU 63* 73 80   BUN 4* 4* 4*   CREA 0.36* 0.33* 0.40*   CA 7.6* 7.6* 7.3*   MG 1.8 1.6  --    ALB 1.0* 1.0* 0.9*   TBILI 1.1* 0.9 0.8   * 85* 97*     No results found for: GLUCPOC  No results for input(s): PH, PCO2, PO2, HCO3, FIO2 in the last 72 hours. No results for input(s): INR, INREXT in the last 72 hours.         Assessment:   (See above)  Principal Problem:    Pneumonia involving right lung (8/25/2020)    Active Problems:    Hemoptysis (8/25/2020)      Hyperbilirubinemia (8/25/2020)      Weight loss, unintentional (8/25/2020)      Pleural effusion, right (8/25/2020)      Bilateral pulmonary embolism ([address] (8/25/2020)      Acute deep vein thrombosis (DVT) of both lower extremities ([address] (8/25/2020)      Alcohol use (8/25/2020)      Diarrhea (8/25/2020)      Fecal occult blood test positive (8/25/2020)      Hyponatremia with extracellular fluid depletion (8/25/2020)        Plan:   (See above)      Signed By: Shana Britton MD     9/8/2020  12:09 PM

## 2020-09-09 ENCOUNTER — APPOINTMENT (OUTPATIENT)
Dept: GENERAL RADIOLOGY | Age: 65
DRG: 166 | End: 2020-09-09
Attending: INTERNAL MEDICINE
Payer: COMMERCIAL

## 2020-09-09 ENCOUNTER — APPOINTMENT (OUTPATIENT)
Dept: ULTRASOUND IMAGING | Age: 65
DRG: 166 | End: 2020-09-09
Attending: FAMILY MEDICINE
Payer: COMMERCIAL

## 2020-09-09 LAB
ALBUMIN SERPL-MCNC: 1 G/DL (ref 3.5–5)
ALBUMIN SERPL-MCNC: 1 G/DL (ref 3.5–5)
ALBUMIN/GLOB SERPL: 0.2 {RATIO} (ref 1.1–2.2)
ALBUMIN/GLOB SERPL: 0.2 {RATIO} (ref 1.1–2.2)
ALP SERPL-CCNC: 290 U/L (ref 45–117)
ALP SERPL-CCNC: 292 U/L (ref 45–117)
ALT SERPL-CCNC: 206 U/L (ref 12–78)
ALT SERPL-CCNC: 207 U/L (ref 12–78)
ANION GAP SERPL CALC-SCNC: 2 MMOL/L (ref 5–15)
AST SERPL-CCNC: 208 U/L (ref 15–37)
AST SERPL-CCNC: 211 U/L (ref 15–37)
BASOPHILS # BLD: 0.3 K/UL (ref 0–0.1)
BASOPHILS NFR BLD: 2 % (ref 0–1)
BILIRUB DIRECT SERPL-MCNC: 0.3 MG/DL (ref 0–0.2)
BILIRUB SERPL-MCNC: 0.6 MG/DL (ref 0.2–1)
BILIRUB SERPL-MCNC: 0.8 MG/DL (ref 0.2–1)
BUN SERPL-MCNC: 4 MG/DL (ref 6–20)
BUN/CREAT SERPL: 13 (ref 12–20)
CALCIUM SERPL-MCNC: 7.7 MG/DL (ref 8.5–10.1)
CHLORIDE SERPL-SCNC: 101 MMOL/L (ref 97–108)
CO2 SERPL-SCNC: 32 MMOL/L (ref 21–32)
COVID-19, XGCOVT: NOT DETECTED
CREAT SERPL-MCNC: 0.32 MG/DL (ref 0.7–1.3)
DIFFERENTIAL METHOD BLD: ABNORMAL
EOSINOPHIL # BLD: 0 K/UL (ref 0–0.4)
EOSINOPHIL NFR BLD: 0 % (ref 0–7)
ERYTHROCYTE [DISTWIDTH] IN BLOOD BY AUTOMATED COUNT: 20.4 % (ref 11.5–14.5)
FOLATE SERPL-MCNC: 26.8 NG/ML (ref 5–21)
GLOBULIN SER CALC-MCNC: 4.3 G/DL (ref 2–4)
GLOBULIN SER CALC-MCNC: 4.5 G/DL (ref 2–4)
GLUCOSE SERPL-MCNC: 77 MG/DL (ref 65–100)
HCT VFR BLD AUTO: 23.7 % (ref 36.6–50.3)
HEALTH STATUS, XMCV2T: NORMAL
HGB BLD-MCNC: 7.6 G/DL (ref 12.1–17)
IMM GRANULOCYTES # BLD AUTO: 0 K/UL
IMM GRANULOCYTES NFR BLD AUTO: 0 %
IRON SATN MFR SERPL: 29 % (ref 20–50)
IRON SERPL-MCNC: 15 UG/DL (ref 35–150)
LYMPHOCYTES # BLD: 3.1 K/UL (ref 0.8–3.5)
LYMPHOCYTES NFR BLD: 24 % (ref 12–49)
MCH RBC QN AUTO: 33.2 PG (ref 26–34)
MCHC RBC AUTO-ENTMCNC: 32.1 G/DL (ref 30–36.5)
MCV RBC AUTO: 103.5 FL (ref 80–99)
MONOCYTES # BLD: 0.9 K/UL (ref 0–1)
MONOCYTES NFR BLD: 7 % (ref 5–13)
NEUTS BAND NFR BLD MANUAL: 3 % (ref 0–6)
NEUTS SEG # BLD: 8.7 K/UL (ref 1.8–8)
NEUTS SEG NFR BLD: 64 % (ref 32–75)
NRBC # BLD: 0 K/UL (ref 0–0.01)
NRBC BLD-RTO: 0 PER 100 WBC
PLATELET # BLD AUTO: 366 K/UL (ref 150–400)
PMV BLD AUTO: 9.5 FL (ref 8.9–12.9)
POTASSIUM SERPL-SCNC: 3.9 MMOL/L (ref 3.5–5.1)
PROT SERPL-MCNC: 5.3 G/DL (ref 6.4–8.2)
PROT SERPL-MCNC: 5.5 G/DL (ref 6.4–8.2)
RBC # BLD AUTO: 2.29 M/UL (ref 4.1–5.7)
RBC MORPH BLD: ABNORMAL
RETICS # AUTO: 0.11 M/UL (ref 0.03–0.1)
RETICS/RBC NFR AUTO: 4.7 % (ref 0.7–2.1)
SODIUM SERPL-SCNC: 135 MMOL/L (ref 136–145)
SOURCE, COVRS: NORMAL
SPECIMEN SOURCE, FCOV2M: NORMAL
SPECIMEN TYPE, XMCV1T: NORMAL
TIBC SERPL-MCNC: 51 UG/DL (ref 250–450)
VIT B12 SERPL-MCNC: 837 PG/ML (ref 193–986)
WBC # BLD AUTO: 13 K/UL (ref 4.1–11.1)

## 2020-09-09 PROCEDURE — 83540 ASSAY OF IRON: CPT

## 2020-09-09 PROCEDURE — 76705 ECHO EXAM OF ABDOMEN: CPT

## 2020-09-09 PROCEDURE — 82607 VITAMIN B-12: CPT

## 2020-09-09 PROCEDURE — 74011250637 HC RX REV CODE- 250/637: Performed by: INTERNAL MEDICINE

## 2020-09-09 PROCEDURE — 82746 ASSAY OF FOLIC ACID SERUM: CPT

## 2020-09-09 PROCEDURE — 85025 COMPLETE CBC W/AUTO DIFF WBC: CPT

## 2020-09-09 PROCEDURE — 85045 AUTOMATED RETICULOCYTE COUNT: CPT

## 2020-09-09 PROCEDURE — 74011250637 HC RX REV CODE- 250/637: Performed by: FAMILY MEDICINE

## 2020-09-09 PROCEDURE — 36415 COLL VENOUS BLD VENIPUNCTURE: CPT

## 2020-09-09 PROCEDURE — 74011250637 HC RX REV CODE- 250/637: Performed by: SPECIALIST

## 2020-09-09 PROCEDURE — 80053 COMPREHEN METABOLIC PANEL: CPT

## 2020-09-09 PROCEDURE — 80076 HEPATIC FUNCTION PANEL: CPT

## 2020-09-09 PROCEDURE — 74011250636 HC RX REV CODE- 250/636: Performed by: FAMILY MEDICINE

## 2020-09-09 PROCEDURE — 71045 X-RAY EXAM CHEST 1 VIEW: CPT

## 2020-09-09 PROCEDURE — 65660000000 HC RM CCU STEPDOWN

## 2020-09-09 RX ORDER — FUROSEMIDE 10 MG/ML
10 INJECTION INTRAMUSCULAR; INTRAVENOUS ONCE
Status: COMPLETED | OUTPATIENT
Start: 2020-09-09 | End: 2020-09-09

## 2020-09-09 RX ADMIN — HYDROCODONE BITARTRATE AND ACETAMINOPHEN 1 TABLET: 5; 325 TABLET ORAL at 09:13

## 2020-09-09 RX ADMIN — Medication: at 09:23

## 2020-09-09 RX ADMIN — PANCRELIPASE 1 CAPSULE: 24000; 76000; 120000 CAPSULE, DELAYED RELEASE PELLETS ORAL at 18:04

## 2020-09-09 RX ADMIN — MESALAMINE 800 MG: 400 CAPSULE, DELAYED RELEASE ORAL at 09:14

## 2020-09-09 RX ADMIN — FOLIC ACID 1 MG: 1 TABLET ORAL at 09:12

## 2020-09-09 RX ADMIN — MESALAMINE 800 MG: 400 CAPSULE, DELAYED RELEASE ORAL at 21:23

## 2020-09-09 RX ADMIN — PANCRELIPASE 1 CAPSULE: 24000; 76000; 120000 CAPSULE, DELAYED RELEASE PELLETS ORAL at 09:13

## 2020-09-09 RX ADMIN — CYANOCOBALAMIN TAB 500 MCG 1000 MCG: 500 TAB at 09:13

## 2020-09-09 RX ADMIN — LOPERAMIDE HYDROCHLORIDE 2 MG: 2 CAPSULE ORAL at 21:23

## 2020-09-09 RX ADMIN — MESALAMINE 800 MG: 400 CAPSULE, DELAYED RELEASE ORAL at 15:09

## 2020-09-09 RX ADMIN — Medication 10 ML: at 15:09

## 2020-09-09 RX ADMIN — Medication 10 ML: at 21:23

## 2020-09-09 RX ADMIN — PANCRELIPASE 1 CAPSULE: 24000; 76000; 120000 CAPSULE, DELAYED RELEASE PELLETS ORAL at 12:15

## 2020-09-09 RX ADMIN — FUROSEMIDE 10 MG: 10 INJECTION, SOLUTION INTRAMUSCULAR; INTRAVENOUS at 06:57

## 2020-09-09 NOTE — PROGRESS NOTES
Daily Progress Note: 9/9/2020  Shea Richard MD    Assessment/Plan:   Pneumonia involving right lung (8/25/2020) /  Hemoptysis (8/25/2020) POA: extensive with internal cavitation and air fluid levels. Staph Aureus, leukocytosis improving. CXR unchanged  --pleural fluid studies + staph, BC neg, cytology neg, AFB neg smears x 3 - cultures pending, and 2nd quantiferon gold negative. -- zosyn, doxy completed, Chest tube removed 9/3  -- Pulm following     Bilateral pulmonary embolism, acute ([facility] [address] (8/25/2020) / Acute deep vein thrombosis (DVT) of both lower extremities ([facility] [address] (8/25/2020):   - IVC filter placed   - holding anticoags due to dropping Hb and active hemoptysis and GI bleeding     Weight loss, unintentional (8/25/2020) POA: still concerning for malignancy but severe protein calorie malnutrition and empyema likely causes. Nutrition and speech therapy following  -Diet as tolerated  -calcium corrects  -GI work up pending- hoping for endoscopy tomorrow     Hyperbilirubinemia (8/25/2020) POA: unclear etiology. CT abdomen unremarkable  - resolved     Pleural effusion, right (8/25/2020) POA: empyema, pneumonia vs malignant effusion. -pulmonology following  - Chest tube placed 8/26/20     Alcohol use (8/25/2020) POA: he says he has cut down recently. -Monitor for withdrawal     Chronic Diarrhea (8/25/2020) / Fecal occult blood test positive (8/25/2020) POA: check stool studies. Monitor Hgb.    -GI following- no longer refusing any GI studies  -imodium and peptobismol PRN  -FloraQ daily- or pt's home probiotics    Hypomag/hypokalemia:  -replete and monitor        Problem List:  Problem List as of 9/9/2020 Date Reviewed: 8/25/2020          Codes Class Noted - Resolved    * (Principal) Pneumonia involving right lung ICD-10-CM: J18.9  ICD-9-CM: 486  8/25/2020 - Present        Hemoptysis ICD-10-CM: R04.2  ICD-9-CM: 786.30  8/25/2020 - Present        Hyperbilirubinemia ICD-10-CM: E80.6  ICD-9-CM: 782.4  8/25/2020 - Present        Weight loss, unintentional ICD-10-CM: R63.4  ICD-9-CM: 783.21  8/25/2020 - Present        Pleural effusion, right ICD-10-CM: J90  ICD-9-CM: 511.9  8/25/2020 - Present        Bilateral pulmonary embolism ([address] ICD-10-CM: I26.99  ICD-9-CM: 415.19  8/25/2020 - Present        Acute deep vein thrombosis (DVT) of both lower extremities (HCC) ICD-10-CM: I82.403  ICD-9-CM: 453.40  8/25/2020 - Present        Alcohol use ICD-10-CM: Z72.89  ICD-9-CM: V49.89  8/25/2020 - Present        Diarrhea ICD-10-CM: R19.7  ICD-9-CM: 787.91  8/25/2020 - Present        Fecal occult blood test positive ICD-10-CM: R19.5  ICD-9-CM: 792.1  8/25/2020 - Present        Hyponatremia with extracellular fluid depletion ICD-10-CM: E87.1  ICD-9-CM: 276.1  8/25/2020 - Present            Subjective:     60 y.o. male who is being admitted for Pneumonia involving right lung. Mr. Jorge Taylor presented to our Emergency Department today complaining of generalized weakness, diarrhea and lethargy for several months. Patient was accompanied by his friend who gave most of the history. The patient does not like seeing physicians and apparently uses vitamin supplements. He is a smoker and drinks alcohol and has been progressively weaker over several months now. He has had pleuritic right sided chest pain associated with a productive cough and hemoptysis. He feel on the floor several days ago and was unable to get up. His friends helped him up and brought him to the ED for further testing. Initial CXR done showed airspace disease in right mid lower lung zone consistent with pneumonia. This is surrounded by a moderate right-sided pleural effusion. Upon review, he was noted to be cachectic with swollen lower extremities. CT scans chest abdomen and pelvis done showed a diffuse irregular opacification throughout the right lower lobe with multiple areas of internal cavitation and air-fluid levels.  Findings are more likely related to infection than malignancy. Large right pleural effusion. Trace left pleural effusion. Bilateral pulmonary emboli. Clot burden is moderate. No evidence of right heart strain. Thrombus in the veins of the left lower extremity extending to the IVC. A small amount of thrombus is also seen in the right common iliac vein. He will be admitted for further management. (Dr Balbir Richmond)    8/26:  Feeling a little better this AM but still c/o painful, pleuritic cough and occas thick sputum. No leg/calf pains. No known exposure to ill individuals/no travel - \"just staying in the last few months. \"  No temps. WBC 31.9K. Cultures neg so far.      8/27:  Reports less cough, chest pain and feeling better overall. Appetite has returned. Chest tube placed yesterday with discharge obtained - pending cultures. Other cultures remain neg. Hb lower so cont to hold anticoag for now. GI has seen and work up when more stable. Remains on doxy, zosyn, vanc. WBC down to 28K. Bili back to normal.     8/28:  Continues to grad improve. Still with some cough. No tremor or hallucinations so far. K+ lower - replacing. WBC coming down. Afeb. CXR this AM pending. Cultures remain neg so far. Quantiferon gold test pending as of this AM.  He reports hx of chronic LE edema for years. 8/29: Pt continues to have frequent stooling. GI plans endoscopy next week if pt allows. He was not willing to participate with PT/OT/nutritionist yesterday. Says despite our best efforts, we will not get his stooling to stop until he gets his home probiotics [friend will bring it]. He refuses to eat until the stooling is better. 8/30:  Pt is reluctant to admit that his stooling has improved. Down to 2 BMs yesterday instead of 6 the day before. Tolerating PO. Willing to eat more today. He is still having hemoptysis. Wanting to get out of bed. Willing to work with PT/OT. 8/31:  Feeling better. Appetite better. TB neg so far.   First Yue Lorenzo was indeterminate - repeat pending. Afeb. WBC slowly coming down. Currently on Doxy and Zosyn. Replacing Mag and K+. Hb now stable in 8s. 9/1: Little change. Not moving about much, he reports due to chest tube being painful when he moves. Hb 7.8. WBC 16. No sign of ETOH withdrawal.     /9/2:  Now reports he wants to move around more and get up and wants to work with PT. AFB smears neg x3, AFB cultures still pending of course. WBC slowly coming down. Afeb. Hb still slowly dropping - 7.7 today. May consider prophylactic dose of lovenox and watch Hb.  GI work up pending. Arms a little more swollen. 9/3: Feeling better. Hoping for endoscopy in am. Patient would like chest tube removed prior to prep and studies if possible. Speech would like MBS.     9/4:  Feeling better except for prep for colonoscopy - reports clear results last BM. Chest tube out yesterday. Hb down to 7.0 so still holding anticoag - may need transfusion. Upper and lower endoscopy planned today. CXR this AM pending. K+ down with the prep - replacing. 9/5:  Little change. Still with occas productive cough. Now also c/o increasing LE edema and today notes scrotal edema and now wants something done about it. 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 Hb down to 6.9 today - would benefit from transfusion. Risks v benefits of transfusion discussed and he finally agreed. Appetite very good now, taking plenty of fluids now, so will DC IVFs. Low dose of lasix after the transfusion to help with the edema. 9/6:  Less edema in LEs and scrotum today but still quite a bit - will give another low dose of Lasix. Received 1 unit packed red cells yesterday. K+ sl low - replacing. Still having episodic loose stools but on questioning he reports chronic loose stools for \"years\" which would go along with his colonoscopy findings. AM CBC is still pending.   Discussed need for rehab and strongly advised he consider going with his degree of debility. Risk of falls and fractures discussed. 9/7:  Still c/o scrotal and LE edema but sl better - one dose of 10mg lasix today. Discussed his low albumin as part of the problem. Less cough today but slight amount of hemoptysis this AM. Hb 7.8 this AM - was in the 8s after the transfusion. K+ still low - replacing. Declining to have dressings changed - discussed risks. Discussed his severe weakness and need for rehab again - still not very interested in going. 9/8:  Less scrotal swelling after the lasix - > 1.5 liter out. He did get up some in room yesterday and did fairly well but still very weak. Fewer loose stools. He has mult questions about Ulcerative colitis and basics discussed with him - advised him to discuss further with GI. Eating well and has high protein diet. Albumin still low. K+ back up. Still has cough - less productive so far today. Will check CXR today. ALT higher toay - monitoring. 9/9:  Continues to have less edema with low dose of lasix yesterday. CXR looking better. Still having frequent loose stools but better than prior to admission. Hb continues to drop likely due to chronic, slow GI blood loss due to the ulcerative colitis. He will likely need episodic transfusions. LFTs look improved but full hepatic panel is pending. Less cough so far today. He was up a bit more yesterday. He reports he is now willing to go to rehab. Due to recent increased bilat LE edema and scrotal edema (although now somewhat better), will get IR to make sure the IVC filter is patent. 1145am:  Discussed with Dr Arlette Mcburney (IR) - advised ab US as start to eval IVC. Review of Systems:   A comprehensive review of systems was negative except for that written in the HPI.     Objective:   Physical Exam:   Visit Vitals  BP 97/62 (BP 1 Location: Right arm, BP Patient Position: At rest)   Pulse 73   Temp 97.5 °F (36.4 °C)   Resp 16   Ht 5' 10\" (1.778 m)   Wt 78.5 kg (173 lb 1 oz) Comment: patient refused for me to remove stuff from bed before   SpO2 97%   BMI 24.83 kg/m²    O2 Flow Rate (L/min): 2 l/min O2 Device: Room air  Temp (24hrs), Av.7 °F (36.5 °C), Min:97.5 °F (36.4 °C), Max:98.1 °F (36.7 °C)    1901 - 700  In: 0   Out: 2250 [Urine:2250]   701 - 1900  In: 0998 [P.O.:660; I.V.:856]  Out: 5217 [Urine:3380]  General:  Alert, cooperative, no distress, cachetic, ill appearing. Head:  Normocephalic, without obvious abnormality, atraumatic. Eyes:  Meno conjuntiva. PERRL, EOMs intact. Throat: Lips, mucosa, and tongue moist.   Neck: Supple, symmetrical, trachea midline, no adenopathy, thyroid: no enlargement/tenderness/nodules, no carotid bruit and no JVD. Lungs:   Better air exchange,  Clear at this moment. Chest wall: Former chest tube site right looks ok   Heart:  Regular rate and rhythm, S1, S2 normal, no murmur, click, rub or gallop. Abdomen:   Soft, non-tender. Bowel sounds normal. No masses,  No organomegaly. Extremities: no cyanosis. No calf tenderness;2+ pitting edema bilat. 2+ pitting edema in arms, No cords palpated. Pulses: 2+ and symmetric all extremities. Skin: Skin color, texture, turgor normal. No rashes   Neurologic:  Alert and oriented X 3. Fine motor of hands and fingers normal.   equal.  No tremor. Gait not tested at this time. Sensation grossly normal to touch. Gross motor of extremities normal.       Data Review:   EXAM: CT CHEST ABD PELV W CONT 20  INDICATION: weight loss  COMPARISON: Radiographs 2020  CONTRAST: 100 mL of Isovue-370. FINDINGS:   CHEST WALL: No mass or axillary lymphadenopathy. THYROID: No nodule. MEDIASTINUM: No mass or lymphadenopathy. BRYAN: No mass or lymphadenopathy. THORACIC AORTA: No dissection or aneurysm. MAIN PULMONARY ARTERY: Normal in caliber. Thrombus is seen throughout the right  lower lobe pulmonary arteries.  Thrombus is seen in the left lower lobe pulmonary  artery extending into the anterior and lateral basilar segments. TRACHEA/BRONCHI: Patent. ESOPHAGUS: No wall thickening or dilatation. HEART: Normal in size. PLEURA: There is a large right pleural effusion with enhancement of the pleural  surfaces. Trace left pleural effusion. LUNGS: There is irregular opacification throughout the right lower lobe. There  are multiple areas of internal cavitation with air-fluid levels. Small areas of  atelectasis or scarring are seen in the lingula and inferior left lower lobe. LIVER: No mass. BILIARY TREE: Gallbladder is within normal limits. CBD is not dilated. SPLEEN: within normal limits. PANCREAS: No mass or ductal dilatation. ADRENALS: Unremarkable. KIDNEYS: Small nonobstructive stones are seen in the bilateral kidneys. No  evidence of hydronephrosis. No mass. STOMACH: Unremarkable. SMALL BOWEL: No dilatation or wall thickening. COLON: No dilatation or wall thickening. APPENDIX: Unremarkable  PERITONEUM: No ascites or pneumoperitoneum. RETROPERITONEUM: No lymphadenopathy or aortic aneurysm. REPRODUCTIVE ORGANS: Unremarkable  URINARY BLADDER: No mass or calculus. BONES: No destructive bone lesion. ABDOMINAL WALL: No mass or hernia. ADDITIONAL COMMENTS: There is thrombus in the left femoral veins extending into  the left iliac veins into the IVC. A small amount of thrombus is also present in  the right common iliac vein. IMPRESSION:  1. Diffuse irregular opacification throughout the right lower lobe with multiple  areas of internal cavitation and air-fluid levels. Findings are more likely  related to infection than malignancy. 2. Large right pleural effusion. Trace left pleural effusion. 3. Bilateral pulmonary emboli. Clot burden is moderate. No evidence of right  heart strain. 4. Thrombus in the veins of the left lower extremity extending to the IVC. A  small amount of thrombus is also seen in the right common iliac vein.       EXAM: IR THORACENTESIS/INSERT CHEST TUBE 8/26/20  INDICATION:  Request placement of large size pigtail catheter. Possible empyema. PROCEDURE:  Available history and imaging was reviewed which demonstrates a large partially  loculated right pleural effusion. Possibility of empyema, tuberculosis and other  etiologies are being considered clinically. Specific request for larger pigtail  catheter placement. Following informed consent the patient was evaluated with ultrasound in the  angina/catheter lab department. This demonstrates a large right pleural  effusion. The location of the pleural effusion was marked and then the area was prepped  and draped. 1% lidocaine was used for local anesthesia. A small incision was made with an 11  blade. A micropuncture needle was used under direct fluoroscopic guidance to access the  pleural fluid. When this was achieved the microguidewire was placed followed by  a 5 [place] dilator. Through this a short [address] guidewire  was placed. The track was then dilated to 12 [place]. This was then followed by  placement of a 12 [place] Abs[place] pigtail catheter. The string for the pigtail  was removed prior to placement. The catheter was positioned under fluoroscopic  guidance and then secured in place at the skin with a 2-0 Prolene suture. The  site was then dressed with a Tegaderm Bioclusive dressing and will be connected  to a Pleur-evac for subsequent drainage. Prior to this approximately 100 mL of fluid was obtained and sent for cytology  as well as multiple cultures including aerobic, anaerobic, AFB and fungal.   The fluid was withdrawn was mildly cloudy serosanguineous in color. I did not  notice any foul smell. The procedure was adequately tolerated without significant blood loss or  evidence of complication. Final digital image of the chest was saved documenting catheter position.   IMPRESSION:   Successful ultrasound and fluoroscopic guided placement of 12 [place] right-sided  chest tube. Patient will have ongoing drainage from Pleur-evac in his hospital  bed/room. Care and monitoring as per medical service and nursing service. ECHO 8/27/20  Interpretation Summary   Result status: Final result    · Image quality for this study was technically difficult. · Echo study was technically difficulty and limited due to patient's tolerance. · LV: Estimated LVEF is 55 - 60%. Normal cavity size, wall thickness and systolic function (ejection fraction normal). Wall motion: normal. Mild (grade 1) left ventricular diastolic dysfunction. · Pericardium: Small pericardial effusion adjacent to right ventricle. Portable AP upright view of the chest. 9/1/20  Direct comparison made to prior chest x-ray dated September 1, 2020. Cardiomediastinal silhouette is stable. Right basilar chest tube is unchanged in  position. There No residual pneumothorax is visualized. There are small  bilateral pleural effusions. There are persistent increased interstitial  markings at the right lung. There is persistent bibasilar patchy airspace  disease, right greater than left. IMPRESSION: No residual pneumothorax visualized    CXR 9/4/20  FINDINGS: AP portable imaging of the chest performed at 5:14 AM demonstrates a  stable cardiomediastinal silhouette. The right basilar chest tube is been  removed. Diffuse airspace disease throughout the right lung is unchanged. Small  right pleural effusion persists. No pneumothorax is visualized. There is  unchanged left basilar atelectasis. No significant osseous abnormalities are  seen.   IMPRESSION: No evidence of pneumothorax following chest tube removal.    Recent Days:  Recent Labs     09/09/20  0111 09/08/20  0348 09/07/20  0459   WBC 13.0* 14.4* 15.1*   HGB 7.6* 8.2* 7.8*   HCT 23.7* 26.0* 24.1*    384 367     Recent Labs     09/09/20  0111 09/08/20  0348 09/07/20  0459   * 136 137   K 3.9 3.9 3.1*    101 105   CO2 32 33* 29 GLU 77 63* 73   BUN 4* 4* 4*   CREA 0.32* 0.36* 0.33*   CA 7.7* 7.6* 7.6*   MG  --  1.8 1.6   ALB 1.0* 1.0* 1.0*   TBILI 0.8 1.1* 0.9   * 342* 85*     No results for input(s): PH, PCO2, PO2, HCO3, FIO2 in the last 72 hours. 24 Hour Results:  Recent Results (from the past 24 hour(s))   SARS-COV-2    Collection Time: 09/08/20  3:31 PM   Result Value Ref Range    Specimen source Nasopharyngeal      SARS-CoV-2 PENDING     SARS-CoV-2 PENDING     Specimen source Nasopharyngeal      COVID-19 rapid test PENDING     Specimen type NP Swab      Health status PENDING     COVID-19 PENDING    CBC WITH AUTOMATED DIFF    Collection Time: 09/09/20  1:11 AM   Result Value Ref Range    WBC 13.0 (H) 4.1 - 11.1 K/uL    RBC 2.29 (L) 4.10 - 5.70 M/uL    HGB 7.6 (L) 12.1 - 17.0 g/dL    HCT 23.7 (L) 36.6 - 50.3 %    .5 (H) 80.0 - 99.0 FL    MCH 33.2 26.0 - 34.0 PG    MCHC 32.1 30.0 - 36.5 g/dL    RDW 20.4 (H) 11.5 - 14.5 %    PLATELET 425 621 - 302 K/uL    MPV 9.5 8.9 - 12.9 FL    NRBC 0.0 0  WBC    ABSOLUTE NRBC 0.00 0.00 - 0.01 K/uL    NEUTROPHILS 64 32 - 75 %    BAND NEUTROPHILS 3 0 - 6 %    LYMPHOCYTES 24 12 - 49 %    MONOCYTES 7 5 - 13 %    EOSINOPHILS 0 0 - 7 %    BASOPHILS 2 (H) 0 - 1 %    IMMATURE GRANULOCYTES 0 %    ABS. NEUTROPHILS 8.7 (H) 1.8 - 8.0 K/UL    ABS. LYMPHOCYTES 3.1 0.8 - 3.5 K/UL    ABS. MONOCYTES 0.9 0.0 - 1.0 K/UL    ABS. EOSINOPHILS 0.0 0.0 - 0.4 K/UL    ABS. BASOPHILS 0.3 (H) 0.0 - 0.1 K/UL    ABS. IMM.  GRANS. 0.0 K/UL    DF MANUAL      RBC COMMENTS ANISOCYTOSIS  1+       METABOLIC PANEL, COMPREHENSIVE    Collection Time: 09/09/20  1:11 AM   Result Value Ref Range    Sodium 135 (L) 136 - 145 mmol/L    Potassium 3.9 3.5 - 5.1 mmol/L    Chloride 101 97 - 108 mmol/L    CO2 32 21 - 32 mmol/L    Anion gap 2 (L) 5 - 15 mmol/L    Glucose 77 65 - 100 mg/dL    BUN 4 (L) 6 - 20 MG/DL    Creatinine 0.32 (L) 0.70 - 1.30 MG/DL    BUN/Creatinine ratio 13 12 - 20      GFR est AA >60 >60 ml/min/1.73m2 GFR est non-AA >60 >60 ml/min/1.73m2    Calcium 7.7 (L) 8.5 - 10.1 MG/DL    Bilirubin, total 0.8 0.2 - 1.0 MG/DL    ALT (SGPT) 207 (H) 12 - 78 U/L    AST (SGOT) 208 (H) 15 - 37 U/L    Alk. phosphatase 290 (H) 45 - 117 U/L    Protein, total 5.5 (L) 6.4 - 8.2 g/dL    Albumin 1.0 (L) 3.5 - 5.0 g/dL    Globulin 4.5 (H) 2.0 - 4.0 g/dL    A-G Ratio 0.2 (L) 1.1 - 2.2         Medications reviewed  Current Facility-Administered Medications   Medication Dose Route Frequency    0.9% sodium chloride infusion 250 mL  250 mL IntraVENous PRN    Probiotic - USANA Probiotic  (Patient Supplied)  1 Packet Oral DAILY    mesalamine (DELZICOL) DR capsule 800 mg  800 mg Oral TID    white petrolatum-mineral oiL (EUCERIN) cream   Topical BID    lipase-protease-amylase (CREON 24,000) capsule 1 Cap  1 Cap Oral TID WITH MEALS    loperamide (IMODIUM) capsule 2 mg  2 mg Oral Q6H PRN    bismuth subsalicylate (PEPTO-BISMOL) oral suspension 30 mL  30 mL Oral Q6H PRN    cyanocobalamin (VITAMIN B12) tablet 1,000 mcg  1,000 mcg Oral DAILY    folic acid (FOLVITE) tablet 1 mg  1 mg Oral DAILY    HYDROcodone-acetaminophen (NORCO) 5-325 mg per tablet 1 Tab  1 Tab Oral Q6H PRN    morphine injection 1 mg  1 mg IntraVENous Q4H PRN    sodium chloride (NS) flush 5-10 mL  5-10 mL IntraVENous PRN    sodium chloride (NS) flush 5-40 mL  5-40 mL IntraVENous Q8H    sodium chloride (NS) flush 5-40 mL  5-40 mL IntraVENous PRN    acetaminophen (TYLENOL) tablet 650 mg  650 mg Oral Q6H PRN    Or    acetaminophen (TYLENOL) suppository 650 mg  650 mg Rectal Q6H PRN    polyethylene glycol (MIRALAX) packet 17 g  17 g Oral DAILY PRN    promethazine (PHENERGAN) tablet 12.5 mg  12.5 mg Oral Q6H PRN    Or    ondansetron (ZOFRAN) injection 4 mg  4 mg IntraVENous Q6H PRN     Care Plan discussed with: Patient/Nurse/IR/GI  Total time spent with patient: 30 minutes.   Millie Muñiz MD

## 2020-09-09 NOTE — PROGRESS NOTES
9/9/2020  2:42 PM  Update via chart review, pt is continuing to require medical management for R lung PNA, B/L PE, chronic diarrhea    Transtions of Care Plan:  RUR  14 % Moderate Risk  1. R PNA, B/L PE, US today to eval IVC filter   2. Chronic Diarrhea, continue mesalamine for 6-8wks,  GI has signed off  3. Plan for IPR when stable, initial referral to [facility] declined d/t [employer] insurance termed 8/31. JF reviewed again, declined. Referral to [facility] pending    4. COVID for placement ordered, result pending  5. D/C when stable to IPR  6. Outpatient f/u PCP  7. Transport TBD pending progress family vs WC ESTRELLA Hooker       .

## 2020-09-09 NOTE — PROGRESS NOTES
GI Note    Discussed with Dr. Asad White Mr. Jorge Taylor' elevated liver enzymes. I am attributing this to immune activation related to his infectious process. On presentation he had intrahepatic cholestasis and elevated bilirubin which is resolved. There was an increase in liver enzymes yesterday but declining significantly today. I suspect his liver enzymes will decline with time and his liver function remains normal.  CT has shown no liver parenchymal abnormality or stones. Plan is that we retest those values on his follow up with me in the office after discharge.   Clyde Dietrich MD

## 2020-09-09 NOTE — PROGRESS NOTES
Problem: Falls - Risk of  Goal: *Absence of Falls  Description: Document Leonardo Coles Fall Risk and appropriate interventions in the flowsheet.   Outcome: Progressing Towards Goal  Note: Fall Risk Interventions:  Mobility Interventions: Bed/chair exit alarm, Communicate number of staff needed for ambulation/transfer, Patient to call before getting OOB         Medication Interventions: Bed/chair exit alarm, Patient to call before getting OOB, Teach patient to arise slowly    Elimination Interventions: Bed/chair exit alarm, Call light in reach, Stay With Me (per policy)    History of Falls Interventions: Bed/chair exit alarm

## 2020-09-09 NOTE — PROGRESS NOTES
Verbal shift change report given to Nakia (oncoming nurse) by Morelia Manley (offgoing nurse). Report included the following information SBAR, Kardex and MAR.

## 2020-09-09 NOTE — PROGRESS NOTES
Bedside and Verbal shift change report given to Mehul Pichardo (oncoming nurse) by Brice Dimas RN (offgoing nurse). Report included the following information SBAR, Kardex, MAR, Accordion and Recent Results.

## 2020-09-09 NOTE — PROGRESS NOTES
Bedside shift change report given to Nakia (oncoming nurse) by Marley/Betsy (offgoing nurse). Report included the following information SBAR, Kardex, Procedure Summary and MAR.

## 2020-09-09 NOTE — PROGRESS NOTES
Called MD Laurence Landeros for low BP of 82/48 left arm; HR 82. Right arm 83/53; HR 83. MD is ok, no further actions needed at this time.

## 2020-09-10 ENCOUNTER — APPOINTMENT (OUTPATIENT)
Dept: CT IMAGING | Age: 65
DRG: 166 | End: 2020-09-10
Attending: FAMILY MEDICINE
Payer: COMMERCIAL

## 2020-09-10 ENCOUNTER — APPOINTMENT (OUTPATIENT)
Dept: GENERAL RADIOLOGY | Age: 65
DRG: 166 | End: 2020-09-10
Attending: INTERNAL MEDICINE
Payer: COMMERCIAL

## 2020-09-10 LAB
ALBUMIN SERPL-MCNC: 1 G/DL (ref 3.5–5)
ALBUMIN/GLOB SERPL: 0.2 {RATIO} (ref 1.1–2.2)
ALP SERPL-CCNC: 309 U/L (ref 45–117)
ALT SERPL-CCNC: 169 U/L (ref 12–78)
ANION GAP SERPL CALC-SCNC: 2 MMOL/L (ref 5–15)
AST SERPL-CCNC: 155 U/L (ref 15–37)
BASOPHILS # BLD: 0 K/UL (ref 0–0.1)
BASOPHILS NFR BLD: 0 % (ref 0–1)
BILIRUB SERPL-MCNC: 0.6 MG/DL (ref 0.2–1)
BUN SERPL-MCNC: 5 MG/DL (ref 6–20)
BUN/CREAT SERPL: 14 (ref 12–20)
CALCIUM SERPL-MCNC: 7.6 MG/DL (ref 8.5–10.1)
CHLORIDE SERPL-SCNC: 101 MMOL/L (ref 97–108)
CO2 SERPL-SCNC: 33 MMOL/L (ref 21–32)
CREAT SERPL-MCNC: 0.36 MG/DL (ref 0.7–1.3)
DIFFERENTIAL METHOD BLD: ABNORMAL
EOSINOPHIL # BLD: 0 K/UL (ref 0–0.4)
EOSINOPHIL NFR BLD: 0 % (ref 0–7)
ERYTHROCYTE [DISTWIDTH] IN BLOOD BY AUTOMATED COUNT: 19.9 % (ref 11.5–14.5)
FERRITIN SERPL-MCNC: 619 NG/ML (ref 26–388)
GLOBULIN SER CALC-MCNC: 4.7 G/DL (ref 2–4)
GLUCOSE SERPL-MCNC: 67 MG/DL (ref 65–100)
HCT VFR BLD AUTO: 24 % (ref 36.6–50.3)
HGB BLD-MCNC: 7.7 G/DL (ref 12.1–17)
IMM GRANULOCYTES # BLD AUTO: 0 K/UL
IMM GRANULOCYTES NFR BLD AUTO: 0 %
LYMPHOCYTES # BLD: 2.3 K/UL (ref 0.8–3.5)
LYMPHOCYTES NFR BLD: 16 % (ref 12–49)
MCH RBC QN AUTO: 32.8 PG (ref 26–34)
MCHC RBC AUTO-ENTMCNC: 32.1 G/DL (ref 30–36.5)
MCV RBC AUTO: 102.1 FL (ref 80–99)
MONOCYTES # BLD: 0.4 K/UL (ref 0–1)
MONOCYTES NFR BLD: 3 % (ref 5–13)
NEUTS BAND NFR BLD MANUAL: 1 % (ref 0–6)
NEUTS SEG # BLD: 11.9 K/UL (ref 1.8–8)
NEUTS SEG NFR BLD: 80 % (ref 32–75)
NRBC # BLD: 0 K/UL (ref 0–0.01)
NRBC BLD-RTO: 0 PER 100 WBC
PLATELET # BLD AUTO: 402 K/UL (ref 150–400)
PMV BLD AUTO: 9.5 FL (ref 8.9–12.9)
POTASSIUM SERPL-SCNC: 3.5 MMOL/L (ref 3.5–5.1)
PROT SERPL-MCNC: 5.7 G/DL (ref 6.4–8.2)
RBC # BLD AUTO: 2.35 M/UL (ref 4.1–5.7)
RBC MORPH BLD: ABNORMAL
SODIUM SERPL-SCNC: 136 MMOL/L (ref 136–145)
WBC # BLD AUTO: 14.6 K/UL (ref 4.1–11.1)

## 2020-09-10 PROCEDURE — 82390 ASSAY OF CERULOPLASMIN: CPT

## 2020-09-10 PROCEDURE — 36415 COLL VENOUS BLD VENIPUNCTURE: CPT

## 2020-09-10 PROCEDURE — 82728 ASSAY OF FERRITIN: CPT

## 2020-09-10 PROCEDURE — 74011250637 HC RX REV CODE- 250/637: Performed by: SPECIALIST

## 2020-09-10 PROCEDURE — 80053 COMPREHEN METABOLIC PANEL: CPT

## 2020-09-10 PROCEDURE — 82103 ALPHA-1-ANTITRYPSIN TOTAL: CPT

## 2020-09-10 PROCEDURE — 71045 X-RAY EXAM CHEST 1 VIEW: CPT

## 2020-09-10 PROCEDURE — 74011250637 HC RX REV CODE- 250/637: Performed by: FAMILY MEDICINE

## 2020-09-10 PROCEDURE — 85025 COMPLETE CBC W/AUTO DIFF WBC: CPT

## 2020-09-10 PROCEDURE — 74011000636 HC RX REV CODE- 636: Performed by: RADIOLOGY

## 2020-09-10 PROCEDURE — 86038 ANTINUCLEAR ANTIBODIES: CPT

## 2020-09-10 PROCEDURE — 74011250637 HC RX REV CODE- 250/637: Performed by: INTERNAL MEDICINE

## 2020-09-10 PROCEDURE — 71260 CT THORAX DX C+: CPT

## 2020-09-10 PROCEDURE — 65660000000 HC RM CCU STEPDOWN

## 2020-09-10 RX ORDER — LANOLIN ALCOHOL/MO/W.PET/CERES
1 CREAM (GRAM) TOPICAL
Status: DISCONTINUED | OUTPATIENT
Start: 2020-09-10 | End: 2020-09-16 | Stop reason: HOSPADM

## 2020-09-10 RX ADMIN — HYDROCODONE BITARTRATE AND ACETAMINOPHEN 1 TABLET: 5; 325 TABLET ORAL at 02:39

## 2020-09-10 RX ADMIN — IOPAMIDOL 100 ML: 755 INJECTION, SOLUTION INTRAVENOUS at 12:00

## 2020-09-10 RX ADMIN — Medication: at 17:53

## 2020-09-10 RX ADMIN — PANCRELIPASE 1 CAPSULE: 24000; 76000; 120000 CAPSULE, DELAYED RELEASE PELLETS ORAL at 08:54

## 2020-09-10 RX ADMIN — FERROUS SULFATE TAB 325 MG (65 MG ELEMENTAL FE) 325 MG: 325 (65 FE) TAB at 08:54

## 2020-09-10 RX ADMIN — CYANOCOBALAMIN TAB 500 MCG 1000 MCG: 500 TAB at 08:54

## 2020-09-10 RX ADMIN — LOPERAMIDE HYDROCHLORIDE 2 MG: 2 CAPSULE ORAL at 08:54

## 2020-09-10 RX ADMIN — Medication 10 ML: at 06:00

## 2020-09-10 RX ADMIN — Medication 10 ML: at 23:21

## 2020-09-10 RX ADMIN — PANCRELIPASE 1 CAPSULE: 24000; 76000; 120000 CAPSULE, DELAYED RELEASE PELLETS ORAL at 12:54

## 2020-09-10 RX ADMIN — MESALAMINE 800 MG: 400 CAPSULE, DELAYED RELEASE ORAL at 15:08

## 2020-09-10 RX ADMIN — LOPERAMIDE HYDROCHLORIDE 2 MG: 2 CAPSULE ORAL at 15:08

## 2020-09-10 RX ADMIN — PANCRELIPASE 1 CAPSULE: 24000; 76000; 120000 CAPSULE, DELAYED RELEASE PELLETS ORAL at 17:53

## 2020-09-10 RX ADMIN — Medication 10 ML: at 15:08

## 2020-09-10 RX ADMIN — Medication: at 10:17

## 2020-09-10 RX ADMIN — MESALAMINE 800 MG: 400 CAPSULE, DELAYED RELEASE ORAL at 08:53

## 2020-09-10 RX ADMIN — FOLIC ACID 1 MG: 1 TABLET ORAL at 08:54

## 2020-09-10 RX ADMIN — MESALAMINE 800 MG: 400 CAPSULE, DELAYED RELEASE ORAL at 23:18

## 2020-09-10 NOTE — PROGRESS NOTES
GI note    Liver enzymes a little better, continues with normal liver function. I have no objections to discharge and will follow up in regards colitis and liver enzyme elevation in the office.   Cristiana Alfaro MD

## 2020-09-10 NOTE — PROGRESS NOTES
Transtion of Care Plan:  RUR  14 % low risk  1. R PNA, B/L PE, US today to eval IVC filter   2. Chronic Diarrhea, continue mesalamine for 6-8wks,  GI has signed off  3. [facility] accepted pt pending active insurance; [employer] a few days ago. Will f/u on Friday  4. COVID negative  5. D/C when stable  and pt has active insurance to [facility]  6. Outpatient f/u PCP  7. Transport TBD pending progress family vs WC eleuterio Bojorquez

## 2020-09-10 NOTE — PROGRESS NOTES
Bedside and Verbal shift change report given to HARJIT Castle (oncoming nurse) by Dewayne Llamas (offgoing nurse). Report included the following information SBAR, Kardex, MAR, Accordion and Recent Results.

## 2020-09-10 NOTE — PROGRESS NOTES
Daily Progress Note: 9/10/2020  Tish Levy MD-PCP    Assessment/Plan:   Pneumonia involving right lung (8/25/2020) /  Hemoptysis (8/25/2020) POA: extensive with internal cavitation and air fluid levels. Staph Aureus, leukocytosis improving. CXR unchanged  --pleural fluid studies + staph, BC neg, cytology neg, AFB neg smears x 3 - cultures pending, and 2nd quantiferon gold negative. -- zosyn, doxy completed, Chest tube removed 9/3  -- Pulm following     Bilateral pulmonary embolism, acute ([address] (8/25/2020) / Acute deep vein thrombosis (DVT) of both lower extremities ([address] (8/25/2020):   - IVC filter placed   - holding anticoags due to dropping Hb and active hemoptysis and GI bleeding     Weight loss, unintentional (8/25/2020) POA:t severe protein calorie malnutrition, ulcerative colitis and empyema likely causes. Nutrition and speech therapy following  -Diet as tolerated  -calcium corrects  -GI follow up outpt     Hyperbilirubinemia (8/25/2020) POA: unclear etiology. CTs as under data review. - resolved     Pleural effusion, right (8/25/2020) POA: empyema, pneumonia vs malignant effusion. -pulmonology following  - Chest tube placed 8/26/20 and removed 9/3     Alcohol use (8/25/2020) POA: he says he has cut down recently. - no withdrawal occured     Chronic Diarrhea (8/25/2020) / Ulcerative colitis/ Fecal occult blood test positive (8/25/2020) POA:  Monitor Hgb.    -GI following  -imodium and peptobismol PRN  -FloraQ daily- or pt's home probiotics  -mesalamine has helped    Hypomag/hypokalemia:  -replete and monitor    GI blood loss/blood loss anemia/iron def anemia  - iron supplement        Problem List:  Problem List as of 9/10/2020 Date Reviewed: 8/25/2020          Codes Class Noted - Resolved    * (Principal) Pneumonia involving right lung ICD-10-CM: J18.9  ICD-9-CM: 486  8/25/2020 - Present        Hemoptysis ICD-10-CM: R04.2  ICD-9-CM: 786.30  8/25/2020 - Present Hyperbilirubinemia ICD-10-CM: E80.6  ICD-9-CM: 782.4  8/25/2020 - Present        Weight loss, unintentional ICD-10-CM: R63.4  ICD-9-CM: 783.21  8/25/2020 - Present        Pleural effusion, right ICD-10-CM: J90  ICD-9-CM: 511.9  8/25/2020 - Present        Bilateral pulmonary embolism ([address] ICD-10-CM: I26.99  ICD-9-CM: 415.19  8/25/2020 - Present        Acute deep vein thrombosis (DVT) of both lower extremities (HCC) ICD-10-CM: I82.403  ICD-9-CM: 453.40  8/25/2020 - Present        Alcohol use ICD-10-CM: Z72.89  ICD-9-CM: V49.89  8/25/2020 - Present        Diarrhea ICD-10-CM: R19.7  ICD-9-CM: 787.91  8/25/2020 - Present        Fecal occult blood test positive ICD-10-CM: R19.5  ICD-9-CM: 792.1  8/25/2020 - Present        Hyponatremia with extracellular fluid depletion ICD-10-CM: E87.1  ICD-9-CM: 276.1  8/25/2020 - Present            Subjective:     60 y.o. male who is being admitted for Pneumonia involving right lung. Mr. Joey Peters presented to our Emergency Department today complaining of generalized weakness, diarrhea and lethargy for several months. Patient was accompanied by his friend who gave most of the history. The patient does not like seeing physicians and apparently uses vitamin supplements. He is a smoker and drinks alcohol and has been progressively weaker over several months now. He has had pleuritic right sided chest pain associated with a productive cough and hemoptysis. He feel on the floor several days ago and was unable to get up. His friends helped him up and brought him to the ED for further testing. Initial CXR done showed airspace disease in right mid lower lung zone consistent with pneumonia. This is surrounded by a moderate right-sided pleural effusion. Upon review, he was noted to be cachectic with swollen lower extremities.  CT scans chest abdomen and pelvis done showed a diffuse irregular opacification throughout the right lower lobe with multiple areas of internal cavitation and air-fluid levels. Findings are more likely related to infection than malignancy. Large right pleural effusion. Trace left pleural effusion. Bilateral pulmonary emboli. Clot burden is moderate. No evidence of right heart strain. Thrombus in the veins of the left lower extremity extending to the IVC. A small amount of thrombus is also seen in the right common iliac vein. He will be admitted for further management. (Dr Yonis Castillo)    8/26:  Feeling a little better this AM but still c/o painful, pleuritic cough and occas thick sputum. No leg/calf pains. No known exposure to ill individuals/no travel - \"just staying in the last few months. \"  No temps. WBC 31.9K. Cultures neg so far.      8/27:  Reports less cough, chest pain and feeling better overall. Appetite has returned. Chest tube placed yesterday with discharge obtained - pending cultures. Other cultures remain neg. Hb lower so cont to hold anticoag for now. GI has seen and work up when more stable. Remains on doxy, zosyn, vanc. WBC down to 28K. Bili back to normal.     8/28:  Continues to grad improve. Still with some cough. No tremor or hallucinations so far. K+ lower - replacing. WBC coming down. Afeb. CXR this AM pending. Cultures remain neg so far. Quantiferon gold test pending as of this AM.  He reports hx of chronic LE edema for years. 8/29: Pt continues to have frequent stooling. GI plans endoscopy next week if pt allows. He was not willing to participate with PT/OT/nutritionist yesterday. Says despite our best efforts, we will not get his stooling to stop until he gets his home probiotics [friend will bring it]. He refuses to eat until the stooling is better. 8/30:  Pt is reluctant to admit that his stooling has improved. Down to 2 BMs yesterday instead of 6 the day before. Tolerating PO. Willing to eat more today. He is still having hemoptysis. Wanting to get out of bed. Willing to work with PT/OT. 8/31:  Feeling better. Appetite better. TB neg so far. First Quantiferon Gold was indeterminate - repeat pending. Afeb. WBC slowly coming down. Currently on Doxy and Zosyn. Replacing Mag and K+. Hb now stable in 8s. 9/1: Little change. Not moving about much, he reports due to chest tube being painful when he moves. Hb 7.8. WBC 16. No sign of ETOH withdrawal.     /9/2:  Now reports he wants to move around more and get up and wants to work with PT. AFB smears neg x3, AFB cultures still pending of course. WBC slowly coming down. Afeb. Hb still slowly dropping - 7.7 today. May consider prophylactic dose of lovenox and watch Hb.  GI work up pending. Arms a little more swollen. 9/3: Feeling better. Hoping for endoscopy in am. Patient would like chest tube removed prior to prep and studies if possible. Speech would like MBS.     9/4:  Feeling better except for prep for colonoscopy - reports clear results last BM. Chest tube out yesterday. Hb down to 7.0 so still holding anticoag - may need transfusion. Upper and lower endoscopy planned today. CXR this AM pending. K+ down with the prep - replacing. 9/5:  Little change. Still with occas productive cough. Now also c/o increasing LE edema and today notes scrotal edema and now wants something done about it. 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 Hb down to 6.9 today - would benefit from transfusion. Risks v benefits of transfusion discussed and he finally agreed. Appetite very good now, taking plenty of fluids now, so will DC IVFs. Low dose of lasix after the transfusion to help with the edema. 9/6:  Less edema in LEs and scrotum today but still quite a bit - will give another low dose of Lasix. Received 1 unit packed red cells yesterday. K+ sl low - replacing. Still having episodic loose stools but on questioning he reports chronic loose stools for \"years\" which would go along with his colonoscopy findings. AM CBC is still pending. Discussed need for rehab and strongly advised he consider going with his degree of debility. Risk of falls and fractures discussed. 9/7:  Still c/o scrotal and LE edema but sl better - one dose of 10mg lasix today. Discussed his low albumin as part of the problem. Less cough today but slight amount of hemoptysis this AM. Hb 7.8 this AM - was in the 8s after the transfusion. K+ still low - replacing. Declining to have dressings changed - discussed risks. Discussed his severe weakness and need for rehab again - still not very interested in going. 9/8:  Less scrotal swelling after the lasix - > 1.5 liter out. He did get up some in room yesterday and did fairly well but still very weak. Fewer loose stools. He has mult questions about Ulcerative colitis and basics discussed with him - advised him to discuss further with GI. Eating well and has high protein diet. Albumin still low. K+ back up. Still has cough - less productive so far today. Will check CXR today. ALT higher toay - monitoring. 9/9:  Continues to have less edema with low dose of lasix yesterday. CXR looking better. Still having frequent loose stools but better than prior to admission. Hb continues to drop likely due to chronic, slow GI blood loss due to the ulcerative colitis. He will likely need episodic transfusions. LFTs look improved but full hepatic panel is pending. Less cough so far today. He was up a bit more yesterday. He reports he is now willing to go to rehab. Due to recent increased bilat LE edema and scrotal edema (although now somewhat better), will get IR to make sure the IVC filter is patent. 1145am:  Discussed with Dr Boone Jorge (IR) - advised ab US as start to eval IVC. 9/10:  Fewer loose stools. Less scrotal edema and LE edema. Ab US with IVC not completely seen due to bowel gas - will ask IR to comment. LFTs improved. CXR improving. Hb 7.7. He reports he did not get up yesterday.       Review of Systems:   A comprehensive review of systems was negative except for that written in the HPI. Objective:   Physical Exam:   Visit Vitals  BP 95/59 (BP 1 Location: Right arm, BP Patient Position: At rest)   Pulse 87   Temp 97.9 °F (36.6 °C)   Resp 20   Ht 5' 10\" (1.778 m)   Wt 78.5 kg (173 lb 1 oz) Comment: patient refused for me to remove stuff from bed before   SpO2 96%   BMI 24.83 kg/m²    O2 Flow Rate (L/min): 2 l/min O2 Device: Room air  Temp (24hrs), Av.7 °F (36.5 °C), Min:97.3 °F (36.3 °C), Max:98.1 °F (36.7 °C)    1901 - 09/10 0700  In: 250 [P.O.:250]  Out: 950 [Urine:950]    07 -  1900  In: 900 [P.O.:900]  Out: 4225 [Urine:4225]  General:  Alert, cooperative, no distress, cachetic, ill appearing. Head:  Normocephalic, without obvious abnormality, atraumatic. Eyes:  [place] conjuntiva. PERRL, EOMs intact. Throat: Lips, mucosa, and tongue moist.   Neck: Supple, symmetrical, trachea midline, no adenopathy, thyroid: no enlargement/tenderness/nodules, no carotid bruit and no JVD. Lungs:   Better air exchange,  Clear at this moment. Chest wall: Former chest tube site right looks ok   Heart:  Regular rate and rhythm, S1, S2 normal, no murmur, click, rub or gallop. Abdomen:   Soft, non-tender. Bowel sounds normal. No masses,  No organomegaly. Extremities: no cyanosis. No calf tenderness;2+ pitting edema bilat. LEs. Less in arms and scrotum, No cords palpated. Pulses: 2+ and symmetric all extremities. Skin: Skin color, texture, turgor normal. No rashes   Neurologic:  Alert and oriented X 3. Fine motor of hands and fingers normal.   equal.  No tremor. Gait not tested at this time. Sensation grossly normal to touch. Gross motor of extremities normal.       Data Review:   EXAM: CT CHEST ABD PELV W CONT 20  INDICATION: weight loss  COMPARISON: Radiographs 2020  CONTRAST: 100 mL of Isovue-370.   FINDINGS:   CHEST WALL: No mass or axillary lymphadenopathy. THYROID: No nodule. MEDIASTINUM: No mass or lymphadenopathy. BRYAN: No mass or lymphadenopathy. THORACIC AORTA: No dissection or aneurysm. MAIN PULMONARY ARTERY: Normal in caliber. Thrombus is seen throughout the right  lower lobe pulmonary arteries. Thrombus is seen in the left lower lobe pulmonary  artery extending into the anterior and lateral basilar segments. TRACHEA/BRONCHI: Patent. ESOPHAGUS: No wall thickening or dilatation. HEART: Normal in size. PLEURA: There is a large right pleural effusion with enhancement of the pleural  surfaces. Trace left pleural effusion. LUNGS: There is irregular opacification throughout the right lower lobe. There  are multiple areas of internal cavitation with air-fluid levels. Small areas of  atelectasis or scarring are seen in the lingula and inferior left lower lobe. LIVER: No mass. BILIARY TREE: Gallbladder is within normal limits. CBD is not dilated. SPLEEN: within normal limits. PANCREAS: No mass or ductal dilatation. ADRENALS: Unremarkable. KIDNEYS: Small nonobstructive stones are seen in the bilateral kidneys. No  evidence of hydronephrosis. No mass. STOMACH: Unremarkable. SMALL BOWEL: No dilatation or wall thickening. COLON: No dilatation or wall thickening. APPENDIX: Unremarkable  PERITONEUM: No ascites or pneumoperitoneum. RETROPERITONEUM: No lymphadenopathy or aortic aneurysm. REPRODUCTIVE ORGANS: Unremarkable  URINARY BLADDER: No mass or calculus. BONES: No destructive bone lesion. ABDOMINAL WALL: No mass or hernia. ADDITIONAL COMMENTS: There is thrombus in the left femoral veins extending into  the left iliac veins into the IVC. A small amount of thrombus is also present in  the right common iliac vein. IMPRESSION:  1. Diffuse irregular opacification throughout the right lower lobe with multiple  areas of internal cavitation and air-fluid levels. Findings are more likely  related to infection than malignancy.   2. Large right pleural effusion. Trace left pleural effusion. 3. Bilateral pulmonary emboli. Clot burden is moderate. No evidence of right  heart strain. 4. Thrombus in the veins of the left lower extremity extending to the IVC. A  small amount of thrombus is also seen in the right common iliac vein. EXAM:  IR THORACENTESIS/INSERT CHEST TUBE 8/26/20  INDICATION:  Request placement of large size pigtail catheter. Possible empyema. PROCEDURE:  Available history and imaging was reviewed which demonstrates a large partially  loculated right pleural effusion. Possibility of empyema, tuberculosis and other  etiologies are being considered clinically. Specific request for larger pigtail  catheter placement. Following informed consent the patient was evaluated with ultrasound in the  angina/catheter lab department. This demonstrates a large right pleural  effusion. The location of the pleural effusion was marked and then the area was prepped  and draped. 1% lidocaine was used for local anesthesia. A small incision was made with an 11  blade. A micropuncture needle was used under direct fluoroscopic guidance to access the  pleural fluid. When this was achieved the microguidewire was placed followed by  a 5 [place] dilator. Through this a short [address] guidewire  was placed. The track was then dilated to 12 [place]. This was then followed by  placement of a 12 [place] pigtail catheter. The string for the pigtail  was removed prior to placement. The catheter was positioned under fluoroscopic  guidance and then secured in place at the skin with a 2-0 Prolene suture. The  site was then dressed with a Tegaderm Bioclusive dressing and will be connected  to a Pleur-evac for subsequent drainage.   Prior to this approximately 100 mL of fluid was obtained and sent for cytology  as well as multiple cultures including aerobic, anaerobic, AFB and fungal.   The fluid was withdrawn was mildly cloudy serosanguineous in color. I did not  notice any foul smell. The procedure was adequately tolerated without significant blood loss or  evidence of complication. Final digital image of the chest was saved documenting catheter position. IMPRESSION:   Successful ultrasound and fluoroscopic guided placement of 12 [place] right-sided  chest tube. Patient will have ongoing drainage from Pleur-evac in his hospital  bed/room. Care and monitoring as per medical service and nursing service. ECHO 8/27/20  Interpretation Summary   Result status: Final result    · Image quality for this study was technically difficult. · Echo study was technically difficulty and limited due to patient's tolerance. · LV: Estimated LVEF is 55 - 60%. Normal cavity size, wall thickness and systolic function (ejection fraction normal). Wall motion: normal. Mild (grade 1) left ventricular diastolic dysfunction. · Pericardium: Small pericardial effusion adjacent to right ventricle. Portable AP upright view of the chest. 9/1/20  Direct comparison made to prior chest x-ray dated September 1, 2020. Cardiomediastinal silhouette is stable. Right basilar chest tube is unchanged in  position. There No residual pneumothorax is visualized. There are small  bilateral pleural effusions. There are persistent increased interstitial  markings at the right lung. There is persistent bibasilar patchy airspace  disease, right greater than left. IMPRESSION: No residual pneumothorax visualized    CXR 9/4/20  FINDINGS: AP portable imaging of the chest performed at 5:14 AM demonstrates a  stable cardiomediastinal silhouette. The right basilar chest tube is been  removed. Diffuse airspace disease throughout the right lung is unchanged. Small  right pleural effusion persists. No pneumothorax is visualized. There is  unchanged left basilar atelectasis. No significant osseous abnormalities are  seen.   IMPRESSION: No evidence of pneumothorax following chest tube removal.    EXAM:  [facility] 9/9/20  INDICATION:  evaluate IVC for patency, intermittent increase in edema is  swelling scrotum and lower extremities. COMPARISON:  CT abdomen and pelvis 8/25/20, IVC filter placement 8/25/20  TECHNIQUE:   Real-time ultrasound was utilized along with color-flow duplex Doppler  ultrasound to evaluate the inferior vena cava. Multiple saved digital images. FINDINGS:   The upper inferior vena cava is adequately visualized down to the filter and  there is no evidence of thrombus. Due to overlying bowel gas is difficult to visualize inferior vena cava without  below the filter. There is at least some flow demonstrated in the distal  inferior vena cava. IMPRESSION:  1. Normal-appearing inferior vena cava extending down to the filter. 2. Suboptimal visualization of the inferior vena cava beginning at the lower  aspect of the filter and extending through the bifurcation. There is some flow  demonstrated, however this area is suboptimally examined. Recent Days:  Recent Labs     09/10/20  0203 09/09/20  0111 09/08/20  0348   WBC 14.6* 13.0* 14.4*   HGB 7.7* 7.6* 8.2*   HCT 24.0* 23.7* 26.0*   * 366 384     Recent Labs     09/10/20  0203 09/09/20  0111 09/08/20  0348    135* 136   K 3.5 3.9 3.9    101 101   CO2 33* 32 33*   GLU 67 77 63*   BUN 5* 4* 4*   CREA 0.36* 0.32* 0.36*   CA 7.6* 7.7* 7.6*   MG  --   --  1.8   ALB 1.0* 1.0*  1.0* 1.0*   TBILI 0.6 0.6  0.8 1.1*   * 206*  207* 342*     No results for input(s): PH, PCO2, PO2, HCO3, FIO2 in the last 72 hours.     24 Hour Results:  Recent Results (from the past 24 hour(s))   IRON PROFILE    Collection Time: 09/09/20 11:03 AM   Result Value Ref Range    Iron 15 (L) 35 - 150 ug/dL    TIBC 51 (L) 250 - 450 ug/dL    Iron % saturation 29 20 - 50 %   VITAMIN B12    Collection Time: 09/09/20 11:03 AM   Result Value Ref Range    Vitamin B12 837 193 - 986 pg/mL   FOLATE    Collection Time: 09/09/20 11:03 AM Result Value Ref Range    Folate 26.8 (H) 5.0 - 21.0 ng/mL   CBC WITH AUTOMATED DIFF    Collection Time: 09/10/20  2:03 AM   Result Value Ref Range    WBC 14.6 (H) 4.1 - 11.1 K/uL    RBC 2.35 (L) 4.10 - 5.70 M/uL    HGB 7.7 (L) 12.1 - 17.0 g/dL    HCT 24.0 (L) 36.6 - 50.3 %    .1 (H) 80.0 - 99.0 FL    MCH 32.8 26.0 - 34.0 PG    MCHC 32.1 30.0 - 36.5 g/dL    RDW 19.9 (H) 11.5 - 14.5 %    PLATELET 603 (H) 950 - 400 K/uL    MPV 9.5 8.9 - 12.9 FL    NRBC 0.0 0  WBC    ABSOLUTE NRBC 0.00 0.00 - 0.01 K/uL    NEUTROPHILS 80 (H) 32 - 75 %    BAND NEUTROPHILS 1 0 - 6 %    LYMPHOCYTES 16 12 - 49 %    MONOCYTES 3 (L) 5 - 13 %    EOSINOPHILS 0 0 - 7 %    BASOPHILS 0 0 - 1 %    IMMATURE GRANULOCYTES 0 %    ABS. NEUTROPHILS 11.9 (H) 1.8 - 8.0 K/UL    ABS. LYMPHOCYTES 2.3 0.8 - 3.5 K/UL    ABS. MONOCYTES 0.4 0.0 - 1.0 K/UL    ABS. EOSINOPHILS 0.0 0.0 - 0.4 K/UL    ABS. BASOPHILS 0.0 0.0 - 0.1 K/UL    ABS. IMM. GRANS. 0.0 K/UL    DF MANUAL      RBC COMMENTS ANISOCYTOSIS  1+       METABOLIC PANEL, COMPREHENSIVE    Collection Time: 09/10/20  2:03 AM   Result Value Ref Range    Sodium 136 136 - 145 mmol/L    Potassium 3.5 3.5 - 5.1 mmol/L    Chloride 101 97 - 108 mmol/L    CO2 33 (H) 21 - 32 mmol/L    Anion gap 2 (L) 5 - 15 mmol/L    Glucose 67 65 - 100 mg/dL    BUN 5 (L) 6 - 20 MG/DL    Creatinine 0.36 (L) 0.70 - 1.30 MG/DL    BUN/Creatinine ratio 14 12 - 20      GFR est AA >60 >60 ml/min/1.73m2    GFR est non-AA >60 >60 ml/min/1.73m2    Calcium 7.6 (L) 8.5 - 10.1 MG/DL    Bilirubin, total 0.6 0.2 - 1.0 MG/DL    ALT (SGPT) 169 (H) 12 - 78 U/L    AST (SGOT) 155 (H) 15 - 37 U/L    Alk.  phosphatase 309 (H) 45 - 117 U/L    Protein, total 5.7 (L) 6.4 - 8.2 g/dL    Albumin 1.0 (L) 3.5 - 5.0 g/dL    Globulin 4.7 (H) 2.0 - 4.0 g/dL    A-G Ratio 0.2 (L) 1.1 - 2.2         Medications reviewed  Current Facility-Administered Medications   Medication Dose Route Frequency    0.9% sodium chloride infusion 250 mL  250 mL IntraVENous PRN    Probiotic - USANA Probiotic  (Patient Supplied)  1 Packet Oral DAILY    mesalamine (DELZICOL) DR capsule 800 mg  800 mg Oral TID    white petrolatum-mineral oiL (EUCERIN) cream   Topical BID    lipase-protease-amylase (CREON 24,000) capsule 1 Cap  1 Cap Oral TID WITH MEALS    loperamide (IMODIUM) capsule 2 mg  2 mg Oral Q6H PRN    bismuth subsalicylate (PEPTO-BISMOL) oral suspension 30 mL  30 mL Oral Q6H PRN    cyanocobalamin (VITAMIN B12) tablet 1,000 mcg  1,000 mcg Oral DAILY    folic acid (FOLVITE) tablet 1 mg  1 mg Oral DAILY    HYDROcodone-acetaminophen (NORCO) 5-325 mg per tablet 1 Tab  1 Tab Oral Q6H PRN    morphine injection 1 mg  1 mg IntraVENous Q4H PRN    sodium chloride (NS) flush 5-10 mL  5-10 mL IntraVENous PRN    sodium chloride (NS) flush 5-40 mL  5-40 mL IntraVENous Q8H    sodium chloride (NS) flush 5-40 mL  5-40 mL IntraVENous PRN    acetaminophen (TYLENOL) tablet 650 mg  650 mg Oral Q6H PRN    Or    acetaminophen (TYLENOL) suppository 650 mg  650 mg Rectal Q6H PRN    polyethylene glycol (MIRALAX) packet 17 g  17 g Oral DAILY PRN    promethazine (PHENERGAN) tablet 12.5 mg  12.5 mg Oral Q6H PRN    Or    ondansetron (ZOFRAN) injection 4 mg  4 mg IntraVENous Q6H PRN     Care Plan discussed with: Patient/Nurse/IR/GI  Total time spent with patient: 30 minutes.   Deepak Bowser MD

## 2020-09-11 ENCOUNTER — APPOINTMENT (OUTPATIENT)
Dept: GENERAL RADIOLOGY | Age: 65
DRG: 166 | End: 2020-09-11
Attending: INTERNAL MEDICINE
Payer: COMMERCIAL

## 2020-09-11 LAB
A1AT SERPL-MCNC: 177 MG/DL (ref 101–187)
ALBUMIN SERPL-MCNC: 1.1 G/DL (ref 3.5–5)
ALBUMIN/GLOB SERPL: 0.2 {RATIO} (ref 1.1–2.2)
ALP SERPL-CCNC: 323 U/L (ref 45–117)
ALT SERPL-CCNC: 130 U/L (ref 12–78)
ANA SER QL: NEGATIVE
ANION GAP SERPL CALC-SCNC: 2 MMOL/L (ref 5–15)
AST SERPL-CCNC: 98 U/L (ref 15–37)
BASOPHILS # BLD: 0.1 K/UL (ref 0–0.1)
BASOPHILS NFR BLD: 0 % (ref 0–1)
BILIRUB SERPL-MCNC: 0.7 MG/DL (ref 0.2–1)
BUN SERPL-MCNC: 4 MG/DL (ref 6–20)
BUN/CREAT SERPL: 11 (ref 12–20)
CALCIUM SERPL-MCNC: 7.6 MG/DL (ref 8.5–10.1)
CERULOPLASMIN SERPL-MCNC: 16.4 MG/DL (ref 16–31)
CHLORIDE SERPL-SCNC: 100 MMOL/L (ref 97–108)
CO2 SERPL-SCNC: 32 MMOL/L (ref 21–32)
CREAT SERPL-MCNC: 0.37 MG/DL (ref 0.7–1.3)
DIFFERENTIAL METHOD BLD: ABNORMAL
EOSINOPHIL # BLD: 0 K/UL (ref 0–0.4)
EOSINOPHIL NFR BLD: 0 % (ref 0–7)
ERYTHROCYTE [DISTWIDTH] IN BLOOD BY AUTOMATED COUNT: 19.5 % (ref 11.5–14.5)
GLOBULIN SER CALC-MCNC: 4.9 G/DL (ref 2–4)
GLUCOSE SERPL-MCNC: 67 MG/DL (ref 65–100)
HCT VFR BLD AUTO: 24.5 % (ref 36.6–50.3)
HGB BLD-MCNC: 7.9 G/DL (ref 12.1–17)
IMM GRANULOCYTES # BLD AUTO: 0.1 K/UL (ref 0–0.04)
IMM GRANULOCYTES NFR BLD AUTO: 1 % (ref 0–0.5)
LYMPHOCYTES # BLD: 2.6 K/UL (ref 0.8–3.5)
LYMPHOCYTES NFR BLD: 20 % (ref 12–49)
MCH RBC QN AUTO: 33.5 PG (ref 26–34)
MCHC RBC AUTO-ENTMCNC: 32.2 G/DL (ref 30–36.5)
MCV RBC AUTO: 103.8 FL (ref 80–99)
MONOCYTES # BLD: 0.8 K/UL (ref 0–1)
MONOCYTES NFR BLD: 6 % (ref 5–13)
NEUTS SEG # BLD: 9.4 K/UL (ref 1.8–8)
NEUTS SEG NFR BLD: 73 % (ref 32–75)
NRBC # BLD: 0 K/UL (ref 0–0.01)
NRBC BLD-RTO: 0 PER 100 WBC
PLATELET # BLD AUTO: 400 K/UL (ref 150–400)
PMV BLD AUTO: 9.2 FL (ref 8.9–12.9)
POTASSIUM SERPL-SCNC: 3.4 MMOL/L (ref 3.5–5.1)
PROT SERPL-MCNC: 6 G/DL (ref 6.4–8.2)
RBC # BLD AUTO: 2.36 M/UL (ref 4.1–5.7)
SODIUM SERPL-SCNC: 134 MMOL/L (ref 136–145)
WBC # BLD AUTO: 12.9 K/UL (ref 4.1–11.1)

## 2020-09-11 PROCEDURE — 85025 COMPLETE CBC W/AUTO DIFF WBC: CPT

## 2020-09-11 PROCEDURE — 97530 THERAPEUTIC ACTIVITIES: CPT

## 2020-09-11 PROCEDURE — 74011250637 HC RX REV CODE- 250/637: Performed by: FAMILY MEDICINE

## 2020-09-11 PROCEDURE — 65660000000 HC RM CCU STEPDOWN

## 2020-09-11 PROCEDURE — 74011250637 HC RX REV CODE- 250/637: Performed by: SPECIALIST

## 2020-09-11 PROCEDURE — 36415 COLL VENOUS BLD VENIPUNCTURE: CPT

## 2020-09-11 PROCEDURE — 71045 X-RAY EXAM CHEST 1 VIEW: CPT

## 2020-09-11 PROCEDURE — 74011250637 HC RX REV CODE- 250/637: Performed by: INTERNAL MEDICINE

## 2020-09-11 PROCEDURE — 97116 GAIT TRAINING THERAPY: CPT

## 2020-09-11 PROCEDURE — 80053 COMPREHEN METABOLIC PANEL: CPT

## 2020-09-11 RX ORDER — POTASSIUM CHLORIDE 750 MG/1
10 TABLET, FILM COATED, EXTENDED RELEASE ORAL 2 TIMES DAILY
Status: DISCONTINUED | OUTPATIENT
Start: 2020-09-11 | End: 2020-09-16 | Stop reason: HOSPADM

## 2020-09-11 RX ADMIN — Medication 10 ML: at 12:35

## 2020-09-11 RX ADMIN — CYANOCOBALAMIN TAB 500 MCG 1000 MCG: 500 TAB at 09:57

## 2020-09-11 RX ADMIN — FOLIC ACID 1 MG: 1 TABLET ORAL at 09:56

## 2020-09-11 RX ADMIN — Medication 10 ML: at 22:46

## 2020-09-11 RX ADMIN — POTASSIUM CHLORIDE 10 MEQ: 750 TABLET, FILM COATED, EXTENDED RELEASE ORAL at 09:56

## 2020-09-11 RX ADMIN — Medication: at 17:16

## 2020-09-11 RX ADMIN — MESALAMINE 800 MG: 400 CAPSULE, DELAYED RELEASE ORAL at 22:42

## 2020-09-11 RX ADMIN — Medication: at 10:39

## 2020-09-11 RX ADMIN — PANCRELIPASE 1 CAPSULE: 24000; 76000; 120000 CAPSULE, DELAYED RELEASE PELLETS ORAL at 09:56

## 2020-09-11 RX ADMIN — POTASSIUM CHLORIDE 10 MEQ: 750 TABLET, FILM COATED, EXTENDED RELEASE ORAL at 17:15

## 2020-09-11 RX ADMIN — LOPERAMIDE HYDROCHLORIDE 2 MG: 2 CAPSULE ORAL at 10:04

## 2020-09-11 RX ADMIN — MESALAMINE 800 MG: 400 CAPSULE, DELAYED RELEASE ORAL at 17:16

## 2020-09-11 RX ADMIN — MESALAMINE 800 MG: 400 CAPSULE, DELAYED RELEASE ORAL at 09:57

## 2020-09-11 RX ADMIN — FERROUS SULFATE TAB 325 MG (65 MG ELEMENTAL FE) 325 MG: 325 (65 FE) TAB at 09:56

## 2020-09-11 RX ADMIN — PANCRELIPASE 1 CAPSULE: 24000; 76000; 120000 CAPSULE, DELAYED RELEASE PELLETS ORAL at 17:16

## 2020-09-11 RX ADMIN — PANCRELIPASE 1 CAPSULE: 24000; 76000; 120000 CAPSULE, DELAYED RELEASE PELLETS ORAL at 12:34

## 2020-09-11 NOTE — PROGRESS NOTES
1643:  Donnie Rubio called from [facility]. At this point there Is still no record of COBRA payment. Once it is rec'd it can take 10-14 days to process. Pt's income is too much to qualify him for serafin. Will continue to inquire into COBRA payment. Transtion of Care Plan:  RUR  15 % low risk  1. [facility] accepted pt pending active insurance;  2. [facility] reports there is still no record of payment for COBRA  3. Will try to bring pt in as serafin  4. COVID negative  5. Pt will need w/c [facility] transport at d/c (his friend now reports having a fever)  SAMIRA Bennett

## 2020-09-11 NOTE — PROGRESS NOTES
Problem: Self Care Deficits Care Plan (Adult)  Goal: *Acute Goals and Plan of Care (Insert Text)  Description:   FUNCTIONAL STATUS PRIOR TO ADMISSION: Patient was independent and active without use of DME. Patient was independent for basic and instrumental ADLs. HOME SUPPORT: The patient lived alone with no local support. Occupational Therapy Goals  Initiated 8/27/2020; Weekly re-assessment 9/8/2020, all goals remain appropriate. 1.  Patient will perform grooming, standing at sink for at least 5 minutes, with supervision/set-up within 7 day(s). 2.  Patient will perform upper body dressing with modified independence within 7 day(s). 3.  Patient will perform lower body dressing with supervision/set-up within 7 day(s). 4.  Patient will perform toilet transfers with modified independence within 7 day(s). 5.  Patient will perform all aspects of toileting with modified independence within 7 day(s). 6.  Patient will participate in upper extremity therapeutic exercise/activities with independence for 5 minutes within 7 day(s). 7.  Patient will utilize energy conservation techniques during functional activities with verbal cues within 7 day(s). Outcome: Progressing Towards Goal   OCCUPATIONAL THERAPY TREATMENT  Patient: Susana Villarreal (78 y.o. male)  Date: 9/11/2020  Diagnosis: Pneumonia [J18.9]   Pneumonia involving right lung  Procedure(s) (LRB):  ESOPHAGOGASTRODUODENOSCOPY (EGD) (N/A)  COLONOSCOPY (N/A)  ESOPHAGOGASTRODUODENAL (EGD) BIOPSY (N/A)  COLON BIOPSY (N/A) 7 Days Post-Op  Precautions: Fall  Chart, occupational therapy assessment, plan of care, and goals were reviewed. ASSESSMENT  Patient continues with skilled OT services and is progressing towards goals. Patient received supine in bed with HOB elevated. Patient agreeable to light activity with return to supine. Patient moves to EOB with supervision/stand by assist. With bed height elevated patient stands with CGA.   Noted small soiled area on bed, patient offered hygiene, but refused attempt. He does participate in transfers with CGA using RW. Patient returned to supine with increased time and CGA for management of LEs. Patient particular with room set up and position in bed. Left in NAD in bed. Current Level of Function Impacting Discharge (ADLs): patient currently CGA for transfers, increased assist with ADL tasks, but may be more related to poor attempt to perform vs inability    Other factors to consider for discharge:          PLAN :  Patient continues to benefit from skilled intervention to address the above impairments. Continue treatment per established plan of care. to address goals. Recommend with staff: OOB to chair for meals, transfers to commod    Recommend next OT session: continue goals    Recommendation for discharge: (in order for the patient to meet his/her long term goals)  Therapy 3 hours per day 5-7 days per week     This discharge recommendation:  Has been made in collaboration with the attending provider and/or case management    IF patient discharges home will need the following DME: TBD       SUBJECTIVE:   Patient stated I want the 2 pillows .     OBJECTIVE DATA SUMMARY:   Cognitive/Behavioral Status:  Neurologic State: Alert  Orientation Level: Oriented X4  Cognition: Appropriate decision making; Appropriate for age attention/concentration; Appropriate safety awareness; Follows commands  Perception: Appears intact  Perseveration: No perseveration noted  Safety/Judgement: Awareness of environment    Functional Mobility and Transfers for ADLs:  Bed Mobility:  Rolling: Supervision  Supine to Sit: Supervision  Sit to Supine: Contact guard assistance; Additional time    Transfers:  Sit to Stand: Contact guard assistance(bed height elevated)          Balance:  Sitting: Intact  Standing: With support  Standing - Static: Good  Standing - Dynamic : Good    ADL Intervention:                           Lower Body Dressing Assistance  Socks: Total assistance (dependent)    Toileting  Bowel Hygiene: Maximum assistance    Cognitive Retraining  Safety/Judgement: Awareness of environment    Therapeutic Exercises:       Pain:      Activity Tolerance:   Fair  Please refer to the flowsheet for vital signs taken during this treatment. After treatment patient left in no apparent distress:   Supine in bed, Call bell within reach, Bed / chair alarm activated, and 4 rails per patient request    COMMUNICATION/COLLABORATION:   The patients plan of care was discussed with: Physical therapist and Registered nurse.      Gloria Macias, OTR/L  Time Calculation: 16 mins

## 2020-09-11 NOTE — ROUTINE PROCESS
Bedside and Verbal shift change report given to [facility] (oncoming nurse) by Neo Gamble (offgoing nurse). Report included the following information SBAR and Kardex.

## 2020-09-11 NOTE — PROGRESS NOTES
Daily Progress Note: 9/11/2020  Danelle Cuba MD-PCP    Assessment/Plan:   Pneumonia involving right lung (8/25/2020) /  Hemoptysis (8/25/2020) POA: extensive with internal cavitation and air fluid levels. Staph Aureus, leukocytosis improving. CXR unchanged  --pleural fluid studies + staph, BC neg, cytology neg, AFB neg smears x 3 - cultures pending, and 2nd quantiferon gold negative. -- zosyn, doxy completed, Chest tube removed 9/3  -- Pulm following     Bilateral pulmonary embolism, acute ([address] (8/25/2020) / Acute deep vein thrombosis (DVT) of both lower extremities ([address] (8/25/2020):   - IVC filter placed   - holding anticoags due to dropping Hb and active hemoptysis and GI bleeding     Weight loss, unintentional (8/25/2020) POA:t severe protein calorie malnutrition, ulcerative colitis and empyema likely causes. Nutrition and speech therapy following  -Diet as tolerated  -calcium corrects  -GI follow up outpt     Hyperbilirubinemia (8/25/2020) POA: unclear etiology. CTs as under data review. - resolved     Pleural effusion, right (8/25/2020) POA: empyema, pneumonia vs malignant effusion. -pulmonology following  - Chest tube placed 8/26/20 and removed 9/3     Alcohol use (8/25/2020) POA: he says he has cut down recently. - no withdrawal occured     Chronic Diarrhea (8/25/2020) / Ulcerative colitis/ Fecal occult blood test positive (8/25/2020) POA:  Monitor Hgb.    -GI following  -imodium and peptobismol PRN  -FloraQ daily- or pt's home probiotics  -mesalamine has helped    Hypomag/hypokalemia:  -replete and monitor    GI blood loss/blood loss anemia/iron def anemia  - iron supplement        Problem List:  Problem List as of 9/11/2020 Date Reviewed: 8/25/2020          Codes Class Noted - Resolved    * (Principal) Pneumonia involving right lung ICD-10-CM: J18.9  ICD-9-CM: 486  8/25/2020 - Present        Hemoptysis ICD-10-CM: R04.2  ICD-9-CM: 786.30  8/25/2020 - Present Hyperbilirubinemia ICD-10-CM: E80.6  ICD-9-CM: 782.4  8/25/2020 - Present        Weight loss, unintentional ICD-10-CM: R63.4  ICD-9-CM: 783.21  8/25/2020 - Present        Pleural effusion, right ICD-10-CM: J90  ICD-9-CM: 511.9  8/25/2020 - Present        Bilateral pulmonary embolism ([address] ICD-10-CM: I26.99  ICD-9-CM: 415.19  8/25/2020 - Present        Acute deep vein thrombosis (DVT) of both lower extremities ([facility]) ICD-10-CM: I82.403  ICD-9-CM: 453.40  8/25/2020 - Present        Alcohol use ICD-10-CM: Z72.89  ICD-9-CM: V49.89  8/25/2020 - Present        Diarrhea ICD-10-CM: R19.7  ICD-9-CM: 787.91  8/25/2020 - Present        Fecal occult blood test positive ICD-10-CM: R19.5  ICD-9-CM: 792.1  8/25/2020 - Present        Hyponatremia with extracellular fluid depletion ICD-10-CM: E87.1  ICD-9-CM: 276.1  8/25/2020 - Present            Subjective:     60 y.o. male who is being admitted for Pneumonia involving right lung. Mr. Gavin Lopez presented to our Emergency Department today complaining of generalized weakness, diarrhea and lethargy for several months. Patient was accompanied by his friend who gave most of the history. The patient does not like seeing physicians and apparently uses vitamin supplements. He is a smoker and drinks alcohol and has been progressively weaker over several months now. He has had pleuritic right sided chest pain associated with a productive cough and hemoptysis. He feel on the floor several days ago and was unable to get up. His friends helped him up and brought him to the ED for further testing. Initial CXR done showed airspace disease in right mid lower lung zone consistent with pneumonia. This is surrounded by a moderate right-sided pleural effusion. Upon review, he was noted to be cachectic with swollen lower extremities.  CT scans chest abdomen and pelvis done showed a diffuse irregular opacification throughout the right lower lobe with multiple areas of internal cavitation and air-fluid levels. Findings are more likely related to infection than malignancy. Large right pleural effusion. Trace left pleural effusion. Bilateral pulmonary emboli. Clot burden is moderate. No evidence of right heart strain. Thrombus in the veins of the left lower extremity extending to the IVC. A small amount of thrombus is also seen in the right common iliac vein. He will be admitted for further management. (Dr Genaro Mario)    8/26:  Feeling a little better this AM but still c/o painful, pleuritic cough and occas thick sputum. No leg/calf pains. No known exposure to ill individuals/no travel - \"just staying in the last few months. \"  No temps. WBC 31.9K. Cultures neg so far.      8/27:  Reports less cough, chest pain and feeling better overall. Appetite has returned. Chest tube placed yesterday with discharge obtained - pending cultures. Other cultures remain neg. Hb lower so cont to hold anticoag for now. GI has seen and work up when more stable. Remains on doxy, zosyn, vanc. WBC down to 28K. Bili back to normal.     8/28:  Continues to grad improve. Still with some cough. No tremor or hallucinations so far. K+ lower - replacing. WBC coming down. Afeb. CXR this AM pending. Cultures remain neg so far. Quantiferon gold test pending as of this AM.  He reports hx of chronic LE edema for years. 8/29: Pt continues to have frequent stooling. GI plans endoscopy next week if pt allows. He was not willing to participate with PT/OT/nutritionist yesterday. Says despite our best efforts, we will not get his stooling to stop until he gets his home probiotics [friend will bring it]. He refuses to eat until the stooling is better. 8/30:  Pt is reluctant to admit that his stooling has improved. Down to 2 BMs yesterday instead of 6 the day before. Tolerating PO. Willing to eat more today. He is still having hemoptysis. Wanting to get out of bed. Willing to work with PT/OT. 8/31:  Feeling better. Appetite better. TB neg so far. First Quantiferon Gold was indeterminate - repeat pending. Afeb. WBC slowly coming down. Currently on Doxy and Zosyn. Replacing Mag and K+. Hb now stable in 8s. 9/1: Little change. Not moving about much, he reports due to chest tube being painful when he moves. Hb 7.8. WBC 16. No sign of ETOH withdrawal.     /9/2:  Now reports he wants to move around more and get up and wants to work with PT. AFB smears neg x3, AFB cultures still pending of course. WBC slowly coming down. Afeb. Hb still slowly dropping - 7.7 today. May consider prophylactic dose of lovenox and watch Hb.  GI work up pending. Arms a little more swollen. 9/3: Feeling better. Hoping for endoscopy in am. Patient would like chest tube removed prior to prep and studies if possible. Speech would like MBS.     9/4:  Feeling better except for prep for colonoscopy - reports clear results last BM. Chest tube out yesterday. Hb down to 7.0 so still holding anticoag - may need transfusion. Upper and lower endoscopy planned today. CXR this AM pending. K+ down with the prep - replacing. 9/5:  Little change. Still with occas productive cough. Now also c/o increasing LE edema and today notes scrotal edema and now wants something done about it. 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 Hb down to 6.9 today - would benefit from transfusion. Risks v benefits of transfusion discussed and he finally agreed. Appetite very good now, taking plenty of fluids now, so will DC IVFs. Low dose of lasix after the transfusion to help with the edema. 9/6:  Less edema in LEs and scrotum today but still quite a bit - will give another low dose of Lasix. Received 1 unit packed red cells yesterday. K+ sl low - replacing. Still having episodic loose stools but on questioning he reports chronic loose stools for \"years\" which would go along with his colonoscopy findings. AM CBC is still pending. Discussed need for rehab and strongly advised he consider going with his degree of debility. Risk of falls and fractures discussed. 9/7:  Still c/o scrotal and LE edema but sl better - one dose of 10mg lasix today. Discussed his low albumin as part of the problem. Less cough today but slight amount of hemoptysis this AM. Hb 7.8 this AM - was in the 8s after the transfusion. K+ still low - replacing. Declining to have dressings changed - discussed risks. Discussed his severe weakness and need for rehab again - still not very interested in going. 9/8:  Less scrotal swelling after the lasix - > 1.5 liter out. He did get up some in room yesterday and did fairly well but still very weak. Fewer loose stools. He has mult questions about Ulcerative colitis and basics discussed with him - advised him to discuss further with GI. Eating well and has high protein diet. Albumin still low. K+ back up. Still has cough - less productive so far today. Will check CXR today. ALT higher toay - monitoring. 9/9:  Continues to have less edema with low dose of lasix yesterday. CXR looking better. Still having frequent loose stools but better than prior to admission. Hb continues to drop likely due to chronic, slow GI blood loss due to the ulcerative colitis. He will likely need episodic transfusions. LFTs look improved but full hepatic panel is pending. Less cough so far today. He was up a bit more yesterday. He reports he is now willing to go to rehab. Due to recent increased bilat LE edema and scrotal edema (although now somewhat better), will get IR to make sure the IVC filter is patent. 1145am:  Discussed with Dr Jorge Elder (IR) - advised ab US as start to eval IVC. 9/10:  Fewer loose stools. Less scrotal edema and LE edema. Ab US with IVC not completely seen due to bowel gas - will ask IR to comment. LFTs improved. CXR improving. Hb 7.7. He reports he did not get up yesterday.       9/11: Less cough, less sputum, less edema, and feeling better. CT Chest/Ab/Pelvis 9/10/20 as noted under Data Review. Hb stable in 7s for now - recommend frequent checks at rehab.  K+ sl low - replacing. LFTs better - follow up with GI (Dr Tammie Evans) after discharge for LFTs and for Ulcerative Colitis. Follow up with PCP when out of rehab. Stable for rehab. Review of Systems:   A comprehensive review of systems was negative except for that written in the HPI. Objective:   Physical Exam:   Visit Vitals  /62 (BP 1 Location: Left arm, BP Patient Position: At rest)   Pulse 80   Temp 97.3 °F (36.3 °C)   Resp 16   Ht 5' 10\" (1.778 m)   Wt 78.5 kg (173 lb 1 oz) Comment: patient refused for me to remove stuff from bed before   SpO2 95%   BMI 24.83 kg/m²    O2 Flow Rate (L/min): 2 l/min O2 Device: Room air  Temp (24hrs), Av.7 °F (36.5 °C), Min:97.3 °F (36.3 °C), Max:98.3 °F (36.8 °C)    09/10 1901 -  0700  In: -   Out: 350 [Urine:350]   701 - 09/10 1900  In: 490 [P.O.:490]  Out: 4175 [Urine:4175]  General:  Alert, cooperative, no distress, cachetic, ill appearing. Head:  Normocephalic, without obvious abnormality, atraumatic. Eyes:  [place] conjuntiva. PERRL, EOMs intact. Throat: Lips, mucosa, and tongue moist.   Neck: Supple, symmetrical, trachea midline, no adenopathy, thyroid: no enlargement/tenderness/nodules, no carotid bruit and no JVD. Lungs:   Better air exchange,  Clear at this moment. Chest wall: Former chest tube site right looks ok   Heart:  Regular rate and rhythm, S1, S2 normal, no murmur, click, rub or gallop. Abdomen:   Soft, non-tender. Bowel sounds normal. No masses,  No organomegaly. Extremities: no cyanosis. No calf tenderness;1-2+ pitting edema bilat. LEs. Less in arms and scrotum, No cords palpated. Pulses: 2+ and symmetric all extremities. Skin: Skin color, texture, turgor normal. No rashes   Neurologic:  Alert and oriented X 3.   Fine motor of hands and fingers normal.   equal.  No tremor. Gait not tested at this time. Sensation grossly normal to touch. Gross motor of extremities normal.       Data Review:   EXAM: CT CHEST ABD PELV W CONT 8/25/20  INDICATION: weight loss  COMPARISON: Radiographs 8/25/2020  CONTRAST: 100 mL of Isovue-370. FINDINGS:   CHEST WALL: No mass or axillary lymphadenopathy. THYROID: No nodule. MEDIASTINUM: No mass or lymphadenopathy. BRYAN: No mass or lymphadenopathy. THORACIC AORTA: No dissection or aneurysm. MAIN PULMONARY ARTERY: Normal in caliber. Thrombus is seen throughout the right  lower lobe pulmonary arteries. Thrombus is seen in the left lower lobe pulmonary  artery extending into the anterior and lateral basilar segments. TRACHEA/BRONCHI: Patent. ESOPHAGUS: No wall thickening or dilatation. HEART: Normal in size. PLEURA: There is a large right pleural effusion with enhancement of the pleural  surfaces. Trace left pleural effusion. LUNGS: There is irregular opacification throughout the right lower lobe. There  are multiple areas of internal cavitation with air-fluid levels. Small areas of  atelectasis or scarring are seen in the lingula and inferior left lower lobe. LIVER: No mass. BILIARY TREE: Gallbladder is within normal limits. CBD is not dilated. SPLEEN: within normal limits. PANCREAS: No mass or ductal dilatation. ADRENALS: Unremarkable. KIDNEYS: Small nonobstructive stones are seen in the bilateral kidneys. No  evidence of hydronephrosis. No mass. STOMACH: Unremarkable. SMALL BOWEL: No dilatation or wall thickening. COLON: No dilatation or wall thickening. APPENDIX: Unremarkable  PERITONEUM: No ascites or pneumoperitoneum. RETROPERITONEUM: No lymphadenopathy or aortic aneurysm. REPRODUCTIVE ORGANS: Unremarkable  URINARY BLADDER: No mass or calculus. BONES: No destructive bone lesion. ABDOMINAL WALL: No mass or hernia.   ADDITIONAL COMMENTS: There is thrombus in the left femoral veins extending into  the left iliac veins into the IVC. A small amount of thrombus is also present in  the right common iliac vein. IMPRESSION:  1. Diffuse irregular opacification throughout the right lower lobe with multiple  areas of internal cavitation and air-fluid levels. Findings are more likely  related to infection than malignancy. 2. Large right pleural effusion. Trace left pleural effusion. 3. Bilateral pulmonary emboli. Clot burden is moderate. No evidence of right  heart strain. 4. Thrombus in the veins of the left lower extremity extending to the IVC. A  small amount of thrombus is also seen in the right common iliac vein. EXAM:  IR THORACENTESIS/INSERT CHEST TUBE 8/26/20  INDICATION:  Request placement of large size pigtail catheter. Possible empyema. PROCEDURE:  Available history and imaging was reviewed which demonstrates a large partially  loculated right pleural effusion. Possibility of empyema, tuberculosis and other  etiologies are being considered clinically. Specific request for larger pigtail  catheter placement. Following informed consent the patient was evaluated with ultrasound in the  angina/catheter lab department. This demonstrates a large right pleural  effusion. The location of the pleural effusion was marked and then the area was prepped  and draped. 1% lidocaine was used for local anesthesia. A small incision was made with an 11  blade. A micropuncture needle was used under direct fluoroscopic guidance to access the  pleural fluid. When this was achieved the microguidewire was placed followed by  a 5 [place] Stall dilator. Through this a short [address] guidewire  was placed. The track was then dilated to 12 Bluebell Stall. This was then followed by  placement of a 12 [place] Abs[place] pigtail catheter. The string for the pigtail  was removed prior to placement.  The catheter was positioned under fluoroscopic  guidance and then secured in place at the skin with a 2-0 Prolene suture. The  site was then dressed with a Tegaderm Bioclusive dressing and will be connected  to a Pleur-evac for subsequent drainage. Prior to this approximately 100 mL of fluid was obtained and sent for cytology  as well as multiple cultures including aerobic, anaerobic, AFB and fungal.   The fluid was withdrawn was mildly cloudy serosanguineous in color. I did not  notice any foul smell. The procedure was adequately tolerated without significant blood loss or  evidence of complication. Final digital image of the chest was saved documenting catheter position. IMPRESSION:   Successful ultrasound and fluoroscopic guided placement of 12 [place] right-sided  chest tube. Patient will have ongoing drainage from Pleur-evac in his hospital  bed/room. Care and monitoring as per medical service and nursing service. ECHO 8/27/20  Interpretation Summary   Result status: Final result    · Image quality for this study was technically difficult. · Echo study was technically difficulty and limited due to patient's tolerance. · LV: Estimated LVEF is 55 - 60%. Normal cavity size, wall thickness and systolic function (ejection fraction normal). Wall motion: normal. Mild (grade 1) left ventricular diastolic dysfunction. · Pericardium: Small pericardial effusion adjacent to right ventricle. Portable AP upright view of the chest. 9/1/20  Direct comparison made to prior chest x-ray dated September 1, 2020. Cardiomediastinal silhouette is stable. Right basilar chest tube is unchanged in  position. There No residual pneumothorax is visualized. There are small  bilateral pleural effusions. There are persistent increased interstitial  markings at the right lung. There is persistent bibasilar patchy airspace  disease, right greater than left. IMPRESSION: No residual pneumothorax visualized    CXR 9/4/20  FINDINGS: AP portable imaging of the chest performed at 5:14 AM demonstrates a  stable cardiomediastinal silhouette. The right basilar chest tube is been  removed. Diffuse airspace disease throughout the right lung is unchanged. Small  right pleural effusion persists. No pneumothorax is visualized. There is  unchanged left basilar atelectasis. No significant osseous abnormalities are  seen. IMPRESSION: No evidence of pneumothorax following chest tube removal.    EXAM:  US ABD LTD 9/9/20  INDICATION:  evaluate IVC for patency, intermittent increase in edema is  swelling scrotum and lower extremities. COMPARISON:  CT abdomen and pelvis 8/25/20, IVC filter placement 8/25/20  TECHNIQUE:   Real-time ultrasound was utilized along with color-flow duplex Doppler  ultrasound to evaluate the inferior vena cava. Multiple saved digital images. FINDINGS:   The upper inferior vena cava is adequately visualized down to the filter and  there is no evidence of thrombus. Due to overlying bowel gas is difficult to visualize inferior vena cava without  below the filter. There is at least some flow demonstrated in the distal  inferior vena cava. IMPRESSION:  1. Normal-appearing inferior vena cava extending down to the filter. 2. Suboptimal visualization of the inferior vena cava beginning at the lower  aspect of the filter and extending through the bifurcation. There is some flow  demonstrated, however this area is suboptimally examined. EXAM: CT CHEST ABD PELV W CONT 9/10/20  INDICATION: assess patency of IVC . IVC filter. COMPARISON: 8/25/2020  CONTRAST: 100 mL of Isovue-370. TECHNIQUE:   Following the uneventful intravenous administration of contrast, thin axial  images were obtained through the chest, abdomen and pelvis. Coronal and sagittal  reformats were generated. Oral contrast was not administered. CT dose reduction  was achieved through use of a standardized protocol tailored for this  examination and automatic exposure control for dose modulation. FINDINGS:   CHEST WALL: No mass or axillary lymphadenopathy.   THYROID: No nodule. MEDIASTINUM: Largest right paratracheal node measures 8 mm in short axis  diameter (series 2, image 24). BRYAN: No mass or lymphadenopathy. THORACIC AORTA: No dissection or aneurysm. MAIN PULMONARY ARTERY: Pulmonary embolism in a branch to the right lower lobe  (series 2, image 35). Nonoccluding small chronic eccentric thrombus in a branch  to the left lower lobe (axial image 28 and coronal image 71). TRACHEA/BRONCHI: Patent. ESOPHAGUS: No wall thickening or dilatation. HEART: Normal in size. PLEURA: Bilateral pleural effusions, larger on the left. Small right  hydropneumothorax. LUNGS: Bibasilar compressive atelectasis, more pronounced on the right. Right  apical pleural thickening with associated small apical pneumothorax (series 3,  image 17). LIVER: No mass. BILIARY TREE: Gallbladder is within normal limits. CBD is not dilated. SPLEEN: within normal limits. PANCREAS: No mass or ductal dilatation. ADRENALS: Unremarkable. KIDNEYS: Bilateral nonobstructing renal calculi (coronal images 91 and 96). STOMACH: Unremarkable. SMALL BOWEL: No dilatation or wall thickening. COLON: No dilatation or wall thickening. APPENDIX: Not visualized  PERITONEUM: No ascites or pneumoperitoneum   RETROPERITONEUM: No lymphadenopathy or aortic aneurysm. REPRODUCTIVE ORGANS: Small prostate  URINARY BLADDER: Layering stones in the inferior bladder (series 2, image 111). BONES: No destructive bone lesion. Disc space narrowing with disc bulges at L3-4  and L4-5 and L5-S1. Dextroconvex lumbar curvature. ABDOMINAL WALL: No mass or hernia. ADDITIONAL COMMENTS: Partial cast in the left iliac vein (coronal image 86 and  axial image 94). . Partial cast below below the IVC. IVC filter in place. (Axial  image 83 and coronal image 76). Small amount of thrombus extends into the origin  of the right common iliac vein (series 2, image 87).   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 Decreased thrombus in in both lower lobe pulmonary arteries  Small right hydropneumothorax. Bilateral pleural effusions, asymmetric to the  left. Bibasilar compressive atelectasis, asymmetric to the right. Incidental bladder calculi. Nonobstructing bilateral renal calculi.     Recent Days:  Recent Labs     09/11/20 0343 09/10/20  0203 09/09/20  0111   WBC 12.9* 14.6* 13.0*   HGB 7.9* 7.7* 7.6*   HCT 24.5* 24.0* 23.7*    402* 366     Recent Labs     09/11/20 0343 09/10/20  0203 09/09/20  0111   * 136 135*   K 3.4* 3.5 3.9    101 101   CO2 32 33* 32   GLU 67 67 77   BUN 4* 5* 4*   CREA 0.37* 0.36* 0.32*   CA 7.6* 7.6* 7.7*   ALB 1.1* 1.0* 1.0*  1.0*   TBILI 0.7 0.6 0.6  0.8   * 169* 206*  207*     No results for input(s): PH, PCO2, PO2, HCO3, FIO2 in the last 72 hours. 24 Hour Results:  Recent Results (from the past 24 hour(s))   CBC WITH AUTOMATED DIFF    Collection Time: 09/11/20  3:43 AM   Result Value Ref Range    WBC 12.9 (H) 4.1 - 11.1 K/uL    RBC 2.36 (L) 4.10 - 5.70 M/uL    HGB 7.9 (L) 12.1 - 17.0 g/dL    HCT 24.5 (L) 36.6 - 50.3 %    .8 (H) 80.0 - 99.0 FL    MCH 33.5 26.0 - 34.0 PG    MCHC 32.2 30.0 - 36.5 g/dL    RDW 19.5 (H) 11.5 - 14.5 %    PLATELET 585 548 - 131 K/uL    MPV 9.2 8.9 - 12.9 FL    NRBC 0.0 0  WBC    ABSOLUTE NRBC 0.00 0.00 - 0.01 K/uL    NEUTROPHILS 73 32 - 75 %    LYMPHOCYTES 20 12 - 49 %    MONOCYTES 6 5 - 13 %    EOSINOPHILS 0 0 - 7 %    BASOPHILS 0 0 - 1 %    IMMATURE GRANULOCYTES 1 (H) 0.0 - 0.5 %    ABS. NEUTROPHILS 9.4 (H) 1.8 - 8.0 K/UL    ABS. LYMPHOCYTES 2.6 0.8 - 3.5 K/UL    ABS. MONOCYTES 0.8 0.0 - 1.0 K/UL    ABS. EOSINOPHILS 0.0 0.0 - 0.4 K/UL    ABS. BASOPHILS 0.1 0.0 - 0.1 K/UL    ABS. IMM.  GRANS. 0.1 (H) 0.00 - 0.04 K/UL    DF AUTOMATED     METABOLIC PANEL, COMPREHENSIVE    Collection Time: 09/11/20  3:43 AM   Result Value Ref Range    Sodium 134 (L) 136 - 145 mmol/L    Potassium 3.4 (L) 3.5 - 5.1 mmol/L Chloride 100 97 - 108 mmol/L    CO2 32 21 - 32 mmol/L    Anion gap 2 (L) 5 - 15 mmol/L    Glucose 67 65 - 100 mg/dL    BUN 4 (L) 6 - 20 MG/DL    Creatinine 0.37 (L) 0.70 - 1.30 MG/DL    BUN/Creatinine ratio 11 (L) 12 - 20      GFR est AA >60 >60 ml/min/1.73m2    GFR est non-AA >60 >60 ml/min/1.73m2    Calcium 7.6 (L) 8.5 - 10.1 MG/DL    Bilirubin, total 0.7 0.2 - 1.0 MG/DL    ALT (SGPT) 130 (H) 12 - 78 U/L    AST (SGOT) 98 (H) 15 - 37 U/L    Alk.  phosphatase 323 (H) 45 - 117 U/L    Protein, total 6.0 (L) 6.4 - 8.2 g/dL    Albumin 1.1 (L) 3.5 - 5.0 g/dL    Globulin 4.9 (H) 2.0 - 4.0 g/dL    A-G Ratio 0.2 (L) 1.1 - 2.2         Medications reviewed  Current Facility-Administered Medications   Medication Dose Route Frequency    ferrous sulfate tablet 325 mg  1 Tab Oral DAILY WITH BREAKFAST    0.9% sodium chloride infusion 250 mL  250 mL IntraVENous PRN    Probiotic - USANA Probiotic  (Patient Supplied)  1 Packet Oral DAILY    mesalamine (DELZICOL) DR capsule 800 mg  800 mg Oral TID    white petrolatum-mineral oiL (EUCERIN) cream   Topical BID    lipase-protease-amylase (CREON 24,000) capsule 1 Cap  1 Cap Oral TID WITH MEALS    loperamide (IMODIUM) capsule 2 mg  2 mg Oral Q6H PRN    bismuth subsalicylate (PEPTO-BISMOL) oral suspension 30 mL  30 mL Oral Q6H PRN    cyanocobalamin (VITAMIN B12) tablet 1,000 mcg  1,000 mcg Oral DAILY    folic acid (FOLVITE) tablet 1 mg  1 mg Oral DAILY    HYDROcodone-acetaminophen (NORCO) 5-325 mg per tablet 1 Tab  1 Tab Oral Q6H PRN    morphine injection 1 mg  1 mg IntraVENous Q4H PRN    sodium chloride (NS) flush 5-10 mL  5-10 mL IntraVENous PRN    sodium chloride (NS) flush 5-40 mL  5-40 mL IntraVENous Q8H    sodium chloride (NS) flush 5-40 mL  5-40 mL IntraVENous PRN    acetaminophen (TYLENOL) tablet 650 mg  650 mg Oral Q6H PRN    Or    acetaminophen (TYLENOL) suppository 650 mg  650 mg Rectal Q6H PRN    polyethylene glycol (MIRALAX) packet 17 g  17 g Oral DAILY PRN    promethazine (PHENERGAN) tablet 12.5 mg  12.5 mg Oral Q6H PRN    Or    ondansetron (ZOFRAN) injection 4 mg  4 mg IntraVENous Q6H PRN     Care Plan discussed with: Patient/Nurse/IR/GI  Total time spent with patient: 30 minutes.   Shea Shipley MD

## 2020-09-11 NOTE — PROGRESS NOTES
Wound Care Consult:  Low clif score, concern for bony prominences       Assessment:  Bilateral heels: intact, no redness  Elbows: intact, no redness  Sacrum: blanchable redness, evidence of previous wounds present but no open areas at this time, bony prominence     Treatment:  Sacrum: mepilex placed on bony prominence, patient repositioned in bed, patient educated on importance of repositioning, patient currently on [place] air mattress       Recommendations/Plan:  Orders to be placed for mepilex placement on sacrum, orders in for mobility turn team, continual education on repositioning

## 2020-09-11 NOTE — PROGRESS NOTES
Bedside and Verbal shift change report given to Shirley(oncoming nurse) by Shea Reece (offgoing nurse). Report included the following information SBAR, Kardex, Intake/Output, MAR and Recent Results.

## 2020-09-11 NOTE — PROGRESS NOTES
Problem: Mobility Impaired (Adult and Pediatric)  Goal: *Acute Goals and Plan of Care (Insert Text)  Description: FUNCTIONAL STATUS PRIOR TO ADMISSION: Pt reports independent baseline mobility without device    HOME SUPPORT PRIOR TO ADMISSION: The patient lived alone with friends to provide assistance intermittently. Physical Therapy Goals  Initiated 8/27/2020; continue same goals as of 9/2/2020 ; continue same goals as of 9/11/2020  1. Patient will move from supine to sit and sit to supine in bed with supervision/set-up within 7 day(s). 2.  Patient will transfer from bed to chair and chair to bed with supervision/set-up using the least restrictive device within 7 day(s). 3.  Patient will perform sit to stand with supervision/set-up within 7 day(s). 4.  Patient will ambulate with supervision/set-up for 100 feet with the least restrictive device within 7 day(s). 5.  Patient will ascend/descend 4 stairs with one handrail(s) with minimal assistance/contact guard assist within 7 day(s). Outcome: Progressing Towards Goal   PHYSICAL THERAPY TREATMENT  Patient: Woody Odonnell (6 y.o. male)  Date: 9/11/2020  Diagnosis: Pneumonia [J18.9]   Pneumonia involving right lung  Procedure(s) (LRB):  ESOPHAGOGASTRODUODENOSCOPY (EGD) (N/A)  COLONOSCOPY (N/A)  ESOPHAGOGASTRODUODENAL (EGD) BIOPSY (N/A)  COLON BIOPSY (N/A) 7 Days Post-Op  Precautions: Fall  Chart, physical therapy assessment, plan of care and goals were reviewed. ASSESSMENT  Patient continues with skilled PT services and is progressing towards goals. Communicated with nurse cleared for therapy. Sit on the edge of bed supervision, sit to stand CGA, ambulate with rolling walker SBA, no loss of balance steady sitting and standing balance. Tolerated ambulation out on the 5th floor hallway offered to be OOB to chair as tolerated however patient declined just want to go back to bed.  Educate and  Instructed patient to continue active range of motion exercise on both legs while up on chair or on bed, verbalized understanding and agreed with all goals set for the patient. Nurse came in the room after ambulation and agreed to continue to monitor patient. Current Level of Function Impacting Discharge (mobility/balance): fall    Other factors to consider for discharge: none         PLAN :  Patient continues to benefit from skilled intervention to address the above impairments. Continue treatment per established plan of care. to address goals. Recommendation for discharge: (in order for the patient to meet his/her long term goals)  Therapy 3 hours per day 5-7 days per week    This discharge recommendation:  Has been made in collaboration with the attending provider and/or case management    IF patient discharges home will need the following DME: none       SUBJECTIVE:   Patient stated Aleene Best.     OBJECTIVE DATA SUMMARY:   Critical Behavior:  Neurologic State: Alert  Orientation Level: Oriented X4  Cognition: Appropriate decision making, Appropriate for age attention/concentration, Appropriate safety awareness, Follows commands  Safety/Judgement: Awareness of environment  Functional Mobility Training:  Bed Mobility:  Rolling: Supervision  Supine to Sit: Supervision  Sit to Supine: Contact guard assistance; Additional time  Scooting: Supervision        Transfers:  Sit to Stand: Contact guard assistance(bed height elevated)  Stand to Sit: Contact guard assistance  Stand Pivot Transfers: Contact guard assistance     Bed to Chair: (offered to be OOB to chair declined just want back to bed)                    Balance:  Sitting: Intact  Standing: With support; Impaired  Standing - Static: Good  Standing - Dynamic : Good  Ambulation/Gait Training:  Distance (ft): 80 Feet (ft)  Assistive Device: Walker, rolling;Gait belt  Ambulation - Level of Assistance: Stand-by assistance     Gait Description (WDL): Exceptions to WDL  Gait Abnormalities: Path deviations        Base of Support: Widened     Speed/Taty: Pace decreased (<100 feet/min)  Step Length: Right shortened;Left shortened                         Therapeutic Exercises:    Instructed patient to continue active range of motion exercise on both legs while up on chair or on bed. Pain Ratin/10    Activity Tolerance:   Good  Please refer to the flowsheet for vital signs taken during this treatment. After treatment patient left in no apparent distress:   Supine in bed, Heels elevated for pressure relief, Call bell within reach, Bed / chair alarm activated, and Side rails x 3    COMMUNICATION/COLLABORATION:   The patients plan of care was discussed with: Occupational therapist, Registered nurse, and Case management. Chelita Sales, PT,WCC.    Time Calculation: 30 mins

## 2020-09-12 ENCOUNTER — APPOINTMENT (OUTPATIENT)
Dept: GENERAL RADIOLOGY | Age: 65
DRG: 166 | End: 2020-09-12
Attending: INTERNAL MEDICINE
Payer: COMMERCIAL

## 2020-09-12 LAB
ALBUMIN SERPL-MCNC: 1 G/DL (ref 3.5–5)
ALBUMIN/GLOB SERPL: 0.2 {RATIO} (ref 1.1–2.2)
ALP SERPL-CCNC: 290 U/L (ref 45–117)
ALT SERPL-CCNC: 102 U/L (ref 12–78)
ANION GAP SERPL CALC-SCNC: 4 MMOL/L (ref 5–15)
AST SERPL-CCNC: 84 U/L (ref 15–37)
BASOPHILS # BLD: 0 K/UL (ref 0–0.1)
BASOPHILS NFR BLD: 0 % (ref 0–1)
BILIRUB SERPL-MCNC: 0.6 MG/DL (ref 0.2–1)
BUN SERPL-MCNC: 4 MG/DL (ref 6–20)
BUN/CREAT SERPL: 14 (ref 12–20)
CALCIUM SERPL-MCNC: 7.8 MG/DL (ref 8.5–10.1)
CHLORIDE SERPL-SCNC: 103 MMOL/L (ref 97–108)
CO2 SERPL-SCNC: 31 MMOL/L (ref 21–32)
CREAT SERPL-MCNC: 0.29 MG/DL (ref 0.7–1.3)
DIFFERENTIAL METHOD BLD: ABNORMAL
EOSINOPHIL # BLD: 0 K/UL (ref 0–0.4)
EOSINOPHIL NFR BLD: 0 % (ref 0–7)
ERYTHROCYTE [DISTWIDTH] IN BLOOD BY AUTOMATED COUNT: 19 % (ref 11.5–14.5)
GLOBULIN SER CALC-MCNC: 4.6 G/DL (ref 2–4)
GLUCOSE SERPL-MCNC: 70 MG/DL (ref 65–100)
HCT VFR BLD AUTO: 24.8 % (ref 36.6–50.3)
HGB BLD-MCNC: 7.9 G/DL (ref 12.1–17)
IMM GRANULOCYTES # BLD AUTO: 0 K/UL
IMM GRANULOCYTES NFR BLD AUTO: 0 %
LYMPHOCYTES # BLD: 2.4 K/UL (ref 0.8–3.5)
LYMPHOCYTES NFR BLD: 24 % (ref 12–49)
MCH RBC QN AUTO: 33.1 PG (ref 26–34)
MCHC RBC AUTO-ENTMCNC: 31.9 G/DL (ref 30–36.5)
MCV RBC AUTO: 103.8 FL (ref 80–99)
MONOCYTES # BLD: 0.6 K/UL (ref 0–1)
MONOCYTES NFR BLD: 6 % (ref 5–13)
NEUTS SEG # BLD: 7 K/UL (ref 1.8–8)
NEUTS SEG NFR BLD: 70 % (ref 32–75)
NRBC # BLD: 0 K/UL (ref 0–0.01)
NRBC BLD-RTO: 0 PER 100 WBC
PLATELET # BLD AUTO: 394 K/UL (ref 150–400)
PMV BLD AUTO: 9.3 FL (ref 8.9–12.9)
POTASSIUM SERPL-SCNC: 3.5 MMOL/L (ref 3.5–5.1)
PROT SERPL-MCNC: 5.6 G/DL (ref 6.4–8.2)
RBC # BLD AUTO: 2.39 M/UL (ref 4.1–5.7)
RBC MORPH BLD: ABNORMAL
SODIUM SERPL-SCNC: 138 MMOL/L (ref 136–145)
WBC # BLD AUTO: 10 K/UL (ref 4.1–11.1)

## 2020-09-12 PROCEDURE — 74011250637 HC RX REV CODE- 250/637: Performed by: FAMILY MEDICINE

## 2020-09-12 PROCEDURE — 85025 COMPLETE CBC W/AUTO DIFF WBC: CPT

## 2020-09-12 PROCEDURE — 74011250637 HC RX REV CODE- 250/637: Performed by: INTERNAL MEDICINE

## 2020-09-12 PROCEDURE — 71045 X-RAY EXAM CHEST 1 VIEW: CPT

## 2020-09-12 PROCEDURE — 80053 COMPREHEN METABOLIC PANEL: CPT

## 2020-09-12 PROCEDURE — 36415 COLL VENOUS BLD VENIPUNCTURE: CPT

## 2020-09-12 PROCEDURE — 65660000000 HC RM CCU STEPDOWN

## 2020-09-12 PROCEDURE — 74011250637 HC RX REV CODE- 250/637: Performed by: SPECIALIST

## 2020-09-12 RX ADMIN — FERROUS SULFATE TAB 325 MG (65 MG ELEMENTAL FE) 325 MG: 325 (65 FE) TAB at 08:20

## 2020-09-12 RX ADMIN — CYANOCOBALAMIN TAB 500 MCG 1000 MCG: 500 TAB at 08:20

## 2020-09-12 RX ADMIN — POTASSIUM CHLORIDE 10 MEQ: 750 TABLET, FILM COATED, EXTENDED RELEASE ORAL at 17:25

## 2020-09-12 RX ADMIN — MESALAMINE 800 MG: 400 CAPSULE, DELAYED RELEASE ORAL at 22:00

## 2020-09-12 RX ADMIN — Medication 10 ML: at 06:57

## 2020-09-12 RX ADMIN — Medication 10 ML: at 17:27

## 2020-09-12 RX ADMIN — PANCRELIPASE 1 CAPSULE: 24000; 76000; 120000 CAPSULE, DELAYED RELEASE PELLETS ORAL at 11:50

## 2020-09-12 RX ADMIN — MESALAMINE 800 MG: 400 CAPSULE, DELAYED RELEASE ORAL at 08:20

## 2020-09-12 RX ADMIN — MESALAMINE 800 MG: 400 CAPSULE, DELAYED RELEASE ORAL at 17:25

## 2020-09-12 RX ADMIN — PANCRELIPASE 1 CAPSULE: 24000; 76000; 120000 CAPSULE, DELAYED RELEASE PELLETS ORAL at 08:20

## 2020-09-12 RX ADMIN — HYDROCODONE BITARTRATE AND ACETAMINOPHEN 1 TABLET: 5; 325 TABLET ORAL at 23:03

## 2020-09-12 RX ADMIN — Medication: at 08:23

## 2020-09-12 RX ADMIN — POTASSIUM CHLORIDE 10 MEQ: 750 TABLET, FILM COATED, EXTENDED RELEASE ORAL at 08:20

## 2020-09-12 RX ADMIN — Medication 10 ML: at 22:01

## 2020-09-12 RX ADMIN — FOLIC ACID 1 MG: 1 TABLET ORAL at 08:20

## 2020-09-12 RX ADMIN — Medication: at 17:28

## 2020-09-12 RX ADMIN — PANCRELIPASE 1 CAPSULE: 24000; 76000; 120000 CAPSULE, DELAYED RELEASE PELLETS ORAL at 17:25

## 2020-09-12 NOTE — PROGRESS NOTES
Bedside and Verbal shift change report given to Lewis Pierre RN (oncoming nurse) by Delonte Modi RN (offgoing nurse). Report included the following information SBAR, Kardex and MAR.

## 2020-09-12 NOTE — PROGRESS NOTES
Daily Progress Note: 9/12/2020  Kinjal Guerin MD-PCP    Assessment/Plan:   Pneumonia involving right lung (8/25/2020) /  Hemoptysis (8/25/2020) POA: extensive with internal cavitation and air fluid levels. Staph Aureus, leukocytosis improving. CXR unchanged  --pleural fluid studies + staph, BC neg, cytology neg, AFB neg smears x 3 - cultures pending, and 2nd quantiferon gold negative. -- zosyn, doxy completed, Chest tube removed 9/3  -- Pulm following     Bilateral pulmonary embolism, acute ([address] (8/25/2020) / Acute deep vein thrombosis (DVT) of both lower extremities ([facility] [address] (8/25/2020):   - IVC filter placed   - holding anticoags due to dropping Hb and active hemoptysis and GI bleeding     Weight loss, unintentional (8/25/2020) POA:t severe protein calorie malnutrition, ulcerative colitis and empyema likely causes. Nutrition and speech therapy following  -Diet as tolerated  -calcium corrects  -GI follow up outpt     Hyperbilirubinemia (8/25/2020) POA: unclear etiology. CTs as under data review. - resolved     Pleural effusion, right (8/25/2020) POA: empyema, pneumonia vs malignant effusion. -pulmonology following  - Chest tube placed 8/26/20 and removed 9/3     Alcohol use (8/25/2020) POA: he says he has cut down recently. - no withdrawal occured     Chronic Diarrhea (8/25/2020) / Ulcerative colitis/ Fecal occult blood test positive (8/25/2020) POA:  Monitor Hgb.    -GI following  -imodium and peptobismol PRN  -FloraQ daily- or pt's home probiotics  -mesalamine has helped    Hypomag/hypokalemia:  -replete and monitor    GI blood loss/blood loss anemia/iron def anemia  - iron supplement        Problem List:  Problem List as of 9/12/2020 Date Reviewed: 8/25/2020          Codes Class Noted - Resolved    * (Principal) Pneumonia involving right lung ICD-10-CM: J18.9  ICD-9-CM: 486  8/25/2020 - Present        Hemoptysis ICD-10-CM: R04.2  ICD-9-CM: 786.30  8/25/2020 - Present Hyperbilirubinemia ICD-10-CM: E80.6  ICD-9-CM: 782.4  8/25/2020 - Present        Weight loss, unintentional ICD-10-CM: R63.4  ICD-9-CM: 783.21  8/25/2020 - Present        Pleural effusion, right ICD-10-CM: J90  ICD-9-CM: 511.9  8/25/2020 - Present        Bilateral pulmonary embolism ([address] ICD-10-CM: I26.99  ICD-9-CM: 415.19  8/25/2020 - Present        Acute deep vein thrombosis (DVT) of both lower extremities ([facility]) ICD-10-CM: I82.403  ICD-9-CM: 453.40  8/25/2020 - Present        Alcohol use ICD-10-CM: Z72.89  ICD-9-CM: V49.89  8/25/2020 - Present        Diarrhea ICD-10-CM: R19.7  ICD-9-CM: 787.91  8/25/2020 - Present        Fecal occult blood test positive ICD-10-CM: R19.5  ICD-9-CM: 792.1  8/25/2020 - Present        Hyponatremia with extracellular fluid depletion ICD-10-CM: E87.1  ICD-9-CM: 276.1  8/25/2020 - Present            Subjective:     60 y.o. male who is being admitted for Pneumonia involving right lung. Mr. Sydnee Fothergill presented to our Emergency Department today complaining of generalized weakness, diarrhea and lethargy for several months. Patient was accompanied by his friend who gave most of the history. The patient does not like seeing physicians and apparently uses vitamin supplements. He is a smoker and drinks alcohol and has been progressively weaker over several months now. He has had pleuritic right sided chest pain associated with a productive cough and hemoptysis. He feel on the floor several days ago and was unable to get up. His friends helped him up and brought him to the ED for further testing. Initial CXR done showed airspace disease in right mid lower lung zone consistent with pneumonia. This is surrounded by a moderate right-sided pleural effusion. Upon review, he was noted to be cachectic with swollen lower extremities.  CT scans chest abdomen and pelvis done showed a diffuse irregular opacification throughout the right lower lobe with multiple areas of internal cavitation and air-fluid levels. Findings are more likely related to infection than malignancy. Large right pleural effusion. Trace left pleural effusion. Bilateral pulmonary emboli. Clot burden is moderate. No evidence of right heart strain. Thrombus in the veins of the left lower extremity extending to the IVC. A small amount of thrombus is also seen in the right common iliac vein. He will be admitted for further management. (Dr Zainab Valdes)    8/26:  Feeling a little better this AM but still c/o painful, pleuritic cough and occas thick sputum. No leg/calf pains. No known exposure to ill individuals/no travel - \"just staying in the last few months. \"  No temps. WBC 31.9K. Cultures neg so far.      8/27:  Reports less cough, chest pain and feeling better overall. Appetite has returned. Chest tube placed yesterday with discharge obtained - pending cultures. Other cultures remain neg. Hb lower so cont to hold anticoag for now. GI has seen and work up when more stable. Remains on doxy, zosyn, vanc. WBC down to 28K. Bili back to normal.     8/28:  Continues to grad improve. Still with some cough. No tremor or hallucinations so far. K+ lower - replacing. WBC coming down. Afeb. CXR this AM pending. Cultures remain neg so far. Quantiferon gold test pending as of this AM.  He reports hx of chronic LE edema for years. 8/29: Pt continues to have frequent stooling. GI plans endoscopy next week if pt allows. He was not willing to participate with PT/OT/nutritionist yesterday. Says despite our best efforts, we will not get his stooling to stop until he gets his home probiotics [friend will bring it]. He refuses to eat until the stooling is better. 8/30:  Pt is reluctant to admit that his stooling has improved. Down to 2 BMs yesterday instead of 6 the day before. Tolerating PO. Willing to eat more today. He is still having hemoptysis. Wanting to get out of bed. Willing to work with PT/OT. 8/31:  Feeling better. Appetite better. TB neg so far. First Quantiferon Gold was indeterminate - repeat pending. Afeb. WBC slowly coming down. Currently on Doxy and Zosyn. Replacing Mag and K+. Hb now stable in 8s. 9/1: Little change. Not moving about much, he reports due to chest tube being painful when he moves. Hb 7.8. WBC 16. No sign of ETOH withdrawal.     /9/2:  Now reports he wants to move around more and get up and wants to work with PT. AFB smears neg x3, AFB cultures still pending of course. WBC slowly coming down. Afeb. Hb still slowly dropping - 7.7 today. May consider prophylactic dose of lovenox and watch Hb.  GI work up pending. Arms a little more swollen. 9/3: Feeling better. Hoping for endoscopy in am. Patient would like chest tube removed prior to prep and studies if possible. Speech would like MBS.     9/4:  Feeling better except for prep for colonoscopy - reports clear results last BM. Chest tube out yesterday. Hb down to 7.0 so still holding anticoag - may need transfusion. Upper and lower endoscopy planned today. CXR this AM pending. K+ down with the prep - replacing. 9/5:  Little change. Still with occas productive cough. Now also c/o increasing LE edema and today notes scrotal edema and now wants something done about it. 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 Hb down to 6.9 today - would benefit from transfusion. Risks v benefits of transfusion discussed and he finally agreed. Appetite very good now, taking plenty of fluids now, so will DC IVFs. Low dose of lasix after the transfusion to help with the edema. 9/6:  Less edema in LEs and scrotum today but still quite a bit - will give another low dose of Lasix. Received 1 unit packed red cells yesterday. K+ sl low - replacing. Still having episodic loose stools but on questioning he reports chronic loose stools for \"years\" which would go along with his colonoscopy findings. AM CBC is still pending. Discussed need for rehab and strongly advised he consider going with his degree of debility. Risk of falls and fractures discussed. 9/7:  Still c/o scrotal and LE edema but sl better - one dose of 10mg lasix today. Discussed his low albumin as part of the problem. Less cough today but slight amount of hemoptysis this AM. Hb 7.8 this AM - was in the 8s after the transfusion. K+ still low - replacing. Declining to have dressings changed - discussed risks. Discussed his severe weakness and need for rehab again - still not very interested in going. 9/8:  Less scrotal swelling after the lasix - > 1.5 liter out. He did get up some in room yesterday and did fairly well but still very weak. Fewer loose stools. He has mult questions about Ulcerative colitis and basics discussed with him - advised him to discuss further with GI. Eating well and has high protein diet. Albumin still low. K+ back up. Still has cough - less productive so far today. Will check CXR today. ALT higher toay - monitoring. 9/9:  Continues to have less edema with low dose of lasix yesterday. CXR looking better. Still having frequent loose stools but better than prior to admission. Hb continues to drop likely due to chronic, slow GI blood loss due to the ulcerative colitis. He will likely need episodic transfusions. LFTs look improved but full hepatic panel is pending. Less cough so far today. He was up a bit more yesterday. He reports he is now willing to go to rehab. Due to recent increased bilat LE edema and scrotal edema (although now somewhat better), will get IR to make sure the IVC filter is patent. 1145am:  Discussed with Dr Cecilia Younger (IR) - advised ab US as start to eval IVC. 9/10:  Fewer loose stools. Less scrotal edema and LE edema. Ab US with IVC not completely seen due to bowel gas - will ask IR to comment. LFTs improved. CXR improving. Hb 7.7. He reports he did not get up yesterday.       9/11: Less cough, less sputum, less edema, and feeling better. CT Chest/Ab/Pelvis 9/10/20 as noted under Data Review. Hb stable in 7s for now - recommend frequent checks at rehab.  K+ sl low - replacing. LFTs better - follow up with GI (Dr Katiuska Pan) after discharge for LFTs and for Ulcerative Colitis. Follow up with PCP when out of rehab. Stable for rehab.      :  Only one stool since yesterday he reports and that was semi-solid. Still had fecal urgency and did not make it to bathroom this AM.  Much less edema and continues to feel better. He walked briefly yesterday but still need max assist to get up. Hb stable. LFTs cont to improve. Review of Systems:   A comprehensive review of systems was negative except for that written in the HPI. Objective:   Physical Exam:   Visit Vitals  /62 (BP 1 Location: Right arm, BP Patient Position: At rest)   Pulse 81   Temp 97.4 °F (36.3 °C)   Resp 18   Ht 5' 10\" (1.778 m)   Wt 63.9 kg (140 lb 14 oz)   SpO2 96%   BMI 20.21 kg/m²    O2 Flow Rate (L/min): 2 l/min O2 Device: Room air  Temp (24hrs), Av.9 °F (36.6 °C), Min:97.4 °F (36.3 °C), Max:98.3 °F (36.8 °C)    No intake/output data recorded. 09/10 1901 -  0700  In: -   Out: 4002 [Urine:2455]  General:  Alert, cooperative, no distress, cachetic, ill appearing. Head:  Normocephalic, without obvious abnormality, atraumatic. Eyes:  Orma conjuntiva. PERRL, EOMs intact. Throat: Lips, mucosa, and tongue moist.   Neck: Supple, symmetrical, trachea midline, no adenopathy, thyroid: no enlargement/tenderness/nodules, no carotid bruit and no JVD. Lungs:   Better air exchange,  Clear at this moment. Chest wall: Former chest tube site right looks ok   Heart:  Regular rate and rhythm, S1, S2 normal, no murmur, click, rub or gallop. Abdomen:   Soft, non-tender. Bowel sounds normal. No masses,  No organomegaly. Extremities: no cyanosis. No calf tenderness; less than 1+ pitting edema bilat. LEs.   Much less in arms and scrotum, No cords palpated. Pulses: 2+ and symmetric all extremities. Skin: Skin color, texture, turgor normal. No rashes   Neurologic:  Alert and oriented X 3. Fine motor of hands and fingers normal.   equal.  No tremor. Gait not tested at this time. Sensation grossly normal to touch. Gross motor of extremities normal.       Data Review:   EXAM: CT CHEST ABD PELV W CONT 8/25/20  INDICATION: weight loss  COMPARISON: Radiographs 8/25/2020  CONTRAST: 100 mL of Isovue-370. FINDINGS:   CHEST WALL: No mass or axillary lymphadenopathy. THYROID: No nodule. MEDIASTINUM: No mass or lymphadenopathy. BRYAN: No mass or lymphadenopathy. THORACIC AORTA: No dissection or aneurysm. MAIN PULMONARY ARTERY: Normal in caliber. Thrombus is seen throughout the right  lower lobe pulmonary arteries. Thrombus is seen in the left lower lobe pulmonary  artery extending into the anterior and lateral basilar segments. TRACHEA/BRONCHI: Patent. ESOPHAGUS: No wall thickening or dilatation. HEART: Normal in size. PLEURA: There is a large right pleural effusion with enhancement of the pleural  surfaces. Trace left pleural effusion. LUNGS: There is irregular opacification throughout the right lower lobe. There  are multiple areas of internal cavitation with air-fluid levels. Small areas of  atelectasis or scarring are seen in the lingula and inferior left lower lobe. LIVER: No mass. BILIARY TREE: Gallbladder is within normal limits. CBD is not dilated. SPLEEN: within normal limits. PANCREAS: No mass or ductal dilatation. ADRENALS: Unremarkable. KIDNEYS: Small nonobstructive stones are seen in the bilateral kidneys. No  evidence of hydronephrosis. No mass. STOMACH: Unremarkable. SMALL BOWEL: No dilatation or wall thickening. COLON: No dilatation or wall thickening. APPENDIX: Unremarkable  PERITONEUM: No ascites or pneumoperitoneum.   RETROPERITONEUM: No lymphadenopathy or aortic aneurysm. REPRODUCTIVE ORGANS: Unremarkable  URINARY BLADDER: No mass or calculus. BONES: No destructive bone lesion. ABDOMINAL WALL: No mass or hernia. ADDITIONAL COMMENTS: There is thrombus in the left femoral veins extending into  the left iliac veins into the IVC. A small amount of thrombus is also present in  the right common iliac vein. IMPRESSION:  1. Diffuse irregular opacification throughout the right lower lobe with multiple  areas of internal cavitation and air-fluid levels. Findings are more likely  related to infection than malignancy. 2. Large right pleural effusion. Trace left pleural effusion. 3. Bilateral pulmonary emboli. Clot burden is moderate. No evidence of right  heart strain. 4. Thrombus in the veins of the left lower extremity extending to the IVC. A  small amount of thrombus is also seen in the right common iliac vein. EXAM:  IR THORACENTESIS/INSERT CHEST TUBE 8/26/20  INDICATION:  Request placement of large size pigtail catheter. Possible empyema. PROCEDURE:  Available history and imaging was reviewed which demonstrates a large partially  loculated right pleural effusion. Possibility of empyema, tuberculosis and other  etiologies are being considered clinically. Specific request for larger pigtail  catheter placement. Following informed consent the patient was evaluated with ultrasound in the  angina/catheter lab department. This demonstrates a large right pleural  effusion. The location of the pleural effusion was marked and then the area was prepped  and draped. 1% lidocaine was used for local anesthesia. A small incision was made with an 11  blade. A micropuncture needle was used under direct fluoroscopic guidance to access the  pleural fluid. When this was achieved the microguidewire was placed followed by  a 5 [place] dilator. Through this a short [address] guidewire  was placed. The track was then dilated to 12 [place].  This was then followed by  placement of a 12 [place] [employer] pigtail catheter. The string for the pigtail  was removed prior to placement. The catheter was positioned under fluoroscopic  guidance and then secured in place at the skin with a 2-0 Prolene suture. The  site was then dressed with a Tegaderm Bioclusive dressing and will be connected  to a Pleur-evac for subsequent drainage. Prior to this approximately 100 mL of fluid was obtained and sent for cytology  as well as multiple cultures including aerobic, anaerobic, AFB and fungal.   The fluid was withdrawn was mildly cloudy serosanguineous in color. I did not  notice any foul smell. The procedure was adequately tolerated without significant blood loss or  evidence of complication. Final digital image of the chest was saved documenting catheter position. IMPRESSION:   Successful ultrasound and fluoroscopic guided placement of 12 [place] right-sided  chest tube. Patient will have ongoing drainage from Pleur-evac in his hospital  bed/room. Care and monitoring as per medical service and nursing service. ECHO 8/27/20  Interpretation Summary   Result status: Final result    · Image quality for this study was technically difficult. · Echo study was technically difficulty and limited due to patient's tolerance. · LV: Estimated LVEF is 55 - 60%. Normal cavity size, wall thickness and systolic function (ejection fraction normal). Wall motion: normal. Mild (grade 1) left ventricular diastolic dysfunction. · Pericardium: Small pericardial effusion adjacent to right ventricle. Portable AP upright view of the chest. 9/1/20  Direct comparison made to prior chest x-ray dated September 1, 2020. Cardiomediastinal silhouette is stable. Right basilar chest tube is unchanged in  position. There No residual pneumothorax is visualized. There are small  bilateral pleural effusions. There are persistent increased interstitial  markings at the right lung.  There is persistent bibasilar patchy airspace  disease, right greater than left. IMPRESSION: No residual pneumothorax visualized    CXR 9/4/20  FINDINGS: AP portable imaging of the chest performed at 5:14 AM demonstrates a  stable cardiomediastinal silhouette. The right basilar chest tube is been  removed. Diffuse airspace disease throughout the right lung is unchanged. Small  right pleural effusion persists. No pneumothorax is visualized. There is  unchanged left basilar atelectasis. No significant osseous abnormalities are  seen. IMPRESSION: No evidence of pneumothorax following chest tube removal.    EXAM:  US ABD LTD 9/9/20  INDICATION:  evaluate IVC for patency, intermittent increase in edema is  swelling scrotum and lower extremities. COMPARISON:  CT abdomen and pelvis 8/25/20, IVC filter placement 8/25/20  TECHNIQUE:   Real-time ultrasound was utilized along with color-flow duplex Doppler  ultrasound to evaluate the inferior vena cava. Multiple saved digital images. FINDINGS:   The upper inferior vena cava is adequately visualized down to the filter and  there is no evidence of thrombus. Due to overlying bowel gas is difficult to visualize inferior vena cava without  below the filter. There is at least some flow demonstrated in the distal  inferior vena cava. IMPRESSION:  1. Normal-appearing inferior vena cava extending down to the filter. 2. Suboptimal visualization of the inferior vena cava beginning at the lower  aspect of the filter and extending through the bifurcation. There is some flow  demonstrated, however this area is suboptimally examined. EXAM: CT CHEST ABD PELV W CONT 9/10/20  INDICATION: assess patency of IVC . IVC filter. COMPARISON: 8/25/2020  CONTRAST: 100 mL of Isovue-370. TECHNIQUE:   Following the uneventful intravenous administration of contrast, thin axial  images were obtained through the chest, abdomen and pelvis. Coronal and sagittal  reformats were generated. Oral contrast was not administered.  CT dose reduction  was achieved through use of a standardized protocol tailored for this  examination and automatic exposure control for dose modulation. FINDINGS:   CHEST WALL: No mass or axillary lymphadenopathy. THYROID: No nodule. MEDIASTINUM: Largest right paratracheal node measures 8 mm in short axis  diameter (series 2, image 24). BRYAN: No mass or lymphadenopathy. THORACIC AORTA: No dissection or aneurysm. MAIN PULMONARY ARTERY: Pulmonary embolism in a branch to the right lower lobe  (series 2, image 35). Nonoccluding small chronic eccentric thrombus in a branch  to the left lower lobe (axial image 28 and coronal image 71). TRACHEA/BRONCHI: Patent. ESOPHAGUS: No wall thickening or dilatation. HEART: Normal in size. PLEURA: Bilateral pleural effusions, larger on the left. Small right  hydropneumothorax. LUNGS: Bibasilar compressive atelectasis, more pronounced on the right. Right  apical pleural thickening with associated small apical pneumothorax (series 3,  image 17). LIVER: No mass. BILIARY TREE: Gallbladder is within normal limits. CBD is not dilated. SPLEEN: within normal limits. PANCREAS: No mass or ductal dilatation. ADRENALS: Unremarkable. KIDNEYS: Bilateral nonobstructing renal calculi (coronal images 91 and 96). STOMACH: Unremarkable. SMALL BOWEL: No dilatation or wall thickening. COLON: No dilatation or wall thickening. APPENDIX: Not visualized  PERITONEUM: No ascites or pneumoperitoneum   RETROPERITONEUM: No lymphadenopathy or aortic aneurysm. REPRODUCTIVE ORGANS: Small prostate  URINARY BLADDER: Layering stones in the inferior bladder (series 2, image 111). BONES: No destructive bone lesion. Disc space narrowing with disc bulges at L3-4  and L4-5 and L5-S1. Dextroconvex lumbar curvature. ABDOMINAL WALL: No mass or hernia. ADDITIONAL COMMENTS: Partial cast in the left iliac vein (coronal image 86 and  axial image 94). . Partial cast below below the IVC. IVC filter in place. (Axial  image 83 and coronal image 76). Small amount of thrombus extends into the origin  of the right common iliac vein (series 2, image 87). 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 Decreased thrombus in in both lower lobe pulmonary arteries  Small right hydropneumothorax. Bilateral pleural effusions, asymmetric to the  left. Bibasilar compressive atelectasis, asymmetric to the right. Incidental bladder calculi. Nonobstructing bilateral renal calculi.     Recent Days:  Recent Labs     09/12/20  0311 09/11/20  0343 09/10/20  0203   WBC 10.0 12.9* 14.6*   HGB 7.9* 7.9* 7.7*   HCT 24.8* 24.5* 24.0*    400 402*     Recent Labs     09/12/20  0311 09/11/20  0343 09/10/20  0203    134* 136   K 3.5 3.4* 3.5    100 101   CO2 31 32 33*   GLU 70 67 67   BUN 4* 4* 5*   CREA 0.29* 0.37* 0.36*   CA 7.8* 7.6* 7.6*   ALB 1.0* 1.1* 1.0*   TBILI 0.6 0.7 0.6   * 130* 169*     No results for input(s): PH, PCO2, PO2, HCO3, FIO2 in the last 72 hours. 24 Hour Results:  Recent Results (from the past 24 hour(s))   CBC WITH AUTOMATED DIFF    Collection Time: 09/12/20  3:11 AM   Result Value Ref Range    WBC 10.0 4.1 - 11.1 K/uL    RBC 2.39 (L) 4.10 - 5.70 M/uL    HGB 7.9 (L) 12.1 - 17.0 g/dL    HCT 24.8 (L) 36.6 - 50.3 %    .8 (H) 80.0 - 99.0 FL    MCH 33.1 26.0 - 34.0 PG    MCHC 31.9 30.0 - 36.5 g/dL    RDW 19.0 (H) 11.5 - 14.5 %    PLATELET 687 163 - 573 K/uL    MPV 9.3 8.9 - 12.9 FL    NRBC 0.0 0  WBC    ABSOLUTE NRBC 0.00 0.00 - 0.01 K/uL    NEUTROPHILS 70 32 - 75 %    LYMPHOCYTES 24 12 - 49 %    MONOCYTES 6 5 - 13 %    EOSINOPHILS 0 0 - 7 %    BASOPHILS 0 0 - 1 %    IMMATURE GRANULOCYTES 0 %    ABS. NEUTROPHILS 7.0 1.8 - 8.0 K/UL    ABS. LYMPHOCYTES 2.4 0.8 - 3.5 K/UL    ABS. MONOCYTES 0.6 0.0 - 1.0 K/UL    ABS. EOSINOPHILS 0.0 0.0 - 0.4 K/UL    ABS. BASOPHILS 0.0 0.0 - 0.1 K/UL    ABS. IMM.  GRANS. 0.0 K/UL    DF MANUAL      RBC COMMENTS ANISOCYTOSIS  1+       METABOLIC PANEL, COMPREHENSIVE    Collection Time: 09/12/20  3:11 AM   Result Value Ref Range    Sodium 138 136 - 145 mmol/L    Potassium 3.5 3.5 - 5.1 mmol/L    Chloride 103 97 - 108 mmol/L    CO2 31 21 - 32 mmol/L    Anion gap 4 (L) 5 - 15 mmol/L    Glucose 70 65 - 100 mg/dL    BUN 4 (L) 6 - 20 MG/DL    Creatinine 0.29 (L) 0.70 - 1.30 MG/DL    BUN/Creatinine ratio 14 12 - 20      GFR est AA >60 >60 ml/min/1.73m2    GFR est non-AA >60 >60 ml/min/1.73m2    Calcium 7.8 (L) 8.5 - 10.1 MG/DL    Bilirubin, total 0.6 0.2 - 1.0 MG/DL    ALT (SGPT) 102 (H) 12 - 78 U/L    AST (SGOT) 84 (H) 15 - 37 U/L    Alk.  phosphatase 290 (H) 45 - 117 U/L    Protein, total 5.6 (L) 6.4 - 8.2 g/dL    Albumin 1.0 (L) 3.5 - 5.0 g/dL    Globulin 4.6 (H) 2.0 - 4.0 g/dL    A-G Ratio 0.2 (L) 1.1 - 2.2         Medications reviewed  Current Facility-Administered Medications   Medication Dose Route Frequency    potassium chloride SR (KLOR-CON 10) tablet 10 mEq  10 mEq Oral BID    ferrous sulfate tablet 325 mg  1 Tab Oral DAILY WITH BREAKFAST    0.9% sodium chloride infusion 250 mL  250 mL IntraVENous PRN    Probiotic - USANA Probiotic  (Patient Supplied)  1 Packet Oral DAILY    mesalamine (DELZICOL) DR capsule 800 mg  800 mg Oral TID    white petrolatum-mineral oiL (EUCERIN) cream   Topical BID    lipase-protease-amylase (CREON 24,000) capsule 1 Cap  1 Cap Oral TID WITH MEALS    loperamide (IMODIUM) capsule 2 mg  2 mg Oral Q6H PRN    bismuth subsalicylate (PEPTO-BISMOL) oral suspension 30 mL  30 mL Oral Q6H PRN    cyanocobalamin (VITAMIN B12) tablet 1,000 mcg  1,000 mcg Oral DAILY    folic acid (FOLVITE) tablet 1 mg  1 mg Oral DAILY    HYDROcodone-acetaminophen (NORCO) 5-325 mg per tablet 1 Tab  1 Tab Oral Q6H PRN    morphine injection 1 mg  1 mg IntraVENous Q4H PRN    sodium chloride (NS) flush 5-10 mL  5-10 mL IntraVENous PRN    sodium chloride (NS) flush 5-40 mL  5-40 mL IntraVENous Q8H    sodium chloride (NS) flush 5-40 mL  5-40 mL IntraVENous PRN    acetaminophen (TYLENOL) tablet 650 mg  650 mg Oral Q6H PRN    Or    acetaminophen (TYLENOL) suppository 650 mg  650 mg Rectal Q6H PRN    polyethylene glycol (MIRALAX) packet 17 g  17 g Oral DAILY PRN    promethazine (PHENERGAN) tablet 12.5 mg  12.5 mg Oral Q6H PRN    Or    ondansetron (ZOFRAN) injection 4 mg  4 mg IntraVENous Q6H PRN     Care Plan discussed with: Patient/Nurse/IR/GI  Total time spent with patient: 30 minutes.   Valarie Garcia MD

## 2020-09-12 NOTE — PROGRESS NOTES
Problem: Falls - Risk of  Goal: *Absence of Falls  Outcome: Progressing Towards Goal  Note: Fall Risk Interventions:  Mobility Interventions: Bed/chair exit alarm, PT Consult for mobility concerns, Patient to call before getting OOB, Utilize walker, cane, or other assistive device    Mentation Interventions: Bed/chair exit alarm    Medication Interventions: Assess postural VS orthostatic hypotension, Evaluate medications/consider consulting pharmacy, Teach patient to arise slowly    Elimination Interventions: Bed/chair exit alarm, Urinal in reach, Call light in reach, Patient to call for help with toileting needs    History of Falls Interventions: Bed/chair exit alarm, Room close to nurse's station

## 2020-09-12 NOTE — PROGRESS NOTES
Bedside and Verbal shift change report given to Dimitrios Arnold and Matt Bowser (oncoming nurse) by Nathalie Severin (offgoing nurse). Report included the following information SBAR, Kardex, ED Summary, Intake/Output, MAR, Accordion and Recent Results.

## 2020-09-13 ENCOUNTER — APPOINTMENT (OUTPATIENT)
Dept: GENERAL RADIOLOGY | Age: 65
DRG: 166 | End: 2020-09-13
Attending: INTERNAL MEDICINE
Payer: COMMERCIAL

## 2020-09-13 PROCEDURE — 74011250637 HC RX REV CODE- 250/637: Performed by: FAMILY MEDICINE

## 2020-09-13 PROCEDURE — 74011250637 HC RX REV CODE- 250/637: Performed by: INTERNAL MEDICINE

## 2020-09-13 PROCEDURE — 74011250637 HC RX REV CODE- 250/637: Performed by: SPECIALIST

## 2020-09-13 PROCEDURE — 65660000000 HC RM CCU STEPDOWN

## 2020-09-13 PROCEDURE — 71045 X-RAY EXAM CHEST 1 VIEW: CPT

## 2020-09-13 PROCEDURE — 2709999900 HC NON-CHARGEABLE SUPPLY

## 2020-09-13 RX ADMIN — MESALAMINE 800 MG: 400 CAPSULE, DELAYED RELEASE ORAL at 21:29

## 2020-09-13 RX ADMIN — Medication 10 ML: at 05:00

## 2020-09-13 RX ADMIN — LOPERAMIDE HYDROCHLORIDE 2 MG: 2 CAPSULE ORAL at 19:19

## 2020-09-13 RX ADMIN — Medication: at 08:42

## 2020-09-13 RX ADMIN — PANCRELIPASE 1 CAPSULE: 24000; 76000; 120000 CAPSULE, DELAYED RELEASE PELLETS ORAL at 17:15

## 2020-09-13 RX ADMIN — Medication 10 ML: at 21:30

## 2020-09-13 RX ADMIN — MESALAMINE 800 MG: 400 CAPSULE, DELAYED RELEASE ORAL at 17:15

## 2020-09-13 RX ADMIN — FOLIC ACID 1 MG: 1 TABLET ORAL at 08:42

## 2020-09-13 RX ADMIN — PANCRELIPASE 1 CAPSULE: 24000; 76000; 120000 CAPSULE, DELAYED RELEASE PELLETS ORAL at 08:42

## 2020-09-13 RX ADMIN — CYANOCOBALAMIN TAB 500 MCG 1000 MCG: 500 TAB at 08:42

## 2020-09-13 RX ADMIN — POTASSIUM CHLORIDE 10 MEQ: 750 TABLET, FILM COATED, EXTENDED RELEASE ORAL at 08:41

## 2020-09-13 RX ADMIN — PANCRELIPASE 1 CAPSULE: 24000; 76000; 120000 CAPSULE, DELAYED RELEASE PELLETS ORAL at 12:40

## 2020-09-13 RX ADMIN — Medication: at 17:16

## 2020-09-13 RX ADMIN — FERROUS SULFATE TAB 325 MG (65 MG ELEMENTAL FE) 325 MG: 325 (65 FE) TAB at 08:42

## 2020-09-13 RX ADMIN — POTASSIUM CHLORIDE 10 MEQ: 750 TABLET, FILM COATED, EXTENDED RELEASE ORAL at 17:15

## 2020-09-13 RX ADMIN — Medication 10 ML: at 17:17

## 2020-09-13 RX ADMIN — MESALAMINE 800 MG: 400 CAPSULE, DELAYED RELEASE ORAL at 08:42

## 2020-09-13 NOTE — ACP (ADVANCE CARE PLANNING)
Mr. Dex Stevenson friend was asking about a . They are having POA, MPOA, and a Will drawn up and would need a Notary and possibly witnesses. Explained that we, employees, would not be able to sign any paperwork that includes financial.  We might possibly be able to get permission for a Notary and witnessed to come in to have something like that done; however there are no guarantees. Suggested they determine what their needs would be and then inquire to see if permissions can esthela obtained for temporary visitors. Consulted with Case Management.   Visited by: Allie Whittington., MS., [id number] [facility] ([id number])

## 2020-09-13 NOTE — PROGRESS NOTES
Daily Progress Note: 9/13/2020  Marlyn Baker MD-PCP    Assessment/Plan:   Pneumonia involving right lung (8/25/2020) /  Hemoptysis (8/25/2020) POA: extensive with internal cavitation and air fluid levels. Staph Aureus, leukocytosis improving. CXR unchanged  --pleural fluid studies + staph, BC neg, cytology neg, AFB neg smears x 3 - cultures pending, and 2nd quantiferon gold negative. -- zosyn, doxy completed, Chest tube removed 9/3  -- Pulm following     Bilateral pulmonary embolism, acute ([address] (8/25/2020) / Acute deep vein thrombosis (DVT) of both lower extremities ([address] (8/25/2020):   - IVC filter placed   - holding anticoags due to dropping Hb and active hemoptysis and GI bleeding     Weight loss, unintentional (8/25/2020) POA:t severe protein calorie malnutrition, ulcerative colitis and empyema likely causes. Nutrition and speech therapy following  -Diet as tolerated  -calcium corrects  -GI follow up outpt     Hyperbilirubinemia (8/25/2020) POA: unclear etiology. CTs as under data review. - resolved     Pleural effusion, right (8/25/2020) POA: empyema, pneumonia vs malignant effusion. -pulmonology following  - Chest tube placed 8/26/20 and removed 9/3     Alcohol use (8/25/2020) POA: he says he has cut down recently. - no withdrawal occured     Chronic Diarrhea (8/25/2020) / Ulcerative colitis/ Fecal occult blood test positive (8/25/2020) POA:  Monitor Hgb.    -GI following  -imodium and peptobismol PRN  -FloraQ daily- or pt's home probiotics  -mesalamine has helped    Hypomag/hypokalemia:  -replete and monitor    GI blood loss/blood loss anemia/iron def anemia  - iron supplement        Problem List:  Problem List as of 9/13/2020 Date Reviewed: 8/25/2020          Codes Class Noted - Resolved    * (Principal) Pneumonia involving right lung ICD-10-CM: J18.9  ICD-9-CM: 486  8/25/2020 - Present        Hemoptysis ICD-10-CM: R04.2  ICD-9-CM: 786.30  8/25/2020 - Present Hyperbilirubinemia ICD-10-CM: E80.6  ICD-9-CM: 782.4  8/25/2020 - Present        Weight loss, unintentional ICD-10-CM: R63.4  ICD-9-CM: 783.21  8/25/2020 - Present        Pleural effusion, right ICD-10-CM: J90  ICD-9-CM: 511.9  8/25/2020 - Present        Bilateral pulmonary embolism ([address] ICD-10-CM: I26.99  ICD-9-CM: 415.19  8/25/2020 - Present        Acute deep vein thrombosis (DVT) of both lower extremities ([facility]) ICD-10-CM: I82.403  ICD-9-CM: 453.40  8/25/2020 - Present        Alcohol use ICD-10-CM: Z72.89  ICD-9-CM: V49.89  8/25/2020 - Present        Diarrhea ICD-10-CM: R19.7  ICD-9-CM: 787.91  8/25/2020 - Present        Fecal occult blood test positive ICD-10-CM: R19.5  ICD-9-CM: 792.1  8/25/2020 - Present        Hyponatremia with extracellular fluid depletion ICD-10-CM: E87.1  ICD-9-CM: 276.1  8/25/2020 - Present            Subjective:     60 y.o. male who is being admitted for Pneumonia involving right lung. Mr. Gavin Lopez presented to our Emergency Department today complaining of generalized weakness, diarrhea and lethargy for several months. Patient was accompanied by his friend who gave most of the history. The patient does not like seeing physicians and apparently uses vitamin supplements. He is a smoker and drinks alcohol and has been progressively weaker over several months now. He has had pleuritic right sided chest pain associated with a productive cough and hemoptysis. He feel on the floor several days ago and was unable to get up. His friends helped him up and brought him to the ED for further testing. Initial CXR done showed airspace disease in right mid lower lung zone consistent with pneumonia. This is surrounded by a moderate right-sided pleural effusion. Upon review, he was noted to be cachectic with swollen lower extremities.  CT scans chest abdomen and pelvis done showed a diffuse irregular opacification throughout the right lower lobe with multiple areas of internal cavitation and air-fluid levels. Findings are more likely related to infection than malignancy. Large right pleural effusion. Trace left pleural effusion. Bilateral pulmonary emboli. Clot burden is moderate. No evidence of right heart strain. Thrombus in the veins of the left lower extremity extending to the IVC. A small amount of thrombus is also seen in the right common iliac vein. He will be admitted for further management. (Dr Edmar Amin)    8/26:  Feeling a little better this AM but still c/o painful, pleuritic cough and occas thick sputum. No leg/calf pains. No known exposure to ill individuals/no travel - \"just staying in the last few months. \"  No temps. WBC 31.9K. Cultures neg so far.      8/27:  Reports less cough, chest pain and feeling better overall. Appetite has returned. Chest tube placed yesterday with discharge obtained - pending cultures. Other cultures remain neg. Hb lower so cont to hold anticoag for now. GI has seen and work up when more stable. Remains on doxy, zosyn, vanc. WBC down to 28K. Bili back to normal.     8/28:  Continues to grad improve. Still with some cough. No tremor or hallucinations so far. K+ lower - replacing. WBC coming down. Afeb. CXR this AM pending. Cultures remain neg so far. Quantiferon gold test pending as of this AM.  He reports hx of chronic LE edema for years. 8/29: Pt continues to have frequent stooling. GI plans endoscopy next week if pt allows. He was not willing to participate with PT/OT/nutritionist yesterday. Says despite our best efforts, we will not get his stooling to stop until he gets his home probiotics [friend will bring it]. He refuses to eat until the stooling is better. 8/30:  Pt is reluctant to admit that his stooling has improved. Down to 2 BMs yesterday instead of 6 the day before. Tolerating PO. Willing to eat more today. He is still having hemoptysis. Wanting to get out of bed. Willing to work with PT/OT. 8/31:  Feeling better. Appetite better. TB neg so far. First Quantiferon Gold was indeterminate - repeat pending. Afeb. WBC slowly coming down. Currently on Doxy and Zosyn. Replacing Mag and K+. Hb now stable in 8s. 9/1: Little change. Not moving about much, he reports due to chest tube being painful when he moves. Hb 7.8. WBC 16. No sign of ETOH withdrawal.     /9/2:  Now reports he wants to move around more and get up and wants to work with PT. AFB smears neg x3, AFB cultures still pending of course. WBC slowly coming down. Afeb. Hb still slowly dropping - 7.7 today. May consider prophylactic dose of lovenox and watch Hb.  GI work up pending. Arms a little more swollen. 9/3: Feeling better. Hoping for endoscopy in am. Patient would like chest tube removed prior to prep and studies if possible. Speech would like MBS.     9/4:  Feeling better except for prep for colonoscopy - reports clear results last BM. Chest tube out yesterday. Hb down to 7.0 so still holding anticoag - may need transfusion. Upper and lower endoscopy planned today. CXR this AM pending. K+ down with the prep - replacing. 9/5:  Little change. Still with occas productive cough. Now also c/o increasing LE edema and today notes scrotal edema and now wants something done about it. 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 Hb down to 6.9 today - would benefit from transfusion. Risks v benefits of transfusion discussed and he finally agreed. Appetite very good now, taking plenty of fluids now, so will DC IVFs. Low dose of lasix after the transfusion to help with the edema. 9/6:  Less edema in LEs and scrotum today but still quite a bit - will give another low dose of Lasix. Received 1 unit packed red cells yesterday. K+ sl low - replacing. Still having episodic loose stools but on questioning he reports chronic loose stools for \"years\" which would go along with his colonoscopy findings. AM CBC is still pending. Discussed need for rehab and strongly advised he consider going with his degree of debility. Risk of falls and fractures discussed. 9/7:  Still c/o scrotal and LE edema but sl better - one dose of 10mg lasix today. Discussed his low albumin as part of the problem. Less cough today but slight amount of hemoptysis this AM. Hb 7.8 this AM - was in the 8s after the transfusion. K+ still low - replacing. Declining to have dressings changed - discussed risks. Discussed his severe weakness and need for rehab again - still not very interested in going. 9/8:  Less scrotal swelling after the lasix - > 1.5 liter out. He did get up some in room yesterday and did fairly well but still very weak. Fewer loose stools. He has mult questions about Ulcerative colitis and basics discussed with him - advised him to discuss further with GI. Eating well and has high protein diet. Albumin still low. K+ back up. Still has cough - less productive so far today. Will check CXR today. ALT higher toay - monitoring. 9/9:  Continues to have less edema with low dose of lasix yesterday. CXR looking better. Still having frequent loose stools but better than prior to admission. Hb continues to drop likely due to chronic, slow GI blood loss due to the ulcerative colitis. He will likely need episodic transfusions. LFTs look improved but full hepatic panel is pending. Less cough so far today. He was up a bit more yesterday. He reports he is now willing to go to rehab. Due to recent increased bilat LE edema and scrotal edema (although now somewhat better), will get IR to make sure the IVC filter is patent. 1145am:  Discussed with Dr Greg Ayala (IR) - advised ab US as start to eval IVC. 9/10:  Fewer loose stools. Less scrotal edema and LE edema. Ab US with IVC not completely seen due to bowel gas - will ask IR to comment. LFTs improved. CXR improving. Hb 7.7. He reports he did not get up yesterday.       9/11: Less cough, less sputum, less edema, and feeling better. CT Chest/Ab/Pelvis 9/10/20 as noted under Data Review. Hb stable in 7s for now - recommend frequent checks at rehab.  K+ sl low - replacing. LFTs better - follow up with GI (Dr Carline Andre) after discharge for LFTs and for Ulcerative Colitis. Follow up with PCP when out of rehab. Stable for rehab.      :  Only one stool since yesterday he reports and that was semi-solid. Still had fecal urgency and did not make it to bathroom this AM.  Much less edema and continues to feel better. He walked briefly yesterday but still need max assist to get up. Hb stable. LFTs cont to improve. :  Continues to feel better but still weak and unable to get up or move from sitting to standing on own. Only one stool and reports it was solid this AM.  Still awaiting rehab. Review of Systems:   A comprehensive review of systems was negative except for that written in the HPI. Objective:   Physical Exam:   Visit Vitals  BP (!) 98/55 (BP 1 Location: Left arm) Comment: RN notified   Pulse 71   Temp 97.9 °F (36.6 °C)   Resp 20   Ht 5' 10\" (1.778 m)   Wt 55.9 kg (123 lb 3.8 oz)   SpO2 97%   BMI 17.68 kg/m²    O2 Flow Rate (L/min): 2 l/min O2 Device: Room air  Temp (24hrs), Av.3 °F (36.8 °C), Min:97.6 °F (36.4 °C), Max:98.8 °F (37.1 °C)    No intake/output data recorded. 1901 -  0700  In: 240 [P.O.:240]  Out: 2700 [Urine:2400]  General:  Alert, cooperative, no distress, cachetic, ill appearing. Head:  Normocephalic, without obvious abnormality, atraumatic. Eyes:  [place] conjuntiva. PERRL, EOMs intact. Throat: Lips, mucosa, and tongue moist.   Neck: Supple, symmetrical, trachea midline, no adenopathy, thyroid: no enlargement/tenderness/nodules, no carotid bruit and no JVD. Lungs:   Better air exchange,  Clear at this moment. Chest wall:   Former chest tube site right looks ok   Heart:  Regular rate and rhythm, S1, S2 normal, no murmur, click, rub or gallop. Abdomen:   Soft, non-tender. Bowel sounds normal. No masses,  No organomegaly. Extremities: no cyanosis. No calf tenderness; less than 1+ pitting edema bilat. LEs. Much less in arms and scrotum, No cords palpated. Pulses: 2+ and symmetric all extremities. Skin: Skin color, texture, turgor normal. No rashes   Neurologic:  Alert and oriented X 3. Fine motor of hands and fingers normal.   equal.  No tremor. Gait not tested at this time. Sensation grossly normal to touch. Gross motor of extremities normal.       Data Review:   EXAM: CT CHEST ABD PELV W CONT 8/25/20  INDICATION: weight loss  COMPARISON: Radiographs 8/25/2020  CONTRAST: 100 mL of Isovue-370. FINDINGS:   CHEST WALL: No mass or axillary lymphadenopathy. THYROID: No nodule. MEDIASTINUM: No mass or lymphadenopathy. BRYAN: No mass or lymphadenopathy. THORACIC AORTA: No dissection or aneurysm. MAIN PULMONARY ARTERY: Normal in caliber. Thrombus is seen throughout the right  lower lobe pulmonary arteries. Thrombus is seen in the left lower lobe pulmonary  artery extending into the anterior and lateral basilar segments. TRACHEA/BRONCHI: Patent. ESOPHAGUS: No wall thickening or dilatation. HEART: Normal in size. PLEURA: There is a large right pleural effusion with enhancement of the pleural  surfaces. Trace left pleural effusion. LUNGS: There is irregular opacification throughout the right lower lobe. There  are multiple areas of internal cavitation with air-fluid levels. Small areas of  atelectasis or scarring are seen in the lingula and inferior left lower lobe. LIVER: No mass. BILIARY TREE: Gallbladder is within normal limits. CBD is not dilated. SPLEEN: within normal limits. PANCREAS: No mass or ductal dilatation. ADRENALS: Unremarkable. KIDNEYS: Small nonobstructive stones are seen in the bilateral kidneys. No  evidence of hydronephrosis. No mass. STOMACH: Unremarkable.   SMALL BOWEL: No dilatation or wall thickening. COLON: No dilatation or wall thickening. APPENDIX: Unremarkable  PERITONEUM: No ascites or pneumoperitoneum. RETROPERITONEUM: No lymphadenopathy or aortic aneurysm. REPRODUCTIVE ORGANS: Unremarkable  URINARY BLADDER: No mass or calculus. BONES: No destructive bone lesion. ABDOMINAL WALL: No mass or hernia. ADDITIONAL COMMENTS: There is thrombus in the left femoral veins extending into  the left iliac veins into the IVC. A small amount of thrombus is also present in  the right common iliac vein. IMPRESSION:  1. Diffuse irregular opacification throughout the right lower lobe with multiple  areas of internal cavitation and air-fluid levels. Findings are more likely  related to infection than malignancy. 2. Large right pleural effusion. Trace left pleural effusion. 3. Bilateral pulmonary emboli. Clot burden is moderate. No evidence of right  heart strain. 4. Thrombus in the veins of the left lower extremity extending to the IVC. A  small amount of thrombus is also seen in the right common iliac vein. EXAM:  IR THORACENTESIS/INSERT CHEST TUBE 8/26/20  INDICATION:  Request placement of large size pigtail catheter. Possible empyema. PROCEDURE:  Available history and imaging was reviewed which demonstrates a large partially  loculated right pleural effusion. Possibility of empyema, tuberculosis and other  etiologies are being considered clinically. Specific request for larger pigtail  catheter placement. Following informed consent the patient was evaluated with ultrasound in the  angina/catheter lab department. This demonstrates a large right pleural  effusion. The location of the pleural effusion was marked and then the area was prepped  and draped. 1% lidocaine was used for local anesthesia. A small incision was made with an 11  blade. A micropuncture needle was used under direct fluoroscopic guidance to access the  pleural fluid.  When this was achieved the microguidewire was placed followed by  a 5 [place] dilator. Through this a short [address] guidewire  was placed. The track was then dilated to 12 [place]. This was then followed by  placement of a 12 [place] pigtail catheter. The string for the pigtail  was removed prior to placement. The catheter was positioned under fluoroscopic  guidance and then secured in place at the skin with a 2-0 Prolene suture. The  site was then dressed with a Tegaderm Bioclusive dressing and will be connected  to a Pleur-evac for subsequent drainage. Prior to this approximately 100 mL of fluid was obtained and sent for cytology  as well as multiple cultures including aerobic, anaerobic, AFB and fungal.   The fluid was withdrawn was mildly cloudy serosanguineous in color. I did not  notice any foul smell. The procedure was adequately tolerated without significant blood loss or  evidence of complication. Final digital image of the chest was saved documenting catheter position. IMPRESSION:   Successful ultrasound and fluoroscopic guided placement of 12 [place] right-sided  chest tube. Patient will have ongoing drainage from Pleur-evac in his hospital  bed/room. Care and monitoring as per medical service and nursing service. ECHO 8/27/20  Interpretation Summary   Result status: Final result    · Image quality for this study was technically difficult. · Echo study was technically difficulty and limited due to patient's tolerance. · LV: Estimated LVEF is 55 - 60%. Normal cavity size, wall thickness and systolic function (ejection fraction normal). Wall motion: normal. Mild (grade 1) left ventricular diastolic dysfunction. · Pericardium: Small pericardial effusion adjacent to right ventricle. Portable AP upright view of the chest. 9/1/20  Direct comparison made to prior chest x-ray dated September 1, 2020. Cardiomediastinal silhouette is stable. Right basilar chest tube is unchanged in  position.  There No residual pneumothorax is visualized. There are small  bilateral pleural effusions. There are persistent increased interstitial  markings at the right lung. There is persistent bibasilar patchy airspace  disease, right greater than left. IMPRESSION: No residual pneumothorax visualized    CXR 9/4/20  FINDINGS: AP portable imaging of the chest performed at 5:14 AM demonstrates a  stable cardiomediastinal silhouette. The right basilar chest tube is been  removed. Diffuse airspace disease throughout the right lung is unchanged. Small  right pleural effusion persists. No pneumothorax is visualized. There is  unchanged left basilar atelectasis. No significant osseous abnormalities are  seen. IMPRESSION: No evidence of pneumothorax following chest tube removal.    EXAM:  US ABD LTD 9/9/20  INDICATION:  evaluate IVC for patency, intermittent increase in edema is  swelling scrotum and lower extremities. COMPARISON:  CT abdomen and pelvis 8/25/20, IVC filter placement 8/25/20  TECHNIQUE:   Real-time ultrasound was utilized along with color-flow duplex Doppler  ultrasound to evaluate the inferior vena cava. Multiple saved digital images. FINDINGS:   The upper inferior vena cava is adequately visualized down to the filter and  there is no evidence of thrombus. Due to overlying bowel gas is difficult to visualize inferior vena cava without  below the filter. There is at least some flow demonstrated in the distal  inferior vena cava. IMPRESSION:  1. Normal-appearing inferior vena cava extending down to the filter. 2. Suboptimal visualization of the inferior vena cava beginning at the lower  aspect of the filter and extending through the bifurcation. There is some flow  demonstrated, however this area is suboptimally examined. EXAM: CT CHEST ABD PELV W CONT 9/10/20  INDICATION: assess patency of IVC . IVC filter. COMPARISON: 8/25/2020  CONTRAST: 100 mL of Isovue-370.   TECHNIQUE:   Following the uneventful intravenous administration of contrast, thin axial  images were obtained through the chest, abdomen and pelvis. Coronal and sagittal  reformats were generated. Oral contrast was not administered. CT dose reduction  was achieved through use of a standardized protocol tailored for this  examination and automatic exposure control for dose modulation. FINDINGS:   CHEST WALL: No mass or axillary lymphadenopathy. THYROID: No nodule. MEDIASTINUM: Largest right paratracheal node measures 8 mm in short axis  diameter (series 2, image 24). BRYAN: No mass or lymphadenopathy. THORACIC AORTA: No dissection or aneurysm. MAIN PULMONARY ARTERY: Pulmonary embolism in a branch to the right lower lobe  (series 2, image 35). Nonoccluding small chronic eccentric thrombus in a branch  to the left lower lobe (axial image 28 and coronal image 71). TRACHEA/BRONCHI: Patent. ESOPHAGUS: No wall thickening or dilatation. HEART: Normal in size. PLEURA: Bilateral pleural effusions, larger on the left. Small right  hydropneumothorax. LUNGS: Bibasilar compressive atelectasis, more pronounced on the right. Right  apical pleural thickening with associated small apical pneumothorax (series 3,  image 17). LIVER: No mass. BILIARY TREE: Gallbladder is within normal limits. CBD is not dilated. SPLEEN: within normal limits. PANCREAS: No mass or ductal dilatation. ADRENALS: Unremarkable. KIDNEYS: Bilateral nonobstructing renal calculi (coronal images 91 and 96). STOMACH: Unremarkable. SMALL BOWEL: No dilatation or wall thickening. COLON: No dilatation or wall thickening. APPENDIX: Not visualized  PERITONEUM: No ascites or pneumoperitoneum   RETROPERITONEUM: No lymphadenopathy or aortic aneurysm. REPRODUCTIVE ORGANS: Small prostate  URINARY BLADDER: Layering stones in the inferior bladder (series 2, image 111). BONES: No destructive bone lesion. Disc space narrowing with disc bulges at L3-4  and L4-5 and L5-S1.  Dextroconvex lumbar curvature. ABDOMINAL WALL: No mass or hernia. ADDITIONAL COMMENTS: Partial cast in the left iliac vein (coronal image 86 and  axial image 94). . Partial cast below below the IVC. IVC filter in place. (Axial  image 83 and coronal image 76). Small amount of thrombus extends into the origin  of the right common iliac vein (series 2, image 87). 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 Decreased thrombus in in both lower lobe pulmonary arteries  Small right hydropneumothorax. Bilateral pleural effusions, asymmetric to the  left. Bibasilar compressive atelectasis, asymmetric to the right. Incidental bladder calculi. Nonobstructing bilateral renal calculi.     Recent Days:  Recent Labs     09/12/20  0311 09/11/20  0343   WBC 10.0 12.9*   HGB 7.9* 7.9*   HCT 24.8* 24.5*    400     Recent Labs     09/12/20  0311 09/11/20  0343    134*   K 3.5 3.4*    100   CO2 31 32   GLU 70 67   BUN 4* 4*   CREA 0.29* 0.37*   CA 7.8* 7.6*   ALB 1.0* 1.1*   TBILI 0.6 0.7   * 130*     No results for input(s): PH, PCO2, PO2, HCO3, FIO2 in the last 72 hours. 24 Hour Results:  No results found for this or any previous visit (from the past 24 hour(s)).     Medications reviewed  Current Facility-Administered Medications   Medication Dose Route Frequency    potassium chloride SR (KLOR-CON 10) tablet 10 mEq  10 mEq Oral BID    ferrous sulfate tablet 325 mg  1 Tab Oral DAILY WITH BREAKFAST    0.9% sodium chloride infusion 250 mL  250 mL IntraVENous PRN    Probiotic - USANA Probiotic  (Patient Supplied)  1 Packet Oral DAILY    mesalamine (DELZICOL) DR capsule 800 mg  800 mg Oral TID    white petrolatum-mineral oiL (EUCERIN) cream   Topical BID    lipase-protease-amylase (CREON 24,000) capsule 1 Cap  1 Cap Oral TID WITH MEALS    loperamide (IMODIUM) capsule 2 mg  2 mg Oral Q6H PRN    bismuth subsalicylate (PEPTO-BISMOL) oral suspension 30 mL  30 mL Oral Q6H PRN    cyanocobalamin (VITAMIN B12) tablet 1,000 mcg  1,000 mcg Oral DAILY    folic acid (FOLVITE) tablet 1 mg  1 mg Oral DAILY    HYDROcodone-acetaminophen (NORCO) 5-325 mg per tablet 1 Tab  1 Tab Oral Q6H PRN    morphine injection 1 mg  1 mg IntraVENous Q4H PRN    sodium chloride (NS) flush 5-10 mL  5-10 mL IntraVENous PRN    sodium chloride (NS) flush 5-40 mL  5-40 mL IntraVENous Q8H    sodium chloride (NS) flush 5-40 mL  5-40 mL IntraVENous PRN    acetaminophen (TYLENOL) tablet 650 mg  650 mg Oral Q6H PRN    Or    acetaminophen (TYLENOL) suppository 650 mg  650 mg Rectal Q6H PRN    polyethylene glycol (MIRALAX) packet 17 g  17 g Oral DAILY PRN    promethazine (PHENERGAN) tablet 12.5 mg  12.5 mg Oral Q6H PRN    Or    ondansetron (ZOFRAN) injection 4 mg  4 mg IntraVENous Q6H PRN     Care Plan discussed with: Patient/Nurse/IR/GI  Total time spent with patient: 30 minutes.   Porsha Culp MD

## 2020-09-13 NOTE — PROGRESS NOTES
Follow up visit with Mr. Josette Montero on 5 Med Surg due to Length of Stay. Mr. Josette Montero and his friend were in the room. He indicated he has not spiritual needs at this time. Provided supportive presence. Mr. Josette Montero friend was asking about a . They are having POA, MPOA, and a Will drawn up and would need a Notary and possibly witnesses. Explained that we, employees, would not be able to sign any paperwork that includes financial.  We might possibly be able to get permission for a Notary and witnessed to come in to have something like that done; however there are no guarantees. Suggested they determine what their needs would be and then inquire to see if permissions can esthela obtained for temporary visitors. Consulted with Case Management. Chaplains will follow as able and/or needed.   Visited by: Cece Galvan, MS., [address] [facility] [address] [place] ([id number])

## 2020-09-13 NOTE — ROUTINE PROCESS
Bedside and Verbal shift change report given to HARJIT Lopez (oncoming nurse) by Elias Bence (offgoing nurse). Report included the following information SBAR and Kardex.

## 2020-09-13 NOTE — PROGRESS NOTES
Problem: Falls - Risk of  Goal: *Absence of Falls  Outcome: Progressing Towards Goal  Note: Fall Risk Interventions:  Mobility Interventions: Bed/chair exit alarm, Patient to call before getting OOB, Utilize walker, cane, or other assistive device    Mentation Interventions: Adequate sleep, hydration, pain control    Medication Interventions: Bed/chair exit alarm    Elimination Interventions: Call light in reach, Patient to call for help with toileting needs, Bed/chair exit alarm    History of Falls Interventions: Bed/chair exit alarm

## 2020-09-13 NOTE — PROGRESS NOTES
Bedside and Verbal shift change report given to Jocelyn West (oncoming nurse) by [employer] (offgoing nurse). Report included the following information SBAR, Kardex and MAR.

## 2020-09-14 LAB
ALBUMIN SERPL-MCNC: 1.2 G/DL (ref 3.5–5)
ALBUMIN/GLOB SERPL: 0.2 {RATIO} (ref 1.1–2.2)
ALP SERPL-CCNC: 331 U/L (ref 45–117)
ALT SERPL-CCNC: 139 U/L (ref 12–78)
ANION GAP SERPL CALC-SCNC: 5 MMOL/L (ref 5–15)
AST SERPL-CCNC: 175 U/L (ref 15–37)
BASOPHILS # BLD: 0.2 K/UL (ref 0–0.1)
BASOPHILS NFR BLD: 2 % (ref 0–1)
BILIRUB SERPL-MCNC: 0.6 MG/DL (ref 0.2–1)
BUN SERPL-MCNC: 4 MG/DL (ref 6–20)
BUN/CREAT SERPL: 11 (ref 12–20)
CALCIUM SERPL-MCNC: 7.9 MG/DL (ref 8.5–10.1)
CHLORIDE SERPL-SCNC: 101 MMOL/L (ref 97–108)
CO2 SERPL-SCNC: 28 MMOL/L (ref 21–32)
CREAT SERPL-MCNC: 0.37 MG/DL (ref 0.7–1.3)
DIFFERENTIAL METHOD BLD: ABNORMAL
EOSINOPHIL # BLD: 0 K/UL (ref 0–0.4)
EOSINOPHIL NFR BLD: 0 % (ref 0–7)
ERYTHROCYTE [DISTWIDTH] IN BLOOD BY AUTOMATED COUNT: 17.6 % (ref 11.5–14.5)
GLOBULIN SER CALC-MCNC: 5 G/DL (ref 2–4)
GLUCOSE SERPL-MCNC: 75 MG/DL (ref 65–100)
HCT VFR BLD AUTO: 25.5 % (ref 36.6–50.3)
HGB BLD-MCNC: 8.2 G/DL (ref 12.1–17)
IMM GRANULOCYTES # BLD AUTO: 0 K/UL
IMM GRANULOCYTES NFR BLD AUTO: 0 %
LYMPHOCYTES # BLD: 2.6 K/UL (ref 0.8–3.5)
LYMPHOCYTES NFR BLD: 25 % (ref 12–49)
MCH RBC QN AUTO: 32.8 PG (ref 26–34)
MCHC RBC AUTO-ENTMCNC: 32.2 G/DL (ref 30–36.5)
MCV RBC AUTO: 102 FL (ref 80–99)
MONOCYTES # BLD: 0.6 K/UL (ref 0–1)
MONOCYTES NFR BLD: 6 % (ref 5–13)
NEUTS SEG # BLD: 7 K/UL (ref 1.8–8)
NEUTS SEG NFR BLD: 67 % (ref 32–75)
NRBC # BLD: 0 K/UL (ref 0–0.01)
NRBC BLD-RTO: 0 PER 100 WBC
PLATELET # BLD AUTO: 423 K/UL (ref 150–400)
PMV BLD AUTO: 8.9 FL (ref 8.9–12.9)
POTASSIUM SERPL-SCNC: 3.7 MMOL/L (ref 3.5–5.1)
PROT SERPL-MCNC: 6.2 G/DL (ref 6.4–8.2)
RBC # BLD AUTO: 2.5 M/UL (ref 4.1–5.7)
RBC MORPH BLD: ABNORMAL
SODIUM SERPL-SCNC: 134 MMOL/L (ref 136–145)
WBC # BLD AUTO: 10.4 K/UL (ref 4.1–11.1)

## 2020-09-14 PROCEDURE — 80053 COMPREHEN METABOLIC PANEL: CPT

## 2020-09-14 PROCEDURE — 77030040393 HC DRSG OPTIFOAM GENT MDII -B

## 2020-09-14 PROCEDURE — 65270000029 HC RM PRIVATE

## 2020-09-14 PROCEDURE — 74011250637 HC RX REV CODE- 250/637: Performed by: INTERNAL MEDICINE

## 2020-09-14 PROCEDURE — 36415 COLL VENOUS BLD VENIPUNCTURE: CPT

## 2020-09-14 PROCEDURE — 74011250637 HC RX REV CODE- 250/637: Performed by: FAMILY MEDICINE

## 2020-09-14 PROCEDURE — 74011250637 HC RX REV CODE- 250/637: Performed by: SPECIALIST

## 2020-09-14 PROCEDURE — 97110 THERAPEUTIC EXERCISES: CPT

## 2020-09-14 PROCEDURE — 85025 COMPLETE CBC W/AUTO DIFF WBC: CPT

## 2020-09-14 RX ADMIN — Medication 10 ML: at 05:52

## 2020-09-14 RX ADMIN — POTASSIUM CHLORIDE 10 MEQ: 750 TABLET, FILM COATED, EXTENDED RELEASE ORAL at 17:00

## 2020-09-14 RX ADMIN — Medication 10 ML: at 21:38

## 2020-09-14 RX ADMIN — LOPERAMIDE HYDROCHLORIDE 2 MG: 2 CAPSULE ORAL at 06:47

## 2020-09-14 RX ADMIN — PANCRELIPASE 1 CAPSULE: 24000; 76000; 120000 CAPSULE, DELAYED RELEASE PELLETS ORAL at 11:17

## 2020-09-14 RX ADMIN — LOPERAMIDE HYDROCHLORIDE 2 MG: 2 CAPSULE ORAL at 12:25

## 2020-09-14 RX ADMIN — LOPERAMIDE HYDROCHLORIDE 2 MG: 2 CAPSULE ORAL at 21:42

## 2020-09-14 RX ADMIN — PANCRELIPASE 1 CAPSULE: 24000; 76000; 120000 CAPSULE, DELAYED RELEASE PELLETS ORAL at 17:00

## 2020-09-14 RX ADMIN — FOLIC ACID 1 MG: 1 TABLET ORAL at 07:57

## 2020-09-14 RX ADMIN — FERROUS SULFATE TAB 325 MG (65 MG ELEMENTAL FE) 325 MG: 325 (65 FE) TAB at 07:54

## 2020-09-14 RX ADMIN — Medication 10 ML: at 17:00

## 2020-09-14 RX ADMIN — MESALAMINE 800 MG: 400 CAPSULE, DELAYED RELEASE ORAL at 17:00

## 2020-09-14 RX ADMIN — POTASSIUM CHLORIDE 10 MEQ: 750 TABLET, FILM COATED, EXTENDED RELEASE ORAL at 07:57

## 2020-09-14 RX ADMIN — MESALAMINE 800 MG: 400 CAPSULE, DELAYED RELEASE ORAL at 21:38

## 2020-09-14 RX ADMIN — PANCRELIPASE 1 CAPSULE: 24000; 76000; 120000 CAPSULE, DELAYED RELEASE PELLETS ORAL at 07:55

## 2020-09-14 RX ADMIN — MESALAMINE 800 MG: 400 CAPSULE, DELAYED RELEASE ORAL at 07:56

## 2020-09-14 RX ADMIN — Medication: at 07:57

## 2020-09-14 RX ADMIN — CYANOCOBALAMIN TAB 500 MCG 1000 MCG: 500 TAB at 07:57

## 2020-09-14 NOTE — PROGRESS NOTES
Problem: Self Care Deficits Care Plan (Adult)  Goal: *Acute Goals and Plan of Care (Insert Text)  Description:   FUNCTIONAL STATUS PRIOR TO ADMISSION: Patient was independent and active without use of DME. Patient was independent for basic and instrumental ADLs. HOME SUPPORT: The patient lived alone with no local support. Occupational Therapy Goals  Initiated 8/27/2020; Weekly re-assessment 9/8/2020, all goals remain appropriate. 1.  Patient will perform grooming, standing at sink for at least 5 minutes, with supervision/set-up within 7 day(s). 2.  Patient will perform upper body dressing with modified independence within 7 day(s). 3.  Patient will perform lower body dressing with supervision/set-up within 7 day(s). 4.  Patient will perform toilet transfers with modified independence within 7 day(s). 5.  Patient will perform all aspects of toileting with modified independence within 7 day(s). 6.  Patient will participate in upper extremity therapeutic exercise/activities with independence for 5 minutes within 7 day(s). 7.  Patient will utilize energy conservation techniques during functional activities with verbal cues within 7 day(s). Outcome: Progressing Towards Goal   OCCUPATIONAL THERAPY TREATMENT  Patient: Sanchez Sen (78 y.o. male)  Date: 9/14/2020  Diagnosis: Pneumonia [J18.9]   Pneumonia involving right lung  Procedure(s) (LRB):  ESOPHAGOGASTRODUODENOSCOPY (EGD) (N/A)  COLONOSCOPY (N/A)  ESOPHAGOGASTRODUODENAL (EGD) BIOPSY (N/A)  COLON BIOPSY (N/A) 10 Days Post-Op  Precautions: Fall  Chart, occupational therapy assessment, plan of care, and goals were reviewed. ASSESSMENT  Patient continues with skilled OT services and is slowly progressing towards goals. Pt and his wife stated he was \"up walking around and used bathroom. \" Pt is controlling and likes to have \"appointments' with therapy in the hospital. This after noon he was only willing to engage with UE exercises. Encouraged him to engage with exercises 3 x a day as tolerated. Current Level of Function Impacting Discharge (ADLs): Mn assist UB Adl's    Other factors to consider for discharge:          PLAN :  Patient continues to benefit from skilled intervention to address the above impairments. Continue treatment per established plan of care. to address goals.     Recommend with staff: Sit edge of bed 3 x a day    Recommend next OT session: Cont towards goals    Recommendation for discharge: (in order for the patient to meet his/her long term goals)  Therapy 3 hours per day 5-7 days per week    This discharge recommendation:  Has not yet been discussed the attending provider and/or case management    IF patient discharges home will need the following DME:        SUBJECTIVE:   Patient stated You should have come earlier    OBJECTIVE DATA SUMMARY:   Cognitive/Behavioral Status:  Neurologic State: Alert  Orientation Level: Oriented X4  Cognition: Follows commands             Functional Mobility and Transfers for ADLs:  Bed Mobility:     Not tested  Transfers:      Not tested, pt refused       Balance:   Impaired standing      Therapeutic Exercises:     EXERCISE   Sets   Reps   Active Active Assist   Passive   Comments   Finger flex/ext 1 10 [x]           []           []              Wrist flex/ext 1 10 [x]           []           []              Forearm supination/pronation 1 10 [x]           []           []              Chest presses 1 10 [x]           []           []              Elbow flex/ext 1 10 [x]           []           []              Shoulder flex/ext 1 10 [x]           []           []                 []           []           []                 []           []           []                 []           []           []                 []           []           []                 []           []           []                   Activity Tolerance:   Good  Please refer to the flowsheet for vital signs taken during this treatment. After treatment patient left in no apparent distress:   Supine in bed    COMMUNICATION/COLLABORATION:   The patients plan of care was discussed with: Physical therapy assistant, Occupational therapist, and Registered nurse.      AMBREEN Leavitt  Time Calculation: 10 mins

## 2020-09-14 NOTE — PROGRESS NOTES
Patient has refused to let turn team assist with turning and repositioning throughout the day. Patient is alert and oriented. Currently on specialty bed. Visitor in and asking if there are any alternatives due to patient refusing turn team. Visitor aware that patient is alert and oriented and interventions are in place such as mobility team and specialty bed. We can provide encourage routinely with education of importance of turning and repositioning.

## 2020-09-14 NOTE — PROGRESS NOTES
Transtion of Care Plan:  RUR  15 % low risk  1.  [facility] accepted pt pending active insurance;  2. [facility] reports there is still no record of payment for COBRA  3. [facility] will check to see if they can accept the pt once payment has been rec'd and if they will yanick auth prior to the completion of processing his payment  4. COVID negative  5. Pt will need w/c [facility] transport at d/c if his friend is unable to transport him  SAMIRA Saldana RN

## 2020-09-14 NOTE — PROGRESS NOTES
Bedside shift change report given to Rosmery Jones (oncoming nurse) by Debora Simeon (offgoing nurse). Report included the following information SBAR, Kardex, MAR and Recent Results.

## 2020-09-14 NOTE — PROGRESS NOTES
Problem: Falls - Risk of  Goal: *Absence of Falls  Description: Document Lynn Patches Fall Risk and appropriate interventions in the flowsheet.   Outcome: Progressing Towards Goal  Note: Fall Risk Interventions:  Mobility Interventions: Bed/chair exit alarm, Communicate number of staff needed for ambulation/transfer, Patient to call before getting OOB, PT Consult for mobility concerns    Mentation Interventions: Bed/chair exit alarm    Medication Interventions: Teach patient to arise slowly, Patient to call before getting OOB, Bed/chair exit alarm    Elimination Interventions: Urinal in reach, Patient to call for help with toileting needs, Call light in reach, Bed/chair exit alarm    History of Falls Interventions: Door open when patient unattended, Investigate reason for fall

## 2020-09-14 NOTE — PROGRESS NOTES
Daily Progress Note: 9/14/2020  Joaquin Garcia MD-PCP    Assessment/Plan:   Pneumonia involving right lung (8/25/2020) /  Hemoptysis (8/25/2020) POA: extensive with internal cavitation and air fluid levels. Staph Aureus, leukocytosis improving. CXR unchanged  --pleural fluid studies + staph, BC neg, cytology neg, AFB neg smears x 3 - cultures pending, and 2nd quantiferon gold negative. -- zosyn, doxy completed, Chest tube removed 9/3  -- Pulm following     Bilateral pulmonary embolism, acute ([address] (8/25/2020) / Acute deep vein thrombosis (DVT) of both lower extremities ([address] (8/25/2020):   - IVC filter placed   - holding anticoags due to dropping Hb and active hemoptysis and GI bleeding     Weight loss, unintentional (8/25/2020) POA:t severe protein calorie malnutrition, ulcerative colitis and empyema likely causes. Nutrition and speech therapy following  -Diet as tolerated  -calcium corrects  -GI follow up outpt     Hyperbilirubinemia (8/25/2020) POA: unclear etiology. CTs as under data review. - resolved     Pleural effusion, right (8/25/2020) POA: empyema, pneumonia vs malignant effusion. -pulmonology following  - Chest tube placed 8/26/20 and removed 9/3     Alcohol use (8/25/2020) POA: he says he has cut down recently. - no withdrawal occured     Chronic Diarrhea (8/25/2020) / Ulcerative colitis/ Fecal occult blood test positive (8/25/2020) POA:  Monitor Hgb.    -GI following  -imodium and peptobismol PRN  -FloraQ daily- or pt's home probiotics  -mesalamine has helped    Hypomag/hypokalemia:  -replete and monitor    GI blood loss/blood loss anemia/iron def anemia  - iron supplement        Problem List:  Problem List as of 9/14/2020 Date Reviewed: 8/25/2020          Codes Class Noted - Resolved    * (Principal) Pneumonia involving right lung ICD-10-CM: J18.9  ICD-9-CM: 486  8/25/2020 - Present        Hemoptysis ICD-10-CM: R04.2  ICD-9-CM: 786.30  8/25/2020 - Present Hyperbilirubinemia ICD-10-CM: E80.6  ICD-9-CM: 782.4  8/25/2020 - Present        Weight loss, unintentional ICD-10-CM: R63.4  ICD-9-CM: 783.21  8/25/2020 - Present        Pleural effusion, right ICD-10-CM: J90  ICD-9-CM: 511.9  8/25/2020 - Present        Bilateral pulmonary embolism ([address] ICD-10-CM: I26.99  ICD-9-CM: 415.19  8/25/2020 - Present        Acute deep vein thrombosis (DVT) of both lower extremities ([facility]) ICD-10-CM: I82.403  ICD-9-CM: 453.40  8/25/2020 - Present        Alcohol use ICD-10-CM: Z72.89  ICD-9-CM: V49.89  8/25/2020 - Present        Diarrhea ICD-10-CM: R19.7  ICD-9-CM: 787.91  8/25/2020 - Present        Fecal occult blood test positive ICD-10-CM: R19.5  ICD-9-CM: 792.1  8/25/2020 - Present        Hyponatremia with extracellular fluid depletion ICD-10-CM: E87.1  ICD-9-CM: 276.1  8/25/2020 - Present            Subjective:     60 y.o. male who is being admitted for Pneumonia involving right lung. Mr. Dang Vanessa presented to our Emergency Department today complaining of generalized weakness, diarrhea and lethargy for several months. Patient was accompanied by his friend who gave most of the history. The patient does not like seeing physicians and apparently uses vitamin supplements. He is a smoker and drinks alcohol and has been progressively weaker over several months now. He has had pleuritic right sided chest pain associated with a productive cough and hemoptysis. He feel on the floor several days ago and was unable to get up. His friends helped him up and brought him to the ED for further testing. Initial CXR done showed airspace disease in right mid lower lung zone consistent with pneumonia. This is surrounded by a moderate right-sided pleural effusion. Upon review, he was noted to be cachectic with swollen lower extremities.  CT scans chest abdomen and pelvis done showed a diffuse irregular opacification throughout the right lower lobe with multiple areas of internal cavitation and air-fluid levels. Findings are more likely related to infection than malignancy. Large right pleural effusion. Trace left pleural effusion. Bilateral pulmonary emboli. Clot burden is moderate. No evidence of right heart strain. Thrombus in the veins of the left lower extremity extending to the IVC. A small amount of thrombus is also seen in the right common iliac vein. He will be admitted for further management. (Dr Stephania Verde)    8/26:  Feeling a little better this AM but still c/o painful, pleuritic cough and occas thick sputum. No leg/calf pains. No known exposure to ill individuals/no travel - \"just staying in the last few months. \"  No temps. WBC 31.9K. Cultures neg so far.      8/27:  Reports less cough, chest pain and feeling better overall. Appetite has returned. Chest tube placed yesterday with discharge obtained - pending cultures. Other cultures remain neg. Hb lower so cont to hold anticoag for now. GI has seen and work up when more stable. Remains on doxy, zosyn, vanc. WBC down to 28K. Bili back to normal.     8/28:  Continues to grad improve. Still with some cough. No tremor or hallucinations so far. K+ lower - replacing. WBC coming down. Afeb. CXR this AM pending. Cultures remain neg so far. Quantiferon gold test pending as of this AM.  He reports hx of chronic LE edema for years. 8/29: Pt continues to have frequent stooling. GI plans endoscopy next week if pt allows. He was not willing to participate with PT/OT/nutritionist yesterday. Says despite our best efforts, we will not get his stooling to stop until he gets his home probiotics [friend will bring it]. He refuses to eat until the stooling is better. 8/30:  Pt is reluctant to admit that his stooling has improved. Down to 2 BMs yesterday instead of 6 the day before. Tolerating PO. Willing to eat more today. He is still having hemoptysis. Wanting to get out of bed. Willing to work with PT/OT. 8/31:  Feeling better. Appetite better. TB neg so far. First Quantiferon Gold was indeterminate - repeat pending. Afeb. WBC slowly coming down. Currently on Doxy and Zosyn. Replacing Mag and K+. Hb now stable in 8s. 9/1: Little change. Not moving about much, he reports due to chest tube being painful when he moves. Hb 7.8. WBC 16. No sign of ETOH withdrawal.     /9/2:  Now reports he wants to move around more and get up and wants to work with PT. AFB smears neg x3, AFB cultures still pending of course. WBC slowly coming down. Afeb. Hb still slowly dropping - 7.7 today. May consider prophylactic dose of lovenox and watch Hb.  GI work up pending. Arms a little more swollen. 9/3: Feeling better. Hoping for endoscopy in am. Patient would like chest tube removed prior to prep and studies if possible. Speech would like MBS.     9/4:  Feeling better except for prep for colonoscopy - reports clear results last BM. Chest tube out yesterday. Hb down to 7.0 so still holding anticoag - may need transfusion. Upper and lower endoscopy planned today. CXR this AM pending. K+ down with the prep - replacing. 9/5:  Little change. Still with occas productive cough. Now also c/o increasing LE edema and today notes scrotal edema and now wants something done about it. 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 Hb down to 6.9 today - would benefit from transfusion. Risks v benefits of transfusion discussed and he finally agreed. Appetite very good now, taking plenty of fluids now, so will DC IVFs. Low dose of lasix after the transfusion to help with the edema. 9/6:  Less edema in LEs and scrotum today but still quite a bit - will give another low dose of Lasix. Received 1 unit packed red cells yesterday. K+ sl low - replacing. Still having episodic loose stools but on questioning he reports chronic loose stools for \"years\" which would go along with his colonoscopy findings. AM CBC is still pending. Discussed need for rehab and strongly advised he consider going with his degree of debility. Risk of falls and fractures discussed. 9/7:  Still c/o scrotal and LE edema but sl better - one dose of 10mg lasix today. Discussed his low albumin as part of the problem. Less cough today but slight amount of hemoptysis this AM. Hb 7.8 this AM - was in the 8s after the transfusion. K+ still low - replacing. Declining to have dressings changed - discussed risks. Discussed his severe weakness and need for rehab again - still not very interested in going. 9/8:  Less scrotal swelling after the lasix - > 1.5 liter out. He did get up some in room yesterday and did fairly well but still very weak. Fewer loose stools. He has mult questions about Ulcerative colitis and basics discussed with him - advised him to discuss further with GI. Eating well and has high protein diet. Albumin still low. K+ back up. Still has cough - less productive so far today. Will check CXR today. ALT higher toay - monitoring. 9/9:  Continues to have less edema with low dose of lasix yesterday. CXR looking better. Still having frequent loose stools but better than prior to admission. Hb continues to drop likely due to chronic, slow GI blood loss due to the ulcerative colitis. He will likely need episodic transfusions. LFTs look improved but full hepatic panel is pending. Less cough so far today. He was up a bit more yesterday. He reports he is now willing to go to rehab. Due to recent increased bilat LE edema and scrotal edema (although now somewhat better), will get IR to make sure the IVC filter is patent. 1145am:  Discussed with Dr Alton Eric (IR) - advised ab US as start to eval IVC. 9/10:  Fewer loose stools. Less scrotal edema and LE edema. Ab US with IVC not completely seen due to bowel gas - will ask IR to comment. LFTs improved. CXR improving. Hb 7.7. He reports he did not get up yesterday.       9/11: Less cough, less sputum, less edema, and feeling better. CT Chest/Ab/Pelvis 9/10/20 as noted under Data Review. Hb stable in 7s for now - recommend frequent checks at rehab.  K+ sl low - replacing. LFTs better - follow up with GI (Dr Kena Sunshine) after discharge for LFTs and for Ulcerative Colitis. Follow up with PCP when out of rehab. Stable for rehab.      :  Only one stool since yesterday he reports and that was semi-solid. Still had fecal urgency and did not make it to bathroom this AM.  Much less edema and continues to feel better. He walked briefly yesterday but still need max assist to get up. Hb stable. LFTs cont to improve. :  Continues to feel better but still weak and unable to get up or move from sitting to standing on own. Only one stool and reports it was solid this AM.  Still awaiting rehab.      :  Little change. Only one stool in past 12 hours. Albumin sl better. CXR from yesterday without change. Still very weak. Awaiting rehab. Edema has nearly resolved. Review of Systems:   A comprehensive review of systems was negative except for that written in the HPI. Objective:   Physical Exam:   Visit Vitals  /64 (BP 1 Location: Left arm, BP Patient Position: At rest)   Pulse 81   Temp 97.6 °F (36.4 °C)   Resp 18   Ht 5' 10\" (1.778 m)   Wt 59 kg (130 lb 1.1 oz)   SpO2 96%   BMI 18.66 kg/m²    O2 Flow Rate (L/min): 2 l/min O2 Device: Room air  Temp (24hrs), Av.1 °F (36.7 °C), Min:97.6 °F (36.4 °C), Max:98.6 °F (37 °C)    1901 -  0700  In: 300 [P.O.:300]  Out: 1300 [Urine:1300]   701 - 1900  In: 240 [P.O.:240]  Out: 7357 [Urine:1975]  General:  Alert, cooperative, no distress, cachetic, ill appearing. Head:  Normocephalic, without obvious abnormality, atraumatic. Eyes:  [place] conjuntiva. PERRL, EOMs intact.    Throat: Lips, mucosa, and tongue moist.   Neck: Supple, symmetrical, trachea midline, no adenopathy, thyroid: no enlargement/tenderness/nodules, no carotid bruit and no JVD. Lungs:   Better air exchange,  Clear at this moment. Chest wall: Former chest tube site right looks ok   Heart:  Regular rate and rhythm, S1, S2 normal, no murmur, click, rub or gallop. Abdomen:   Soft, non-tender. Bowel sounds normal. No masses,  No organomegaly. Extremities: no cyanosis. No calf tenderness;  trace pitting edema bilat. LEs. Edema resolved in arms and scrotum, No cords palpated. Pulses: 2+ and symmetric all extremities. Skin: Skin color, texture, turgor normal. No rashes   Neurologic:  Alert and oriented X 3. Fine motor of hands and fingers normal.   equal.  No tremor. Gait not tested at this time. Sensation grossly normal to touch. Gross motor of extremities normal.       Data Review:   EXAM: CT CHEST ABD PELV W CONT 8/25/20  INDICATION: weight loss  COMPARISON: Radiographs 8/25/2020  CONTRAST: 100 mL of Isovue-370. FINDINGS:   CHEST WALL: No mass or axillary lymphadenopathy. THYROID: No nodule. MEDIASTINUM: No mass or lymphadenopathy. BRYAN: No mass or lymphadenopathy. THORACIC AORTA: No dissection or aneurysm. MAIN PULMONARY ARTERY: Normal in caliber. Thrombus is seen throughout the right  lower lobe pulmonary arteries. Thrombus is seen in the left lower lobe pulmonary  artery extending into the anterior and lateral basilar segments. TRACHEA/BRONCHI: Patent. ESOPHAGUS: No wall thickening or dilatation. HEART: Normal in size. PLEURA: There is a large right pleural effusion with enhancement of the pleural  surfaces. Trace left pleural effusion. LUNGS: There is irregular opacification throughout the right lower lobe. There  are multiple areas of internal cavitation with air-fluid levels. Small areas of  atelectasis or scarring are seen in the lingula and inferior left lower lobe. LIVER: No mass. BILIARY TREE: Gallbladder is within normal limits. CBD is not dilated.   SPLEEN: within normal limits. PANCREAS: No mass or ductal dilatation. ADRENALS: Unremarkable. KIDNEYS: Small nonobstructive stones are seen in the bilateral kidneys. No  evidence of hydronephrosis. No mass. STOMACH: Unremarkable. SMALL BOWEL: No dilatation or wall thickening. COLON: No dilatation or wall thickening. APPENDIX: Unremarkable  PERITONEUM: No ascites or pneumoperitoneum. RETROPERITONEUM: No lymphadenopathy or aortic aneurysm. REPRODUCTIVE ORGANS: Unremarkable  URINARY BLADDER: No mass or calculus. BONES: No destructive bone lesion. ABDOMINAL WALL: No mass or hernia. ADDITIONAL COMMENTS: There is thrombus in the left femoral veins extending into  the left iliac veins into the IVC. A small amount of thrombus is also present in  the right common iliac vein. IMPRESSION:  1. Diffuse irregular opacification throughout the right lower lobe with multiple  areas of internal cavitation and air-fluid levels. Findings are more likely  related to infection than malignancy. 2. Large right pleural effusion. Trace left pleural effusion. 3. Bilateral pulmonary emboli. Clot burden is moderate. No evidence of right  heart strain. 4. Thrombus in the veins of the left lower extremity extending to the IVC. A  small amount of thrombus is also seen in the right common iliac vein. EXAM:  IR THORACENTESIS/INSERT CHEST TUBE 8/26/20  INDICATION:  Request placement of large size pigtail catheter. Possible empyema. PROCEDURE:  Available history and imaging was reviewed which demonstrates a large partially  loculated right pleural effusion. Possibility of empyema, tuberculosis and other  etiologies are being considered clinically. Specific request for larger pigtail  catheter placement. Following informed consent the patient was evaluated with ultrasound in the  angina/catheter lab department. This demonstrates a large right pleural  effusion.    The location of the pleural effusion was marked and then the area was prepped  and draped. 1% lidocaine was used for local anesthesia. A small incision was made with an 11  blade. A micropuncture needle was used under direct fluoroscopic guidance to access the  pleural fluid. When this was achieved the microguidewire was placed followed by  a 5 [place] dilator. Through this a short [address] guidewire  was placed. The track was then dilated to 12 [place]. This was then followed by  placement of a 12 [place] pigtail catheter. The string for the pigtail  was removed prior to placement. The catheter was positioned under fluoroscopic  guidance and then secured in place at the skin with a 2-0 Prolene suture. The  site was then dressed with a Tegaderm Bioclusive dressing and will be connected  to a Pleur-evac for subsequent drainage. Prior to this approximately 100 mL of fluid was obtained and sent for cytology  as well as multiple cultures including aerobic, anaerobic, AFB and fungal.   The fluid was withdrawn was mildly cloudy serosanguineous in color. I did not  notice any foul smell. The procedure was adequately tolerated without significant blood loss or  evidence of complication. Final digital image of the chest was saved documenting catheter position. IMPRESSION:   Successful ultrasound and fluoroscopic guided placement of 12 [place] right-sided  chest tube. Patient will have ongoing drainage from Pleur-evac in his hospital  bed/room. Care and monitoring as per medical service and nursing service. ECHO 8/27/20  Interpretation Summary   Result status: Final result    · Image quality for this study was technically difficult. · Echo study was technically difficulty and limited due to patient's tolerance. · LV: Estimated LVEF is 55 - 60%. Normal cavity size, wall thickness and systolic function (ejection fraction normal). Wall motion: normal. Mild (grade 1) left ventricular diastolic dysfunction.   · Pericardium: Small pericardial effusion adjacent to right ventricle. Portable AP upright view of the chest. 9/1/20  Direct comparison made to prior chest x-ray dated September 1, 2020. Cardiomediastinal silhouette is stable. Right basilar chest tube is unchanged in  position. There No residual pneumothorax is visualized. There are small  bilateral pleural effusions. There are persistent increased interstitial  markings at the right lung. There is persistent bibasilar patchy airspace  disease, right greater than left. IMPRESSION: No residual pneumothorax visualized    CXR 9/4/20  FINDINGS: AP portable imaging of the chest performed at 5:14 AM demonstrates a  stable cardiomediastinal silhouette. The right basilar chest tube is been  removed. Diffuse airspace disease throughout the right lung is unchanged. Small  right pleural effusion persists. No pneumothorax is visualized. There is  unchanged left basilar atelectasis. No significant osseous abnormalities are  seen. IMPRESSION: No evidence of pneumothorax following chest tube removal.    EXAM:  [facility] 9/9/20  INDICATION:  evaluate IVC for patency, intermittent increase in edema is  swelling scrotum and lower extremities. COMPARISON:  CT abdomen and pelvis 8/25/20, IVC filter placement 8/25/20  TECHNIQUE:   Real-time ultrasound was utilized along with color-flow duplex Doppler  ultrasound to evaluate the inferior vena cava. Multiple saved digital images. FINDINGS:   The upper inferior vena cava is adequately visualized down to the filter and  there is no evidence of thrombus. Due to overlying bowel gas is difficult to visualize inferior vena cava without  below the filter. There is at least some flow demonstrated in the distal  inferior vena cava. IMPRESSION:  1. Normal-appearing inferior vena cava extending down to the filter. 2. Suboptimal visualization of the inferior vena cava beginning at the lower  aspect of the filter and extending through the bifurcation.  There is some flow  demonstrated, however this area is suboptimally examined. EXAM: CT CHEST ABD PELV W CONT 9/10/20  INDICATION: assess patency of IVC . IVC filter. COMPARISON: 8/25/2020  CONTRAST: 100 mL of Isovue-370. TECHNIQUE:   Following the uneventful intravenous administration of contrast, thin axial  images were obtained through the chest, abdomen and pelvis. Coronal and sagittal  reformats were generated. Oral contrast was not administered. CT dose reduction  was achieved through use of a standardized protocol tailored for this  examination and automatic exposure control for dose modulation. FINDINGS:   CHEST WALL: No mass or axillary lymphadenopathy. THYROID: No nodule. MEDIASTINUM: Largest right paratracheal node measures 8 mm in short axis  diameter (series 2, image 24). BRYAN: No mass or lymphadenopathy. THORACIC AORTA: No dissection or aneurysm. MAIN PULMONARY ARTERY: Pulmonary embolism in a branch to the right lower lobe  (series 2, image 35). Nonoccluding small chronic eccentric thrombus in a branch  to the left lower lobe (axial image 28 and coronal image 71). TRACHEA/BRONCHI: Patent. ESOPHAGUS: No wall thickening or dilatation. HEART: Normal in size. PLEURA: Bilateral pleural effusions, larger on the left. Small right  hydropneumothorax. LUNGS: Bibasilar compressive atelectasis, more pronounced on the right. Right  apical pleural thickening with associated small apical pneumothorax (series 3,  image 17). LIVER: No mass. BILIARY TREE: Gallbladder is within normal limits. CBD is not dilated. SPLEEN: within normal limits. PANCREAS: No mass or ductal dilatation. ADRENALS: Unremarkable. KIDNEYS: Bilateral nonobstructing renal calculi (coronal images 91 and 96). STOMACH: Unremarkable. SMALL BOWEL: No dilatation or wall thickening. COLON: No dilatation or wall thickening. APPENDIX: Not visualized  PERITONEUM: No ascites or pneumoperitoneum   RETROPERITONEUM: No lymphadenopathy or aortic aneurysm.   REPRODUCTIVE ORGANS: Small prostate  URINARY BLADDER: Layering stones in the inferior bladder (series 2, image 111). BONES: No destructive bone lesion. Disc space narrowing with disc bulges at L3-4  and L4-5 and L5-S1. Dextroconvex lumbar curvature. ABDOMINAL WALL: No mass or hernia. ADDITIONAL COMMENTS: Partial cast in the left iliac vein (coronal image 86 and  axial image 94). . Partial cast below below the IVC. IVC filter in place. (Axial  image 83 and coronal image 76). Small amount of thrombus extends into the origin  of the right common iliac vein (series 2, image 87). 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 Decreased thrombus in in both lower lobe pulmonary arteries  Small right hydropneumothorax. Bilateral pleural effusions, asymmetric to the  left. Bibasilar compressive atelectasis, asymmetric to the right. Incidental bladder calculi. Nonobstructing bilateral renal calculi.     Recent Days:  Recent Labs     09/14/20  0342 09/12/20  0311   WBC 10.4 10.0   HGB 8.2* 7.9*   HCT 25.5* 24.8*   * 394     Recent Labs     09/14/20  0342 09/12/20  0311   * 138   K 3.7 3.5    103   CO2 28 31   GLU 75 70   BUN 4* 4*   CREA 0.37* 0.29*   CA 7.9* 7.8*   ALB 1.2* 1.0*   TBILI 0.6 0.6   * 102*     No results for input(s): PH, PCO2, PO2, HCO3, FIO2 in the last 72 hours.     24 Hour Results:  Recent Results (from the past 24 hour(s))   METABOLIC PANEL, COMPREHENSIVE    Collection Time: 09/14/20  3:42 AM   Result Value Ref Range    Sodium 134 (L) 136 - 145 mmol/L    Potassium 3.7 3.5 - 5.1 mmol/L    Chloride 101 97 - 108 mmol/L    CO2 28 21 - 32 mmol/L    Anion gap 5 5 - 15 mmol/L    Glucose 75 65 - 100 mg/dL    BUN 4 (L) 6 - 20 MG/DL    Creatinine 0.37 (L) 0.70 - 1.30 MG/DL    BUN/Creatinine ratio 11 (L) 12 - 20      GFR est AA >60 >60 ml/min/1.73m2    GFR est non-AA >60 >60 ml/min/1.73m2    Calcium 7.9 (L) 8.5 - 10.1 MG/DL    Bilirubin, total 0.6 0.2 - 1.0 MG/DL    ALT (SGPT) 139 (H) 12 - 78 U/L    AST (SGOT) 175 (H) 15 - 37 U/L    Alk. phosphatase 331 (H) 45 - 117 U/L    Protein, total 6.2 (L) 6.4 - 8.2 g/dL    Albumin 1.2 (L) 3.5 - 5.0 g/dL    Globulin 5.0 (H) 2.0 - 4.0 g/dL    A-G Ratio 0.2 (L) 1.1 - 2.2     CBC WITH AUTOMATED DIFF    Collection Time: 09/14/20  3:42 AM   Result Value Ref Range    WBC 10.4 4.1 - 11.1 K/uL    RBC 2.50 (L) 4.10 - 5.70 M/uL    HGB 8.2 (L) 12.1 - 17.0 g/dL    HCT 25.5 (L) 36.6 - 50.3 %    .0 (H) 80.0 - 99.0 FL    MCH 32.8 26.0 - 34.0 PG    MCHC 32.2 30.0 - 36.5 g/dL    RDW 17.6 (H) 11.5 - 14.5 %    PLATELET 646 (H) 966 - 400 K/uL    MPV 8.9 8.9 - 12.9 FL    NRBC 0.0 0  WBC    ABSOLUTE NRBC 0.00 0.00 - 0.01 K/uL    NEUTROPHILS 67 32 - 75 %    LYMPHOCYTES 25 12 - 49 %    MONOCYTES 6 5 - 13 %    EOSINOPHILS 0 0 - 7 %    BASOPHILS 2 (H) 0 - 1 %    IMMATURE GRANULOCYTES 0 %    ABS. NEUTROPHILS 7.0 1.8 - 8.0 K/UL    ABS. LYMPHOCYTES 2.6 0.8 - 3.5 K/UL    ABS. MONOCYTES 0.6 0.0 - 1.0 K/UL    ABS. EOSINOPHILS 0.0 0.0 - 0.4 K/UL    ABS. BASOPHILS 0.2 (H) 0.0 - 0.1 K/UL    ABS. IMM.  GRANS. 0.0 K/UL    DF MANUAL      RBC COMMENTS ANISOCYTOSIS  1+           Medications reviewed  Current Facility-Administered Medications   Medication Dose Route Frequency    potassium chloride SR (KLOR-CON 10) tablet 10 mEq  10 mEq Oral BID    ferrous sulfate tablet 325 mg  1 Tab Oral DAILY WITH BREAKFAST    0.9% sodium chloride infusion 250 mL  250 mL IntraVENous PRN    Probiotic - USANA Probiotic  (Patient Supplied)  1 Packet Oral DAILY    mesalamine (DELZICOL) DR capsule 800 mg  800 mg Oral TID    white petrolatum-mineral oiL (EUCERIN) cream   Topical BID    lipase-protease-amylase (CREON 24,000) capsule 1 Cap  1 Cap Oral TID WITH MEALS    loperamide (IMODIUM) capsule 2 mg  2 mg Oral Q6H PRN    bismuth subsalicylate (PEPTO-BISMOL) oral suspension 30 mL  30 mL Oral Q6H PRN    cyanocobalamin (VITAMIN B12) tablet 1,000 mcg  1,000 mcg Oral DAILY    folic acid (FOLVITE) tablet 1 mg  1 mg Oral DAILY    HYDROcodone-acetaminophen (NORCO) 5-325 mg per tablet 1 Tab  1 Tab Oral Q6H PRN    morphine injection 1 mg  1 mg IntraVENous Q4H PRN    sodium chloride (NS) flush 5-10 mL  5-10 mL IntraVENous PRN    sodium chloride (NS) flush 5-40 mL  5-40 mL IntraVENous Q8H    sodium chloride (NS) flush 5-40 mL  5-40 mL IntraVENous PRN    acetaminophen (TYLENOL) tablet 650 mg  650 mg Oral Q6H PRN    Or    acetaminophen (TYLENOL) suppository 650 mg  650 mg Rectal Q6H PRN    polyethylene glycol (MIRALAX) packet 17 g  17 g Oral DAILY PRN    promethazine (PHENERGAN) tablet 12.5 mg  12.5 mg Oral Q6H PRN    Or    ondansetron (ZOFRAN) injection 4 mg  4 mg IntraVENous Q6H PRN     Care Plan discussed with: Patient/Nurse/IR/GI  Total time spent with patient: 30 minutes.   Delphine العراقي MD

## 2020-09-14 NOTE — PROGRESS NOTES
Bedside and Verbal shift change report given to Lazarus Ip, RN (oncoming nurse) by Ruth Wagner RN (offgoing nurse). Report included the following information SBAR, Kardex, MAR and Accordion.

## 2020-09-15 PROCEDURE — 74011250637 HC RX REV CODE- 250/637: Performed by: FAMILY MEDICINE

## 2020-09-15 PROCEDURE — 74011250637 HC RX REV CODE- 250/637: Performed by: INTERNAL MEDICINE

## 2020-09-15 PROCEDURE — 74011250637 HC RX REV CODE- 250/637: Performed by: SPECIALIST

## 2020-09-15 PROCEDURE — 65270000029 HC RM PRIVATE

## 2020-09-15 RX ADMIN — FERROUS SULFATE TAB 325 MG (65 MG ELEMENTAL FE) 325 MG: 325 (65 FE) TAB at 08:30

## 2020-09-15 RX ADMIN — PANCRELIPASE 1 CAPSULE: 24000; 76000; 120000 CAPSULE, DELAYED RELEASE PELLETS ORAL at 17:02

## 2020-09-15 RX ADMIN — PANCRELIPASE 1 CAPSULE: 24000; 76000; 120000 CAPSULE, DELAYED RELEASE PELLETS ORAL at 11:37

## 2020-09-15 RX ADMIN — Medication: at 17:03

## 2020-09-15 RX ADMIN — MESALAMINE 800 MG: 400 CAPSULE, DELAYED RELEASE ORAL at 21:14

## 2020-09-15 RX ADMIN — POTASSIUM CHLORIDE 10 MEQ: 750 TABLET, FILM COATED, EXTENDED RELEASE ORAL at 08:30

## 2020-09-15 RX ADMIN — POTASSIUM CHLORIDE 10 MEQ: 750 TABLET, FILM COATED, EXTENDED RELEASE ORAL at 17:02

## 2020-09-15 RX ADMIN — CYANOCOBALAMIN TAB 500 MCG 1000 MCG: 500 TAB at 08:30

## 2020-09-15 RX ADMIN — Medication 10 ML: at 21:14

## 2020-09-15 RX ADMIN — PANCRELIPASE 1 CAPSULE: 24000; 76000; 120000 CAPSULE, DELAYED RELEASE PELLETS ORAL at 08:30

## 2020-09-15 RX ADMIN — MESALAMINE 800 MG: 400 CAPSULE, DELAYED RELEASE ORAL at 17:02

## 2020-09-15 RX ADMIN — Medication 10 ML: at 13:41

## 2020-09-15 RX ADMIN — MESALAMINE 800 MG: 400 CAPSULE, DELAYED RELEASE ORAL at 08:30

## 2020-09-15 RX ADMIN — LOPERAMIDE HYDROCHLORIDE 2 MG: 2 CAPSULE ORAL at 17:07

## 2020-09-15 RX ADMIN — Medication: at 08:30

## 2020-09-15 RX ADMIN — Medication 5 ML: at 06:00

## 2020-09-15 RX ADMIN — FOLIC ACID 1 MG: 1 TABLET ORAL at 08:30

## 2020-09-15 NOTE — PROGRESS NOTES
Nutrition Assessment     Type and Reason for Visit: Reassess    Nutrition Recommendations/Plan:   · Changed diet to dental soft as pt is edentulous. GI lite restriction added. · Restart Ensure Pudding and D/c Ensure Clear. Add yogurt as snacks 1x/d. Nutrition Assessment:      9/15: F/u. Pt reports good appetite. Ate most of breakfast. Says he is eating all of his meals as long as he can chew what he gets. Recorded intakes is[place] 0-100%. No longer drinking Ensure Clear. Says he is eating pudding and is agreeable to trying Ensure Pudding again. Also likes yogurt and agreeable to receiving this as snacks. Diet order updated to match pt's preferences. No c/o N/V. He has no diet-related questions at this time. Labs- Na 134, Ca 7.9. Meds- reviewed. 9/8: F/u. Diet advanced to regular. RD changed diet to dental soft per pt preference as he has no teeth and c/o receiving foods that are too hard to chew. S/p colonoscopy, per GI. \"Endoscopic impression is that of longstanding ulcerative colitis now with atrophy of most of the colonic mucosa and pseudopolyposis. Ulcer in rectum. \" Pt says his appetite is good and he ate oatmeal and eggs for breakfast this AM. No c/o N/V. Recorded intakes good, % meals. Likes Ensure Clear and is drinking them. Likes [employer] and [employer] but isn't eating them as he thinks the milk products are worsening his diarrhea. Will d/c. Provided written/verbal  diet education. RD contact info provided for questions. Labs- Ca 7.6, BG [id number]. Meds reviewed. 9/4: F/u. Pt currently NPO and OOR at time of visit for EGD/colonoscopy. Chronic diarrhea. Chest tube removed 9/3. Recorded intakes good, mostly >50% meals. Pt consuming ONS per previous note- will continue after procedure. Labs- K 3.2, Ca 7.1, BG 59-73-97. Meds reviewed. 8/31: F/u. Diet ordered as Regular again. Called into pt's room as he is on airborne precaution for TB.  Pt states he does have some difficulty chewing some foods. States food needs to be soft and most of our foods are. Will change consistency back to Mechanical Soft. Pt states he is eating okay, intakes are documented as 25-75% meals. Multiple ONS ordered. Pt states he likes the Ensure clear and Ensure Pudding and sometimes eats the [employer]. Labs: LFT's are increasing. Meds: Vit Y43, folic acid, Creon, Probiotic. BM noted 8/31.    8/27;  Chart reviewed; med noted for pneumonia, TB. Pt on airborne precautions. RD completed comprehensive assessment with pt yesterday 8/26. Multiple ONS initiated. Will continue for now. Per Pulmonology note today, SLP to be consulted. Currently receiving a regular consistency diet with fair/good documented po intake. 8/26: 59 yo male admitted for pneumonia. PMhx: ETOH use daily. Underweight per BMI of 17.9. No weight hx in EMR. Pt being tested for COVID-19. Spoke with pt over phone, states he has been steadily losing weight and reports 50lb loss over the past 1.5 years. This is 29% loss which is significant for time frame. Pt states he has had no PO intakes for the last 1-2 weeks except for a few protein bars. Prior to that his intakes were still poor, only eating 1x/day which was either a frozen meal or sandwich. Regular diet ordered here. States he ate 50% of grits and pancakes this morning. Has no teeth and states he was not able to chew the sausage or fruit. Requesting softer foods, willing to try chopped meats for now but may need even softer foods than that. Discussed ONS options, states Ensure Enlive causes him to have diarrhea. Willing to try Ensure Clear, Ensure pudding, and Magic Cup. C/o chronic diarrhea, states he takes a probiotic most days at home which helps with the diarrhea, requesting that here. Labs and meds reviewed. NaCl ordered at 75ml/hr.       Intakes:  Patient Vitals for the past 168 hrs:   % Diet Eaten   09/15/20 1043 75 %   09/14/20 1353 0 %   09/14/20 0801 50 %   09/12/20 0800 80 %   09/10/20 1255 10 %   09/10/20 0350 0 %   09/09/20 1943 0 %   09/09/20 0951 50 %   09/09/20 0420 0 %   09/08/20 1437 85 %     Last Weight Metric  Weight Loss Metrics 9/14/2020   Today's Wt 130 lb 1.1 oz   BMI 18.66 kg/m2       Malnutrition Assessment:  Malnutrition Status: Severe malnutrition     Estimated Daily Nutrient Needs:  Energy (kcal):  2034 kcals(REE 1373 x AF 1.3 + 250)  Protein (g):  68-80gm(1.2-1.4gm/kg/d)       Fluid (ml/day):  2034 ml(1ml/kg)    Nutrition Related Findings:  LBM 9/14, 1+ edema to all extr      Current Nutrition Therapies:  DIET ONE TIME MESSAGE  DIET NUTRITIONAL SUPPLEMENTS Lunch, Dinner; Pudding, Fortified  DIET DENTAL SOFT (SOFT SOLID)  DIET SNACKS AM Snack; yogurt    Anthropometric Measures:  · Height:  5' 10\" (177.8 cm)  · Current Body Wt:  59 kg (130 lb 1.1 oz)  · BMI: 18.7    Nutrition Diagnosis:   · Underweight related to inadequate protein-energy intake as evidenced by BMI(17.9)     8/31: Nutrition Dx resolved at this time with new weight - BMI 23.4.  9/4: Nutrition dx resolved BMI 24.7.  9/8: Nutrition dx resolved, BMI 24.8 (per RN, bed weight done with blankets,pillows,teli box,call light 3 sheets as pt refused to have them removed before weighing). 9/15: Nutrition dx improved, BMI 18.6 (bed weight).     Nutrition Intervention:  Food and/or Nutrient Delivery: Snacks (specify), Continue current diet, Modify oral nutrition supplement  Nutrition Education and Counseling: Education initiated  Coordination of Nutrition Care: Continued inpatient monitoring    Goals:  PO intake >75% meals + ONS with 0.5#/week weight gain next 5-7 days       Nutrition Monitoring and Evaluation:   Food/Nutrient Intake Outcomes: Food and nutrient intake, Supplement intake  Physical Signs/Symptoms Outcomes: Chewing or swallowing, GI status, Weight    Discharge Planning:    Continue current diet, Continue oral nutrition supplement     Electronically signed by Corey Norris on 9/15/2020

## 2020-09-15 NOTE — PROGRESS NOTES
Daily Progress Note: 9/15/2020  Aida Hoang MD-PCP    Assessment/Plan:   Pneumonia involving right lung (8/25/2020) /  Hemoptysis (8/25/2020) POA: extensive with internal cavitation and air fluid levels. Staph Aureus, leukocytosis improving. CXR unchanged  --pleural fluid studies + staph, BC neg, cytology neg, AFB neg smears x 3 - cultures pending, and 2nd quantiferon gold negative. -- zosyn, doxy completed, Chest tube removed 9/3  -- Pulm following     Bilateral pulmonary embolism, acute ([address] (8/25/2020) / Acute deep vein thrombosis (DVT) of both lower extremities ([address] (8/25/2020):   - IVC filter placed   - holding anticoags due to dropping Hb and active hemoptysis and GI bleeding     Weight loss, unintentional (8/25/2020) POA:t severe protein calorie malnutrition, ulcerative colitis and empyema likely causes. Nutrition and speech therapy following  -Diet as tolerated  -calcium corrects  -GI follow up outpt     Hyperbilirubinemia (8/25/2020) POA: unclear etiology. CTs as under data review. - resolved     Pleural effusion, right (8/25/2020) POA: empyema, pneumonia vs malignant effusion. -pulmonology following  - Chest tube placed 8/26/20 and removed 9/3     Alcohol use (8/25/2020) POA: he says he has cut down recently. - no withdrawal occured     Chronic Diarrhea (8/25/2020) / Ulcerative colitis/ Fecal occult blood test positive (8/25/2020) POA:  Monitor Hgb.    -GI following  -imodium and peptobismol PRN  -FloraQ daily- or pt's home probiotics  -mesalamine has helped    Hypomag/hypokalemia:  -replete and monitor    GI blood loss/blood loss anemia/iron def anemia  - iron supplement        Problem List:  Problem List as of 9/15/2020 Date Reviewed: 8/25/2020          Codes Class Noted - Resolved    * (Principal) Pneumonia involving right lung ICD-10-CM: J18.9  ICD-9-CM: 486  8/25/2020 - Present        Hemoptysis ICD-10-CM: R04.2  ICD-9-CM: 786.30  8/25/2020 - Present Hyperbilirubinemia ICD-10-CM: E80.6  ICD-9-CM: 782.4  8/25/2020 - Present        Weight loss, unintentional ICD-10-CM: R63.4  ICD-9-CM: 783.21  8/25/2020 - Present        Pleural effusion, right ICD-10-CM: J90  ICD-9-CM: 511.9  8/25/2020 - Present        Bilateral pulmonary embolism ([address] ICD-10-CM: I26.99  ICD-9-CM: 415.19  8/25/2020 - Present        Acute deep vein thrombosis (DVT) of both lower extremities ([facility]) ICD-10-CM: I82.403  ICD-9-CM: 453.40  8/25/2020 - Present        Alcohol use ICD-10-CM: Z72.89  ICD-9-CM: V49.89  8/25/2020 - Present        Diarrhea ICD-10-CM: R19.7  ICD-9-CM: 787.91  8/25/2020 - Present        Fecal occult blood test positive ICD-10-CM: R19.5  ICD-9-CM: 792.1  8/25/2020 - Present        Hyponatremia with extracellular fluid depletion ICD-10-CM: E87.1  ICD-9-CM: 276.1  8/25/2020 - Present            Subjective:     60 y.o. male who is being admitted for Pneumonia involving right lung. Mr. Frankie Lopez presented to our Emergency Department today complaining of generalized weakness, diarrhea and lethargy for several months. Patient was accompanied by his friend who gave most of the history. The patient does not like seeing physicians and apparently uses vitamin supplements. He is a smoker and drinks alcohol and has been progressively weaker over several months now. He has had pleuritic right sided chest pain associated with a productive cough and hemoptysis. He feel on the floor several days ago and was unable to get up. His friends helped him up and brought him to the ED for further testing. Initial CXR done showed airspace disease in right mid lower lung zone consistent with pneumonia. This is surrounded by a moderate right-sided pleural effusion. Upon review, he was noted to be cachectic with swollen lower extremities.  CT scans chest abdomen and pelvis done showed a diffuse irregular opacification throughout the right lower lobe with multiple areas of internal cavitation and air-fluid levels. Findings are more likely related to infection than malignancy. Large right pleural effusion. Trace left pleural effusion. Bilateral pulmonary emboli. Clot burden is moderate. No evidence of right heart strain. Thrombus in the veins of the left lower extremity extending to the IVC. A small amount of thrombus is also seen in the right common iliac vein. He will be admitted for further management. (Dr Jesse Goldberg)    8/26:  Feeling a little better this AM but still c/o painful, pleuritic cough and occas thick sputum. No leg/calf pains. No known exposure to ill individuals/no travel - \"just staying in the last few months. \"  No temps. WBC 31.9K. Cultures neg so far.      8/27:  Reports less cough, chest pain and feeling better overall. Appetite has returned. Chest tube placed yesterday with discharge obtained - pending cultures. Other cultures remain neg. Hb lower so cont to hold anticoag for now. GI has seen and work up when more stable. Remains on doxy, zosyn, vanc. WBC down to 28K. Bili back to normal.     8/28:  Continues to grad improve. Still with some cough. No tremor or hallucinations so far. K+ lower - replacing. WBC coming down. Afeb. CXR this AM pending. Cultures remain neg so far. Quantiferon gold test pending as of this AM.  He reports hx of chronic LE edema for years. 8/29: Pt continues to have frequent stooling. GI plans endoscopy next week if pt allows. He was not willing to participate with PT/OT/nutritionist yesterday. Says despite our best efforts, we will not get his stooling to stop until he gets his home probiotics [friend will bring it]. He refuses to eat until the stooling is better. 8/30:  Pt is reluctant to admit that his stooling has improved. Down to 2 BMs yesterday instead of 6 the day before. Tolerating PO. Willing to eat more today. He is still having hemoptysis. Wanting to get out of bed. Willing to work with PT/OT. 8/31:  Feeling better. Appetite better. TB neg so far. First Quantiferon Gold was indeterminate - repeat pending. Afeb. WBC slowly coming down. Currently on Doxy and Zosyn. Replacing Mag and K+. Hb now stable in 8s. 9/1: Little change. Not moving about much, he reports due to chest tube being painful when he moves. Hb 7.8. WBC 16. No sign of ETOH withdrawal.     /9/2:  Now reports he wants to move around more and get up and wants to work with PT. AFB smears neg x3, AFB cultures still pending of course. WBC slowly coming down. Afeb. Hb still slowly dropping - 7.7 today. May consider prophylactic dose of lovenox and watch Hb.  GI work up pending. Arms a little more swollen. 9/3: Feeling better. Hoping for endoscopy in am. Patient would like chest tube removed prior to prep and studies if possible. Speech would like MBS.     9/4:  Feeling better except for prep for colonoscopy - reports clear results last BM. Chest tube out yesterday. Hb down to 7.0 so still holding anticoag - may need transfusion. Upper and lower endoscopy planned today. CXR this AM pending. K+ down with the prep - replacing. 9/5:  Little change. Still with occas productive cough. Now also c/o increasing LE edema and today notes scrotal edema and now wants something done about it. 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 Hb down to 6.9 today - would benefit from transfusion. Risks v benefits of transfusion discussed and he finally agreed. Appetite very good now, taking plenty of fluids now, so will DC IVFs. Low dose of lasix after the transfusion to help with the edema. 9/6:  Less edema in LEs and scrotum today but still quite a bit - will give another low dose of Lasix. Received 1 unit packed red cells yesterday. K+ sl low - replacing. Still having episodic loose stools but on questioning he reports chronic loose stools for \"years\" which would go along with his colonoscopy findings. AM CBC is still pending. Discussed need for rehab and strongly advised he consider going with his degree of debility. Risk of falls and fractures discussed. 9/7:  Still c/o scrotal and LE edema but sl better - one dose of 10mg lasix today. Discussed his low albumin as part of the problem. Less cough today but slight amount of hemoptysis this AM. Hb 7.8 this AM - was in the 8s after the transfusion. K+ still low - replacing. Declining to have dressings changed - discussed risks. Discussed his severe weakness and need for rehab again - still not very interested in going. 9/8:  Less scrotal swelling after the lasix - > 1.5 liter out. He did get up some in room yesterday and did fairly well but still very weak. Fewer loose stools. He has mult questions about Ulcerative colitis and basics discussed with him - advised him to discuss further with GI. Eating well and has high protein diet. Albumin still low. K+ back up. Still has cough - less productive so far today. Will check CXR today. ALT higher toay - monitoring. 9/9:  Continues to have less edema with low dose of lasix yesterday. CXR looking better. Still having frequent loose stools but better than prior to admission. Hb continues to drop likely due to chronic, slow GI blood loss due to the ulcerative colitis. He will likely need episodic transfusions. LFTs look improved but full hepatic panel is pending. Less cough so far today. He was up a bit more yesterday. He reports he is now willing to go to rehab. Due to recent increased bilat LE edema and scrotal edema (although now somewhat better), will get IR to make sure the IVC filter is patent. 1145am:  Discussed with Dr Cristela Zavala (IR) - advised ab US as start to eval IVC. 9/10:  Fewer loose stools. Less scrotal edema and LE edema. Ab US with IVC not completely seen due to bowel gas - will ask IR to comment. LFTs improved. CXR improving. Hb 7.7. He reports he did not get up yesterday.       9/11: Less cough, less sputum, less edema, and feeling better. CT Chest/Ab/Pelvis 9/10/20 as noted under Data Review. Hb stable in 7s for now - recommend frequent checks at rehab.  K+ sl low - replacing. LFTs better - follow up with GI (Dr Peter Harman) after discharge for LFTs and for Ulcerative Colitis. Follow up with PCP when out of rehab. Stable for rehab.      :  Only one stool since yesterday he reports and that was semi-solid. Still had fecal urgency and did not make it to bathroom this AM.  Much less edema and continues to feel better. He walked briefly yesterday but still need max assist to get up. Hb stable. LFTs cont to improve. :  Continues to feel better but still weak and unable to get up or move from sitting to standing on own. Only one stool and reports it was solid this AM.  Still awaiting rehab.      :  Little change. Only one stool in past 12 hours. Albumin sl better. CXR from yesterday without change. Still very weak. Awaiting rehab. Edema has nearly resolved. 9/15:  Little change. Awaiting rehab. No new complaints. Review of Systems:   A comprehensive review of systems was negative except for that written in the HPI. Objective:   Physical Exam:   Visit Vitals  /68 (BP 1 Location: Left arm, BP Patient Position: At rest)   Pulse 75   Temp 97.6 °F (36.4 °C)   Resp 16   Ht 5' 10\" (1.778 m)   Wt 59 kg (130 lb 1.1 oz)   SpO2 98%   BMI 18.66 kg/m²    O2 Flow Rate (L/min): 2 l/min O2 Device: Room air  Temp (24hrs), Av.9 °F (36.6 °C), Min:97.6 °F (36.4 °C), Max:98.3 °F (36.8 °C)    1901 - 09/15 0700  In: -   Out: 300 [Urine:300]   701 - 1900  In: 520 [P.O.:520]  Out: 4000 [Urine:4000]  General:  Alert, cooperative, no distress, cachetic, ill appearing. Head:  Normocephalic, without obvious abnormality, atraumatic. Eyes:  [place] conjuntiva. PERRL, EOMs intact.    Throat: Lips, mucosa, and tongue moist.   Neck: Supple, symmetrical, trachea midline, no adenopathy, thyroid: no enlargement/tenderness/nodules, no carotid bruit and no JVD. Lungs:   Better air exchange,  Clear at this moment. Chest wall: Former chest tube site right looks ok   Heart:  Regular rate and rhythm, S1, S2 normal, no murmur, click, rub or gallop. Abdomen:   Soft, non-tender. Bowel sounds normal. No masses,  No organomegaly. Extremities: no cyanosis. No calf tenderness;  trace pitting edema bilat. LEs. Edema resolved in arms and scrotum, No cords palpated. Pulses: 2+ and symmetric all extremities. Skin: Skin color, texture, turgor normal. No rashes   Neurologic:  Alert and oriented X 3. Fine motor of hands and fingers normal.   equal.  No tremor. Gait not tested at this time. Sensation grossly normal to touch. Gross motor of extremities normal.       Data Review:   EXAM: CT CHEST ABD PELV W CONT 8/25/20  INDICATION: weight loss  COMPARISON: Radiographs 8/25/2020  CONTRAST: 100 mL of Isovue-370. FINDINGS:   CHEST WALL: No mass or axillary lymphadenopathy. THYROID: No nodule. MEDIASTINUM: No mass or lymphadenopathy. BRYAN: No mass or lymphadenopathy. THORACIC AORTA: No dissection or aneurysm. MAIN PULMONARY ARTERY: Normal in caliber. Thrombus is seen throughout the right  lower lobe pulmonary arteries. Thrombus is seen in the left lower lobe pulmonary  artery extending into the anterior and lateral basilar segments. TRACHEA/BRONCHI: Patent. ESOPHAGUS: No wall thickening or dilatation. HEART: Normal in size. PLEURA: There is a large right pleural effusion with enhancement of the pleural  surfaces. Trace left pleural effusion. LUNGS: There is irregular opacification throughout the right lower lobe. There  are multiple areas of internal cavitation with air-fluid levels. Small areas of  atelectasis or scarring are seen in the lingula and inferior left lower lobe. LIVER: No mass. BILIARY TREE: Gallbladder is within normal limits.  CBD is not dilated. SPLEEN: within normal limits. PANCREAS: No mass or ductal dilatation. ADRENALS: Unremarkable. KIDNEYS: Small nonobstructive stones are seen in the bilateral kidneys. No  evidence of hydronephrosis. No mass. STOMACH: Unremarkable. SMALL BOWEL: No dilatation or wall thickening. COLON: No dilatation or wall thickening. APPENDIX: Unremarkable  PERITONEUM: No ascites or pneumoperitoneum. RETROPERITONEUM: No lymphadenopathy or aortic aneurysm. REPRODUCTIVE ORGANS: Unremarkable  URINARY BLADDER: No mass or calculus. BONES: No destructive bone lesion. ABDOMINAL WALL: No mass or hernia. ADDITIONAL COMMENTS: There is thrombus in the left femoral veins extending into  the left iliac veins into the IVC. A small amount of thrombus is also present in  the right common iliac vein. IMPRESSION:  1. Diffuse irregular opacification throughout the right lower lobe with multiple  areas of internal cavitation and air-fluid levels. Findings are more likely  related to infection than malignancy. 2. Large right pleural effusion. Trace left pleural effusion. 3. Bilateral pulmonary emboli. Clot burden is moderate. No evidence of right  heart strain. 4. Thrombus in the veins of the left lower extremity extending to the IVC. A  small amount of thrombus is also seen in the right common iliac vein. EXAM:  IR THORACENTESIS/INSERT CHEST TUBE 8/26/20  INDICATION:  Request placement of large size pigtail catheter. Possible empyema. PROCEDURE:  Available history and imaging was reviewed which demonstrates a large partially  loculated right pleural effusion. Possibility of empyema, tuberculosis and other  etiologies are being considered clinically. Specific request for larger pigtail  catheter placement. Following informed consent the patient was evaluated with ultrasound in the  angina/catheter lab department. This demonstrates a large right pleural  effusion.    The location of the pleural effusion was marked and then the area was prepped  and draped. 1% lidocaine was used for local anesthesia. A small incision was made with an 11  blade. A micropuncture needle was used under direct fluoroscopic guidance to access the  pleural fluid. When this was achieved the microguidewire was placed followed by  a 5 [place] dilator. Through this a short [address] guidewire  was placed. The track was then dilated to 12 [place]. This was then followed by  placement of a 12 [place] Abscession pigtail catheter. The string for the pigtail  was removed prior to placement. The catheter was positioned under fluoroscopic  guidance and then secured in place at the skin with a 2-0 Prolene suture. The  site was then dressed with a Tegaderm Bioclusive dressing and will be connected  to a Pleur-evac for subsequent drainage. Prior to this approximately 100 mL of fluid was obtained and sent for cytology  as well as multiple cultures including aerobic, anaerobic, AFB and fungal.   The fluid was withdrawn was mildly cloudy serosanguineous in color. I did not  notice any foul smell. The procedure was adequately tolerated without significant blood loss or  evidence of complication. Final digital image of the chest was saved documenting catheter position. IMPRESSION:   Successful ultrasound and fluoroscopic guided placement of 12 [place]ana right-sided  chest tube. Patient will have ongoing drainage from Pleur-evac in his hospital  bed/room. Care and monitoring as per medical service and nursing service. ECHO 8/27/20  Interpretation Summary   Result status: Final result    · Image quality for this study was technically difficult. · Echo study was technically difficulty and limited due to patient's tolerance. · LV: Estimated LVEF is 55 - 60%. Normal cavity size, wall thickness and systolic function (ejection fraction normal). Wall motion: normal. Mild (grade 1) left ventricular diastolic dysfunction.   · Pericardium: Small pericardial effusion adjacent to right ventricle. Portable AP upright view of the chest. 9/1/20  Direct comparison made to prior chest x-ray dated September 1, 2020. Cardiomediastinal silhouette is stable. Right basilar chest tube is unchanged in  position. There No residual pneumothorax is visualized. There are small  bilateral pleural effusions. There are persistent increased interstitial  markings at the right lung. There is persistent bibasilar patchy airspace  disease, right greater than left. IMPRESSION: No residual pneumothorax visualized    CXR 9/4/20  FINDINGS: AP portable imaging of the chest performed at 5:14 AM demonstrates a  stable cardiomediastinal silhouette. The right basilar chest tube is been  removed. Diffuse airspace disease throughout the right lung is unchanged. Small  right pleural effusion persists. No pneumothorax is visualized. There is  unchanged left basilar atelectasis. No significant osseous abnormalities are  seen. IMPRESSION: No evidence of pneumothorax following chest tube removal.    EXAM:  [facility] 9/9/20  INDICATION:  evaluate IVC for patency, intermittent increase in edema is  swelling scrotum and lower extremities. COMPARISON:  CT abdomen and pelvis 8/25/20, IVC filter placement 8/25/20  TECHNIQUE:   Real-time ultrasound was utilized along with color-flow duplex Doppler  ultrasound to evaluate the inferior vena cava. Multiple saved digital images. FINDINGS:   The upper inferior vena cava is adequately visualized down to the filter and  there is no evidence of thrombus. Due to overlying bowel gas is difficult to visualize inferior vena cava without  below the filter. There is at least some flow demonstrated in the distal  inferior vena cava. IMPRESSION:  1. Normal-appearing inferior vena cava extending down to the filter. 2. Suboptimal visualization of the inferior vena cava beginning at the lower  aspect of the filter and extending through the bifurcation.  There is some flow  demonstrated, however this area is suboptimally examined. EXAM: CT CHEST ABD PELV W CONT 9/10/20  INDICATION: assess patency of IVC . IVC filter. COMPARISON: 8/25/2020  CONTRAST: 100 mL of Isovue-370. TECHNIQUE:   Following the uneventful intravenous administration of contrast, thin axial  images were obtained through the chest, abdomen and pelvis. Coronal and sagittal  reformats were generated. Oral contrast was not administered. CT dose reduction  was achieved through use of a standardized protocol tailored for this  examination and automatic exposure control for dose modulation. FINDINGS:   CHEST WALL: No mass or axillary lymphadenopathy. THYROID: No nodule. MEDIASTINUM: Largest right paratracheal node measures 8 mm in short axis  diameter (series 2, image 24). BRYAN: No mass or lymphadenopathy. THORACIC AORTA: No dissection or aneurysm. MAIN PULMONARY ARTERY: Pulmonary embolism in a branch to the right lower lobe  (series 2, image 35). Nonoccluding small chronic eccentric thrombus in a branch  to the left lower lobe (axial image 28 and coronal image 71). TRACHEA/BRONCHI: Patent. ESOPHAGUS: No wall thickening or dilatation. HEART: Normal in size. PLEURA: Bilateral pleural effusions, larger on the left. Small right  hydropneumothorax. LUNGS: Bibasilar compressive atelectasis, more pronounced on the right. Right  apical pleural thickening with associated small apical pneumothorax (series 3,  image 17). LIVER: No mass. BILIARY TREE: Gallbladder is within normal limits. CBD is not dilated. SPLEEN: within normal limits. PANCREAS: No mass or ductal dilatation. ADRENALS: Unremarkable. KIDNEYS: Bilateral nonobstructing renal calculi (coronal images 91 and 96). STOMACH: Unremarkable. SMALL BOWEL: No dilatation or wall thickening. COLON: No dilatation or wall thickening.   APPENDIX: Not visualized  PERITONEUM: No ascites or pneumoperitoneum   RETROPERITONEUM: No lymphadenopathy or aortic aneurysm. REPRODUCTIVE ORGANS: Small prostate  URINARY BLADDER: Layering stones in the inferior bladder (series 2, image 111). BONES: No destructive bone lesion. Disc space narrowing with disc bulges at L3-4  and L4-5 and L5-S1. Dextroconvex lumbar curvature. ABDOMINAL WALL: No mass or hernia. ADDITIONAL COMMENTS: Partial cast in the left iliac vein (coronal image 86 and  axial image 94). . Partial cast below below the IVC. IVC filter in place. (Axial  image 83 and coronal image 76). Small amount of thrombus extends into the origin  of the right common iliac vein (series 2, image 87). 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 Decreased thrombus in in both lower lobe pulmonary arteries  Small right hydropneumothorax. Bilateral pleural effusions, asymmetric to the  left. Bibasilar compressive atelectasis, asymmetric to the right. Incidental bladder calculi. Nonobstructing bilateral renal calculi.     Recent Days:  Recent Labs     09/14/20  0342   WBC 10.4   HGB 8.2*   HCT 25.5*   *     Recent Labs     09/14/20  0342   *   K 3.7      CO2 28   GLU 75   BUN 4*   CREA 0.37*   CA 7.9*   ALB 1.2*   TBILI 0.6   *     No results for input(s): PH, PCO2, PO2, HCO3, FIO2 in the last 72 hours. 24 Hour Results:  No results found for this or any previous visit (from the past 24 hour(s)).     Medications reviewed  Current Facility-Administered Medications   Medication Dose Route Frequency    potassium chloride SR (KLOR-CON 10) tablet 10 mEq  10 mEq Oral BID    ferrous sulfate tablet 325 mg  1 Tab Oral DAILY WITH BREAKFAST    0.9% sodium chloride infusion 250 mL  250 mL IntraVENous PRN    Probiotic - USANA Probiotic  (Patient Supplied)  1 Packet Oral DAILY    mesalamine (DELZICOL) DR capsule 800 mg  800 mg Oral TID    white petrolatum-mineral oiL (EUCERIN) cream   Topical BID    lipase-protease-amylase (CREON 24,000) capsule 1 Cap  1 Cap Oral TID WITH MEALS    loperamide (IMODIUM) capsule 2 mg  2 mg Oral Q6H PRN    bismuth subsalicylate (PEPTO-BISMOL) oral suspension 30 mL  30 mL Oral Q6H PRN    cyanocobalamin (VITAMIN B12) tablet 1,000 mcg  1,000 mcg Oral DAILY    folic acid (FOLVITE) tablet 1 mg  1 mg Oral DAILY    HYDROcodone-acetaminophen (NORCO) 5-325 mg per tablet 1 Tab  1 Tab Oral Q6H PRN    morphine injection 1 mg  1 mg IntraVENous Q4H PRN    sodium chloride (NS) flush 5-10 mL  5-10 mL IntraVENous PRN    sodium chloride (NS) flush 5-40 mL  5-40 mL IntraVENous Q8H    sodium chloride (NS) flush 5-40 mL  5-40 mL IntraVENous PRN    acetaminophen (TYLENOL) tablet 650 mg  650 mg Oral Q6H PRN    Or    acetaminophen (TYLENOL) suppository 650 mg  650 mg Rectal Q6H PRN    polyethylene glycol (MIRALAX) packet 17 g  17 g Oral DAILY PRN    promethazine (PHENERGAN) tablet 12.5 mg  12.5 mg Oral Q6H PRN    Or    ondansetron (ZOFRAN) injection 4 mg  4 mg IntraVENous Q6H PRN     Care Plan discussed with: Patient/Nurse/IR/GI  Total time spent with patient: 30 minutes.   Miguel Flanagan MD

## 2020-09-15 NOTE — PROGRESS NOTES
Transtion of Care Plan:  RUR  16 % low risk  1.  [facility] has now declined pt acceptance; he will need placement when COBRA becomes active  2. Halie reports there is still no record of payment for COBRA  3. COVID negative  4. Pt will need w/c [facility] transport at d/c if his friend is unable to transport him  SAMIRA Casillas RN

## 2020-09-15 NOTE — PROGRESS NOTES
Bedside and Verbal shift change report given to Hero Chavez RN (oncoming nurse) by Howard Sage (offgoing nurse). Report included the following information SBAR, Kardex, MAR and [employer].

## 2020-09-16 VITALS
RESPIRATION RATE: 18 BRPM | SYSTOLIC BLOOD PRESSURE: 111 MMHG | BODY MASS INDEX: 17.2 KG/M2 | HEIGHT: 70 IN | DIASTOLIC BLOOD PRESSURE: 68 MMHG | OXYGEN SATURATION: 98 % | WEIGHT: 120.15 LBS | HEART RATE: 78 BPM | TEMPERATURE: 98.3 F

## 2020-09-16 LAB
ALBUMIN SERPL-MCNC: 1.2 G/DL (ref 3.5–5)
ALBUMIN/GLOB SERPL: 0.2 {RATIO} (ref 1.1–2.2)
ALP SERPL-CCNC: 286 U/L (ref 45–117)
ALT SERPL-CCNC: 97 U/L (ref 12–78)
ANION GAP SERPL CALC-SCNC: 5 MMOL/L (ref 5–15)
AST SERPL-CCNC: 91 U/L (ref 15–37)
BASOPHILS # BLD: 0.1 K/UL (ref 0–0.1)
BASOPHILS NFR BLD: 1 % (ref 0–1)
BILIRUB SERPL-MCNC: 0.5 MG/DL (ref 0.2–1)
BUN SERPL-MCNC: 5 MG/DL (ref 6–20)
BUN/CREAT SERPL: 13 (ref 12–20)
CALCIUM SERPL-MCNC: 7.8 MG/DL (ref 8.5–10.1)
CHLORIDE SERPL-SCNC: 102 MMOL/L (ref 97–108)
CO2 SERPL-SCNC: 28 MMOL/L (ref 21–32)
CREAT SERPL-MCNC: 0.38 MG/DL (ref 0.7–1.3)
DIFFERENTIAL METHOD BLD: ABNORMAL
EOSINOPHIL # BLD: 0.1 K/UL (ref 0–0.4)
EOSINOPHIL NFR BLD: 1 % (ref 0–7)
ERYTHROCYTE [DISTWIDTH] IN BLOOD BY AUTOMATED COUNT: 17.2 % (ref 11.5–14.5)
GLOBULIN SER CALC-MCNC: 5.3 G/DL (ref 2–4)
GLUCOSE SERPL-MCNC: 74 MG/DL (ref 65–100)
HCT VFR BLD AUTO: 28.5 % (ref 36.6–50.3)
HGB BLD-MCNC: 9 G/DL (ref 12.1–17)
IMM GRANULOCYTES # BLD AUTO: 0.1 K/UL (ref 0–0.04)
IMM GRANULOCYTES NFR BLD AUTO: 1 % (ref 0–0.5)
LYMPHOCYTES # BLD: 2.1 K/UL (ref 0.8–3.5)
LYMPHOCYTES NFR BLD: 24 % (ref 12–49)
MCH RBC QN AUTO: 32.8 PG (ref 26–34)
MCHC RBC AUTO-ENTMCNC: 31.6 G/DL (ref 30–36.5)
MCV RBC AUTO: 104 FL (ref 80–99)
MONOCYTES # BLD: 0.7 K/UL (ref 0–1)
MONOCYTES NFR BLD: 7 % (ref 5–13)
NEUTS SEG # BLD: 6 K/UL (ref 1.8–8)
NEUTS SEG NFR BLD: 66 % (ref 32–75)
NRBC # BLD: 0 K/UL (ref 0–0.01)
NRBC BLD-RTO: 0 PER 100 WBC
PLATELET # BLD AUTO: 476 K/UL (ref 150–400)
PMV BLD AUTO: 8.8 FL (ref 8.9–12.9)
POTASSIUM SERPL-SCNC: 3.7 MMOL/L (ref 3.5–5.1)
PROT SERPL-MCNC: 6.5 G/DL (ref 6.4–8.2)
RBC # BLD AUTO: 2.74 M/UL (ref 4.1–5.7)
SODIUM SERPL-SCNC: 135 MMOL/L (ref 136–145)
WBC # BLD AUTO: 9 K/UL (ref 4.1–11.1)

## 2020-09-16 PROCEDURE — 74011250636 HC RX REV CODE- 250/636: Performed by: FAMILY MEDICINE

## 2020-09-16 PROCEDURE — 80053 COMPREHEN METABOLIC PANEL: CPT

## 2020-09-16 PROCEDURE — 74011250637 HC RX REV CODE- 250/637: Performed by: SPECIALIST

## 2020-09-16 PROCEDURE — 74011250637 HC RX REV CODE- 250/637: Performed by: INTERNAL MEDICINE

## 2020-09-16 PROCEDURE — 97530 THERAPEUTIC ACTIVITIES: CPT

## 2020-09-16 PROCEDURE — 36415 COLL VENOUS BLD VENIPUNCTURE: CPT

## 2020-09-16 PROCEDURE — 97535 SELF CARE MNGMENT TRAINING: CPT

## 2020-09-16 PROCEDURE — 97116 GAIT TRAINING THERAPY: CPT

## 2020-09-16 PROCEDURE — 74011250637 HC RX REV CODE- 250/637: Performed by: FAMILY MEDICINE

## 2020-09-16 PROCEDURE — 85025 COMPLETE CBC W/AUTO DIFF WBC: CPT

## 2020-09-16 RX ORDER — POTASSIUM CHLORIDE 750 MG/1
10 TABLET, FILM COATED, EXTENDED RELEASE ORAL 2 TIMES DAILY
Qty: 60 TAB | Refills: 0 | Status: SHIPPED
Start: 2020-09-16

## 2020-09-16 RX ORDER — ACETAMINOPHEN 325 MG/1
650 TABLET ORAL
Qty: 30 TAB | Refills: 0 | Status: SHIPPED
Start: 2020-09-16

## 2020-09-16 RX ORDER — BISMUTH SUBSALICYLATE 262 MG/15ML
30 LIQUID ORAL
Qty: 1 BOTTLE | Refills: 0 | Status: SHIPPED
Start: 2020-09-16

## 2020-09-16 RX ORDER — LANOLIN ALCOHOL/MO/W.PET/CERES
1000 CREAM (GRAM) TOPICAL DAILY
Qty: 30 TAB | Refills: 0 | Status: SHIPPED
Start: 2020-09-17

## 2020-09-16 RX ORDER — PROMETHAZINE HYDROCHLORIDE 12.5 MG/1
12.5 TABLET ORAL
Qty: 30 TAB | Refills: 0 | Status: SHIPPED
Start: 2020-09-16

## 2020-09-16 RX ORDER — LANOLIN ALCOHOL/MO/W.PET/CERES
325 CREAM (GRAM) TOPICAL
Qty: 30 TAB | Refills: 0 | Status: SHIPPED
Start: 2020-09-17

## 2020-09-16 RX ORDER — LOPERAMIDE HYDROCHLORIDE 2 MG/1
2 CAPSULE ORAL
Qty: 30 CAP | Refills: 0 | Status: SHIPPED
Start: 2020-09-16 | End: 2020-09-26

## 2020-09-16 RX ORDER — FOLIC ACID 1 MG/1
1 TABLET ORAL DAILY
Qty: 30 TAB | Refills: 0 | Status: SHIPPED
Start: 2020-09-17

## 2020-09-16 RX ORDER — ENOXAPARIN SODIUM 100 MG/ML
40 INJECTION SUBCUTANEOUS EVERY 24 HOURS
Status: DISCONTINUED | OUTPATIENT
Start: 2020-09-16 | End: 2020-09-16 | Stop reason: HOSPADM

## 2020-09-16 RX ORDER — MESALAMINE 400 MG/1
800 CAPSULE, DELAYED RELEASE ORAL 3 TIMES DAILY
Qty: 90 CAP | Refills: 0 | Status: SHIPPED
Start: 2020-09-16

## 2020-09-16 RX ADMIN — PANCRELIPASE 1 CAPSULE: 24000; 76000; 120000 CAPSULE, DELAYED RELEASE PELLETS ORAL at 08:36

## 2020-09-16 RX ADMIN — CYANOCOBALAMIN TAB 500 MCG 1000 MCG: 500 TAB at 08:36

## 2020-09-16 RX ADMIN — Medication: at 08:37

## 2020-09-16 RX ADMIN — POTASSIUM CHLORIDE 10 MEQ: 750 TABLET, FILM COATED, EXTENDED RELEASE ORAL at 17:08

## 2020-09-16 RX ADMIN — MESALAMINE 800 MG: 400 CAPSULE, DELAYED RELEASE ORAL at 17:08

## 2020-09-16 RX ADMIN — POTASSIUM CHLORIDE 10 MEQ: 750 TABLET, FILM COATED, EXTENDED RELEASE ORAL at 08:36

## 2020-09-16 RX ADMIN — MESALAMINE 800 MG: 400 CAPSULE, DELAYED RELEASE ORAL at 08:36

## 2020-09-16 RX ADMIN — FERROUS SULFATE TAB 325 MG (65 MG ELEMENTAL FE) 325 MG: 325 (65 FE) TAB at 08:36

## 2020-09-16 RX ADMIN — PANCRELIPASE 1 CAPSULE: 24000; 76000; 120000 CAPSULE, DELAYED RELEASE PELLETS ORAL at 12:00

## 2020-09-16 RX ADMIN — LOPERAMIDE HYDROCHLORIDE 2 MG: 2 CAPSULE ORAL at 08:40

## 2020-09-16 RX ADMIN — FOLIC ACID 1 MG: 1 TABLET ORAL at 08:36

## 2020-09-16 RX ADMIN — Medication: at 17:08

## 2020-09-16 RX ADMIN — Medication 10 ML: at 13:45

## 2020-09-16 RX ADMIN — PANCRELIPASE 1 CAPSULE: 24000; 76000; 120000 CAPSULE, DELAYED RELEASE PELLETS ORAL at 17:08

## 2020-09-16 NOTE — PROGRESS NOTES
Problem: Self Care Deficits Care Plan (Adult)  Goal: *Acute Goals and Plan of Care (Insert Text)  Description:   FUNCTIONAL STATUS PRIOR TO ADMISSION: Patient was independent and active without use of DME. Patient was independent for basic and instrumental ADLs. HOME SUPPORT: The patient lived alone with no local support. Occupational Therapy Goals  Weekly re assessment 09/15/2020, all goals remain appropriate. 1.  Patient will perform grooming, standing at sink for at least 5 minutes, with supervision/set-up within 7 day(s). 2.  Patient will perform upper body dressing with modified independence within 7 day(s). 3.  Patient will perform lower body dressing with supervision/set-up within 7 day(s). 4.  Patient will perform toilet transfers with modified independence within 7 day(s). 5.  Patient will perform all aspects of toileting with modified independence within 7 day(s). 6.  Patient will participate in upper extremity therapeutic exercise/activities with independence for 5 minutes within 7 day(s). 7.  Patient will utilize energy conservation techniques during functional activities with verbal cues within 7 day(s). Outcome: Progressing Towards Goal                               OCCUPATIONAL THERAPY TREATMENT/WEEKLY RE-EVALUATION  Patient: Iliana Martínez (6 y.o. male)  Date: 9/16/2020  Diagnosis: Pneumonia [J18.9]   Pneumonia involving right lung  Procedure(s) (LRB):  ESOPHAGOGASTRODUODENOSCOPY (EGD) (N/A)  COLONOSCOPY (N/A)  ESOPHAGOGASTRODUODENAL (EGD) BIOPSY (N/A)  COLON BIOPSY (N/A) 12 Days Post-Op  Precautions: Fall  Chart, occupational therapy assessment, plan of care, and goals were reviewed. ASSESSMENT  Based on the objective data described below, patient progressing toward goals with improved participation in activity. Patient received supine, I n bed and is agreeable to OOB.   He moves to EOB with supervision, performs sit to stand with CGA and transfers with same level of assist using RW. Patient able to transfer to commode in bathroom with CGA and requires only verbal cues for hand placement on armrests of BSC. Patient able to stand from commode with safe technique. Patient able to improve standing activity at sink for progression of ADL goals, but requests return to bed for completion of grooming tasks. He is able to perform grooming tasks with set up while seated. Patient friend to room at end of session and offers encouragement. Current Level of Function Impacting Discharge (ADLs): CGA for transfers. Patient with continued difficulty with LE dressing    Other factors to consider for discharge:          PLAN :  Goals have been updated based on progression since last assessment. Patient continues to benefit from skilled intervention to address the above impairments. Continue to follow patient 3 times a week to address goals.     Recommend with staff: OOB to chair, transfers to commode     Recommend next OT session: continue goals    Recommendation for discharge: (in order for the patient to meet his/her long term goals)  Therapy 3 hours per day 5-7 days per week    This discharge recommendation:  Has been made in collaboration with the attending provider and/or case management    Equipment recommendations for successful discharge (if) home: TBD       SUBJECTIVE:   Patient stated Thanks for coming in.    OBJECTIVE DATA SUMMARY:   Cognitive/Behavioral Status:  Neurologic State: Alert  Orientation Level: Oriented X4  Cognition: Follows commands  Perception: Appears intact  Perseveration: No perseveration noted  Safety/Judgement: Awareness of environment    Functional Mobility and Transfers for ADLs:  Bed Mobility:  Supine to Sit: Contact guard assistance  Sit to Supine: Contact guard assistance    Transfers:  Sit to Stand: Contact guard assistance  Functional Transfers  Bathroom Mobility: Contact guard assistance  Toilet Transfer : Contact guard assistance  Cues: Verbal cues provided(reaching for armrests on BSC )       Balance:  Sitting: Intact  Standing: With support    ADL Intervention:       Grooming  Brushing/Combing Hair: Set-up(seated EOB)                             Cognitive Retraining  Safety/Judgement: Awareness of environment    Pain:      Activity Tolerance:   Good and requires rest breaks  Please refer to the flowsheet for vital signs taken during this treatment.     After treatment patient left in no apparent distress:   Supine in bed, Call bell within reach, Caregiver / family present, and Side rails x 3    COMMUNICATION/COLLABORATION:   The patients plan of care was discussed with: Physical Therapist and Registered Nurse    Mick Che OTR/L  Time Calculation: 26 mins

## 2020-09-16 NOTE — DISCHARGE SUMMARY
Physician Discharge Summary     Patient ID:    Gris Storey  [id number]  89 y.o.  1955  Merced Villalba MD    Admit date: 8/25/2020  Discharge date and time: 9/16/2020  Admission Diagnoses: Pneumonia [J18.9]/empyema/Pulmonary emboli/DVTs; ulcerative colitis    Discharge Medications:   Current Discharge Medication List      START taking these medications    Details   bismuth subsalicylate (PEPTO-BISMOL) 262 mg/15 mL suspension Take 30 mL by mouth every six (6) hours as needed for Indigestion. Qty: 1 Bottle, Refills: 0      cyanocobalamin 1,000 mcg tablet Take 1 Tab by mouth daily. Qty: 30 Tab, Refills: 0      ferrous sulfate 325 mg (65 mg iron) tablet Take 1 Tab by mouth daily (with breakfast). Qty: 30 Tab, Refills: 0      folic acid (FOLVITE) 1 mg tablet Take 1 Tab by mouth daily. Qty: 30 Tab, Refills: 0      lipase-protease-amylase (CREON 24,000) 24,000-76,000 -120,000 unit capsule Take 1 Cap by mouth three (3) times daily (with meals). Qty: 90 Cap, Refills: 0      loperamide (IMODIUM) 2 mg capsule Take 1 Cap by mouth every six (6) hours as needed for Diarrhea for up to 10 days. Indications: diarrhea  Qty: 30 Cap, Refills: 0      mesalamine (DELZICOL) 400 mg cdti capsule Take 2 Caps by mouth three (3) times daily. Qty: 90 Cap, Refills: 0      potassium chloride SR (KLOR-CON 10) 10 mEq tablet Take 1 Tab by mouth two (2) times a day. Qty: 60 Tab, Refills: 0      OTHER,NON-FORMULARY, Take 1 Packet by mouth daily. Qty: 30 Each, Refills: 0      promethazine (PHENERGAN) 12.5 mg tablet Take 1 Tab by mouth every six (6) hours as needed for Nausea. Qty: 30 Tab, Refills: 0      acetaminophen (TYLENOL) 325 mg tablet Take 2 Tabs by mouth every six (6) hours as needed for Pain or Fever. Qty: 30 Tab, Refills: 0            Follow up Care:    1. Merced Villalba MD with in 1 weeks  2.  specialists as directed. Diet:  Regular Diet  Disposition:  SNF.   Advanced Directive:  Discharge Exam:  [See today's progress note. ]  CONSULTATIONS: Pulmonary/Intensive care and GI    Significant Diagnostic Studies:   Recent Labs     09/16/20  0525 09/14/20  0342   WBC 9.0 10.4   HGB 9.0* 8.2*   HCT 28.5* 25.5*   * 423*     Recent Labs     09/16/20  0525 09/14/20  0342   * 134*   K 3.7 3.7    101   CO2 28 28   BUN 5* 4*   CREA 0.38* 0.37*   GLU 74 75   CA 7.8* 7.9*     Recent Labs     09/16/20  0525 09/14/20  0342   ALT 97* 139*   * 331*   TBILI 0.5 0.6   TP 6.5 6.2*   ALB 1.2* 1.2*   GLOB 5.3* 5.0*     HOSPITAL COURSE & TREATMENT RENDERED:   1. See today's progress note:  Daily Progress Note and Discharge Note: 9/16/2020  Beckie Rivera MD-PCP    Assessment/Plan:   Pneumonia involving right lung (8/25/2020) /  Hemoptysis (8/25/2020) POA: extensive with internal cavitation and air fluid levels. Staph Aureus, leukocytosis improving. CXR unchanged  --pleural fluid studies + staph, BC neg, cytology neg, AFB neg smears x 3 - cultures pending, and 2nd quantiferon gold negative. -- zosyn, doxy completed, Chest tube removed 9/3  -- Pulm consulted and signed off  -- he will need episodic CXRs      Bilateral pulmonary embolism, acute ([address] (8/25/2020) / Acute deep vein thrombosis (DVT) of both lower extremities ([address] (8/25/2020):   - IVC filter placed   - held anticoags due to dropping Hb and active hemoptysis and GI bleeding - restarted Lovenox 9/15/20 for DVT prophy now that Hb climbing - outpt at rehab they can follow the protocol there.      Weight loss, unintentional (8/25/2020) POA: severe protein calorie malnutrition, ulcerative colitis and empyema likely causes. Nutrition and speech therapy following  -Diet as tolerated  -GI follow up outpt for Ulcerative Colitis     Hyperbilirubinemia (8/25/2020) POA: unclear etiology. CTs as under data review. - resolved     Pleural effusion, right (8/25/2020) POA: empyema, pneumonia vs malignant effusion.  -pulmonology following  - Chest tube placed 8/26/20 and removed 9/3  - improved - will need f/u with Pulmonary     Alcohol use (8/25/2020) POA: he says he has cut down recently. - no withdrawal occured     Chronic Diarrhea (8/25/2020) / Ulcerative colitis/ Fecal occult blood test positive (8/25/2020) POA:  Monitor Hgb. -GI outpt follow up  -imodium and peptobismol PRN  -FloraQ daily- or pt's home probiotics  -mesalamine has helped - continue    Hypomag/hypokalemia:  -replete as needed and monitor at rehab    GI blood loss/blood loss anemia/iron def anemia  - iron supplement        Problem List:  Problem List as of 9/16/2020 Date Reviewed: 8/25/2020          Codes Class Noted - Resolved    * (Principal) Pneumonia involving right lung ICD-10-CM: J18.9  ICD-9-CM: 486  8/25/2020 - Present        Hemoptysis ICD-10-CM: R04.2  ICD-9-CM: 786.30  8/25/2020 - Present        Hyperbilirubinemia ICD-10-CM: E80.6  ICD-9-CM: 782.4  8/25/2020 - Present        Weight loss, unintentional ICD-10-CM: R63.4  ICD-9-CM: 783.21  8/25/2020 - Present        Pleural effusion, right ICD-10-CM: J90  ICD-9-CM: 511.9  8/25/2020 - Present        Bilateral pulmonary embolism ([facility] [facility] ICD-10-CM: I26.99  ICD-9-CM: 415.19  8/25/2020 - Present        Acute deep vein thrombosis (DVT) of both lower extremities ([facility] [address] [facility] ICD-10-CM: I82.403  ICD-9-CM: 453.40  8/25/2020 - Present        Alcohol use ICD-10-CM: Z72.89  ICD-9-CM: V49.89  8/25/2020 - Present        Diarrhea ICD-10-CM: R19.7  ICD-9-CM: 787.91  8/25/2020 - Present        Fecal occult blood test positive ICD-10-CM: R19.5  ICD-9-CM: 792.1  8/25/2020 - Present        Hyponatremia with extracellular fluid depletion ICD-10-CM: E87.1  ICD-9-CM: 276.1  8/25/2020 - Present            Subjective:     60 y.o. male who is being admitted for Pneumonia involving right lung. Mr. Joey Peters presented to our Emergency Department today complaining of generalized weakness, diarrhea and lethargy for several months.  Patient was accompanied by his friend who gave most of the history. The patient does not like seeing physicians and apparently uses vitamin supplements. He is a smoker and drinks alcohol and has been progressively weaker over several months now. He has had pleuritic right sided chest pain associated with a productive cough and hemoptysis. He feel on the floor several days ago and was unable to get up. His friends helped him up and brought him to the ED for further testing. Initial CXR done showed airspace disease in right mid lower lung zone consistent with pneumonia. This is surrounded by a moderate right-sided pleural effusion. Upon review, he was noted to be cachectic with swollen lower extremities. CT scans chest abdomen and pelvis done showed a diffuse irregular opacification throughout the right lower lobe with multiple areas of internal cavitation and air-fluid levels. Findings are more likely related to infection than malignancy. Large right pleural effusion. Trace left pleural effusion. Bilateral pulmonary emboli. Clot burden is moderate. No evidence of right heart strain. Thrombus in the veins of the left lower extremity extending to the IVC. A small amount of thrombus is also seen in the right common iliac vein. He will be admitted for further management. (Dr Genaro Mario)    8/26:  Feeling a little better this AM but still c/o painful, pleuritic cough and occas thick sputum. No leg/calf pains. No known exposure to ill individuals/no travel - \"just staying in the last few months. \"  No temps. WBC 31.9K. Cultures neg so far.      8/27:  Reports less cough, chest pain and feeling better overall. Appetite has returned. Chest tube placed yesterday with discharge obtained - pending cultures. Other cultures remain neg. Hb lower so cont to hold anticoag for now. GI has seen and work up when more stable. Remains on doxy, zosyn, vanc. WBC down to 28K. Bili back to normal.     8/28:  Continues to grad improve. Still with some cough.   No tremor or hallucinations so far.  K+ lower - replacing. WBC coming down. Afeb. CXR this AM pending. Cultures remain neg so far. Quantiferon gold test pending as of this AM.  He reports hx of chronic LE edema for years. 8/29: Pt continues to have frequent stooling. GI plans endoscopy next week if pt allows. He was not willing to participate with PT/OT/nutritionist yesterday. Says despite our best efforts, we will not get his stooling to stop until he gets his home probiotics [friend will bring it]. He refuses to eat until the stooling is better. 8/30:  Pt is reluctant to admit that his stooling has improved. Down to 2 BMs yesterday instead of 6 the day before. Tolerating PO. Willing to eat more today. He is still having hemoptysis. Wanting to get out of bed. Willing to work with PT/OT. 8/31:  Feeling better. Appetite better. TB neg so far. First Quantiferon Gold was indeterminate - repeat pending. Afeb. WBC slowly coming down. Currently on Doxy and Zosyn. Replacing Mag and K+. Hb now stable in 8s. 9/1: Little change. Not moving about much, he reports due to chest tube being painful when he moves. Hb 7.8. WBC 16. No sign of ETOH withdrawal.     /9/2:  Now reports he wants to move around more and get up and wants to work with PT. AFB smears neg x3, AFB cultures still pending of course. WBC slowly coming down. Afeb. Hb still slowly dropping - 7.7 today. May consider prophylactic dose of lovenox and watch Hb.  GI work up pending. Arms a little more swollen. 9/3: Feeling better. Hoping for endoscopy in am. Patient would like chest tube removed prior to prep and studies if possible. Speech would like MBS.     9/4:  Feeling better except for prep for colonoscopy - reports clear results last BM. Chest tube out yesterday. Hb down to 7.0 so still holding anticoag - may need transfusion. Upper and lower endoscopy planned today. CXR this AM pending. K+ down with the prep - replacing.      9/5: Little change. Still with occas productive cough. Now also c/o increasing LE edema and today notes scrotal edema and now wants something done about it. 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 Hb down to 6.9 today - would benefit from transfusion. Risks v benefits of transfusion discussed and he finally agreed. Appetite very good now, taking plenty of fluids now, so will DC IVFs. Low dose of lasix after the transfusion to help with the edema. 9/6:  Less edema in LEs and scrotum today but still quite a bit - will give another low dose of Lasix. Received 1 unit packed red cells yesterday. K+ sl low - replacing. Still having episodic loose stools but on questioning he reports chronic loose stools for \"years\" which would go along with his colonoscopy findings. AM CBC is still pending. Discussed need for rehab and strongly advised he consider going with his degree of debility. Risk of falls and fractures discussed. 9/7:  Still c/o scrotal and LE edema but sl better - one dose of 10mg lasix today. Discussed his low albumin as part of the problem. Less cough today but slight amount of hemoptysis this AM. Hb 7.8 this AM - was in the 8s after the transfusion. K+ still low - replacing. Declining to have dressings changed - discussed risks. Discussed his severe weakness and need for rehab again - still not very interested in going. 9/8:  Less scrotal swelling after the lasix - > 1.5 liter out. He did get up some in room yesterday and did fairly well but still very weak. Fewer loose stools. He has mult questions about Ulcerative colitis and basics discussed with him - advised him to discuss further with GI. Eating well and has high protein diet. Albumin still low. K+ back up. Still has cough - less productive so far today. Will check CXR today. ALT higher toay - monitoring. 9/9:  Continues to have less edema with low dose of lasix yesterday. CXR looking better. Still having frequent loose stools but better than prior to admission. Hb continues to drop likely due to chronic, slow GI blood loss due to the ulcerative colitis. He will likely need episodic transfusions. LFTs look improved but full hepatic panel is pending. Less cough so far today. He was up a bit more yesterday. He reports he is now willing to go to rehab. Due to recent increased bilat LE edema and scrotal edema (although now somewhat better), will get IR to make sure the IVC filter is patent. 1145am:  Discussed with Dr Cristela Zavala (IR) - advised ab US as start to eval IVC. 9/10:  Fewer loose stools. Less scrotal edema and LE edema. Ab US with IVC not completely seen due to bowel gas - will ask IR to comment. LFTs improved. CXR improving. Hb 7.7. He reports he did not get up yesterday. 9/11:  Less cough, less sputum, less edema, and feeling better. CT Chest/Ab/Pelvis 9/10/20 as noted under Data Review. Hb stable in 7s for now - recommend frequent checks at rehab.  K+ sl low - replacing. LFTs better - follow up with GI (Dr Cleopatra Eduardo) after discharge for LFTs and for Ulcerative Colitis. Follow up with PCP when out of rehab. Stable for rehab.      9/12:  Only one stool since yesterday he reports and that was semi-solid. Still had fecal urgency and did not make it to bathroom this AM.  Much less edema and continues to feel better. He walked briefly yesterday but still need max assist to get up. Hb stable. LFTs cont to improve. 9/13:  Continues to feel better but still weak and unable to get up or move from sitting to standing on own. Only one stool and reports it was solid this AM.  Still awaiting rehab.      9/14:  Little change. Only one stool in past 12 hours. Albumin sl better. CXR from yesterday without change. Still very weak. Awaiting rehab. Edema has nearly resolved. 9/15:  Little change. Awaiting rehab. No new complaints. 9/16:  No new complaints.   Fewer loose stools. Appetite good. Informed him that previous rehab acceptance has now been declined and he now wants to go home. Discussed that he would need PT/OT at home and [facility] - will discuss further with case management. Since he is still lying in bed most of the day, and with no further rectal bleeding, rising Hb, and no further hemoptysis, will start Lovenox. 4:15pm:  Pt accepted at [facility] and pt has changed his mind and agreed to go. Stable for rehab. Review of Systems:   A comprehensive review of systems was negative except for that written in the HPI. Objective:   Physical Exam:   Visit Vitals  BP 97/62 (BP 1 Location: Left arm, BP Patient Position: At rest)   Pulse 79   Temp 98.6 °F (37 °C)   Resp 18   Ht 5' 10\" (1.778 m)   Wt 54.5 kg (120 lb 2.4 oz)   SpO2 98%   BMI 17.24 kg/m²    O2 Flow Rate (L/min): 2 l/min O2 Device: Room air  Temp (24hrs), Av.3 °F (36.8 °C), Min:97.9 °F (36.6 °C), Max:98.7 °F (37.1 °C)    No intake/output data recorded.  0701 - 09/15 1900  In: 120 [P.O.:120]  Out: 2230 [Urine:2230]  General:  Alert, cooperative, no distress, cachetic, ill appearing. Head:  Normocephalic, without obvious abnormality, atraumatic. Eyes:  [place] conjuntiva. PERRL, EOMs intact. Throat: Lips, mucosa, and tongue moist.   Neck: Supple, symmetrical, trachea midline, no adenopathy, thyroid: no enlargement/tenderness/nodules, no carotid bruit and no JVD. Lungs:   Better air exchange,  Clear at this moment. Chest wall: Former chest tube site right looks ok   Heart:  Regular rate and rhythm, S1, S2 normal, no murmur, click, rub or gallop. Abdomen:   Soft, non-tender. Bowel sounds normal. No masses,  No organomegaly. Extremities: no cyanosis. No calf tenderness;  trace pitting edema bilat. LEs. Edema resolved in arms and scrotum, No cords palpated. Pulses: 2+ and symmetric all extremities.    Skin: Skin color, texture, turgor normal. No rashes   Neurologic: Alert and oriented X 3. Fine motor of hands and fingers normal.   equal.  No tremor. Gait not tested at this time. Sensation grossly normal to touch. Gross motor of extremities normal.       Data Review:   EXAM: CT CHEST ABD PELV W CONT 8/25/20  INDICATION: weight loss  COMPARISON: Radiographs 8/25/2020  CONTRAST: 100 mL of Isovue-370. FINDINGS:   CHEST WALL: No mass or axillary lymphadenopathy. THYROID: No nodule. MEDIASTINUM: No mass or lymphadenopathy. BRYAN: No mass or lymphadenopathy. THORACIC AORTA: No dissection or aneurysm. MAIN PULMONARY ARTERY: Normal in caliber. Thrombus is seen throughout the right  lower lobe pulmonary arteries. Thrombus is seen in the left lower lobe pulmonary  artery extending into the anterior and lateral basilar segments. TRACHEA/BRONCHI: Patent. ESOPHAGUS: No wall thickening or dilatation. HEART: Normal in size. PLEURA: There is a large right pleural effusion with enhancement of the pleural  surfaces. Trace left pleural effusion. LUNGS: There is irregular opacification throughout the right lower lobe. There  are multiple areas of internal cavitation with air-fluid levels. Small areas of  atelectasis or scarring are seen in the lingula and inferior left lower lobe. LIVER: No mass. BILIARY TREE: Gallbladder is within normal limits. CBD is not dilated. SPLEEN: within normal limits. PANCREAS: No mass or ductal dilatation. ADRENALS: Unremarkable. KIDNEYS: Small nonobstructive stones are seen in the bilateral kidneys. No  evidence of hydronephrosis. No mass. STOMACH: Unremarkable. SMALL BOWEL: No dilatation or wall thickening. COLON: No dilatation or wall thickening. APPENDIX: Unremarkable  PERITONEUM: No ascites or pneumoperitoneum. RETROPERITONEUM: No lymphadenopathy or aortic aneurysm. REPRODUCTIVE ORGANS: Unremarkable  URINARY BLADDER: No mass or calculus. BONES: No destructive bone lesion. ABDOMINAL WALL: No mass or hernia.   ADDITIONAL COMMENTS: There is thrombus in the left femoral veins extending into  the left iliac veins into the IVC. A small amount of thrombus is also present in  the right common iliac vein. IMPRESSION:  1. Diffuse irregular opacification throughout the right lower lobe with multiple  areas of internal cavitation and air-fluid levels. Findings are more likely  related to infection than malignancy. 2. Large right pleural effusion. Trace left pleural effusion. 3. Bilateral pulmonary emboli. Clot burden is moderate. No evidence of right  heart strain. 4. Thrombus in the veins of the left lower extremity extending to the IVC. A  small amount of thrombus is also seen in the right common iliac vein. EXAM:  IR THORACENTESIS/INSERT CHEST TUBE 8/26/20  INDICATION:  Request placement of large size pigtail catheter. Possible empyema. PROCEDURE:  Available history and imaging was reviewed which demonstrates a large partially  loculated right pleural effusion. Possibility of empyema, tuberculosis and other  etiologies are being considered clinically. Specific request for larger pigtail  catheter placement. Following informed consent the patient was evaluated with ultrasound in the  angina/catheter lab department. This demonstrates a large right pleural  effusion. The location of the pleural effusion was marked and then the area was prepped  and draped. 1% lidocaine was used for local anesthesia. A small incision was made with an 11  blade. A micropuncture needle was used under direct fluoroscopic guidance to access the  pleural fluid. When this was achieved the microguidewire was placed followed by  a 5 [place] dilator. Through this a short [address] guidewire  was placed. The track was then dilated to 12 [place]. This was then followed by  placement of a 12 [place] pigtail catheter. The string for the pigtail  was removed prior to placement.  The catheter was positioned under fluoroscopic  guidance and then secured in place at the skin with a 2-0 Prolene suture. The  site was then dressed with a Tegaderm Bioclusive dressing and will be connected  to a Pleur-evac for subsequent drainage. Prior to this approximately 100 mL of fluid was obtained and sent for cytology  as well as multiple cultures including aerobic, anaerobic, AFB and fungal.   The fluid was withdrawn was mildly cloudy serosanguineous in color. I did not  notice any foul smell. The procedure was adequately tolerated without significant blood loss or  evidence of complication. Final digital image of the chest was saved documenting catheter position. IMPRESSION:   Successful ultrasound and fluoroscopic guided placement of 12 [place] right-sided  chest tube. Patient will have ongoing drainage from Pleur-evac in his hospital  bed/room. Care and monitoring as per medical service and nursing service. ECHO 8/27/20  Interpretation Summary   Result status: Final result    · Image quality for this study was technically difficult. · Echo study was technically difficulty and limited due to patient's tolerance. · LV: Estimated LVEF is 55 - 60%. Normal cavity size, wall thickness and systolic function (ejection fraction normal). Wall motion: normal. Mild (grade 1) left ventricular diastolic dysfunction. · Pericardium: Small pericardial effusion adjacent to right ventricle. Portable AP upright view of the chest. 9/1/20  Direct comparison made to prior chest x-ray dated September 1, 2020. Cardiomediastinal silhouette is stable. Right basilar chest tube is unchanged in  position. There No residual pneumothorax is visualized. There are small  bilateral pleural effusions. There are persistent increased interstitial  markings at the right lung. There is persistent bibasilar patchy airspace  disease, right greater than left.   IMPRESSION: No residual pneumothorax visualized    CXR 9/4/20  FINDINGS: AP portable imaging of the chest performed at 5:14 AM demonstrates a  stable cardiomediastinal silhouette. The right basilar chest tube is been  removed. Diffuse airspace disease throughout the right lung is unchanged. Small  right pleural effusion persists. No pneumothorax is visualized. There is  unchanged left basilar atelectasis. No significant osseous abnormalities are  seen. IMPRESSION: No evidence of pneumothorax following chest tube removal.    EXAM:  US ABD LTD 9/9/20  INDICATION:  evaluate IVC for patency, intermittent increase in edema is  swelling scrotum and lower extremities. COMPARISON:  CT abdomen and pelvis 8/25/20, IVC filter placement 8/25/20  TECHNIQUE:   Real-time ultrasound was utilized along with color-flow duplex Doppler  ultrasound to evaluate the inferior vena cava. Multiple saved digital images. FINDINGS:   The upper inferior vena cava is adequately visualized down to the filter and  there is no evidence of thrombus. Due to overlying bowel gas is difficult to visualize inferior vena cava without  below the filter. There is at least some flow demonstrated in the distal  inferior vena cava. IMPRESSION:  1. Normal-appearing inferior vena cava extending down to the filter. 2. Suboptimal visualization of the inferior vena cava beginning at the lower  aspect of the filter and extending through the bifurcation. There is some flow  demonstrated, however this area is suboptimally examined. EXAM: CT CHEST ABD PELV W CONT 9/10/20  INDICATION: assess patency of IVC . IVC filter. COMPARISON: 8/25/2020  CONTRAST: 100 mL of Isovue-370. TECHNIQUE:   Following the uneventful intravenous administration of contrast, thin axial  images were obtained through the chest, abdomen and pelvis. Coronal and sagittal  reformats were generated. Oral contrast was not administered. CT dose reduction  was achieved through use of a standardized protocol tailored for this  examination and automatic exposure control for dose modulation.   FINDINGS:   CHEST WALL: No mass or axillary lymphadenopathy. THYROID: No nodule. MEDIASTINUM: Largest right paratracheal node measures 8 mm in short axis  diameter (series 2, image 24). BRYAN: No mass or lymphadenopathy. THORACIC AORTA: No dissection or aneurysm. MAIN PULMONARY ARTERY: Pulmonary embolism in a branch to the right lower lobe  (series 2, image 35). Nonoccluding small chronic eccentric thrombus in a branch  to the left lower lobe (axial image 28 and coronal image 71). TRACHEA/BRONCHI: Patent. ESOPHAGUS: No wall thickening or dilatation. HEART: Normal in size. PLEURA: Bilateral pleural effusions, larger on the left. Small right  hydropneumothorax. LUNGS: Bibasilar compressive atelectasis, more pronounced on the right. Right  apical pleural thickening with associated small apical pneumothorax (series 3,  image 17). LIVER: No mass. BILIARY TREE: Gallbladder is within normal limits. CBD is not dilated. SPLEEN: within normal limits. PANCREAS: No mass or ductal dilatation. ADRENALS: Unremarkable. KIDNEYS: Bilateral nonobstructing renal calculi (coronal images 91 and 96). STOMACH: Unremarkable. SMALL BOWEL: No dilatation or wall thickening. COLON: No dilatation or wall thickening. APPENDIX: Not visualized  PERITONEUM: No ascites or pneumoperitoneum   RETROPERITONEUM: No lymphadenopathy or aortic aneurysm. REPRODUCTIVE ORGANS: Small prostate  URINARY BLADDER: Layering stones in the inferior bladder (series 2, image 111). BONES: No destructive bone lesion. Disc space narrowing with disc bulges at L3-4  and L4-5 and L5-S1. Dextroconvex lumbar curvature. ABDOMINAL WALL: No mass or hernia. ADDITIONAL COMMENTS: Partial cast in the left iliac vein (coronal image 86 and  axial image 94). . Partial cast below below the IVC. IVC filter in place. (Axial  image 83 and coronal image 76). Small amount of thrombus extends into the origin  of the right common iliac vein (series 2, image 87).   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 Decreased thrombus in in both lower lobe pulmonary arteries  Small right hydropneumothorax. Bilateral pleural effusions, asymmetric to the  left. Bibasilar compressive atelectasis, asymmetric to the right. Incidental bladder calculi. Nonobstructing bilateral renal calculi.     Signed:  Nenita Jhaveri MD  9/16/2020  4:43 PM

## 2020-09-16 NOTE — PROGRESS NOTES
Daily Progress Note and Discharge Note: 9/16/2020  Etienne Go MD-PCP    Assessment/Plan:   Pneumonia involving right lung (8/25/2020) /  Hemoptysis (8/25/2020) POA: extensive with internal cavitation and air fluid levels. Staph Aureus, leukocytosis improving. CXR unchanged  --pleural fluid studies + staph, BC neg, cytology neg, AFB neg smears x 3 - cultures pending, and 2nd quantiferon gold negative. -- zosyn, doxy completed, Chest tube removed 9/3  -- Pulm consulted and signed off  -- he will need episodic CXRs      Bilateral pulmonary embolism, acute ([facility] [address] (8/25/2020) / Acute deep vein thrombosis (DVT) of both lower extremities ([address] (8/25/2020):   - IVC filter placed   - held anticoags due to dropping Hb and active hemoptysis and GI bleeding - restarted Lovenox 9/15/20 for DVT prophy now that Hb climbing - outpt at rehab they can follow the protocol there.      Weight loss, unintentional (8/25/2020) POA: severe protein calorie malnutrition, ulcerative colitis and empyema likely causes. Nutrition and speech therapy following  -Diet as tolerated  -GI follow up outpt for Ulcerative Colitis     Hyperbilirubinemia (8/25/2020) POA: unclear etiology. CTs as under data review. - resolved     Pleural effusion, right (8/25/2020) POA: empyema, pneumonia vs malignant effusion. -pulmonology following  - Chest tube placed 8/26/20 and removed 9/3  - improved - will need f/u with Pulmonary     Alcohol use (8/25/2020) POA: he says he has cut down recently. - no withdrawal occured     Chronic Diarrhea (8/25/2020) / Ulcerative colitis/ Fecal occult blood test positive (8/25/2020) POA:  Monitor Hgb.    -GI outpt follow up  -imodium and peptobismol PRN  -FloraQ daily- or pt's home probiotics  -mesalamine has helped - continue    Hypomag/hypokalemia:  -replete as needed and monitor at rehab    GI blood loss/blood loss anemia/iron def anemia  - iron supplement        Problem List:  Problem List as of 9/16/2020 Date Reviewed: 8/25/2020          Codes Class Noted - Resolved    * (Principal) Pneumonia involving right lung ICD-10-CM: J18.9  ICD-9-CM: 974  8/25/2020 - Present        Hemoptysis ICD-10-CM: R04.2  ICD-9-CM: 786.30  8/25/2020 - Present        Hyperbilirubinemia ICD-10-CM: E80.6  ICD-9-CM: 782.4  8/25/2020 - Present        Weight loss, unintentional ICD-10-CM: R63.4  ICD-9-CM: 783.21  8/25/2020 - Present        Pleural effusion, right ICD-10-CM: J90  ICD-9-CM: 511.9  8/25/2020 - Present        Bilateral pulmonary embolism ([facility] ICD-10-CM: I26.99  ICD-9-CM: 415.19  8/25/2020 - Present        Acute deep vein thrombosis (DVT) of both lower extremities ([facility] ICD-10-CM: I82.403  ICD-9-CM: 453.40  8/25/2020 - Present        Alcohol use ICD-10-CM: Z72.89  ICD-9-CM: V49.89  8/25/2020 - Present        Diarrhea ICD-10-CM: R19.7  ICD-9-CM: 787.91  8/25/2020 - Present        Fecal occult blood test positive ICD-10-CM: R19.5  ICD-9-CM: 792.1  8/25/2020 - Present        Hyponatremia with extracellular fluid depletion ICD-10-CM: E87.1  ICD-9-CM: 276.1  8/25/2020 - Present            Subjective:     60 y.o. male who is being admitted for Pneumonia involving right lung. Mr. Laura Schuster presented to our Emergency Department today complaining of generalized weakness, diarrhea and lethargy for several months. Patient was accompanied by his friend who gave most of the history. The patient does not like seeing physicians and apparently uses vitamin supplements. He is a smoker and drinks alcohol and has been progressively weaker over several months now. He has had pleuritic right sided chest pain associated with a productive cough and hemoptysis. He feel on the floor several days ago and was unable to get up. His friends helped him up and brought him to the ED for further testing. Initial CXR done showed airspace disease in right mid lower lung zone consistent with pneumonia.  This is surrounded by a moderate right-sided pleural effusion. Upon review, he was noted to be cachectic with swollen lower extremities. CT scans chest abdomen and pelvis done showed a diffuse irregular opacification throughout the right lower lobe with multiple areas of internal cavitation and air-fluid levels. Findings are more likely related to infection than malignancy. Large right pleural effusion. Trace left pleural effusion. Bilateral pulmonary emboli. Clot burden is moderate. No evidence of right heart strain. Thrombus in the veins of the left lower extremity extending to the IVC. A small amount of thrombus is also seen in the right common iliac vein. He will be admitted for further management. (Dr Edouard Plan)    8/26:  Feeling a little better this AM but still c/o painful, pleuritic cough and occas thick sputum. No leg/calf pains. No known exposure to ill individuals/no travel - \"just staying in the last few months. \"  No temps. WBC 31.9K. Cultures neg so far.      8/27:  Reports less cough, chest pain and feeling better overall. Appetite has returned. Chest tube placed yesterday with discharge obtained - pending cultures. Other cultures remain neg. Hb lower so cont to hold anticoag for now. GI has seen and work up when more stable. Remains on doxy, zosyn, vanc. WBC down to 28K. Bili back to normal.     8/28:  Continues to grad improve. Still with some cough. No tremor or hallucinations so far. K+ lower - replacing. WBC coming down. Afeb. CXR this AM pending. Cultures remain neg so far. Quantiferon gold test pending as of this AM.  He reports hx of chronic LE edema for years. 8/29: Pt continues to have frequent stooling. GI plans endoscopy next week if pt allows. He was not willing to participate with PT/OT/nutritionist yesterday. Says despite our best efforts, we will not get his stooling to stop until he gets his home probiotics [friend will bring it]. He refuses to eat until the stooling is better.       8/30:  Pt is reluctant to admit that his stooling has improved. Down to 2 BMs yesterday instead of 6 the day before. Tolerating PO. Willing to eat more today. He is still having hemoptysis. Wanting to get out of bed. Willing to work with PT/OT. 8/31:  Feeling better. Appetite better. TB neg so far. First Quantiferon Gold was indeterminate - repeat pending. Afeb. WBC slowly coming down. Currently on Doxy and Zosyn. Replacing Mag and K+. Hb now stable in 8s. 9/1: Little change. Not moving about much, he reports due to chest tube being painful when he moves. Hb 7.8. WBC 16. No sign of ETOH withdrawal.     /9/2:  Now reports he wants to move around more and get up and wants to work with PT. AFB smears neg x3, AFB cultures still pending of course. WBC slowly coming down. Afeb. Hb still slowly dropping - 7.7 today. May consider prophylactic dose of lovenox and watch Hb.  GI work up pending. Arms a little more swollen. 9/3: Feeling better. Hoping for endoscopy in am. Patient would like chest tube removed prior to prep and studies if possible. Speech would like MBS.     9/4:  Feeling better except for prep for colonoscopy - reports clear results last BM. Chest tube out yesterday. Hb down to 7.0 so still holding anticoag - may need transfusion. Upper and lower endoscopy planned today. CXR this AM pending. K+ down with the prep - replacing. 9/5:  Little change. Still with occas productive cough. Now also c/o increasing LE edema and today notes scrotal edema and now wants something done about it. 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 Hb down to 6.9 today - would benefit from transfusion. Risks v benefits of transfusion discussed and he finally agreed. Appetite very good now, taking plenty of fluids now, so will DC IVFs. Low dose of lasix after the transfusion to help with the edema. 9/6:  Less edema in LEs and scrotum today but still quite a bit - will give another low dose of Lasix. Received 1 unit packed red cells yesterday. K+ sl low - replacing. Still having episodic loose stools but on questioning he reports chronic loose stools for \"years\" which would go along with his colonoscopy findings. AM CBC is still pending. Discussed need for rehab and strongly advised he consider going with his degree of debility. Risk of falls and fractures discussed. 9/7:  Still c/o scrotal and LE edema but sl better - one dose of 10mg lasix today. Discussed his low albumin as part of the problem. Less cough today but slight amount of hemoptysis this AM. Hb 7.8 this AM - was in the 8s after the transfusion. K+ still low - replacing. Declining to have dressings changed - discussed risks. Discussed his severe weakness and need for rehab again - still not very interested in going. 9/8:  Less scrotal swelling after the lasix - > 1.5 liter out. He did get up some in room yesterday and did fairly well but still very weak. Fewer loose stools. He has mult questions about Ulcerative colitis and basics discussed with him - advised him to discuss further with GI. Eating well and has high protein diet. Albumin still low. K+ back up. Still has cough - less productive so far today. Will check CXR today. ALT higher toay - monitoring. 9/9:  Continues to have less edema with low dose of lasix yesterday. CXR looking better. Still having frequent loose stools but better than prior to admission. Hb continues to drop likely due to chronic, slow GI blood loss due to the ulcerative colitis. He will likely need episodic transfusions. LFTs look improved but full hepatic panel is pending. Less cough so far today. He was up a bit more yesterday. He reports he is now willing to go to rehab. Due to recent increased bilat LE edema and scrotal edema (although now somewhat better), will get IR to make sure the IVC filter is patent.     1145am:  Discussed with Dr Alexandra Gamino (IR) - advised ab US as start to eval IVC. 9/10:  Fewer loose stools. Less scrotal edema and LE edema. Ab US with IVC not completely seen due to bowel gas - will ask IR to comment. LFTs improved. CXR improving. Hb 7.7. He reports he did not get up yesterday. 9/11:  Less cough, less sputum, less edema, and feeling better. CT Chest/Ab/Pelvis 9/10/20 as noted under Data Review. Hb stable in 7s for now - recommend frequent checks at rehab.  K+ sl low - replacing. LFTs better - follow up with GI (Dr Stacia Persaud) after discharge for LFTs and for Ulcerative Colitis. Follow up with PCP when out of rehab. Stable for rehab.      9/12:  Only one stool since yesterday he reports and that was semi-solid. Still had fecal urgency and did not make it to bathroom this AM.  Much less edema and continues to feel better. He walked briefly yesterday but still need max assist to get up. Hb stable. LFTs cont to improve. 9/13:  Continues to feel better but still weak and unable to get up or move from sitting to standing on own. Only one stool and reports it was solid this AM.  Still awaiting rehab.      9/14:  Little change. Only one stool in past 12 hours. Albumin sl better. CXR from yesterday without change. Still very weak. Awaiting rehab. Edema has nearly resolved. 9/15:  Little change. Awaiting rehab. No new complaints. 9/16:  No new complaints. Fewer loose stools. Appetite good. Informed him that previous rehab acceptance has now been declined and he now wants to go home. Discussed that he would need PT/OT at home and [facility] - will discuss further with case management. Since he is still lying in bed most of the day, and with no further rectal bleeding, rising Hb, and no further hemoptysis, will start Lovenox. 4:15pm:  Pt accepted at [facility] and pt has changed his mind and agreed to go. Stable for rehab.       Review of Systems:   A comprehensive review of systems was negative except for that written in the HPI.    Objective:   Physical Exam:   Visit Vitals  BP 97/62 (BP 1 Location: Left arm, BP Patient Position: At rest)   Pulse 79   Temp 98.6 °F (37 °C)   Resp 18   Ht 5' 10\" (1.778 m)   Wt 54.5 kg (120 lb 2.4 oz)   SpO2 98%   BMI 17.24 kg/m²    O2 Flow Rate (L/min): 2 l/min O2 Device: Room air  Temp (24hrs), Av.3 °F (36.8 °C), Min:97.9 °F (36.6 °C), Max:98.7 °F (37.1 °C)    No intake/output data recorded.  0701 - 09/15 1900  In: 120 [P.O.:120]  Out: 2230 [Urine:2230]  General:  Alert, cooperative, no distress, cachetic, ill appearing. Head:  Normocephalic, without obvious abnormality, atraumatic. Eyes:  [place] conjuntiva. PERRL, EOMs intact. Throat: Lips, mucosa, and tongue moist.   Neck: Supple, symmetrical, trachea midline, no adenopathy, thyroid: no enlargement/tenderness/nodules, no carotid bruit and no JVD. Lungs:   Better air exchange,  Clear at this moment. Chest wall: Former chest tube site right looks ok   Heart:  Regular rate and rhythm, S1, S2 normal, no murmur, click, rub or gallop. Abdomen:   Soft, non-tender. Bowel sounds normal. No masses,  No organomegaly. Extremities: no cyanosis. No calf tenderness;  trace pitting edema bilat. LEs. Edema resolved in arms and scrotum, No cords palpated. Pulses: 2+ and symmetric all extremities. Skin: Skin color, texture, turgor normal. No rashes   Neurologic:  Alert and oriented X 3. Fine motor of hands and fingers normal.   equal.  No tremor. Gait not tested at this time. Sensation grossly normal to touch. Gross motor of extremities normal.       Data Review:   EXAM: CT CHEST ABD PELV W CONT 20  INDICATION: weight loss  COMPARISON: Radiographs 2020  CONTRAST: 100 mL of Isovue-370. FINDINGS:   CHEST WALL: No mass or axillary lymphadenopathy. THYROID: No nodule. MEDIASTINUM: No mass or lymphadenopathy. BRYAN: No mass or lymphadenopathy. THORACIC AORTA: No dissection or aneurysm.   MAIN PULMONARY ARTERY: Normal in caliber. Thrombus is seen throughout the right  lower lobe pulmonary arteries. Thrombus is seen in the left lower lobe pulmonary  artery extending into the anterior and lateral basilar segments. TRACHEA/BRONCHI: Patent. ESOPHAGUS: No wall thickening or dilatation. HEART: Normal in size. PLEURA: There is a large right pleural effusion with enhancement of the pleural  surfaces. Trace left pleural effusion. LUNGS: There is irregular opacification throughout the right lower lobe. There  are multiple areas of internal cavitation with air-fluid levels. Small areas of  atelectasis or scarring are seen in the lingula and inferior left lower lobe. LIVER: No mass. BILIARY TREE: Gallbladder is within normal limits. CBD is not dilated. SPLEEN: within normal limits. PANCREAS: No mass or ductal dilatation. ADRENALS: Unremarkable. KIDNEYS: Small nonobstructive stones are seen in the bilateral kidneys. No  evidence of hydronephrosis. No mass. STOMACH: Unremarkable. SMALL BOWEL: No dilatation or wall thickening. COLON: No dilatation or wall thickening. APPENDIX: Unremarkable  PERITONEUM: No ascites or pneumoperitoneum. RETROPERITONEUM: No lymphadenopathy or aortic aneurysm. REPRODUCTIVE ORGANS: Unremarkable  URINARY BLADDER: No mass or calculus. BONES: No destructive bone lesion. ABDOMINAL WALL: No mass or hernia. ADDITIONAL COMMENTS: There is thrombus in the left femoral veins extending into  the left iliac veins into the IVC. A small amount of thrombus is also present in  the right common iliac vein. IMPRESSION:  1. Diffuse irregular opacification throughout the right lower lobe with multiple  areas of internal cavitation and air-fluid levels. Findings are more likely  related to infection than malignancy. 2. Large right pleural effusion. Trace left pleural effusion. 3. Bilateral pulmonary emboli. Clot burden is moderate. No evidence of right  heart strain.   4. Thrombus in the veins of the left lower extremity extending to the IVC. A  small amount of thrombus is also seen in the right common iliac vein. EXAM:  IR THORACENTESIS/INSERT CHEST TUBE 8/26/20  INDICATION:  Request placement of large size pigtail catheter. Possible empyema. PROCEDURE:  Available history and imaging was reviewed which demonstrates a large partially  loculated right pleural effusion. Possibility of empyema, tuberculosis and other  etiologies are being considered clinically. Specific request for larger pigtail  catheter placement. Following informed consent the patient was evaluated with ultrasound in the  angina/catheter lab department. This demonstrates a large right pleural  effusion. The location of the pleural effusion was marked and then the area was prepped  and draped. 1% lidocaine was used for local anesthesia. A small incision was made with an 11  blade. A micropuncture needle was used under direct fluoroscopic guidance to access the  pleural fluid. When this was achieved the microguidewire was placed followed by  a 5 [place] dilator. Through this a short [address] guidewire  was placed. The track was then dilated to 12 [place]. This was then followed by  placement of a 12 [place] pigtail catheter. The string for the pigtail  was removed prior to placement. The catheter was positioned under fluoroscopic  guidance and then secured in place at the skin with a 2-0 Prolene suture. The  site was then dressed with a Tegaderm Bioclusive dressing and will be connected  to a Pleur-evac for subsequent drainage. Prior to this approximately 100 mL of fluid was obtained and sent for cytology  as well as multiple cultures including aerobic, anaerobic, AFB and fungal.   The fluid was withdrawn was mildly cloudy serosanguineous in color. I did not  notice any foul smell. The procedure was adequately tolerated without significant blood loss or  evidence of complication.     Final digital image of the chest was saved documenting catheter position. IMPRESSION:   Successful ultrasound and fluoroscopic guided placement of 12 [place] right-sided  chest tube. Patient will have ongoing drainage from Pleur-evac in his hospital  bed/room. Care and monitoring as per medical service and nursing service. ECHO 8/27/20  Interpretation Summary   Result status: Final result    · Image quality for this study was technically difficult. · Echo study was technically difficulty and limited due to patient's tolerance. · LV: Estimated LVEF is 55 - 60%. Normal cavity size, wall thickness and systolic function (ejection fraction normal). Wall motion: normal. Mild (grade 1) left ventricular diastolic dysfunction. · Pericardium: Small pericardial effusion adjacent to right ventricle. Portable AP upright view of the chest. 9/1/20  Direct comparison made to prior chest x-ray dated September 1, 2020. Cardiomediastinal silhouette is stable. Right basilar chest tube is unchanged in  position. There No residual pneumothorax is visualized. There are small  bilateral pleural effusions. There are persistent increased interstitial  markings at the right lung. There is persistent bibasilar patchy airspace  disease, right greater than left. IMPRESSION: No residual pneumothorax visualized    CXR 9/4/20  FINDINGS: AP portable imaging of the chest performed at 5:14 AM demonstrates a  stable cardiomediastinal silhouette. The right basilar chest tube is been  removed. Diffuse airspace disease throughout the right lung is unchanged. Small  right pleural effusion persists. No pneumothorax is visualized. There is  unchanged left basilar atelectasis. No significant osseous abnormalities are  seen. IMPRESSION: No evidence of pneumothorax following chest tube removal.    EXAM:   [facility] 9/9/20  INDICATION:  evaluate IVC for patency, intermittent increase in edema is  swelling scrotum and lower extremities.   COMPARISON:  CT abdomen and pelvis 8/25/20, IVC filter placement 8/25/20  TECHNIQUE:   Real-time ultrasound was utilized along with color-flow duplex Doppler  ultrasound to evaluate the inferior vena cava. Multiple saved digital images. FINDINGS:   The upper inferior vena cava is adequately visualized down to the filter and  there is no evidence of thrombus. Due to overlying bowel gas is difficult to visualize inferior vena cava without  below the filter. There is at least some flow demonstrated in the distal  inferior vena cava. IMPRESSION:  1. Normal-appearing inferior vena cava extending down to the filter. 2. Suboptimal visualization of the inferior vena cava beginning at the lower  aspect of the filter and extending through the bifurcation. There is some flow  demonstrated, however this area is suboptimally examined. EXAM: CT CHEST ABD PELV W CONT 9/10/20  INDICATION: assess patency of IVC . IVC filter. COMPARISON: 8/25/2020  CONTRAST: 100 mL of Isovue-370. TECHNIQUE:   Following the uneventful intravenous administration of contrast, thin axial  images were obtained through the chest, abdomen and pelvis. Coronal and sagittal  reformats were generated. Oral contrast was not administered. CT dose reduction  was achieved through use of a standardized protocol tailored for this  examination and automatic exposure control for dose modulation. FINDINGS:   CHEST WALL: No mass or axillary lymphadenopathy. THYROID: No nodule. MEDIASTINUM: Largest right paratracheal node measures 8 mm in short axis  diameter (series 2, image 24). BRYAN: No mass or lymphadenopathy. THORACIC AORTA: No dissection or aneurysm. MAIN PULMONARY ARTERY: Pulmonary embolism in a branch to the right lower lobe  (series 2, image 35). Nonoccluding small chronic eccentric thrombus in a branch  to the left lower lobe (axial image 28 and coronal image 71). TRACHEA/BRONCHI: Patent. ESOPHAGUS: No wall thickening or dilatation. HEART: Normal in size.   PLEURA: Bilateral pleural effusions, larger on the left. Small right  hydropneumothorax. LUNGS: Bibasilar compressive atelectasis, more pronounced on the right. Right  apical pleural thickening with associated small apical pneumothorax (series 3,  image 17). LIVER: No mass. BILIARY TREE: Gallbladder is within normal limits. CBD is not dilated. SPLEEN: within normal limits. PANCREAS: No mass or ductal dilatation. ADRENALS: Unremarkable. KIDNEYS: Bilateral nonobstructing renal calculi (coronal images 91 and 96). STOMACH: Unremarkable. SMALL BOWEL: No dilatation or wall thickening. COLON: No dilatation or wall thickening. APPENDIX: Not visualized  PERITONEUM: No ascites or pneumoperitoneum   RETROPERITONEUM: No lymphadenopathy or aortic aneurysm. REPRODUCTIVE ORGANS: Small prostate  URINARY BLADDER: Layering stones in the inferior bladder (series 2, image 111). BONES: No destructive bone lesion. Disc space narrowing with disc bulges at L3-4  and L4-5 and L5-S1. Dextroconvex lumbar curvature. ABDOMINAL WALL: No mass or hernia. ADDITIONAL COMMENTS: Partial cast in the left iliac vein (coronal image 86 and  axial image 94). . Partial cast below below the IVC. IVC filter in place. (Axial  image 83 and coronal image 76). Small amount of thrombus extends into the origin  of the right common iliac vein (series 2, image 87). 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 Decreased thrombus in in both lower lobe pulmonary arteries  Small right hydropneumothorax. Bilateral pleural effusions, asymmetric to the  left. Bibasilar compressive atelectasis, asymmetric to the right. Incidental bladder calculi.  Nonobstructing bilateral renal calculi.     Recent Days:  Recent Labs     09/16/20  0525 09/14/20  0342   WBC 9.0 10.4   HGB 9.0* 8.2*   HCT 28.5* 25.5*   * 423*     Recent Labs     09/16/20  0525 09/14/20  0342   * 134*   K 3.7 3.7    101   CO2 28 28   GLU 74 75   BUN 5* 4*   CREA 0.38* 0.37*   CA 7.8* 7.9*   ALB 1.2* 1.2*   TBILI 0.5 0.6   ALT 97* 139*     No results for input(s): PH, PCO2, PO2, HCO3, FIO2 in the last 72 hours. 24 Hour Results:  Recent Results (from the past 24 hour(s))   CBC WITH AUTOMATED DIFF    Collection Time: 09/16/20  5:25 AM   Result Value Ref Range    WBC 9.0 4.1 - 11.1 K/uL    RBC 2.74 (L) 4.10 - 5.70 M/uL    HGB 9.0 (L) 12.1 - 17.0 g/dL    HCT 28.5 (L) 36.6 - 50.3 %    .0 (H) 80.0 - 99.0 FL    MCH 32.8 26.0 - 34.0 PG    MCHC 31.6 30.0 - 36.5 g/dL    RDW 17.2 (H) 11.5 - 14.5 %    PLATELET 827 (H) 204 - 400 K/uL    MPV 8.8 (L) 8.9 - 12.9 FL    NRBC 0.0 0  WBC    ABSOLUTE NRBC 0.00 0.00 - 0.01 K/uL    NEUTROPHILS 66 32 - 75 %    LYMPHOCYTES 24 12 - 49 %    MONOCYTES 7 5 - 13 %    EOSINOPHILS 1 0 - 7 %    BASOPHILS 1 0 - 1 %    IMMATURE GRANULOCYTES 1 (H) 0.0 - 0.5 %    ABS. NEUTROPHILS 6.0 1.8 - 8.0 K/UL    ABS. LYMPHOCYTES 2.1 0.8 - 3.5 K/UL    ABS. MONOCYTES 0.7 0.0 - 1.0 K/UL    ABS. EOSINOPHILS 0.1 0.0 - 0.4 K/UL    ABS. BASOPHILS 0.1 0.0 - 0.1 K/UL    ABS. IMM. GRANS. 0.1 (H) 0.00 - 0.04 K/UL    DF AUTOMATED     METABOLIC PANEL, COMPREHENSIVE    Collection Time: 09/16/20  5:25 AM   Result Value Ref Range    Sodium 135 (L) 136 - 145 mmol/L    Potassium 3.7 3.5 - 5.1 mmol/L    Chloride 102 97 - 108 mmol/L    CO2 28 21 - 32 mmol/L    Anion gap 5 5 - 15 mmol/L    Glucose 74 65 - 100 mg/dL    BUN 5 (L) 6 - 20 MG/DL    Creatinine 0.38 (L) 0.70 - 1.30 MG/DL    BUN/Creatinine ratio 13 12 - 20      GFR est AA >60 >60 ml/min/1.73m2    GFR est non-AA >60 >60 ml/min/1.73m2    Calcium 7.8 (L) 8.5 - 10.1 MG/DL    Bilirubin, total 0.5 0.2 - 1.0 MG/DL    ALT (SGPT) 97 (H) 12 - 78 U/L    AST (SGOT) 91 (H) 15 - 37 U/L    Alk.  phosphatase 286 (H) 45 - 117 U/L    Protein, total 6.5 6.4 - 8.2 g/dL    Albumin 1.2 (L) 3.5 - 5.0 g/dL    Globulin 5.3 (H) 2.0 - 4.0 g/dL    A-G Ratio 0.2 (L) 1.1 - 2.2         Medications reviewed  Current Facility-Administered Medications   Medication Dose Route Frequency    potassium chloride SR (KLOR-CON 10) tablet 10 mEq  10 mEq Oral BID    ferrous sulfate tablet 325 mg  1 Tab Oral DAILY WITH BREAKFAST    0.9% sodium chloride infusion 250 mL  250 mL IntraVENous PRN    Probiotic - USANA Probiotic  (Patient Supplied)  1 Packet Oral DAILY    mesalamine (DELZICOL) DR capsule 800 mg  800 mg Oral TID    white petrolatum-mineral oiL (EUCERIN) cream   Topical BID    lipase-protease-amylase (CREON 24,000) capsule 1 Cap  1 Cap Oral TID WITH MEALS    loperamide (IMODIUM) capsule 2 mg  2 mg Oral Q6H PRN    bismuth subsalicylate (PEPTO-BISMOL) oral suspension 30 mL  30 mL Oral Q6H PRN    cyanocobalamin (VITAMIN B12) tablet 1,000 mcg  1,000 mcg Oral DAILY    folic acid (FOLVITE) tablet 1 mg  1 mg Oral DAILY    HYDROcodone-acetaminophen (NORCO) 5-325 mg per tablet 1 Tab  1 Tab Oral Q6H PRN    morphine injection 1 mg  1 mg IntraVENous Q4H PRN    sodium chloride (NS) flush 5-10 mL  5-10 mL IntraVENous PRN    sodium chloride (NS) flush 5-40 mL  5-40 mL IntraVENous Q8H    sodium chloride (NS) flush 5-40 mL  5-40 mL IntraVENous PRN    acetaminophen (TYLENOL) tablet 650 mg  650 mg Oral Q6H PRN    Or    acetaminophen (TYLENOL) suppository 650 mg  650 mg Rectal Q6H PRN    polyethylene glycol (MIRALAX) packet 17 g  17 g Oral DAILY PRN    promethazine (PHENERGAN) tablet 12.5 mg  12.5 mg Oral Q6H PRN    Or    ondansetron (ZOFRAN) injection 4 mg  4 mg IntraVENous Q6H PRN     Care Plan discussed with: Patient/Nurse/IR/GI  Total time spent with patient: 30 minutes.   Payton Bishop MD

## 2020-09-16 NOTE — PROGRESS NOTES
1623:  Pt d/c'd to [facility] today. Nursing to call report to [facility] at [phone]. [facility] was called ([phone]); left a message for w/c Duyen Lizarraga  today. They will  at [place] [id number], manager, approved case management to be billed. Dr. Laurence Landeros was called to update him and request a d/c order.       CM Note:  [facility] can accept pt pending therapy notes. They do have a bed today.   Mike LOMBARDI

## 2020-09-16 NOTE — PROGRESS NOTES
Problem: Mobility Impaired (Adult and Pediatric)  Goal: *Acute Goals and Plan of Care (Insert Text)  Description: FUNCTIONAL STATUS PRIOR TO ADMISSION: Pt reports independent baseline mobility without device    HOME SUPPORT PRIOR TO ADMISSION: The patient lived alone with friends to provide assistance intermittently. Physical Therapy Goals  Initiated 8/27/2020; continue same goals as of 9/2/2020 ; continue same goals as of 9/11/2020  1. Patient will move from supine to sit and sit to supine in bed with supervision/set-up within 7 day(s). 2.  Patient will transfer from bed to chair and chair to bed with supervision/set-up using the least restrictive device within 7 day(s). 3.  Patient will perform sit to stand with supervision/set-up within 7 day(s). 4.  Patient will ambulate with supervision/set-up for 100 feet with the least restrictive device within 7 day(s). 5.  Patient will ascend/descend 4 stairs with one handrail(s) with minimal assistance/contact guard assist within 7 day(s). Note:   PHYSICAL THERAPY TREATMENT  Patient: Javan Lucas (46 y.o. male)  Date: 9/16/2020  Diagnosis: Pneumonia [J18.9]   Pneumonia involving right lung  Procedure(s) (LRB):  ESOPHAGOGASTRODUODENOSCOPY (EGD) (N/A)  COLONOSCOPY (N/A)  ESOPHAGOGASTRODUODENAL (EGD) BIOPSY (N/A)  COLON BIOPSY (N/A) 12 Days Post-Op  Precautions: Fall  Chart, physical therapy assessment, plan of care and goals were reviewed. ASSESSMENT  Patient continues with skilled PT services. Pt comes to sit with CGA. Pt ambulated 40ft with  RW CGA. Pt  given cues to decrease audi making turns. Pt back to bed with CGA. Pt progressing slowly. Continue goals. Current Level of Function Impacting Discharge (mobility/balance): Pt is generally weak but is CGA for mobility and short distance ambulation. PLAN :  Patient continues to benefit from skilled intervention to address the above impairments.   Continue treatment per established plan of care.  to address goals. Recommendation for discharge: (in order for the patient to meet his/her long term goals)  Therapy 3 hours per day 5-7 days per week or home with intensive Home health PT. This discharge recommendation:  Has been made in collaboration with the attending provider and/or case management    IF patient discharges home will need the following DME: rolling walker       SUBJECTIVE:       OBJECTIVE DATA SUMMARY:   Critical Behavior:  Neurologic State: Alert, Eyes open spontaneously  Orientation Level: Oriented X4  Cognition: Follows commands, Appropriate safety awareness, Appropriate for age attention/concentration, Appropriate decision making  Safety/Judgement: Awareness of environment  Functional Mobility Training:  Bed Mobility:     Supine to Sit: Contact guard assistance  Sit to Supine: Contact guard assistance           Transfers:  Sit to Stand: Contact guard assistance  Stand to Sit: Contact guard assistance            Ambulation/Gait Training:  Distance (ft): 30 Feet (ft)  Assistive Device: Walker, rolling  Ambulation - Level of Assistance: Contact guard assistance        Gait Abnormalities: Decreased step clearance        Base of Support: Narrowed     Speed/Taty: Pace decreased (<100 feet/min)  Step Length: Right shortened;Left shortened               Activity Tolerance:   Good and Fair  Please refer to the flowsheet for vital signs taken during this treatment.     After treatment patient left in no apparent distress:   Supine in bed    COMMUNICATION/COLLABORATION:   The patients plan of care was discussed with: Physical therapist.     Tyshawn Cunha PTA   Time Calculation: 23 mins

## 2020-09-16 NOTE — PROGRESS NOTES
1630 Patient will be going to [facility], Wheel Chair Ania August will arrive at 1800.     [phone] Discharge information provided. Time for questions and concerns allowed. IV removed. Cleaned up and ready to go. RN will continue to monitor. Report called to [facility] @ [phone] to [facility]. [address] to confirm pickup. Stated that the  got a little behind and should be here shortly.

## 2020-09-16 NOTE — PROGRESS NOTES
Bedside and Verbal shift change report given to Bella Sommers RN (oncoming nurse) by Tracie Navarro RN (offgoing nurse). Report included the following information SBAR, Intake/Output, MAR and Recent Results.

## 2020-09-16 NOTE — DISCHARGE INSTRUCTIONS
Patient Discharge Instructions    Yasemin Vallejor / [id number] : 1955    Admitted 2020 Discharged: 2020 4:42 PM     ACUTE DIAGNOSES:  Pneumonia [J18.9]    CHRONIC MEDICAL DIAGNOSES:  Problem List as of 2020 Date Reviewed: 2020          Codes Class Noted - Resolved    * (Principal) Pneumonia involving right lung ICD-10-CM: J18.9  ICD-9-CM: 190  2020 - Present        Hemoptysis ICD-10-CM: R04.2  ICD-9-CM: 786.30  2020 - Present        Hyperbilirubinemia ICD-10-CM: E80.6  ICD-9-CM: 782.4  2020 - Present        Weight loss, unintentional ICD-10-CM: R63.4  ICD-9-CM: 783.21  2020 - Present        Pleural effusion, right ICD-10-CM: J90  ICD-9-CM: 511.9  2020 - Present        Bilateral pulmonary embolism ([facility] [address] ICD-10-CM: I26.99  ICD-9-CM: 415.19  2020 - Present        Acute deep vein thrombosis (DVT) of both lower extremities ([facility] [address] ICD-10-CM: I82.403  ICD-9-CM: 453.40  2020 - Present        Alcohol use ICD-10-CM: Z72.89  ICD-9-CM: V49.89  2020 - Present        Diarrhea ICD-10-CM: R19.7  ICD-9-CM: 787.91  2020 - Present        Fecal occult blood test positive ICD-10-CM: R19.5  ICD-9-CM: 792.1  2020 - Present        Hyponatremia with extracellular fluid depletion ICD-10-CM: E87.1  ICD-9-CM: 276.1  2020 - Present              DISCHARGE MEDICATIONS:         · It is important that you take the medication exactly as they are prescribed. · Keep your medication in the bottles provided by the pharmacist and keep a list of the medication names, dosages, and times to be taken in your wallet. · Do not take other medications without consulting your doctor.        DIET:  Regular Diet    ACTIVITY: Activity as tolerated    ADDITIONAL INFORMATION: If you experience any of the following symptoms then please call your primary care physician or return to the emergency room if you cannot get hold of your doctor: Fever, chills, nausea, vomiting, diarrhea, change in mentation, falling, bleeding, shortness of breath. FOLLOW UP CARE:  Dr. Kip Vieira MD  you are to call and set up an appointment to see them with in 1 week. Follow-up with specialists at directed by them      Information obtained by :  I understand that if any problems occur once I am at home I am to contact my physician. I understand and acknowledge receipt of the instructions indicated above.                                                                                                                                            Physician's or R.N.'s Signature                                                                  Date/Time                                                                                                                                              Patient or Representative Signature                                                          Date/Time

## 2020-09-28 LAB
BACTERIA SPEC CULT: NORMAL
SERVICE CMNT-IMP: NORMAL

## 2020-10-12 LAB
ACID FAST STN SPEC: NEGATIVE
MYCOBACTERIUM SPEC QL CULT: NEGATIVE
SPECIMEN PREPARATION: NORMAL
SPECIMEN SOURCE: NORMAL

## 2020-10-19 ENCOUNTER — HOSPITAL ENCOUNTER (OUTPATIENT)
Dept: GENERAL RADIOLOGY | Age: 65
Discharge: HOME | End: 2020-10-19
Payer: MEDICAID

## 2020-10-19 ENCOUNTER — TRANSCRIBE ORDER (OUTPATIENT)
Dept: REGISTRATION | Age: 65
End: 2020-10-19

## 2020-10-19 DIAGNOSIS — J18.9 UNRESOLVED PNEUMONIA: ICD-10-CM

## 2020-10-19 DIAGNOSIS — J18.9 UNRESOLVED PNEUMONIA: Primary | ICD-10-CM

## 2020-10-19 PROCEDURE — 71046 X-RAY EXAM CHEST 2 VIEWS: CPT

## 2020-11-13 ENCOUNTER — TRANSCRIBE ORDER (OUTPATIENT)
Dept: SCHEDULING | Age: 65
End: 2020-11-13

## 2020-11-13 DIAGNOSIS — R64 CACHECTIC (HCC): Primary | ICD-10-CM

## 2020-11-13 DIAGNOSIS — R63.4 WEIGHT LOSS: ICD-10-CM

## 2020-11-13 DIAGNOSIS — R74.8 ELEVATED LIVER ENZYMES: ICD-10-CM

## (undated) DEVICE — ELECTRODE,RADIOTRANSLUCENT,FOAM,3PK: Brand: MEDLINE

## (undated) DEVICE — 1200 GUARD II KIT W/5MM TUBE W/O VAC TUBE: Brand: GUARDIAN

## (undated) DEVICE — CONTAINER SPEC 20 ML LID NEUT BUFF FORMALIN 10 % POLYPR STS

## (undated) DEVICE — KIT COLON W/ 1.1OZ LUB AND 2 END

## (undated) DEVICE — SOLIDIFIER MEDC 1200ML -- CONVERT TO 356117

## (undated) DEVICE — CANN NASAL O2 CAPNOGRAPHY AD -- FILTERLINE

## (undated) DEVICE — JELLY,LUBE,STERILE,FLIP TOP,TUBE,4-OZ: Brand: MEDLINE

## (undated) DEVICE — FORCEPS BX L240CM JAW DIA2.8MM L CAP W/ NDL MIC MESH TOOTH

## (undated) DEVICE — BITEBLOCK ENDOSCP 60FR MAXI WHT POLYETH STURDY W/ VELC WVN

## (undated) DEVICE — BAG SPEC BIOHZRD 10 X 10 IN --